# Patient Record
Sex: MALE | Race: WHITE | NOT HISPANIC OR LATINO | Employment: OTHER | URBAN - METROPOLITAN AREA
[De-identification: names, ages, dates, MRNs, and addresses within clinical notes are randomized per-mention and may not be internally consistent; named-entity substitution may affect disease eponyms.]

---

## 2017-01-10 ENCOUNTER — HOSPITAL ENCOUNTER (OUTPATIENT)
Dept: RADIOLOGY | Facility: MEDICAL CENTER | Age: 62
End: 2017-01-10
Attending: SURGERY
Payer: COMMERCIAL

## 2017-01-10 DIAGNOSIS — M79.606 PAIN OF LOWER EXTREMITY, UNSPECIFIED LATERALITY: ICD-10-CM

## 2017-01-10 DIAGNOSIS — M79.605 PAIN IN BOTH LOWER EXTREMITIES: ICD-10-CM

## 2017-01-10 DIAGNOSIS — M79.604 PAIN IN BOTH LOWER EXTREMITIES: ICD-10-CM

## 2017-01-10 DIAGNOSIS — R20.0 NUMBNESS: ICD-10-CM

## 2017-01-10 PROCEDURE — 93922 UPR/L XTREMITY ART 2 LEVELS: CPT | Mod: 26 | Performed by: SURGERY

## 2017-01-10 PROCEDURE — 93978 VASCULAR STUDY: CPT | Mod: 26 | Performed by: SURGERY

## 2017-01-10 PROCEDURE — 93978 VASCULAR STUDY: CPT

## 2017-01-10 PROCEDURE — 93880 EXTRACRANIAL BILAT STUDY: CPT

## 2017-01-10 PROCEDURE — 93922 UPR/L XTREMITY ART 2 LEVELS: CPT

## 2017-01-10 PROCEDURE — 93880 EXTRACRANIAL BILAT STUDY: CPT | Mod: 26 | Performed by: SURGERY

## 2017-01-17 ENCOUNTER — OFFICE VISIT (OUTPATIENT)
Dept: MEDICAL GROUP | Facility: PHYSICIAN GROUP | Age: 62
End: 2017-01-17
Payer: COMMERCIAL

## 2017-01-17 VITALS
TEMPERATURE: 98.4 F | HEIGHT: 70 IN | OXYGEN SATURATION: 95 % | SYSTOLIC BLOOD PRESSURE: 130 MMHG | DIASTOLIC BLOOD PRESSURE: 84 MMHG | WEIGHT: 148 LBS | BODY MASS INDEX: 21.19 KG/M2 | HEART RATE: 89 BPM

## 2017-01-17 DIAGNOSIS — I10 ESSENTIAL HYPERTENSION: ICD-10-CM

## 2017-01-17 DIAGNOSIS — F17.210 CIGARETTE NICOTINE DEPENDENCE WITHOUT COMPLICATION: ICD-10-CM

## 2017-01-17 DIAGNOSIS — I25.810 CORONARY ARTERY DISEASE INVOLVING AUTOLOGOUS VEIN CORONARY BYPASS GRAFT WITHOUT ANGINA PECTORIS: ICD-10-CM

## 2017-01-17 PROCEDURE — 99204 OFFICE O/P NEW MOD 45 MIN: CPT | Performed by: FAMILY MEDICINE

## 2017-01-17 RX ORDER — SIMVASTATIN 20 MG
20 TABLET ORAL EVERY EVENING
Qty: 90 TAB | Refills: 3 | Status: SHIPPED | OUTPATIENT
Start: 2017-01-17 | End: 2018-04-30 | Stop reason: SDUPTHER

## 2017-01-17 RX ORDER — LISINOPRIL 5 MG/1
5 TABLET ORAL DAILY
Qty: 30 TAB | Refills: 4 | Status: SHIPPED | OUTPATIENT
Start: 2017-01-17 | End: 2017-03-27 | Stop reason: SDUPTHER

## 2017-01-17 NOTE — ASSESSMENT & PLAN NOTE
Ongoing issue. Patient reports that he continues to smoke one pack per day. He reports that he has smoked as much as 2 packs per day in the past. He understands that this is a risk going forward. He states that he it wants to quit but at this point has not felt the motivation to actually quit.

## 2017-01-17 NOTE — ASSESSMENT & PLAN NOTE
Ongoing issue. Patient reports that he did have coronary artery bypass grafting in 2014. This is a two-vessel surgery. Patient reports that he has been following it was surgery but not with cardiology. He also reports that he has had stents placed in the past.    Of concern is the patient continues to smoke.

## 2017-01-17 NOTE — PROGRESS NOTES
Dale Dinero is a 61 y.o. male here for CAD; HTN; smoking  HPI:  Patient is here today to establish care; he recently has changed insurances and needed to find a new primary care provider. He is a 61-year-old male who presents with the following concerns:  CAD (coronary artery disease)  Ongoing issue. Patient reports that he did have coronary artery bypass grafting in 2014. This is a two-vessel surgery. Patient reports that he has been following it was surgery but not with cardiology. He also reports that he has had stents placed in the past.    Of concern is the patient continues to smoke.    Cigarette nicotine dependence without complication  Ongoing issue. Patient reports that he continues to smoke one pack per day. He reports that he has smoked as much as 2 packs per day in the past. He understands that this is a risk going forward. He states that he it wants to quit but at this point has not felt the motivation to actually quit.    Essential hypertension  Ongoing issue. Patient reports 100% compliance with medication; he is currently on lisinopril 5 mg daily. He denies any issues with dry Persistent cough, headache, dizziness, chest pain.    Current medicines (including changes today)  Current Outpatient Prescriptions   Medication Sig Dispense Refill   • lisinopril (PRINIVIL) 5 MG Tab Take 1 Tab by mouth every day. 30 Tab 4   • simvastatin (ZOCOR) 20 MG Tab Take 1 Tab by mouth every evening. 90 Tab 3   • aspirin  MG TBEC Take 1 Tab by mouth every day. 60 Tab    • cyclobenzaprine (FLEXERIL) 10 MG TABS Take 1 Tab by mouth 3 times a day as needed. 30 Tab 0   • hydrocodone-acetaminophen (NORCO) 5-325 MG TABS per tablet Take 1-2 Tabs by mouth every four hours as needed. 20 Tab 0   • albuterol (VENTOLIN OR PROVENTIL) 108 (90 BASE) MCG/ACT AERS inhalation aerosol Inhale 2 Puffs by mouth every 6 hours as needed for Shortness of Breath. 8.5 g 3     No current facility-administered medications for this visit.  "    He  has a past medical history of CAD (coronary artery disease); COPD (chronic obstructive pulmonary disease) (CMS-HCC); and Hep C w/o coma, chronic (CMS-HCC).  He  has past surgical history that includes meniscus repair (Left); multiple coronary artery bypass; and laminotomy.  Social History   Substance Use Topics   • Smoking status: Current Every Day Smoker -- 1.00 packs/day for 45 years     Types: Cigarettes   • Smokeless tobacco: Never Used   • Alcohol Use: Yes      Comment:  3 beers/daily     Social History     Social History Narrative     Family History   Problem Relation Age of Onset   • Other Mother      accident   • Cancer Father      prostate   • Diabetes Father    • Cancer Brother      lymphoma     Family Status   Relation Status Death Age   • Mother     • Father     • Sister Alive    • Brother Alive    • Sister Alive          ROS  12 point ROS completed and no pertinent positives identified  All other systems reviewed and are negative     Objective:     Blood pressure 130/84, pulse 89, temperature 36.9 °C (98.4 °F), height 1.778 m (5' 10\"), weight 67.132 kg (148 lb), SpO2 95 %. Body mass index is 21.24 kg/(m^2).  Physical Exam:    Constitutional: Alert, no distress.  Skin: Warm, dry, good turgor, no rashes in visible areas.  Eye: Equal, round and reactive, conjunctiva clear, lids normal.  ENMT: Lips without lesions, good dentition, oropharynx clear.  Neck: Trachea midline, no masses, no thyromegaly. No cervical or supraclavicular lymphadenopathy.  Respiratory: Unlabored respiratory effort, lungs clear to auscultation, no ronchi.mild expiratory wheeze to bilateral upper lobes  Cardiovascular: Normal S1, S2, no edema. Moderate systolic ejection murmur noted  Abdomen: Soft, non-tender, no masses, no hepatosplenomegaly.  Psych: Alert and oriented x3, normal affect and mood.  Neuro: CN 2-12 grossly intact      Assessment and Plan:   The following treatment plan was discussed     1. " Coronary artery disease involving autologous vein coronary bypass graft without angina pectoris  COMP METABOLIC PANEL    LIPID PROFILE    Stable. Patient will be started on simvastatin 20 mg as protection. Sent for labs in 3 months. Monitor   2. Essential hypertension  lisinopril (PRINIVIL) 5 MG Tab    Stable. Continue current medication; refills provided. Monitor   3. Cigarette nicotine dependence without complication      Uncontrolled. Encourage smoking cessation. Monitor        Records requested.  Followup: Return in about 3 months (around 4/17/2017) for lipids/medication.

## 2017-01-17 NOTE — ASSESSMENT & PLAN NOTE
Ongoing issue. Patient reports 100% compliance with medication; he is currently on lisinopril 5 mg daily. He denies any issues with dry Persistent cough, headache, dizziness, chest pain.

## 2017-03-27 DIAGNOSIS — I10 ESSENTIAL HYPERTENSION: ICD-10-CM

## 2017-03-27 RX ORDER — LISINOPRIL 5 MG/1
5 TABLET ORAL DAILY
Qty: 90 TAB | Refills: 1 | Status: SHIPPED | OUTPATIENT
Start: 2017-03-27 | End: 2017-06-23 | Stop reason: SDUPTHER

## 2017-03-27 NOTE — TELEPHONE ENCOUNTER
Was the patient seen in the last year in this department? Yes  1/17/17    Does patient have an active prescription for medications requested? No     Received Request Via: Pharmacy

## 2017-06-01 ENCOUNTER — OFFICE VISIT (OUTPATIENT)
Dept: URGENT CARE | Facility: PHYSICIAN GROUP | Age: 62
End: 2017-06-01
Payer: COMMERCIAL

## 2017-06-01 VITALS
HEIGHT: 70 IN | OXYGEN SATURATION: 96 % | SYSTOLIC BLOOD PRESSURE: 152 MMHG | BODY MASS INDEX: 21.19 KG/M2 | HEART RATE: 106 BPM | DIASTOLIC BLOOD PRESSURE: 90 MMHG | TEMPERATURE: 98.4 F | WEIGHT: 148 LBS

## 2017-06-01 DIAGNOSIS — J44.1 COPD EXACERBATION (HCC): ICD-10-CM

## 2017-06-01 PROCEDURE — 99214 OFFICE O/P EST MOD 30 MIN: CPT | Performed by: PHYSICIAN ASSISTANT

## 2017-06-01 RX ORDER — AZITHROMYCIN 250 MG/1
TABLET, FILM COATED ORAL
Qty: 6 TAB | Refills: 0 | Status: SHIPPED | OUTPATIENT
Start: 2017-06-01 | End: 2017-09-15

## 2017-06-01 RX ORDER — METHYLPREDNISOLONE 4 MG/1
TABLET ORAL
Qty: 1 KIT | Refills: 0 | Status: SHIPPED | OUTPATIENT
Start: 2017-06-01 | End: 2017-09-15

## 2017-06-01 RX ORDER — CODEINE PHOSPHATE AND GUAIFENESIN 10; 100 MG/5ML; MG/5ML
5 SOLUTION ORAL EVERY 4 HOURS PRN
Qty: 100 ML | Refills: 0 | Status: SHIPPED | OUTPATIENT
Start: 2017-06-01 | End: 2017-09-15

## 2017-06-01 RX ORDER — ALBUTEROL SULFATE 90 UG/1
2 AEROSOL, METERED RESPIRATORY (INHALATION) EVERY 6 HOURS PRN
Qty: 8.5 G | Refills: 0 | Status: SHIPPED | OUTPATIENT
Start: 2017-06-01 | End: 2017-09-15

## 2017-06-01 ASSESSMENT — ENCOUNTER SYMPTOMS
HEMOPTYSIS: 0
SPUTUM PRODUCTION: 1
WHEEZING: 1
SORE THROAT: 0
PALPITATIONS: 0
SHORTNESS OF BREATH: 1
COUGH: 1
CHILLS: 1
FEVER: 1

## 2017-06-01 ASSESSMENT — COPD QUESTIONNAIRES: COPD: 1

## 2017-06-01 NOTE — PROGRESS NOTES
Subjective:      Dale Dinero is a 62 y.o. male who presents with Cough            Cough  This is a new problem. The current episode started in the past 7 days. The problem has been gradually worsening. The cough is productive of sputum. Associated symptoms include chills, ear pain, a fever, shortness of breath and wheezing. Pertinent negatives include no chest pain, hemoptysis or sore throat. The symptoms are aggravated by lying down. He has tried OTC cough suppressant for the symptoms. The treatment provided no relief. His past medical history is significant for COPD.       Review of Systems   Constitutional: Positive for fever, chills and malaise/fatigue.   HENT: Positive for congestion and ear pain. Negative for sore throat.    Respiratory: Positive for cough, sputum production, shortness of breath and wheezing. Negative for hemoptysis.    Cardiovascular: Negative for chest pain and palpitations.     All other systems reviewed and are negative.  PMH:  has a past medical history of CAD (coronary artery disease); COPD (chronic obstructive pulmonary disease) (CMS-HCC); and Hep C w/o coma, chronic (CMS-HCC).  MEDS:   Current outpatient prescriptions:   •  azithromycin (ZITHROMAX) 250 MG Tab, Take 500 mg (two tablets) on day one and then take 250 mg (1 tablet) for 4 days., Disp: 6 Tab, Rfl: 0  •  guaifenesin-codeine (CHERATUSSIN AC) Solution oral solution, Take 5 mL by mouth every four hours as needed for Cough., Disp: 100 mL, Rfl: 0  •  MethylPREDNISolone (MEDROL DOSEPAK) 4 MG Tablet Therapy Pack, Take as directed on package, Disp: 1 Kit, Rfl: 0  •  albuterol 108 (90 BASE) MCG/ACT Aero Soln inhalation aerosol, Inhale 2 Puffs by mouth every 6 hours as needed for Shortness of Breath., Disp: 8.5 g, Rfl: 0  •  lisinopril (PRINIVIL) 5 MG Tab, Take 1 Tab by mouth every day., Disp: 90 Tab, Rfl: 1  •  simvastatin (ZOCOR) 20 MG Tab, Take 1 Tab by mouth every evening., Disp: 90 Tab, Rfl: 3  •  cyclobenzaprine (FLEXERIL)  "10 MG TABS, Take 1 Tab by mouth 3 times a day as needed., Disp: 30 Tab, Rfl: 0  •  hydrocodone-acetaminophen (NORCO) 5-325 MG TABS per tablet, Take 1-2 Tabs by mouth every four hours as needed., Disp: 20 Tab, Rfl: 0  •  aspirin  MG TBEC, Take 1 Tab by mouth every day., Disp: 60 Tab, Rfl:   ALLERGIES: No Known Allergies  SURGHX:   Past Surgical History   Procedure Laterality Date   • Meniscus repair Left    • Multiple coronary artery bypass     • Laminotomy       lumbar (rods)     SOCHX:  reports that he has been smoking Cigarettes.  He has a 45 pack-year smoking history. He has never used smokeless tobacco. He reports that he drinks alcohol. He reports that he does not use illicit drugs.  FH: Family history was reviewed, no pertinent findings to report  Medications, Allergies, and current problem list reviewed today in Epic       Objective:     /90 mmHg  Pulse 106  Temp(Src) 36.9 °C (98.4 °F)  Ht 1.778 m (5' 10\")  Wt 67.132 kg (148 lb)  BMI 21.24 kg/m2  SpO2 96%     Physical Exam   Constitutional: He is oriented to person, place, and time. He appears well-developed and well-nourished.   HENT:   Head: Normocephalic and atraumatic.   Right Ear: Hearing, tympanic membrane, external ear and ear canal normal.   Left Ear: Hearing, tympanic membrane, external ear and ear canal normal.   Nose: Nose normal.   Mouth/Throat: Uvula is midline, oropharynx is clear and moist and mucous membranes are normal.   Neck: Normal range of motion. Neck supple.   Cardiovascular: Normal rate, regular rhythm and normal heart sounds.  Exam reveals no gallop and no friction rub.    No murmur heard.  Pulmonary/Chest: Effort normal. No accessory muscle usage. No apnea, no tachypnea and no bradypnea. No respiratory distress. He has no decreased breath sounds. He has wheezes. He has no rhonchi. He has no rales. He exhibits no tenderness.   Neurological: He is alert and oriented to person, place, and time.   Skin: Skin is warm and " dry.   Psychiatric: He has a normal mood and affect. His behavior is normal. Judgment and thought content normal.   Vitals reviewed.              Assessment/Plan:     1. COPD exacerbation (CMS-[facility])    - azithromycin (ZITHROMAX) 250 MG Tab; Take 500 mg (two tablets) on day one and then take 250 mg (1 tablet) for 4 days.  Dispense: 6 Tab; Refill: 0  - guaifenesin-codeine (CHERATUSSIN AC) Solution oral solution; Take 5 mL by mouth every four hours as needed for Cough.  Dispense: 100 mL; Refill: 0  - MethylPREDNISolone (MEDROL DOSEPAK) 4 MG Tablet Therapy Pack; Take as directed on package  Dispense: 1 Kit; Refill: 0  - albuterol 108 (90 BASE) MCG/ACT Aero Soln inhalation aerosol; Inhale 2 Puffs by mouth every 6 hours as needed for Shortness of Breath.  Dispense: 8.5 g; Refill: 0    Differential diagnosis, natural history, supportive care, and indications for immediate follow-up discussed at length.   Follow-up with primary care provider within 4-5 days, emergency room precautions discussed.  Patient and/or family appears understanding of information.

## 2017-06-23 DIAGNOSIS — I10 ESSENTIAL HYPERTENSION: ICD-10-CM

## 2017-06-23 RX ORDER — LISINOPRIL 5 MG/1
5 TABLET ORAL DAILY
Qty: 90 TAB | Refills: 2 | Status: SHIPPED | OUTPATIENT
Start: 2017-06-23 | End: 2018-04-30 | Stop reason: SDUPTHER

## 2017-06-23 NOTE — TELEPHONE ENCOUNTER
Was the patient seen in the last year in this department? Yes  1/17/17    Does patient have an active prescription for medications requested? No     Received Request Via: Patient

## 2017-06-23 NOTE — TELEPHONE ENCOUNTER
Requested Prescriptions     Signed Prescriptions Disp Refills   • lisinopril (PRINIVIL) 5 MG Tab 90 Tab 2     Sig: Take 1 Tab by mouth every day.     Authorizing Provider: [facility]     Ordering User: TAMAR BENAVIDEZ A.P.R.N.

## 2017-07-27 ENCOUNTER — TELEPHONE (OUTPATIENT)
Dept: MEDICAL GROUP | Facility: PHYSICIAN GROUP | Age: 62
End: 2017-07-27

## 2017-07-27 NOTE — TELEPHONE ENCOUNTER
VOICEMAIL: 7/26/17 @ 4:09 pm  1. Caller Name: Dale Dinero                      Call Back Number: [phone] (home)     2. Message: Pt needs assistant in finding another doctor Pt stated he things he has another blood clot in his leg.    3. Patient approves office to leave a detailed voicemail/MyChart message: N\A

## 2017-07-27 NOTE — TELEPHONE ENCOUNTER
1. Caller Name: Dale Dinero                                         Call Back Number: [phone] (home)       Patient approves a detailed voicemail message: N\A    I called patient to see what was going on Pt stated he has an appointment with Doctor Jeannette Bills MD on 8/1/17. I advise him to keep appointment. I asked if he had any swelling or pain in leg Pt stated no. I advise him if he develops swelling and or pain to go to Urgent care or Main ER. Pt stated he understood.

## 2017-09-13 ENCOUNTER — APPOINTMENT (OUTPATIENT)
Dept: MEDICAL GROUP | Facility: PHYSICIAN GROUP | Age: 62
End: 2017-09-13
Payer: COMMERCIAL

## 2017-09-14 ENCOUNTER — TELEPHONE (OUTPATIENT)
Dept: MEDICAL GROUP | Facility: PHYSICIAN GROUP | Age: 62
End: 2017-09-14

## 2017-09-14 NOTE — TELEPHONE ENCOUNTER
ESTABLISHED PATIENT PRE-VISIT PLANNING     Note: Patient will not be contacted if there is no indication to call.     1.  Reviewed notes from the last few office visits within the medical group: Yes    2.  If any orders were placed at last visit or intended to be done for this visit (i.e. 6 mos follow-up), do we have Results/Consult Notes?        •  Labs - Labs ordered, completed and results are in chart.       •  Imaging - Imaging was not ordered at last office visit.       •  Referrals - No referrals were ordered at last office visit.    3. Is this appointment scheduled as a Hospital Follow-Up? No    4.  Immunizations were updated in [employer] using Web[employer]?: Yes       •  [employer] Recommendations: HEPATITIS A , HEPATITIS B and ZOSTAVAX (Shingles)    5.  Patient is due for the following Health Maintenance Topics:   Health Maintenance Due   Topic Date Due   • IMM DTaP/Tdap/Td Vaccine (1 - Tdap) 03/08/1976   • IMM PNEUMOCOCCAL 19-64 (ADULT) MEDIUM RISK SERIES (1 of 1 - PPSV23) 03/08/1976   • COLONOSCOPY  03/08/2007   • PFT SCREENING-FEV1 AND FEV/FVC RATIO / SPIROMETRY SHOULD BE PERFORMED ANNUALLY  05/28/2016   • IMM ZOSTER VACCINE  03/08/2017   • IMM INFLUENZA (1) 09/01/2017           6.  Patient was informed to arrive 15 min prior to their scheduled appointment and bring in their medication bottles.

## 2017-09-15 ENCOUNTER — OFFICE VISIT (OUTPATIENT)
Dept: MEDICAL GROUP | Facility: PHYSICIAN GROUP | Age: 62
End: 2017-09-15
Payer: COMMERCIAL

## 2017-09-15 VITALS
OXYGEN SATURATION: 94 % | DIASTOLIC BLOOD PRESSURE: 84 MMHG | HEIGHT: 70 IN | WEIGHT: 146 LBS | TEMPERATURE: 98.2 F | HEART RATE: 89 BPM | SYSTOLIC BLOOD PRESSURE: 136 MMHG | RESPIRATION RATE: 16 BRPM | BODY MASS INDEX: 20.9 KG/M2

## 2017-09-15 DIAGNOSIS — B18.2 CHRONIC HEPATITIS C WITHOUT HEPATIC COMA (HCC): ICD-10-CM

## 2017-09-15 DIAGNOSIS — I10 ESSENTIAL HYPERTENSION: ICD-10-CM

## 2017-09-15 DIAGNOSIS — F17.210 CIGARETTE NICOTINE DEPENDENCE WITHOUT COMPLICATION: ICD-10-CM

## 2017-09-15 DIAGNOSIS — I25.810 CORONARY ARTERY DISEASE INVOLVING AUTOLOGOUS VEIN CORONARY BYPASS GRAFT WITHOUT ANGINA PECTORIS: ICD-10-CM

## 2017-09-15 PROCEDURE — 99214 OFFICE O/P EST MOD 30 MIN: CPT | Performed by: FAMILY MEDICINE

## 2017-09-15 ASSESSMENT — PATIENT HEALTH QUESTIONNAIRE - PHQ9: CLINICAL INTERPRETATION OF PHQ2 SCORE: 0

## 2017-09-15 NOTE — ASSESSMENT & PLAN NOTE
Ongoing issue. Patient continues to smoke about a pack a day. When asked he reports that he is not motivated to quit at this time.

## 2017-09-15 NOTE — ASSESSMENT & PLAN NOTE
Ongoing issue. Patient reports 100% compliance with lisinopril 5 mg daily. Patient denies any issues with dry cough, headache, dizziness, chest pain. Chart review shows that his blood pressure and heart rate both are in a normal range for his age

## 2017-09-15 NOTE — ASSESSMENT & PLAN NOTE
Ongoing issue. Patient has a long-standing history of coronary artery disease. He is well-controlled with hypertension medicine and currently is on simvastatin for cholesterol management. Previous lab work shows that his lipid panel was within the normal range. Patient continues to have a risk factor of smoking.

## 2017-09-15 NOTE — ASSESSMENT & PLAN NOTE
Ongoing issue. Patient is reporting that he has a long-standing history of hepatitis C. He states in the past he was managed by hematologist and has received phlebotomy treatments. He would like to establish with a new hematologist for treatment.

## 2017-09-15 NOTE — PROGRESS NOTES
Subjective:   Dale Dinero is a 62 y.o. male here today for Hypertension, coronary artery disease, hepatitis C, smoking    Essential hypertension  Ongoing issue. Patient reports 100% compliance with lisinopril 5 mg daily. Patient denies any issues with dry cough, headache, dizziness, chest pain. Chart review shows that his blood pressure and heart rate both are in a normal range for his age    Chronic hepatitis C without hepatic coma (CMS-HCC)  Ongoing issue. Patient is reporting that he has a long-standing history of hepatitis C. He states in the past he was managed by hematologist and has received phlebotomy treatments. He would like to establish with a new hematologist for treatment.    CAD (coronary artery disease)  Ongoing issue. Patient has a long-standing history of coronary artery disease. He is well-controlled with hypertension medicine and currently is on simvastatin for cholesterol management. Previous lab work shows that his lipid panel was within the normal range. Patient continues to have a risk factor of smoking.    Cigarette nicotine dependence without complication  Ongoing issue. Patient continues to smoke about a pack a day. When asked he reports that he is not motivated to quit at this time.         Current medicines (including changes today)  Current Outpatient Prescriptions   Medication Sig Dispense Refill   • lisinopril (PRINIVIL) 5 MG Tab Take 1 Tab by mouth every day. 90 Tab 2   • simvastatin (ZOCOR) 20 MG Tab Take 1 Tab by mouth every evening. 90 Tab 3   • aspirin  MG TBEC Take 1 Tab by mouth every day. 60 Tab      No current facility-administered medications for this visit.      He  has a past medical history of CAD (coronary artery disease); COPD (chronic obstructive pulmonary disease) (CMS-HCC); and Hep C w/o coma, chronic (CMS-HCC).    ROS   No chest pain, no shortness of breath, no abdominal pain  +Skin lesions     Objective:     Blood pressure 136/84, pulse 89, temperature  "36.8 °C (98.2 °F), resp. rate 16, height 1.778 m (5' 10\"), weight 66.2 kg (146 lb), SpO2 94 %. Body mass index is 20.95 kg/m².   Physical Exam:  Alert, oriented in no acute distress.  Eye contact is good, speech goal directed, affect calm  HEENT: conjunctiva non-injected, sclera non-icteric.  Pinna normal. TM pearly gray.   Oral mucous membranes pink and moist with no lesions.  Neck No adenopathy or masses in the neck or supraclavicular regions.  Lungs: Mild expiratory wheeze appreciated bilateral upper lobes  CV: regular rate and rhythm. Moderate systolic ejection murmurs appreciated  Abdomen: soft, nontender, No CVAT  Ext: no edema, color normal, vascularity normal, temperature normal  Skin: Multiple open sores over both hands      Assessment and Plan:   The following treatment plan was discussed     1. Chronic hepatitis C without hepatic coma (CMS-HCC)  REFERRAL TO HEMATOLOGY ONCOLOGY Referral to? [facility]    Stable. Patient requesting referral to hematology for further treatment.   2. Essential hypertension  COMP METABOLIC PANEL    MICROALBUMIN CREAT RATIO URINE    LIPID PROFILE    VITAMIN D,25 HYDROXY    Stable. Continue current medications; sent for labs and monitor for results   3. Coronary artery disease involving autologous vein coronary bypass graft without angina pectoris      Stable. Continue current medications; sent for labs and monitor for results   4. Cigarette nicotine dependence without complication      Uncontrolled. Continue to encourage smoking cessation       Followup: Return in about 6 months (around 3/15/2018) for HTN/medication, Short.            "

## 2017-09-16 ENCOUNTER — HOSPITAL ENCOUNTER (OUTPATIENT)
Dept: LAB | Facility: MEDICAL CENTER | Age: 62
End: 2017-09-16
Attending: FAMILY MEDICINE
Payer: COMMERCIAL

## 2017-09-16 DIAGNOSIS — I10 ESSENTIAL HYPERTENSION: ICD-10-CM

## 2017-09-16 LAB
25(OH)D3 SERPL-MCNC: 25 NG/ML (ref 30–100)
ALBUMIN SERPL BCP-MCNC: 4.2 G/DL (ref 3.2–4.9)
ALBUMIN/GLOB SERPL: 1.3 G/DL
ALP SERPL-CCNC: 51 U/L (ref 30–99)
ALT SERPL-CCNC: 29 U/L (ref 2–50)
ANION GAP SERPL CALC-SCNC: 10 MMOL/L (ref 0–11.9)
AST SERPL-CCNC: 68 U/L (ref 12–45)
BILIRUB SERPL-MCNC: 0.9 MG/DL (ref 0.1–1.5)
BUN SERPL-MCNC: 8 MG/DL (ref 8–22)
CALCIUM SERPL-MCNC: 9.1 MG/DL (ref 8.5–10.5)
CHLORIDE SERPL-SCNC: 107 MMOL/L (ref 96–112)
CHOLEST SERPL-MCNC: 123 MG/DL (ref 100–199)
CO2 SERPL-SCNC: 25 MMOL/L (ref 20–33)
CREAT SERPL-MCNC: 0.56 MG/DL (ref 0.5–1.4)
CREAT UR-MCNC: 228 MG/DL
GFR SERPL CREATININE-BSD FRML MDRD: >60 ML/MIN/1.73 M 2
GLOBULIN SER CALC-MCNC: 3.3 G/DL (ref 1.9–3.5)
GLUCOSE SERPL-MCNC: 79 MG/DL (ref 65–99)
HDLC SERPL-MCNC: 69 MG/DL
LDLC SERPL CALC-MCNC: 43 MG/DL
MICROALBUMIN UR-MCNC: 19.8 MG/DL
MICROALBUMIN/CREAT UR: 87 MG/G (ref 0–30)
POTASSIUM SERPL-SCNC: 4.1 MMOL/L (ref 3.6–5.5)
PROT SERPL-MCNC: 7.5 G/DL (ref 6–8.2)
SODIUM SERPL-SCNC: 142 MMOL/L (ref 135–145)
TRIGL SERPL-MCNC: 53 MG/DL (ref 0–149)

## 2017-09-16 PROCEDURE — 36415 COLL VENOUS BLD VENIPUNCTURE: CPT

## 2017-09-16 PROCEDURE — 82306 VITAMIN D 25 HYDROXY: CPT

## 2017-09-16 PROCEDURE — 82043 UR ALBUMIN QUANTITATIVE: CPT

## 2017-09-16 PROCEDURE — 80053 COMPREHEN METABOLIC PANEL: CPT

## 2017-09-16 PROCEDURE — 80061 LIPID PANEL: CPT

## 2017-09-16 PROCEDURE — 82570 ASSAY OF URINE CREATININE: CPT

## 2017-09-19 ENCOUNTER — TELEPHONE (OUTPATIENT)
Dept: MEDICAL GROUP | Facility: PHYSICIAN GROUP | Age: 62
End: 2017-09-19

## 2017-09-19 DIAGNOSIS — B18.2 CHRONIC HEPATITIS C WITHOUT HEPATIC COMA (HCC): ICD-10-CM

## 2017-09-19 NOTE — TELEPHONE ENCOUNTER
Please let the patient know that the referral was declined. Please ask him who he has seen previously for phlebotomy and we would need to get those records for further evaluation.    Lisa Velásquez D.O.

## 2017-09-19 NOTE — TELEPHONE ENCOUNTER
I informed patient about lab work. He is still asking about getting a phleb done. It looks like the referral you placed to Hematology was canceled by the physcian who reviewed it.       Per Dr. Oconnell he did not feel this was an appropriate specialty for this referral.  Gave to Boaz to review and contact the office.

## 2017-09-19 NOTE — TELEPHONE ENCOUNTER
----- Message from Lisa Velásquez D.O. sent at 9/19/2017  2:32 PM PDT -----  Please advise pt that all labs are in a normal/acceptable range except: AST (liver enzyme), vitamin D.  For the elevated liver enzyme, this is something we can reevaluate in 6 months. At that time we will recheck this lab and determine if anything further needs to be done.  For the vitamin D level, recommend over-the-counter vitamin D 2000 international units twice daily.  If you have any questions, please let me know.    Lisa Velásquez D.O.

## 2017-09-21 ENCOUNTER — HOSPITAL ENCOUNTER (OUTPATIENT)
Dept: LAB | Facility: MEDICAL CENTER | Age: 62
End: 2017-09-21
Attending: FAMILY MEDICINE
Payer: COMMERCIAL

## 2017-09-21 DIAGNOSIS — B18.2 CHRONIC HEPATITIS C WITHOUT HEPATIC COMA (HCC): ICD-10-CM

## 2017-09-21 LAB
BASOPHILS # BLD AUTO: 0.6 % (ref 0–1.8)
BASOPHILS # BLD: 0.04 K/UL (ref 0–0.12)
EOSINOPHIL # BLD AUTO: 0.05 K/UL (ref 0–0.51)
EOSINOPHIL NFR BLD: 0.8 % (ref 0–6.9)
ERYTHROCYTE [DISTWIDTH] IN BLOOD BY AUTOMATED COUNT: 43.3 FL (ref 35.9–50)
FERRITIN SERPL-MCNC: 300.2 NG/ML (ref 22–322)
HCT VFR BLD AUTO: 47.3 % (ref 42–52)
HGB BLD-MCNC: 16 G/DL (ref 14–18)
IMM GRANULOCYTES # BLD AUTO: 0.02 K/UL (ref 0–0.11)
IMM GRANULOCYTES NFR BLD AUTO: 0.3 % (ref 0–0.9)
IRON SATN MFR SERPL: 20 % (ref 15–55)
IRON SERPL-MCNC: 75 UG/DL (ref 50–180)
LYMPHOCYTES # BLD AUTO: 2.35 K/UL (ref 1–4.8)
LYMPHOCYTES NFR BLD: 37.7 % (ref 22–41)
MCH RBC QN AUTO: 32 PG (ref 27–33)
MCHC RBC AUTO-ENTMCNC: 33.8 G/DL (ref 33.7–35.3)
MCV RBC AUTO: 94.6 FL (ref 81.4–97.8)
MONOCYTES # BLD AUTO: 0.58 K/UL (ref 0–0.85)
MONOCYTES NFR BLD AUTO: 9.3 % (ref 0–13.4)
NEUTROPHILS # BLD AUTO: 3.19 K/UL (ref 1.82–7.42)
NEUTROPHILS NFR BLD: 51.3 % (ref 44–72)
NRBC # BLD AUTO: 0 K/UL
NRBC BLD AUTO-RTO: 0 /100 WBC
PLATELET # BLD AUTO: 153 K/UL (ref 164–446)
PMV BLD AUTO: 11.1 FL (ref 9–12.9)
RBC # BLD AUTO: 5 M/UL (ref 4.7–6.1)
TIBC SERPL-MCNC: 375 UG/DL (ref 250–450)
WBC # BLD AUTO: 6.2 K/UL (ref 4.8–10.8)

## 2017-09-21 PROCEDURE — 82728 ASSAY OF FERRITIN: CPT

## 2017-09-21 PROCEDURE — 85025 COMPLETE CBC W/AUTO DIFF WBC: CPT

## 2017-09-21 PROCEDURE — 36415 COLL VENOUS BLD VENIPUNCTURE: CPT

## 2017-09-21 PROCEDURE — 83540 ASSAY OF IRON: CPT

## 2017-09-21 PROCEDURE — 83550 IRON BINDING TEST: CPT

## 2017-09-28 ENCOUNTER — TELEPHONE (OUTPATIENT)
Dept: MEDICAL GROUP | Facility: PHYSICIAN GROUP | Age: 62
End: 2017-09-28

## 2017-09-28 NOTE — TELEPHONE ENCOUNTER
It looks like the labs that the hematologist requested came back. [facility] doesn't have anything stating a patient got a phlebotomy 10+ years ago. Dale is extremely upset about this process. I told him we have ordered the labs and placed the referral. I am not sure what else we can do.      Per Dr. Sandra:     I talked to Dr. Velásquez & suggested she get CBC, Ferritin & old records & staff message me with results. I will help her with patient.

## 2017-10-03 ENCOUNTER — TELEPHONE (OUTPATIENT)
Dept: MEDICAL GROUP | Facility: PHYSICIAN GROUP | Age: 62
End: 2017-10-03

## 2017-10-03 DIAGNOSIS — L57.0 AK (ACTINIC KERATOSIS): ICD-10-CM

## 2017-10-03 NOTE — TELEPHONE ENCOUNTER
Tamir called asking if he can see the doctor that he originally saw years ago. Can we please send a referral?     [facility]    [address], [place], [address], [place] [id number] [phone]

## 2017-10-12 ENCOUNTER — APPOINTMENT (RX ONLY)
Dept: URBAN - METROPOLITAN AREA CLINIC 4 | Facility: CLINIC | Age: 62
Setting detail: DERMATOLOGY
End: 2017-10-12

## 2017-10-12 DIAGNOSIS — E80.1 PORPHYRIA CUTANEA TARDA: ICD-10-CM

## 2017-10-12 PROBLEM — Z85.820 PERSONAL HISTORY OF MALIGNANT MELANOMA OF SKIN: Status: ACTIVE | Noted: 2017-10-12

## 2017-10-12 PROBLEM — B17.10 ACUTE HEPATITIS C WITHOUT HEPATIC COMA: Status: ACTIVE | Noted: 2017-10-12

## 2017-10-12 PROCEDURE — ? COUNSELING

## 2017-10-12 PROCEDURE — 99201: CPT

## 2017-10-12 NOTE — HPI: EVALUATION OF SKIN LESION(S)
How Severe Are Your Spot(S)?: mild
Have Your Spot(S) Been Treated In The Past?: has been treated
Hpi Title: Evaluation of a Skin Lesion
Additional History: Patient with history of same symptoms, een and evaluated here 3  years ago, had full work up and diagnosed with PCT, had theraputic phlebotomy which seemed to improve symptoms. Patient is not under the care of DHA/GI specialist at this time, PCP is following him for Hep C.
When Was It Treated?: 10/14/2013
Family Member: Sister
Location: Upper back
Year Removed: 1900

## 2017-10-25 ENCOUNTER — RX ONLY (OUTPATIENT)
Age: 62
Setting detail: RX ONLY
End: 2017-10-25

## 2017-10-25 RX ORDER — HYDROXYCHLOROQUINE SULFATE 200 MG/1
TABLET, FILM COATED ORAL
Qty: 30 | Refills: 2 | Status: ERX | COMMUNITY
Start: 2017-10-25

## 2018-04-30 DIAGNOSIS — I10 ESSENTIAL HYPERTENSION: ICD-10-CM

## 2018-04-30 RX ORDER — LISINOPRIL 5 MG/1
5 TABLET ORAL DAILY
Qty: 90 TAB | Refills: 2 | Status: SHIPPED | OUTPATIENT
Start: 2018-04-30 | End: 2020-02-27 | Stop reason: SDUPTHER

## 2018-04-30 RX ORDER — SIMVASTATIN 20 MG
20 TABLET ORAL EVERY EVENING
Qty: 90 TAB | Refills: 2 | Status: SHIPPED | OUTPATIENT
Start: 2018-04-30 | End: 2020-03-18 | Stop reason: SDUPTHER

## 2018-04-30 NOTE — TELEPHONE ENCOUNTER
Was the patient seen in the last year in this department? Yes LOV 09/15/17  Does patient have an active prescription for medications requested? No     Received Request Via: Pharmacy

## 2018-08-15 ENCOUNTER — HOSPITAL ENCOUNTER (OUTPATIENT)
Dept: RADIOLOGY | Facility: MEDICAL CENTER | Age: 63
End: 2018-08-15
Attending: SURGERY
Payer: COMMERCIAL

## 2018-08-15 DIAGNOSIS — R20.0 TACTILE ANESTHESIA: ICD-10-CM

## 2018-08-15 DIAGNOSIS — M79.606 PAIN OF LOWER EXTREMITY, UNSPECIFIED LATERALITY: ICD-10-CM

## 2018-08-15 PROCEDURE — 93978 VASCULAR STUDY: CPT

## 2018-08-15 PROCEDURE — 93978 VASCULAR STUDY: CPT | Mod: 26 | Performed by: SURGERY

## 2018-08-15 PROCEDURE — 93922 UPR/L XTREMITY ART 2 LEVELS: CPT | Mod: 26 | Performed by: SURGERY

## 2018-08-15 PROCEDURE — 93922 UPR/L XTREMITY ART 2 LEVELS: CPT

## 2018-10-03 ENCOUNTER — OFFICE VISIT (OUTPATIENT)
Dept: MEDICAL GROUP | Facility: PHYSICIAN GROUP | Age: 63
End: 2018-10-03
Payer: COMMERCIAL

## 2018-10-03 VITALS
WEIGHT: 159 LBS | DIASTOLIC BLOOD PRESSURE: 86 MMHG | TEMPERATURE: 97.3 F | BODY MASS INDEX: 22.76 KG/M2 | HEART RATE: 90 BPM | OXYGEN SATURATION: 96 % | SYSTOLIC BLOOD PRESSURE: 138 MMHG | HEIGHT: 70 IN

## 2018-10-03 DIAGNOSIS — E78.5 DYSLIPIDEMIA: ICD-10-CM

## 2018-10-03 DIAGNOSIS — B07.8 OTHER VIRAL WARTS: ICD-10-CM

## 2018-10-03 DIAGNOSIS — I65.23 BILATERAL CAROTID ARTERY STENOSIS: ICD-10-CM

## 2018-10-03 DIAGNOSIS — I10 ESSENTIAL HYPERTENSION: ICD-10-CM

## 2018-10-03 DIAGNOSIS — L84 CALLUS OF FOOT: ICD-10-CM

## 2018-10-03 DIAGNOSIS — R53.83 OTHER FATIGUE: ICD-10-CM

## 2018-10-03 DIAGNOSIS — I25.810 CORONARY ARTERY DISEASE INVOLVING AUTOLOGOUS VEIN CORONARY BYPASS GRAFT WITHOUT ANGINA PECTORIS: ICD-10-CM

## 2018-10-03 PROCEDURE — 99214 OFFICE O/P EST MOD 30 MIN: CPT | Performed by: FAMILY MEDICINE

## 2018-10-03 NOTE — ASSESSMENT & PLAN NOTE
Ongoing issues; patient currently compliant with lisinopril 5 mg daily; current blood pressure is not within a recommended range.  Patient has been following cardiology for this issue.  Currently denies any headache, dizziness, chest pain.

## 2018-10-03 NOTE — ASSESSMENT & PLAN NOTE
Ongoing issues; patient reports that he is doing well; recent follow with cardiology showed no significant blockage to the coronary arteries at this time.  Currently denies any chest pain, syncope, shortness of breath.  Patient does continue to smoke.

## 2018-10-03 NOTE — ASSESSMENT & PLAN NOTE
This problem is new to me.  Patient reports that he has a wart on the palm of both hands.  He states it is been there for several months.  He is tried over-the-counter treatments but nothing has been beneficial.

## 2018-10-03 NOTE — ASSESSMENT & PLAN NOTE
This problem is new to me.  Patient is reporting that he is having fatigue during the day.  He reports that he sleeps well during the night and currently denies any issues with adduction or sleep maintenance.  He reports that the fatigue is to the point that he feels very tired and needs to take a nap during the day.  Otherwise evaluation of chart does not reveal any significant findings.

## 2018-10-03 NOTE — PROGRESS NOTES
Subjective:   Dale Dinero is a 63 y.o. male here today for coronary artery disease, hypertension, hyperlipidemia, callus on foot, warts on hand    CAD (coronary artery disease)  Ongoing issues; patient reports that he is doing well; recent follow with cardiology showed no significant blockage to the coronary arteries at this time.  Currently denies any chest pain, syncope, shortness of breath.  Patient does continue to smoke.    Essential hypertension  Ongoing issues; patient currently compliant with lisinopril 5 mg daily; current blood pressure is not within a recommended range.  Patient has been following cardiology for this issue.  Currently denies any headache, dizziness, chest pain.    Other fatigue  This problem is new to me.  Patient is reporting that he is having fatigue during the day.  He reports that he sleeps well during the night and currently denies any issues with adduction or sleep maintenance.  He reports that the fatigue is to the point that he feels very tired and needs to take a nap during the day.  Otherwise evaluation of chart does not reveal any significant findings.    Dyslipidemia  Ongoing issues; patient compliant with simvastatin 20 mg daily and currently denies any muscle cramps.  Previous lab work was within a recommended range.    Callus of foot  This problem is new to me.  Patient is reporting a callus on the medial side of the left great toe.  He states that it has gotten slightly larger.  He is curious as to what he needs to do to help get rid of it since it presses on issue and causes some pain.    Other viral warts  This problem is new to me.  Patient reports that he has a wart on the palm of both hands.  He states it is been there for several months.  He is tried over-the-counter treatments but nothing has been beneficial.         Current medicines (including changes today)  Current Outpatient Prescriptions   Medication Sig Dispense Refill   • lisinopril (PRINIVIL) 5 MG Tab  "Take 1 Tab by mouth every day. 90 Tab 2   • simvastatin (ZOCOR) 20 MG Tab Take 1 Tab by mouth every evening. 90 Tab 2   • aspirin  MG TBEC Take 1 Tab by mouth every day. 60 Tab      No current facility-administered medications for this visit.      He  has a past medical history of CAD (coronary artery disease); COPD (chronic obstructive pulmonary disease) (HCC); and Hep C w/o coma, chronic (HCC).    ROS   No chest pain, no shortness of breath, no abdominal pain  + Fatigue     Objective:     Blood pressure 138/86, pulse 90, temperature 36.3 °C (97.3 °F), height 1.778 m (5' 10\"), weight 72.1 kg (159 lb), SpO2 96 %. Body mass index is 22.81 kg/m².   Physical Exam:  Alert, oriented in no acute distress.  Eye contact is good, speech goal directed, affect calm  HEENT: conjunctiva non-injected, sclera non-icteric.  Pinna normal. Oral mucous membranes pink and moist with no lesions.  Neck No adenopathy or masses in the neck or supraclavicular regions.  Lungs: clear to auscultation bilaterally with good excursion.  CV: regular rate and rhythm.  Mild systolic ejection murmur  Abdomen: soft, nontender, No CVAT  Ext: no edema, color normal, vascularity normal, temperature normal        Assessment and Plan:   The following treatment plan was discussed   1. Other fatigue  CBC WITH DIFFERENTIAL    TSH    Unknown origin; labs ordered for evaluation; monitor for results   2. Essential hypertension  COMP METABOLIC PANEL    MICROALBUMIN CREAT RATIO URINE    Uncontrolled; recommend patient follow with cardiology for medication adjustment.   3. Coronary artery disease involving autologous vein coronary bypass graft without angina pectoris  US-CAROTID DOPPLER BILAT    Stable.  Continue follow with cardiology as directed   4. Dyslipidemia  LIPID PROFILE    Stable.  Continue current medications; labs ordered for evaluation; monitor for results   5. Callus of foot      Uncontrolled; patient will try conservative home therapy of warm " salt water soaks along with filing; monitor   6. Other viral warts      Uncontrolled; patient tolerated cryo procedure today; please see note above   7. Bilateral carotid artery stenosis      Carotid ultrasound ordered for further evaluation; due to fatigue, rule out any blood flow issue       Followup: Return in about 6 months (around 4/3/2019) for Medication/lab review, Short.

## 2018-10-03 NOTE — ASSESSMENT & PLAN NOTE
Ongoing issues; patient compliant with simvastatin 20 mg daily and currently denies any muscle cramps.  Previous lab work was within a recommended range.

## 2018-10-03 NOTE — ASSESSMENT & PLAN NOTE
This problem is new to me.  Patient is reporting a callus on the medial side of the left great toe.  He states that it has gotten slightly larger.  He is curious as to what he needs to do to help get rid of it since it presses on issue and causes some pain.

## 2018-10-04 ENCOUNTER — HOSPITAL ENCOUNTER (OUTPATIENT)
Dept: RADIOLOGY | Facility: MEDICAL CENTER | Age: 63
End: 2018-10-04
Attending: FAMILY MEDICINE
Payer: COMMERCIAL

## 2018-10-04 ENCOUNTER — HOSPITAL ENCOUNTER (OUTPATIENT)
Dept: LAB | Facility: MEDICAL CENTER | Age: 63
End: 2018-10-04
Attending: FAMILY MEDICINE
Payer: COMMERCIAL

## 2018-10-04 DIAGNOSIS — I25.810 CORONARY ARTERY DISEASE INVOLVING AUTOLOGOUS VEIN CORONARY BYPASS GRAFT WITHOUT ANGINA PECTORIS: ICD-10-CM

## 2018-10-04 DIAGNOSIS — E78.5 DYSLIPIDEMIA: ICD-10-CM

## 2018-10-04 DIAGNOSIS — R53.83 OTHER FATIGUE: ICD-10-CM

## 2018-10-04 DIAGNOSIS — I10 ESSENTIAL HYPERTENSION: ICD-10-CM

## 2018-10-04 LAB
ALBUMIN SERPL BCP-MCNC: 4.1 G/DL (ref 3.2–4.9)
ALBUMIN/GLOB SERPL: 1.3 G/DL
ALP SERPL-CCNC: 49 U/L (ref 30–99)
ALT SERPL-CCNC: 12 U/L (ref 2–50)
ANION GAP SERPL CALC-SCNC: 6 MMOL/L (ref 0–11.9)
AST SERPL-CCNC: 17 U/L (ref 12–45)
BASOPHILS # BLD AUTO: 0.6 % (ref 0–1.8)
BASOPHILS # BLD: 0.04 K/UL (ref 0–0.12)
BILIRUB SERPL-MCNC: 0.6 MG/DL (ref 0.1–1.5)
BUN SERPL-MCNC: 12 MG/DL (ref 8–22)
CALCIUM SERPL-MCNC: 9.7 MG/DL (ref 8.5–10.5)
CHLORIDE SERPL-SCNC: 109 MMOL/L (ref 96–112)
CHOLEST SERPL-MCNC: 109 MG/DL (ref 100–199)
CO2 SERPL-SCNC: 26 MMOL/L (ref 20–33)
CREAT SERPL-MCNC: 0.83 MG/DL (ref 0.5–1.4)
CREAT UR-MCNC: 238.3 MG/DL
EOSINOPHIL # BLD AUTO: 0.14 K/UL (ref 0–0.51)
EOSINOPHIL NFR BLD: 2.2 % (ref 0–6.9)
ERYTHROCYTE [DISTWIDTH] IN BLOOD BY AUTOMATED COUNT: 43.8 FL (ref 35.9–50)
GLOBULIN SER CALC-MCNC: 3.1 G/DL (ref 1.9–3.5)
GLUCOSE SERPL-MCNC: 82 MG/DL (ref 65–99)
HCT VFR BLD AUTO: 46.5 % (ref 42–52)
HDLC SERPL-MCNC: 34 MG/DL
HGB BLD-MCNC: 15.6 G/DL (ref 14–18)
IMM GRANULOCYTES # BLD AUTO: 0.02 K/UL (ref 0–0.11)
IMM GRANULOCYTES NFR BLD AUTO: 0.3 % (ref 0–0.9)
LDLC SERPL CALC-MCNC: 60 MG/DL
LYMPHOCYTES # BLD AUTO: 2.42 K/UL (ref 1–4.8)
LYMPHOCYTES NFR BLD: 37.8 % (ref 22–41)
MCH RBC QN AUTO: 30.8 PG (ref 27–33)
MCHC RBC AUTO-ENTMCNC: 33.5 G/DL (ref 33.7–35.3)
MCV RBC AUTO: 91.7 FL (ref 81.4–97.8)
MICROALBUMIN UR-MCNC: 2.3 MG/DL
MICROALBUMIN/CREAT UR: 10 MG/G (ref 0–30)
MONOCYTES # BLD AUTO: 0.7 K/UL (ref 0–0.85)
MONOCYTES NFR BLD AUTO: 10.9 % (ref 0–13.4)
NEUTROPHILS # BLD AUTO: 3.08 K/UL (ref 1.82–7.42)
NEUTROPHILS NFR BLD: 48.2 % (ref 44–72)
NRBC # BLD AUTO: 0 K/UL
NRBC BLD-RTO: 0 /100 WBC
PLATELET # BLD AUTO: 223 K/UL (ref 164–446)
PMV BLD AUTO: 10.4 FL (ref 9–12.9)
POTASSIUM SERPL-SCNC: 4.5 MMOL/L (ref 3.6–5.5)
PROT SERPL-MCNC: 7.2 G/DL (ref 6–8.2)
RBC # BLD AUTO: 5.07 M/UL (ref 4.7–6.1)
SODIUM SERPL-SCNC: 141 MMOL/L (ref 135–145)
TRIGL SERPL-MCNC: 77 MG/DL (ref 0–149)
TSH SERPL DL<=0.005 MIU/L-ACNC: 1.63 UIU/ML (ref 0.38–5.33)
WBC # BLD AUTO: 6.4 K/UL (ref 4.8–10.8)

## 2018-10-04 PROCEDURE — 80053 COMPREHEN METABOLIC PANEL: CPT

## 2018-10-04 PROCEDURE — 84443 ASSAY THYROID STIM HORMONE: CPT

## 2018-10-04 PROCEDURE — 85025 COMPLETE CBC W/AUTO DIFF WBC: CPT

## 2018-10-04 PROCEDURE — 93880 EXTRACRANIAL BILAT STUDY: CPT

## 2018-10-04 PROCEDURE — 82043 UR ALBUMIN QUANTITATIVE: CPT

## 2018-10-04 PROCEDURE — 36415 COLL VENOUS BLD VENIPUNCTURE: CPT

## 2018-10-04 PROCEDURE — 80061 LIPID PANEL: CPT

## 2018-10-04 PROCEDURE — 82570 ASSAY OF URINE CREATININE: CPT

## 2018-10-11 ENCOUNTER — TELEPHONE (OUTPATIENT)
Dept: MEDICAL GROUP | Facility: PHYSICIAN GROUP | Age: 63
End: 2018-10-11

## 2018-10-11 RX ORDER — SILDENAFIL 50 MG/1
50 TABLET, FILM COATED ORAL PRN
Qty: 10 TAB | Refills: 3 | Status: SHIPPED | OUTPATIENT
Start: 2018-10-11 | End: 2020-01-16 | Stop reason: SDUPTHER

## 2018-10-11 NOTE — TELEPHONE ENCOUNTER
I called to inform him of his US results     Dale said he mentioned his ED to you in his last appt. He said we did a US to check his blood flow    Since his blood flow if fine he wants to know what to do about the ED

## 2018-10-29 ENCOUNTER — IMMUNIZATION (OUTPATIENT)
Dept: SOCIAL WORK | Facility: CLINIC | Age: 63
End: 2018-10-29
Payer: COMMERCIAL

## 2018-10-29 DIAGNOSIS — Z23 NEED FOR VACCINATION: ICD-10-CM

## 2018-10-29 PROCEDURE — 90471 IMMUNIZATION ADMIN: CPT | Performed by: REGISTERED NURSE

## 2018-10-29 PROCEDURE — 90686 IIV4 VACC NO PRSV 0.5 ML IM: CPT | Performed by: REGISTERED NURSE

## 2019-01-17 RX ORDER — SILDENAFIL CITRATE 20 MG/1
50 TABLET ORAL PRN
Qty: 10 TAB | Refills: 0
Start: 2019-01-17

## 2019-02-26 ENCOUNTER — HOSPITAL ENCOUNTER (OUTPATIENT)
Dept: LAB | Facility: MEDICAL CENTER | Age: 64
End: 2019-02-26
Attending: INTERNAL MEDICINE
Payer: COMMERCIAL

## 2019-02-26 LAB
ALBUMIN SERPL BCP-MCNC: 4.6 G/DL (ref 3.2–4.9)
ALBUMIN/GLOB SERPL: 1.8 G/DL
ALP SERPL-CCNC: 68 U/L (ref 30–99)
ALT SERPL-CCNC: 6 U/L (ref 2–50)
ANION GAP SERPL CALC-SCNC: 8 MMOL/L (ref 0–11.9)
APTT PPP: 29.8 SEC (ref 24.7–36)
AST SERPL-CCNC: 13 U/L (ref 12–45)
BASOPHILS # BLD AUTO: 0.6 % (ref 0–1.8)
BASOPHILS # BLD: 0.05 K/UL (ref 0–0.12)
BILIRUB SERPL-MCNC: 0.6 MG/DL (ref 0.1–1.5)
BUN SERPL-MCNC: 9 MG/DL (ref 8–22)
CALCIUM SERPL-MCNC: 10 MG/DL (ref 8.5–10.5)
CHLORIDE SERPL-SCNC: 106 MMOL/L (ref 96–112)
CHOLEST SERPL-MCNC: 124 MG/DL (ref 100–199)
CO2 SERPL-SCNC: 28 MMOL/L (ref 20–33)
CREAT SERPL-MCNC: 0.79 MG/DL (ref 0.5–1.4)
EOSINOPHIL # BLD AUTO: 0.1 K/UL (ref 0–0.51)
EOSINOPHIL NFR BLD: 1.2 % (ref 0–6.9)
ERYTHROCYTE [DISTWIDTH] IN BLOOD BY AUTOMATED COUNT: 45.5 FL (ref 35.9–50)
FASTING STATUS PATIENT QL REPORTED: NORMAL
GLOBULIN SER CALC-MCNC: 2.6 G/DL (ref 1.9–3.5)
GLUCOSE SERPL-MCNC: 103 MG/DL (ref 65–99)
HCT VFR BLD AUTO: 48.5 % (ref 42–52)
HDLC SERPL-MCNC: 37 MG/DL
HGB BLD-MCNC: 16.1 G/DL (ref 14–18)
IMM GRANULOCYTES # BLD AUTO: 0.02 K/UL (ref 0–0.11)
IMM GRANULOCYTES NFR BLD AUTO: 0.2 % (ref 0–0.9)
INR PPP: 1.04 (ref 0.87–1.13)
LDLC SERPL CALC-MCNC: 76 MG/DL
LYMPHOCYTES # BLD AUTO: 2.94 K/UL (ref 1–4.8)
LYMPHOCYTES NFR BLD: 36.2 % (ref 22–41)
MCH RBC QN AUTO: 30.6 PG (ref 27–33)
MCHC RBC AUTO-ENTMCNC: 33.2 G/DL (ref 33.7–35.3)
MCV RBC AUTO: 92.2 FL (ref 81.4–97.8)
MONOCYTES # BLD AUTO: 0.72 K/UL (ref 0–0.85)
MONOCYTES NFR BLD AUTO: 8.9 % (ref 0–13.4)
NEUTROPHILS # BLD AUTO: 4.29 K/UL (ref 1.82–7.42)
NEUTROPHILS NFR BLD: 52.9 % (ref 44–72)
NRBC # BLD AUTO: 0 K/UL
NRBC BLD-RTO: 0 /100 WBC
PLATELET # BLD AUTO: 228 K/UL (ref 164–446)
PMV BLD AUTO: 10.2 FL (ref 9–12.9)
POTASSIUM SERPL-SCNC: 4.5 MMOL/L (ref 3.6–5.5)
PROT SERPL-MCNC: 7.2 G/DL (ref 6–8.2)
PROTHROMBIN TIME: 13.7 SEC (ref 12–14.6)
PSA SERPL-MCNC: 3.4 NG/ML (ref 0–4)
RBC # BLD AUTO: 5.26 M/UL (ref 4.7–6.1)
SODIUM SERPL-SCNC: 142 MMOL/L (ref 135–145)
TRIGL SERPL-MCNC: 55 MG/DL (ref 0–149)
TSH SERPL DL<=0.005 MIU/L-ACNC: 1.82 UIU/ML (ref 0.38–5.33)
WBC # BLD AUTO: 8.1 K/UL (ref 4.8–10.8)

## 2019-02-26 PROCEDURE — 84153 ASSAY OF PSA TOTAL: CPT

## 2019-02-26 PROCEDURE — 84443 ASSAY THYROID STIM HORMONE: CPT

## 2019-02-26 PROCEDURE — 85730 THROMBOPLASTIN TIME PARTIAL: CPT

## 2019-02-26 PROCEDURE — 80053 COMPREHEN METABOLIC PANEL: CPT

## 2019-02-26 PROCEDURE — 36415 COLL VENOUS BLD VENIPUNCTURE: CPT

## 2019-02-26 PROCEDURE — 85025 COMPLETE CBC W/AUTO DIFF WBC: CPT

## 2019-02-26 PROCEDURE — 85610 PROTHROMBIN TIME: CPT

## 2019-02-26 PROCEDURE — 80061 LIPID PANEL: CPT

## 2019-04-18 ENCOUNTER — HOSPITAL ENCOUNTER (OUTPATIENT)
Dept: LAB | Facility: MEDICAL CENTER | Age: 64
End: 2019-04-18
Attending: INTERNAL MEDICINE
Payer: COMMERCIAL

## 2019-04-18 LAB
ALBUMIN SERPL BCP-MCNC: 4.5 G/DL (ref 3.2–4.9)
ALBUMIN/GLOB SERPL: 1.5 G/DL
ALP SERPL-CCNC: 65 U/L (ref 30–99)
ALT SERPL-CCNC: 8 U/L (ref 2–50)
ANION GAP SERPL CALC-SCNC: 7 MMOL/L (ref 0–11.9)
AST SERPL-CCNC: 13 U/L (ref 12–45)
BASOPHILS # BLD AUTO: 0.6 % (ref 0–1.8)
BASOPHILS # BLD: 0.05 K/UL (ref 0–0.12)
BILIRUB SERPL-MCNC: 0.6 MG/DL (ref 0.1–1.5)
BUN SERPL-MCNC: 11 MG/DL (ref 8–22)
CALCIUM SERPL-MCNC: 9.4 MG/DL (ref 8.5–10.5)
CHLORIDE SERPL-SCNC: 106 MMOL/L (ref 96–112)
CO2 SERPL-SCNC: 26 MMOL/L (ref 20–33)
CREAT SERPL-MCNC: 0.7 MG/DL (ref 0.5–1.4)
EOSINOPHIL # BLD AUTO: 0.12 K/UL (ref 0–0.51)
EOSINOPHIL NFR BLD: 1.5 % (ref 0–6.9)
ERYTHROCYTE [DISTWIDTH] IN BLOOD BY AUTOMATED COUNT: 43.6 FL (ref 35.9–50)
FERRITIN SERPL-MCNC: 101 NG/ML (ref 22–322)
GLOBULIN SER CALC-MCNC: 3 G/DL (ref 1.9–3.5)
GLUCOSE SERPL-MCNC: 98 MG/DL (ref 65–99)
HBV CORE AB SERPL QL IA: NEGATIVE
HBV SURFACE AB SERPL IA-ACNC: <3.1 MIU/ML (ref 0–10)
HBV SURFACE AG SER QL: NEGATIVE
HCT VFR BLD AUTO: 50 % (ref 42–52)
HCV AB SER QL: REACTIVE
HGB BLD-MCNC: 16.5 G/DL (ref 14–18)
IMM GRANULOCYTES # BLD AUTO: 0.02 K/UL (ref 0–0.11)
IMM GRANULOCYTES NFR BLD AUTO: 0.3 % (ref 0–0.9)
INR PPP: 1.04 (ref 0.87–1.13)
IRON SATN MFR SERPL: 22 % (ref 15–55)
IRON SERPL-MCNC: 83 UG/DL (ref 50–180)
LYMPHOCYTES # BLD AUTO: 3.18 K/UL (ref 1–4.8)
LYMPHOCYTES NFR BLD: 40 % (ref 22–41)
MCH RBC QN AUTO: 29.8 PG (ref 27–33)
MCHC RBC AUTO-ENTMCNC: 33 G/DL (ref 33.7–35.3)
MCV RBC AUTO: 90.3 FL (ref 81.4–97.8)
MONOCYTES # BLD AUTO: 0.7 K/UL (ref 0–0.85)
MONOCYTES NFR BLD AUTO: 8.8 % (ref 0–13.4)
NEUTROPHILS # BLD AUTO: 3.88 K/UL (ref 1.82–7.42)
NEUTROPHILS NFR BLD: 48.8 % (ref 44–72)
NRBC # BLD AUTO: 0 K/UL
NRBC BLD-RTO: 0 /100 WBC
PLATELET # BLD AUTO: 254 K/UL (ref 164–446)
PLATELET # BLD AUTO: 262 K/UL (ref 164–446)
PMV BLD AUTO: 10 FL (ref 9–12.9)
POTASSIUM SERPL-SCNC: 4.3 MMOL/L (ref 3.6–5.5)
PROT SERPL-MCNC: 7.5 G/DL (ref 6–8.2)
PROTHROMBIN TIME: 13.7 SEC (ref 12–14.6)
RBC # BLD AUTO: 5.54 M/UL (ref 4.7–6.1)
SODIUM SERPL-SCNC: 139 MMOL/L (ref 135–145)
TIBC SERPL-MCNC: 381 UG/DL (ref 250–450)
TSH SERPL DL<=0.005 MIU/L-ACNC: 1.62 UIU/ML (ref 0.38–5.33)
WBC # BLD AUTO: 8 K/UL (ref 4.8–10.8)

## 2019-04-18 PROCEDURE — 87522 HEPATITIS C REVRS TRNSCRPJ: CPT

## 2019-04-18 PROCEDURE — 82104 ALPHA-1-ANTITRYPSIN PHENO: CPT

## 2019-04-18 PROCEDURE — 86708 HEPATITIS A ANTIBODY: CPT

## 2019-04-18 PROCEDURE — 85025 COMPLETE CBC W/AUTO DIFF WBC: CPT

## 2019-04-18 PROCEDURE — 82977 ASSAY OF GGT: CPT

## 2019-04-18 PROCEDURE — 84443 ASSAY THYROID STIM HORMONE: CPT

## 2019-04-18 PROCEDURE — 84450 TRANSFERASE (AST) (SGOT): CPT

## 2019-04-18 PROCEDURE — 83883 ASSAY NEPHELOMETRY NOT SPEC: CPT

## 2019-04-18 PROCEDURE — 86706 HEP B SURFACE ANTIBODY: CPT

## 2019-04-18 PROCEDURE — 86704 HEP B CORE ANTIBODY TOTAL: CPT

## 2019-04-18 PROCEDURE — 85049 AUTOMATED PLATELET COUNT: CPT

## 2019-04-18 PROCEDURE — 82390 ASSAY OF CERULOPLASMIN: CPT

## 2019-04-18 PROCEDURE — 36415 COLL VENOUS BLD VENIPUNCTURE: CPT

## 2019-04-18 PROCEDURE — 86803 HEPATITIS C AB TEST: CPT

## 2019-04-18 PROCEDURE — 83516 IMMUNOASSAY NONANTIBODY: CPT | Mod: 91

## 2019-04-18 PROCEDURE — 87340 HEPATITIS B SURFACE AG IA: CPT

## 2019-04-18 PROCEDURE — 83550 IRON BINDING TEST: CPT

## 2019-04-18 PROCEDURE — 84520 ASSAY OF UREA NITROGEN: CPT

## 2019-04-18 PROCEDURE — 82103 ALPHA-1-ANTITRYPSIN TOTAL: CPT

## 2019-04-18 PROCEDURE — 82728 ASSAY OF FERRITIN: CPT

## 2019-04-18 PROCEDURE — 85610 PROTHROMBIN TIME: CPT

## 2019-04-18 PROCEDURE — 84460 ALANINE AMINO (ALT) (SGPT): CPT

## 2019-04-18 PROCEDURE — 86038 ANTINUCLEAR ANTIBODIES: CPT

## 2019-04-18 PROCEDURE — 80053 COMPREHEN METABOLIC PANEL: CPT

## 2019-04-18 PROCEDURE — 83540 ASSAY OF IRON: CPT

## 2019-04-20 LAB
CERULOPLASMIN SERPL-MCNC: 32 MG/DL (ref 17–54)
HAV AB SER QL IA: NEGATIVE

## 2019-04-21 LAB
A1AT PHENOTYP SERPL-IMP: NORMAL
A1AT SERPL-MCNC: 175 MG/DL (ref 90–200)
MITOCHONDRIA M2 IGG SER-ACNC: 10.3 UNITS (ref 0–20)
NUCLEAR IGG SER QL IA: NORMAL
SMA IGG SER-ACNC: 8 UNITS (ref 0–19)

## 2019-04-22 LAB
A2 MACROGLOB SERPL-MCNC: 451 MG/DL (ref 131–293)
ALT SERPL-CCNC: 9 U/L (ref 5–50)
ANNOTATION COMMENT IMP: ABNORMAL
AST SERPL-CCNC: 18 U/L (ref 9–50)
BUN SERPL-SCNC: 10 MG/DL (ref 7–20)
CIRRHOMETER PT SCORE Q4850: 0.02
FIBROSIS STAGE SERPL QL: ABNORMAL
GGT SERPL-CCNC: 17 U/L (ref 7–51)
INFLAMETER META CLASS Q4853: ABNORMAL
INFLAMETER PT SCORE Q4852: 0.47
LIVER FIBR SCORE SERPL CALC.FIBROMETER: 0.6
PATHOLOGY STUDY: ABNORMAL
PROTHROM ACT/NOR PPP: 96 % (ref 90–120)

## 2019-04-23 LAB
HCV RNA SERPL NAA+PROBE-ACNC: ABNORMAL IU/ML
HCV RNA SERPL NAA+PROBE-LOG IU: 6.59 LOG IU/ML
HCV RNA SERPL QL NAA+PROBE: DETECTED

## 2019-05-13 ENCOUNTER — HOSPITAL ENCOUNTER (OUTPATIENT)
Dept: RADIOLOGY | Facility: MEDICAL CENTER | Age: 64
End: 2019-05-13
Attending: INTERNAL MEDICINE
Payer: COMMERCIAL

## 2019-05-13 DIAGNOSIS — Z80.0 FAMILY HISTORY OF MALIGNANT NEOPLASM OF GASTROINTESTINAL TRACT: ICD-10-CM

## 2019-05-13 DIAGNOSIS — B18.2 CHRONIC HEPATITIS C WITH HEPATIC COMA (HCC): ICD-10-CM

## 2019-05-13 PROCEDURE — 76700 US EXAM ABDOM COMPLETE: CPT

## 2019-09-11 ENCOUNTER — HOSPITAL ENCOUNTER (OUTPATIENT)
Dept: LAB | Facility: MEDICAL CENTER | Age: 64
End: 2019-09-11
Attending: INTERNAL MEDICINE
Payer: COMMERCIAL

## 2019-09-11 LAB
ALBUMIN SERPL BCP-MCNC: 4.5 G/DL (ref 3.2–4.9)
ALBUMIN/GLOB SERPL: 1.6 G/DL
ALP SERPL-CCNC: 68 U/L (ref 30–99)
ALT SERPL-CCNC: 6 U/L (ref 2–50)
ANION GAP SERPL CALC-SCNC: 12 MMOL/L (ref 0–11.9)
AST SERPL-CCNC: 15 U/L (ref 12–45)
BASOPHILS # BLD AUTO: 0.7 % (ref 0–1.8)
BASOPHILS # BLD: 0.06 K/UL (ref 0–0.12)
BILIRUB SERPL-MCNC: 0.8 MG/DL (ref 0.1–1.5)
BUN SERPL-MCNC: 18 MG/DL (ref 8–22)
CALCIUM SERPL-MCNC: 9.5 MG/DL (ref 8.5–10.5)
CHLORIDE SERPL-SCNC: 106 MMOL/L (ref 96–112)
CO2 SERPL-SCNC: 22 MMOL/L (ref 20–33)
CREAT SERPL-MCNC: 0.78 MG/DL (ref 0.5–1.4)
EOSINOPHIL # BLD AUTO: 0.23 K/UL (ref 0–0.51)
EOSINOPHIL NFR BLD: 2.7 % (ref 0–6.9)
ERYTHROCYTE [DISTWIDTH] IN BLOOD BY AUTOMATED COUNT: 43.2 FL (ref 35.9–50)
GLOBULIN SER CALC-MCNC: 2.9 G/DL (ref 1.9–3.5)
GLUCOSE SERPL-MCNC: 106 MG/DL (ref 65–99)
HCT VFR BLD AUTO: 50.9 % (ref 42–52)
HGB BLD-MCNC: 16.5 G/DL (ref 14–18)
IMM GRANULOCYTES # BLD AUTO: 0.03 K/UL (ref 0–0.11)
IMM GRANULOCYTES NFR BLD AUTO: 0.3 % (ref 0–0.9)
LYMPHOCYTES # BLD AUTO: 3.58 K/UL (ref 1–4.8)
LYMPHOCYTES NFR BLD: 41.7 % (ref 22–41)
MCH RBC QN AUTO: 28.9 PG (ref 27–33)
MCHC RBC AUTO-ENTMCNC: 32.4 G/DL (ref 33.7–35.3)
MCV RBC AUTO: 89.3 FL (ref 81.4–97.8)
MONOCYTES # BLD AUTO: 0.88 K/UL (ref 0–0.85)
MONOCYTES NFR BLD AUTO: 10.3 % (ref 0–13.4)
NEUTROPHILS # BLD AUTO: 3.8 K/UL (ref 1.82–7.42)
NEUTROPHILS NFR BLD: 44.3 % (ref 44–72)
NRBC # BLD AUTO: 0 K/UL
NRBC BLD-RTO: 0 /100 WBC
PLATELET # BLD AUTO: 218 K/UL (ref 164–446)
PMV BLD AUTO: 10.2 FL (ref 9–12.9)
POTASSIUM SERPL-SCNC: 4.1 MMOL/L (ref 3.6–5.5)
PROT SERPL-MCNC: 7.4 G/DL (ref 6–8.2)
RBC # BLD AUTO: 5.7 M/UL (ref 4.7–6.1)
SODIUM SERPL-SCNC: 140 MMOL/L (ref 135–145)
WBC # BLD AUTO: 8.6 K/UL (ref 4.8–10.8)

## 2019-09-11 PROCEDURE — 87522 HEPATITIS C REVRS TRNSCRPJ: CPT

## 2019-09-11 PROCEDURE — 80053 COMPREHEN METABOLIC PANEL: CPT

## 2019-09-11 PROCEDURE — 85025 COMPLETE CBC W/AUTO DIFF WBC: CPT

## 2019-09-11 PROCEDURE — 36415 COLL VENOUS BLD VENIPUNCTURE: CPT

## 2019-09-13 LAB
HCV RNA SERPL NAA+PROBE-ACNC: NOT DETECTED IU/ML
HCV RNA SERPL NAA+PROBE-LOG IU: NOT DETECTED LOG IU/ML
HCV RNA SERPL QL NAA+PROBE: NOT DETECTED

## 2019-10-08 ENCOUNTER — HOSPITAL ENCOUNTER (OUTPATIENT)
Dept: LAB | Facility: MEDICAL CENTER | Age: 64
End: 2019-10-08
Attending: INTERNAL MEDICINE
Payer: COMMERCIAL

## 2019-10-08 LAB
ALBUMIN SERPL BCP-MCNC: 4.2 G/DL (ref 3.2–4.9)
ALBUMIN/GLOB SERPL: 1.5 G/DL
ALP SERPL-CCNC: 67 U/L (ref 30–99)
ALT SERPL-CCNC: 6 U/L (ref 2–50)
ANION GAP SERPL CALC-SCNC: 10 MMOL/L (ref 0–11.9)
AST SERPL-CCNC: 13 U/L (ref 12–45)
BASOPHILS # BLD AUTO: 0.7 % (ref 0–1.8)
BASOPHILS # BLD: 0.06 K/UL (ref 0–0.12)
BILIRUB SERPL-MCNC: 0.7 MG/DL (ref 0.1–1.5)
BUN SERPL-MCNC: 13 MG/DL (ref 8–22)
CALCIUM SERPL-MCNC: 9 MG/DL (ref 8.5–10.5)
CHLORIDE SERPL-SCNC: 110 MMOL/L (ref 96–112)
CO2 SERPL-SCNC: 22 MMOL/L (ref 20–33)
CREAT SERPL-MCNC: 0.73 MG/DL (ref 0.5–1.4)
EOSINOPHIL # BLD AUTO: 0.18 K/UL (ref 0–0.51)
EOSINOPHIL NFR BLD: 2 % (ref 0–6.9)
ERYTHROCYTE [DISTWIDTH] IN BLOOD BY AUTOMATED COUNT: 43.4 FL (ref 35.9–50)
GLOBULIN SER CALC-MCNC: 2.8 G/DL (ref 1.9–3.5)
GLUCOSE SERPL-MCNC: 88 MG/DL (ref 65–99)
HCT VFR BLD AUTO: 48.4 % (ref 42–52)
HGB BLD-MCNC: 15.7 G/DL (ref 14–18)
IMM GRANULOCYTES # BLD AUTO: 0.03 K/UL (ref 0–0.11)
IMM GRANULOCYTES NFR BLD AUTO: 0.3 % (ref 0–0.9)
LYMPHOCYTES # BLD AUTO: 3.14 K/UL (ref 1–4.8)
LYMPHOCYTES NFR BLD: 35.4 % (ref 22–41)
MCH RBC QN AUTO: 29.3 PG (ref 27–33)
MCHC RBC AUTO-ENTMCNC: 32.4 G/DL (ref 33.7–35.3)
MCV RBC AUTO: 90.3 FL (ref 81.4–97.8)
MONOCYTES # BLD AUTO: 0.96 K/UL (ref 0–0.85)
MONOCYTES NFR BLD AUTO: 10.8 % (ref 0–13.4)
NEUTROPHILS # BLD AUTO: 4.51 K/UL (ref 1.82–7.42)
NEUTROPHILS NFR BLD: 50.8 % (ref 44–72)
NRBC # BLD AUTO: 0 K/UL
NRBC BLD-RTO: 0 /100 WBC
PLATELET # BLD AUTO: 233 K/UL (ref 164–446)
PMV BLD AUTO: 10.4 FL (ref 9–12.9)
POTASSIUM SERPL-SCNC: 3.9 MMOL/L (ref 3.6–5.5)
PROT SERPL-MCNC: 7 G/DL (ref 6–8.2)
RBC # BLD AUTO: 5.36 M/UL (ref 4.7–6.1)
SODIUM SERPL-SCNC: 142 MMOL/L (ref 135–145)
WBC # BLD AUTO: 8.9 K/UL (ref 4.8–10.8)

## 2019-10-08 PROCEDURE — 85025 COMPLETE CBC W/AUTO DIFF WBC: CPT

## 2019-10-08 PROCEDURE — 80053 COMPREHEN METABOLIC PANEL: CPT

## 2019-10-08 PROCEDURE — 87522 HEPATITIS C REVRS TRNSCRPJ: CPT

## 2019-10-08 PROCEDURE — 36415 COLL VENOUS BLD VENIPUNCTURE: CPT

## 2019-12-17 ENCOUNTER — OFFICE VISIT (OUTPATIENT)
Dept: MEDICAL GROUP | Facility: MEDICAL CENTER | Age: 64
End: 2019-12-17
Payer: COMMERCIAL

## 2019-12-17 VITALS
TEMPERATURE: 98.5 F | BODY MASS INDEX: 23.98 KG/M2 | RESPIRATION RATE: 16 BRPM | OXYGEN SATURATION: 94 % | HEIGHT: 71 IN | HEART RATE: 86 BPM | SYSTOLIC BLOOD PRESSURE: 146 MMHG | DIASTOLIC BLOOD PRESSURE: 82 MMHG | WEIGHT: 171.3 LBS

## 2019-12-17 DIAGNOSIS — J41.0 SMOKERS' COUGH (HCC): ICD-10-CM

## 2019-12-17 DIAGNOSIS — F17.200 TOBACCO DEPENDENCE: ICD-10-CM

## 2019-12-17 DIAGNOSIS — Z12.11 COLON CANCER SCREENING: ICD-10-CM

## 2019-12-17 DIAGNOSIS — I10 ESSENTIAL HYPERTENSION: ICD-10-CM

## 2019-12-17 DIAGNOSIS — Z23 NEED FOR VACCINATION: ICD-10-CM

## 2019-12-17 DIAGNOSIS — B18.2 CHRONIC HEPATITIS C WITHOUT HEPATIC COMA (HCC): ICD-10-CM

## 2019-12-17 DIAGNOSIS — E78.5 DYSLIPIDEMIA: ICD-10-CM

## 2019-12-17 DIAGNOSIS — I73.9 PVD (PERIPHERAL VASCULAR DISEASE) (HCC): ICD-10-CM

## 2019-12-17 PROCEDURE — 90732 PPSV23 VACC 2 YRS+ SUBQ/IM: CPT | Performed by: FAMILY MEDICINE

## 2019-12-17 PROCEDURE — 99204 OFFICE O/P NEW MOD 45 MIN: CPT | Mod: 25 | Performed by: FAMILY MEDICINE

## 2019-12-17 PROCEDURE — 90471 IMMUNIZATION ADMIN: CPT | Performed by: FAMILY MEDICINE

## 2019-12-17 PROCEDURE — 90472 IMMUNIZATION ADMIN EACH ADD: CPT | Performed by: FAMILY MEDICINE

## 2019-12-17 PROCEDURE — 90686 IIV4 VACC NO PRSV 0.5 ML IM: CPT | Performed by: FAMILY MEDICINE

## 2019-12-17 ASSESSMENT — PATIENT HEALTH QUESTIONNAIRE - PHQ9: CLINICAL INTERPRETATION OF PHQ2 SCORE: 0

## 2019-12-17 NOTE — PROGRESS NOTES
CC: New patient: Hypertension, hyperlipidemia, prior peripheral vascular disease, hepatitis C, chronic smoker    HPI:  Dale presents today to establish new PCP.    Patient has been active and independent with all ADLs.  Has the following chronic medical issues:.    Smokers' cough (HCC)/ Tobacco dependence  Patient is a chronic smoker.  He smokes about one to one and a half pack a day for more than 40 years.  Smoking cessation discussed patient wants to try it.  No history of COPD, however has been having intermittent cough that lately gets worse.  Denies any shortness of breath.  Has been having normal oxygen saturation.  Was never diagnosed before with COPD.     Essential hypertension  Has been adequately controlled on current medication. Denies headache, chest pain, and SOB.patient has been on lisinopril 5 mg daily.  No side effects    Dyslipidemia  He has been tolerating the statin. Denies muscle pain LFTs has been normal, patient has been on simvastatin 20 mg daily.  Has history of peripheral vascular disease.  No history of diabetes, CAD, or stroke.    PVD (peripheral vascular disease) (HCC)  Patient has history of aorta bifemoral bypass.  He is currently mostly stable. However has been having intermittent claudication but has not been affecting his quality of life.  Patient has been on aspirin and statin, has been following up with vascular surgeon in a regular basis.    Chronic hepatitis C without hepatic coma (HCC)  Patient underwent t treatment, last blood test showed hep C virus is undetected    Due for colonoscopy.  However for now he cannot afford it.  It is okay to do the fit test.  Due for pneumonia shot.    Patient Active Problem List    Diagnosis Date Noted   • Smokers' cough (HCC) 12/17/2019   • PVD (peripheral vascular disease) (HCC) 12/17/2019   • Other fatigue 10/03/2018   • Dyslipidemia 10/03/2018   • Callus of foot 10/03/2018   • Other viral warts 10/03/2018   • Chronic hepatitis C without  hepatic coma (HCC) 09/15/2017   • Essential hypertension 01/17/2017   • Tobacco dependence 01/17/2017   • COPD (chronic obstructive pulmonary disease) (HCC) 05/28/2015   • CAD (coronary artery disease) 05/28/2015   • Near syncope 05/28/2015   • Non compliance w medication regimen 05/28/2015       Current Outpatient Medications   Medication Sig Dispense Refill   • lisinopril (PRINIVIL) 5 MG Tab Take 1 Tab by mouth every day. 90 Tab 2   • simvastatin (ZOCOR) 20 MG Tab Take 1 Tab by mouth every evening. 90 Tab 2   • sildenafil citrate (VIAGRA) 50 MG tablet Take 1 Tab by mouth as needed for Erectile Dysfunction. 10 Tab 3   • aspirin  MG TBEC Take 1 Tab by mouth every day. 60 Tab      No current facility-administered medications for this visit.          Allergies as of 12/17/2019   • (No Known Allergies)        Social History     Socioeconomic History   • Marital status: Single     Spouse name: Not on file   • Number of children: Not on file   • Years of education: Not on file   • Highest education level: Not on file   Occupational History   • Not on file   Social Needs   • Financial resource strain: Not on file   • Food insecurity:     Worry: Not on file     Inability: Not on file   • Transportation needs:     Medical: Not on file     Non-medical: Not on file   Tobacco Use   • Smoking status: Current Every Day Smoker     Packs/day: 1.00     Years: 45.00     Pack years: 45.00     Types: Cigarettes   • Smokeless tobacco: Never Used   Substance and Sexual Activity   • Alcohol use: No     Comment: Patient stopped drinking 4 months ago   • Drug use: No   • Sexual activity: Never   Lifestyle   • Physical activity:     Days per week: Not on file     Minutes per session: Not on file   • Stress: Not on file   Relationships   • Social connections:     Talks on phone: Not on file     Gets together: Not on file     Attends Hoahaoism service: Not on file     Active member of club or organization: Not on file     Attends  "meetings of clubs or organizations: Not on file     Relationship status: Not on file   • Intimate partner violence:     Fear of current or ex partner: Not on file     Emotionally abused: Not on file     Physically abused: Not on file     Forced sexual activity: Not on file   Other Topics Concern   • Not on file   Social History Narrative   • Not on file       Family History   Problem Relation Age of Onset   • Other Mother         accident   • Cancer Father         prostate   • Diabetes Father    • Cancer Brother         lymphoma       Past Surgical History:   Procedure Laterality Date   • LAMINOTOMY      lumbar (rods)   • MENISCUS REPAIR Left    • MULTIPLE CORONARY ARTERY BYPASS         ROS:  Denies any Headache, Blurred Vision, Confusion Chest pain,  Shortness of breath,  Abdominal pain, Changes of bowel or bladder, Lower ext edema, Fevers, Nights sweats, Weight Changes, Focal weakness or numbness.  All other systems are negative.    /82 (BP Location: Right arm, Patient Position: Sitting, BP Cuff Size: Adult)   Pulse 86   Temp 36.9 °C (98.5 °F) (Temporal)   Resp 16   Ht 1.791 m (5' 10.5\")   Wt 77.7 kg (171 lb 4.8 oz)   SpO2 94%   BMI 24.23 kg/m²     Physical Exam:  Gen:         Alert and oriented, No apparent distress.  HEENT:   Perrla, TM clear,  Oralpharynx no erythema or exudates.  Neck:       No Jugular venous distension, Lymphadenopathy, Thyromegaly, Bruits.  Lungs:     Clear to auscultation bilaterally  CV:          Regular rate and rhythm. No murmurs, rubs or gallops.  Abd:         Soft non tender, non distended. Normal active bowel sounds. No                                        Hepatosplenomegaly, No pulsatile masses.  Ext:          No clubbing, cyanosis, edema.      Assessment and Plan.   64 y.o. male     1. Smokers' cough (HCC)/ Tobacco dependence  Smokes about one to one and a half pack a day for more than 40 years.  Smoking cessation discussed patient wants to try it.  Referred to " smoking cessation program.    - REFERRAL TO TOBACCO CESSATION PROGRAM    2. Essential hypertension  Controlled.  Continue on lisinopril 5 mg daily.    3. Dyslipidemia  He has been tolerating the statin. Denies muscle pain LFTs has been normal  Continue on simvastatin 20 mg daily.    4. PVD (peripheral vascular disease) (HCC)  Patient has history of aorta bifemoral bypass.  Currently stable.  However has been having intermittent claudication.  Continue aspirin, continue simvastatin.  Continue follow-up with vascular surgeon.    5. Chronic hepatitis C without hepatic coma (HCC)  Status post treatment, last blood test showed hep C virus is undetected      7. Colon cancer screening  Due for colonoscopy.  However for now he cannot afford it.  Will do the fit test.    - OCCULT BLOOD FECES IMMUNOASSAY; Future    8. Need for vaccination  Due for pneumonia shot (patient less than 65 blood he is a chronic smoker), and he was due for flu shot.    - Pneumococal Polysaccharide Vaccine 23-Valent =>3YO SQ/IM  - Influenza Vaccine Quad Injection (PF)

## 2019-12-21 ENCOUNTER — HOSPITAL ENCOUNTER (OUTPATIENT)
Facility: MEDICAL CENTER | Age: 64
End: 2019-12-21
Attending: FAMILY MEDICINE
Payer: COMMERCIAL

## 2019-12-21 PROCEDURE — 82274 ASSAY TEST FOR BLOOD FECAL: CPT

## 2019-12-29 DIAGNOSIS — Z12.11 COLON CANCER SCREENING: ICD-10-CM

## 2019-12-30 ENCOUNTER — PATIENT MESSAGE (OUTPATIENT)
Dept: MEDICAL GROUP | Facility: MEDICAL CENTER | Age: 64
End: 2019-12-30

## 2019-12-30 DIAGNOSIS — Z71.6 ENCOUNTER FOR SMOKING CESSATION COUNSELING: ICD-10-CM

## 2019-12-30 LAB — HEMOCCULT STL QL IA: NEGATIVE

## 2020-01-02 ENCOUNTER — PATIENT MESSAGE (OUTPATIENT)
Dept: MEDICAL GROUP | Facility: MEDICAL CENTER | Age: 65
End: 2020-01-02

## 2020-01-02 DIAGNOSIS — Z71.6 ENCOUNTER FOR SMOKING CESSATION COUNSELING: ICD-10-CM

## 2020-01-09 ENCOUNTER — HOSPITAL ENCOUNTER (OUTPATIENT)
Dept: LAB | Facility: MEDICAL CENTER | Age: 65
End: 2020-01-09
Attending: INTERNAL MEDICINE
Payer: COMMERCIAL

## 2020-01-09 LAB
ALBUMIN SERPL BCP-MCNC: 4.3 G/DL (ref 3.2–4.9)
ALBUMIN/GLOB SERPL: 1.7 G/DL
ALP SERPL-CCNC: 52 U/L (ref 30–99)
ALT SERPL-CCNC: 6 U/L (ref 2–50)
ANION GAP SERPL CALC-SCNC: 10 MMOL/L (ref 0–11.9)
AST SERPL-CCNC: 13 U/L (ref 12–45)
BASOPHILS # BLD AUTO: 0.8 % (ref 0–1.8)
BASOPHILS # BLD: 0.06 K/UL (ref 0–0.12)
BILIRUB SERPL-MCNC: 0.7 MG/DL (ref 0.1–1.5)
BUN SERPL-MCNC: 10 MG/DL (ref 8–22)
CALCIUM SERPL-MCNC: 9.1 MG/DL (ref 8.5–10.5)
CHLORIDE SERPL-SCNC: 107 MMOL/L (ref 96–112)
CO2 SERPL-SCNC: 24 MMOL/L (ref 20–33)
CREAT SERPL-MCNC: 0.73 MG/DL (ref 0.5–1.4)
EOSINOPHIL # BLD AUTO: 0.15 K/UL (ref 0–0.51)
EOSINOPHIL NFR BLD: 1.9 % (ref 0–6.9)
ERYTHROCYTE [DISTWIDTH] IN BLOOD BY AUTOMATED COUNT: 43.3 FL (ref 35.9–50)
GLOBULIN SER CALC-MCNC: 2.5 G/DL (ref 1.9–3.5)
GLUCOSE SERPL-MCNC: 81 MG/DL (ref 65–99)
HCT VFR BLD AUTO: 51.5 % (ref 42–52)
HGB BLD-MCNC: 17.4 G/DL (ref 14–18)
IMM GRANULOCYTES # BLD AUTO: 0.01 K/UL (ref 0–0.11)
IMM GRANULOCYTES NFR BLD AUTO: 0.1 % (ref 0–0.9)
LYMPHOCYTES # BLD AUTO: 3.04 K/UL (ref 1–4.8)
LYMPHOCYTES NFR BLD: 39 % (ref 22–41)
MCH RBC QN AUTO: 30.4 PG (ref 27–33)
MCHC RBC AUTO-ENTMCNC: 33.8 G/DL (ref 33.7–35.3)
MCV RBC AUTO: 89.9 FL (ref 81.4–97.8)
MONOCYTES # BLD AUTO: 0.77 K/UL (ref 0–0.85)
MONOCYTES NFR BLD AUTO: 9.9 % (ref 0–13.4)
NEUTROPHILS # BLD AUTO: 3.77 K/UL (ref 1.82–7.42)
NEUTROPHILS NFR BLD: 48.3 % (ref 44–72)
NRBC # BLD AUTO: 0 K/UL
NRBC BLD-RTO: 0 /100 WBC
PLATELET # BLD AUTO: 216 K/UL (ref 164–446)
PMV BLD AUTO: 10.2 FL (ref 9–12.9)
POTASSIUM SERPL-SCNC: 4 MMOL/L (ref 3.6–5.5)
PROT SERPL-MCNC: 6.8 G/DL (ref 6–8.2)
RBC # BLD AUTO: 5.73 M/UL (ref 4.7–6.1)
SODIUM SERPL-SCNC: 141 MMOL/L (ref 135–145)
WBC # BLD AUTO: 7.8 K/UL (ref 4.8–10.8)

## 2020-01-09 PROCEDURE — 87522 HEPATITIS C REVRS TRNSCRPJ: CPT

## 2020-01-09 PROCEDURE — 80053 COMPREHEN METABOLIC PANEL: CPT

## 2020-01-09 PROCEDURE — 85025 COMPLETE CBC W/AUTO DIFF WBC: CPT

## 2020-01-09 PROCEDURE — 36415 COLL VENOUS BLD VENIPUNCTURE: CPT

## 2020-01-16 ENCOUNTER — PATIENT MESSAGE (OUTPATIENT)
Dept: MEDICAL GROUP | Facility: MEDICAL CENTER | Age: 65
End: 2020-01-16

## 2020-01-16 DIAGNOSIS — N52.9 ERECTILE DYSFUNCTION, UNSPECIFIED ERECTILE DYSFUNCTION TYPE: ICD-10-CM

## 2020-01-16 RX ORDER — SILDENAFIL 50 MG/1
50 TABLET, FILM COATED ORAL PRN
Qty: 10 TAB | Refills: 3 | Status: SHIPPED | OUTPATIENT
Start: 2020-01-16 | End: 2020-01-21 | Stop reason: SDUPTHER

## 2020-01-17 NOTE — TELEPHONE ENCOUNTER
From: Dale Dinero  To: William Harman M.D.  Sent: 1/16/2020 3:36 PM PST  Subject: Prescription Question    Can you refill this at [facility]     ----- Message -----  From: William Harman M.D.  Sent: 1/2/20 5:45 PM  To: Dale Dinero  Subject: RE: Prescription Question     I can mail it to you but not email it.  I will send it by mail.      ----- Message -----   From: Dale Dinero   Sent: 1/2/2020 3:57 PM PST   To: William Harman M.D.  Subject: Prescription Question    Can you e mail me the prescription for chantex my ins company dosent cover i found a company that will sale me chantex for 50 bucks not 500 .i send them your prescription then they contact you.

## 2020-01-21 ENCOUNTER — PATIENT MESSAGE (OUTPATIENT)
Dept: MEDICAL GROUP | Facility: MEDICAL CENTER | Age: 65
End: 2020-01-21

## 2020-01-21 DIAGNOSIS — N52.9 ERECTILE DYSFUNCTION, UNSPECIFIED ERECTILE DYSFUNCTION TYPE: ICD-10-CM

## 2020-01-21 RX ORDER — SILDENAFIL 50 MG/1
50 TABLET, FILM COATED ORAL PRN
Qty: 10 TAB | Refills: 3 | Status: SHIPPED
Start: 2020-01-21 | End: 2020-01-21 | Stop reason: SDUPTHER

## 2020-01-21 RX ORDER — SILDENAFIL 50 MG/1
50 TABLET, FILM COATED ORAL PRN
Qty: 10 TAB | Refills: 3 | Status: SHIPPED | OUTPATIENT
Start: 2020-01-21 | End: 2020-03-23 | Stop reason: SDUPTHER

## 2020-01-21 NOTE — TELEPHONE ENCOUNTER
From: Dale Dinero  To: William Harman M.D.  Sent: 1/21/2020 3:33 PM PST  Subject: Prescription Question      Sent to [facility]?  ----- Message -----  From: William Harman M.D.  Sent: 1/21/20 3:31 PM  To: Dale Dinero  Subject: RE: Prescription Question    Medication was refilled on 1/16/2020. However I refilled it again today.      ----- Message -----   From: Dale Dinero   Sent: 1/21/2020 2:45 PM PST   To: William Harman M.D.  Subject: Prescription Question    Need refill on silfinidel

## 2020-01-22 NOTE — TELEPHONE ENCOUNTER
From: Dale Dinero  To: William Harman M.D.  Sent: 1/21/2020 4:13 PM PST  Subject: Prescription Question      Omg .getting upset now. You sent that script to [facility] ... please send to [facility]  ----- Message -----  From: William Harman M.D.  Sent: 1/21/20 3:43 PM  To: Dale Dinero  Subject: RE: Prescription Question    yes    ----- Message -----   From: Dale Dinero   Sent: 1/21/2020 3:33 PM PST   To: William Harman M.D.  Subject: Prescription Question      Sent to [facility]?  ----- Message -----  From: William Harman M.D.  Sent: 1/21/20 3:31 PM  To: Dale Dinero  Subject: RE: Prescription Question    Medication was refilled on 1/16/2020. However I refilled it again today.      ----- Message -----   From: Dale Dinero   Sent: 1/21/2020 2:45 PM PST   To: William Harman M.D.  Subject: Prescription Question    Need refill on silfinidel

## 2020-01-24 ENCOUNTER — PATIENT MESSAGE (OUTPATIENT)
Dept: MEDICAL GROUP | Facility: MEDICAL CENTER | Age: 65
End: 2020-01-24

## 2020-01-24 DIAGNOSIS — Z71.6 ENCOUNTER FOR SMOKING CESSATION COUNSELING: ICD-10-CM

## 2020-01-26 NOTE — TELEPHONE ENCOUNTER
From: Dale Dinero  To: William Harman M.D.  Sent: 1/24/2020 1:36 PM PST  Subject: Prescription Question      Thanks for last script.. now im on my.last week of starter box of chantex down to 5 smokes a day now .but afraid withdraws if i run out of chantex ? Do i need to come in for the rest of [employer] subscription ??  ----- Message -----  From: William Harman M.D.  Sent: 1/21/20 4:29 PM  To: Dale Dinero  Subject: RE: Prescription Question    Sent to [facility].      ----- Message -----   From: Dale Dinero   Sent: 1/21/2020 4:13 PM PST   To: William Harman M.D.  Subject: Prescription Question      Omg .getting upset now. You sent that script to [facility] ... please send to [facility]  ----- Message -----  From: William Harman M.D.  Sent: 1/21/20 3:43 PM  To: Dale Dinero  Subject: RE: Prescription Question    yes    ----- Message -----   From: Dale Dinero   Sent: 1/21/2020 3:33 PM PST   To: William Harman M.D.  Subject: Prescription Question      Sent to [facility]?  ----- Message -----  From: William Harman M.D.  Sent: 1/21/20 3:31 PM  To: Dale Dinero  Subject: RE: Prescription Question    Medication was refilled on 1/16/2020. However I refilled it again today.      ----- Message -----   From: Dale Dinero   Sent: 1/21/2020 2:45 PM PST   To: William Harman M.D.  Subject: Prescription Question    Need refill on silfinidel

## 2020-01-28 DIAGNOSIS — Z71.6 ENCOUNTER FOR SMOKING CESSATION COUNSELING: ICD-10-CM

## 2020-02-21 ENCOUNTER — PATIENT MESSAGE (OUTPATIENT)
Dept: MEDICAL GROUP | Facility: MEDICAL CENTER | Age: 65
End: 2020-02-21

## 2020-02-21 ENCOUNTER — OFFICE VISIT (OUTPATIENT)
Dept: MEDICAL GROUP | Facility: MEDICAL CENTER | Age: 65
End: 2020-02-21
Payer: COMMERCIAL

## 2020-02-21 VITALS
SYSTOLIC BLOOD PRESSURE: 138 MMHG | OXYGEN SATURATION: 91 % | HEIGHT: 70 IN | TEMPERATURE: 98.6 F | RESPIRATION RATE: 16 BRPM | DIASTOLIC BLOOD PRESSURE: 82 MMHG | BODY MASS INDEX: 24.58 KG/M2 | HEART RATE: 88 BPM

## 2020-02-21 DIAGNOSIS — J44.1 COPD WITH ACUTE EXACERBATION (HCC): ICD-10-CM

## 2020-02-21 PROCEDURE — 99214 OFFICE O/P EST MOD 30 MIN: CPT | Performed by: FAMILY MEDICINE

## 2020-02-21 RX ORDER — GUAIFENESIN AND DEXTROMETHORPHAN HYDROBROMIDE 100; 10 MG/5ML; MG/5ML
10 SOLUTION ORAL EVERY 6 HOURS PRN
Qty: 560 ML | Refills: 0 | Status: SHIPPED | OUTPATIENT
Start: 2020-02-21 | End: 2020-09-08

## 2020-02-21 RX ORDER — METHYLPREDNISOLONE 4 MG/1
TABLET ORAL
Qty: 21 TAB | Refills: 0 | Status: SHIPPED | OUTPATIENT
Start: 2020-02-21 | End: 2020-09-08

## 2020-02-21 RX ORDER — ALBUTEROL SULFATE 90 UG/1
2 AEROSOL, METERED RESPIRATORY (INHALATION) EVERY 4 HOURS PRN
Qty: 1 INHALER | Refills: 2 | Status: SHIPPED | OUTPATIENT
Start: 2020-02-21 | End: 2023-04-13

## 2020-02-21 RX ORDER — DOXYCYCLINE HYCLATE 100 MG
100 TABLET ORAL 2 TIMES DAILY
Qty: 14 TAB | Refills: 0 | Status: SHIPPED | OUTPATIENT
Start: 2020-02-21 | End: 2020-02-28

## 2020-02-21 RX ORDER — BENZONATATE 100 MG/1
100 CAPSULE ORAL 3 TIMES DAILY PRN
Qty: 21 CAP | Refills: 0 | Status: SHIPPED | OUTPATIENT
Start: 2020-02-21 | End: 2020-09-08

## 2020-02-21 ASSESSMENT — PATIENT HEALTH QUESTIONNAIRE - PHQ9: CLINICAL INTERPRETATION OF PHQ2 SCORE: 0

## 2020-02-21 NOTE — TELEPHONE ENCOUNTER
From: Dale Dinero  To: William Harman M.D.  Sent: 2/21/2020 11:29 AM PST  Subject: Prescription Question    Please call me s[facility]roe said you not call in [facility].and they have no anti biotic till.monday       ----- Message -----   From:William Harman M.D.   Sent:1/26/2020 2:40 PM PST   To:Dale Dinero   Subject:RE: Prescription Question     I sent a refill for the Chantix    ----- Message -----   From: Dale Dinero   Sent: 1/24/2020 1:36 PM PST   To: William Harman M.D.  Subject: Prescription Question      Thanks for last script.. now im on my.last week of starter box of Localbasetex down to 5 smokes a day now .but afraid withdraws if i run out of Localbasetex ? Do i need to come in for the rest of [employer] subscription ??  ----- Message -----  From: William Harman M.D.  Sent: 1/21/20 4:29 PM  To: Dale Dinero  Subject: RE: Prescription Question    Sent to [facility].      ----- Message -----   From: Dale Dinero   Sent: 1/21/2020 4:13 PM PST   To: William Harman M.D.  Subject: Prescription Question      Omg .getting upset now. You sent that script to [facility] ... please send to [facility]  ----- Message -----  From: William Harman M.D.  Sent: 1/21/20 3:43 PM  To: Dale Dinero  Subject: RE: Prescription Question    yes    ----- Message -----   From: Dale Dinero   Sent: 1/21/2020 3:33 PM PST   To: William Harman M.D.  Subject: Prescription Question      Sent to [facility]?  ----- Message -----  From: William Harman M.D.  Sent: 1/21/20 3:31 PM  To: Dale Dinero  Subject: RE: Prescription Question    Medication was refilled on 1/16/2020. However I refilled it again today.      ----- Message -----   From: Dale Dinero   Sent: 1/21/2020 2:45 PM PST   To: William Harman M.D.  Subject: Prescription Question    Need refill on silfinidel

## 2020-02-21 NOTE — PROGRESS NOTES
CC: Cough and shortness of breath    HPI:   Dale presents today with cough and shortness of breath for a week.  However in the past 2 days the condition gets worse patient has been having productive cough with thick yellow phlegm, he also reported fever and chills especially at night.  However his oxygen saturation has been more than 90% of the time.  Patient still smokes about half a pack a day for more than 40 years.  Smoking cessation discussed, he stated that he has been trying.    Patient Active Problem List    Diagnosis Date Noted   • Smokers' cough ([facility]) 12/17/2019   • PVD (peripheral vascular disease) ([facility]) 12/17/2019   • Other fatigue 10/03/2018   • Dyslipidemia 10/03/2018   • Callus of foot 10/03/2018   • Other viral warts 10/03/2018   • Chronic hepatitis C without hepatic coma ([facility]) 09/15/2017   • Essential hypertension 01/17/2017   • Tobacco dependence 01/17/2017   • COPD (chronic obstructive pulmonary disease) ([facility]) 05/28/2015   • CAD (coronary artery disease) 05/28/2015   • Near syncope 05/28/2015   • Non compliance w medication regimen 05/28/2015       Current Outpatient Medications   Medication Sig Dispense Refill   • doxycycline (VIBRAMYCIN) 100 MG Tab Take 1 Tab by mouth 2 times a day for 7 days. 14 Tab 0   • methylPREDNISolone (MEDROL DOSEPAK) 4 MG Tablet Therapy Pack As directed on the packaging label. 21 Tab 0   • albuterol 108 (90 Base) MCG/ACT Aero Soln inhalation aerosol Inhale 2 Puffs by mouth every four hours as needed for Shortness of Breath. 1 Inhaler 2   • Dextromethorphan-guaiFENesin (TUSSIN DM)  MG/5ML Syrup Take 10 mL by mouth every 6 hours as needed. 560 mL 0   • varenicline (CHANTIX PATRICK) 0.5 MG X 11 & 1 MG X 42 tablet USE AS DIRECTED (Patient not taking: Reported on 2/21/2020) 53 Tab 0   • sildenafil citrate (VIAGRA) 50 MG tablet Take 1 Tab by mouth as needed for Erectile Dysfunction. 10 Tab 3   • lisinopril (PRINIVIL) 5 MG Tab Take 1 Tab by mouth every day. 90 Tab 2   •  "simvastatin (ZOCOR) 20 MG Tab Take 1 Tab by mouth every evening. 90 Tab 2   • aspirin  MG TBEC Take 1 Tab by mouth every day. 60 Tab      No current facility-administered medications for this visit.          Allergies as of 02/21/2020   • (No Known Allergies)        ROS: Denies any chest pain, Shortness of breath, Changes bowel or bladder, Lower extremity edema.    Physical Exam:  /82 (BP Location: Right arm, Patient Position: Sitting, BP Cuff Size: Adult long)   Pulse 88   Temp 37 °C (98.6 °F) (Temporal)   Resp 16   Ht 1.778 m (5' 10\")   SpO2 91%   BMI 24.58 kg/m²   Gen.: Well-developed, well-nourished, no apparent distress,pleasant and cooperative with the examination  Skin:  Warm and dry with good turgor. No rashes or suspicious lesions in visible areas  HEENT:Sinuses nontender with palpation, TMs clear, nares patent with pink mucosa and clear rhinorrhea,no septal deviation ,polyps or lesions. lips without lesions, oropharynx clear.  Neck: Trachea midline,no masses or adenopathy. No JVD.  Cor: Regular rate and rhythm without murmur, gallop or rub.  Lungs: Respirations unlabored.Decreased breath sounds bilaterally.  Diffuse wheezes, and mild crackles  Extremities: No cyanosis, clubbing or edema.        Assessment and Plan.   64 y.o. male     1. COPD with acute exacerbation (HCC)+/-pneumonia  We will start patient on oral antibiotics and oral steroids.  Patient advised to take albuterol every 6 to 8 hours regularly for 2 days then every 6 hours as needed.  Cough suppressant medication was started.  Recommend supportive treatment as follows:  Drink plenty of fluids to keep yourself hydrated. Warm fluids like tea and hot soup are better.  Increase humidity in the house with a humidifier or place your head over a steaming pot.-  Use Tylenol or Motrin for aches, pains, or fever.  Get plenty of rest to allow your body time to heal.  Contact the office or return for appt. if you develop worsening " symptoms or do not improve in the next week.  Avoid airway irritants such as tobacco smoke or blowing dust.    - doxycycline (VIBRAMYCIN) 100 MG Tab; Take 1 Tab by mouth 2 times a day for 7 days.  Dispense: 14 Tab; Refill: 0  - methylPREDNISolone (MEDROL DOSEPAK) 4 MG Tablet Therapy Pack; As directed on the packaging label.  Dispense: 21 Tab; Refill: 0  - albuterol 108 (90 Base) MCG/ACT Aero Soln inhalation aerosol; Inhale 2 Puffs by mouth every four hours as needed for Shortness of Breath.  Dispense: 1 Inhaler; Refill: 2  - Dextromethorphan-guaiFENesin (TUSSIN DM)  MG/5ML Syrup; Take 10 mL by mouth every 6 hours as needed.  Dispense: 560 mL; Refill: 0

## 2020-02-27 ENCOUNTER — PATIENT MESSAGE (OUTPATIENT)
Dept: MEDICAL GROUP | Facility: MEDICAL CENTER | Age: 65
End: 2020-02-27

## 2020-02-27 DIAGNOSIS — I10 ESSENTIAL HYPERTENSION: ICD-10-CM

## 2020-02-27 RX ORDER — LISINOPRIL 5 MG/1
5 TABLET ORAL DAILY
Qty: 90 TAB | Refills: 2 | Status: SHIPPED | OUTPATIENT
Start: 2020-02-27 | End: 2020-10-29 | Stop reason: SDUPTHER

## 2020-02-27 NOTE — TELEPHONE ENCOUNTER
From: Dale Dinero  To: William Harman M.D.  Sent: 2/27/2020 10:31 AM PST  Subject: Prescription Question      Omg do i need to ask every month for lisinopril refill ??? Im out in few days       ----- Message -----   From:William Harman M.D.   Sent:2/21/2020 12:45 PM PST   To:Dale Dinero   Subject:RE: Prescription Question    I sent it again.      ----- Message -----   From:Dale Dinero   Sent:2/21/2020 11:29 AM PST   To:William Harman M.D.   Subject:Prescription Question    Please call me yuliet said you not call in [facility].and they have no anti biotic till.monday       ----- Message -----   From:William Harman M.D.   Sent:1/26/2020 2:40 PM PST   To:Dale Dinero   Subject:RE: Prescription Question     I sent a refill for the Chantix    ----- Message -----   From: Dale Dinero   Sent: 1/24/2020 1:36 PM PST   To: William Harman M.D.  Subject: Prescription Question      Thanks for last script.. now im on my.last week of starter box of chantex down to 5 smokes a day now .but afraid withdraws if i run out of chantex ? Do i need to come in for the rest of chantex subscription ??  ----- Message -----  From: William Harman M.D.  Sent: 1/21/20 4:29 PM  To: Dale Dinero  Subject: RE: Prescription Question    Sent to [facility].      ----- Message -----   From: Dale Dinero   Sent: 1/21/2020 4:13 PM PST   To: William Harman M.D.  Subject: Prescription Question      Omg .getting upset now. You sent that script to [facility] ... please send to [facility]  ----- Message -----  From: William Harman M.D.  Sent: 1/21/20 3:43 PM  To: Dale Dinero  Subject: RE: Prescription Question    yes    ----- Message -----   From: Dale Dinero   Sent: 1/21/2020 3:33 PM PST   To: William Harman M.D.  Subject: Prescription Question      Sent to [facility]?  ----- Message -----  From:  William Harman M.D.  Sent: 1/21/20 3:31 PM  To: Dale Dinero  Subject: RE: Prescription Question    Medication was refilled on 1/16/2020. However I refilled it again today.      ----- Message -----   From: Dale Dinero   Sent: 1/21/2020 2:45 PM PST   To: William Harman M.D.  Subject: Prescription Question    Need refill on silfinidel

## 2020-03-18 RX ORDER — SIMVASTATIN 20 MG
20 TABLET ORAL EVERY EVENING
Qty: 90 TAB | Refills: 2 | Status: SHIPPED | OUTPATIENT
Start: 2020-03-18 | End: 2021-02-25

## 2020-03-23 DIAGNOSIS — N52.9 ERECTILE DYSFUNCTION, UNSPECIFIED ERECTILE DYSFUNCTION TYPE: ICD-10-CM

## 2020-03-23 RX ORDER — SILDENAFIL 50 MG/1
50 TABLET, FILM COATED ORAL PRN
Qty: 10 TAB | Refills: 3 | Status: SHIPPED | OUTPATIENT
Start: 2020-03-23 | End: 2020-05-08 | Stop reason: SDUPTHER

## 2020-05-08 DIAGNOSIS — N52.9 ERECTILE DYSFUNCTION, UNSPECIFIED ERECTILE DYSFUNCTION TYPE: ICD-10-CM

## 2020-05-08 RX ORDER — SILDENAFIL 50 MG/1
50 TABLET, FILM COATED ORAL PRN
Qty: 10 TAB | Refills: 3 | Status: SHIPPED | OUTPATIENT
Start: 2020-05-08 | End: 2020-05-12 | Stop reason: SDUPTHER

## 2020-07-30 ENCOUNTER — OFFICE VISIT (OUTPATIENT)
Dept: MEDICAL GROUP | Facility: MEDICAL CENTER | Age: 65
End: 2020-07-30
Payer: COMMERCIAL

## 2020-07-30 VITALS
WEIGHT: 170 LBS | SYSTOLIC BLOOD PRESSURE: 124 MMHG | HEART RATE: 94 BPM | RESPIRATION RATE: 16 BRPM | DIASTOLIC BLOOD PRESSURE: 74 MMHG | BODY MASS INDEX: 24.34 KG/M2 | HEIGHT: 70 IN | OXYGEN SATURATION: 95 % | TEMPERATURE: 98.2 F

## 2020-07-30 DIAGNOSIS — G44.89 OTHER HEADACHE SYNDROME: ICD-10-CM

## 2020-07-30 PROCEDURE — 99214 OFFICE O/P EST MOD 30 MIN: CPT | Performed by: FAMILY MEDICINE

## 2020-07-30 RX ORDER — BUTALBITAL, ACETAMINOPHEN AND CAFFEINE 300; 40; 50 MG/1; MG/1; MG/1
1 CAPSULE ORAL EVERY 6 HOURS PRN
Qty: 20 CAP | Refills: 0 | Status: SHIPPED | OUTPATIENT
Start: 2020-07-30 | End: 2020-08-04

## 2020-07-30 ASSESSMENT — PAIN SCALES - GENERAL: PAINLEVEL: 10=SEVERE PAIN

## 2020-07-30 ASSESSMENT — FIBROSIS 4 INDEX: FIB4 SCORE: 1.55

## 2020-07-30 NOTE — PROGRESS NOTES
CC: Headache/intermittent sharp pain    HPI:   Dale presents today because of intermittent headache in the right side of his head.  Patient stated that he has always been having mild headache on the top of his head, but for the past 3 days he has been getting intermittent sharp shooting pain start on the back of the head and goes up to the middle of the right side of the head.  Not associated with nausea or vomiting, associated with visual disturbance, dizziness, or fainting.  The pain stays only for few second and goes away, but shows up multiple times during the day 10-12 times daily.  Patient stated that lately has been staying a lot under the sun in the swimming pool.      Patient Active Problem List    Diagnosis Date Noted   • Smokers' cough (HCC) 12/17/2019   • PVD (peripheral vascular disease) (HCC) 12/17/2019   • Other fatigue 10/03/2018   • Dyslipidemia 10/03/2018   • Callus of foot 10/03/2018   • Other viral warts 10/03/2018   • Chronic hepatitis C without hepatic coma (HCC) 09/15/2017   • Essential hypertension 01/17/2017   • Tobacco dependence 01/17/2017   • COPD (chronic obstructive pulmonary disease) (HCC) 05/28/2015   • CAD (coronary artery disease) 05/28/2015   • Near syncope 05/28/2015   • Non compliance w medication regimen 05/28/2015       Current Outpatient Medications   Medication Sig Dispense Refill   • acetaminophen/caffeine/butalbital 300-40-50 mg (FIORICET) -40 MG Cap capsule Take 1 Cap by mouth every 6 hours as needed for Headache for up to 5 days. 20 Cap 0   • sildenafil citrate (VIAGRA) 50 MG tablet Take 1 Tab by mouth as needed for Erectile Dysfunction. 10 Tab 3   • simvastatin (ZOCOR) 20 MG Tab Take 1 Tab by mouth every evening. 90 Tab 2   • lisinopril (PRINIVIL) 5 MG Tab Take 1 Tab by mouth every day. 90 Tab 2   • albuterol 108 (90 Base) MCG/ACT Aero Soln inhalation aerosol Inhale 2 Puffs by mouth every four hours as needed for Shortness of Breath. 1 Inhaler 2   • aspirin EC  "325 MG TBEC Take 1 Tab by mouth every day. 60 Tab    • methylPREDNISolone (MEDROL DOSEPAK) 4 MG Tablet Therapy Pack As directed on the packaging label. (Patient not taking: Reported on 7/30/2020) 21 Tab 0   • Dextromethorphan-guaiFENesin (TUSSIN DM)  MG/5ML Syrup Take 10 mL by mouth every 6 hours as needed. (Patient not taking: Reported on 7/30/2020) 560 mL 0   • benzonatate (TESSALON) 100 MG Cap Take 1 Cap by mouth 3 times a day as needed for Cough. (Patient not taking: Reported on 7/30/2020) 21 Cap 0   • varenicline (CHANTIX PATRICK) 0.5 MG X 11 & 1 MG X 42 tablet USE AS DIRECTED (Patient not taking: Reported on 2/21/2020) 53 Tab 0     No current facility-administered medications for this visit.          Allergies as of 07/30/2020   • (No Known Allergies)        ROS: Denies any chest pain, Shortness of breath, Changes bowel or bladder, Lower extremity edema.    Physical Exam:  /74 (BP Location: Left arm, Patient Position: Sitting)   Pulse 94   Temp 36.8 °C (98.2 °F) (Temporal)   Resp 16   Ht 1.778 m (5' 10\")   Wt 77.1 kg (170 lb)   SpO2 95%   BMI 24.39 kg/m²   Gen.: Well-developed, well-nourished, no apparent distress,pleasant and cooperative with the examination  Skin:  Warm and dry with good turgor. No rashes or suspicious lesions in visible areas  HEENT:EOMI, pupils are reactive to light and accommodation, sinuses nontender with palpation, TMs clear, nares patent with pink mucosa and clear rhinorrhea,no septal deviation ,polyps or lesions. lips without lesions, oropharynx clear.  Neck: Trachea midline,no masses or adenopathy.  Normal range of motion of the muscles of the head  Cor: Regular rate and rhythm without murmur, gallop or rub.  Lungs: Respirations unlabored.Clear to auscultation with equal breath sounds bilaterally. No wheezes, rhonchi.  Extremities: No cyanosis, clubbing or edema.      Assessment and Plan.   65 y.o. male     1. Other headache syndrome  Seems like neuropathic pain versus " vascular headache.  Will send patient for CT scan  .  Patient on Fioricet.  Patient advised to go to ER if the condition gets worse.    - CT-HEAD W/O; Future  - acetaminophen/caffeine/butalbital 300-40-50 mg (FIORICET) -40 MG Cap capsule; Take 1 Cap by mouth every 6 hours as needed for Headache for up to 5 days.  Dispense: 20 Cap; Refill: 0

## 2020-08-26 ENCOUNTER — HOSPITAL ENCOUNTER (OUTPATIENT)
Dept: LAB | Facility: MEDICAL CENTER | Age: 65
End: 2020-08-26
Attending: INTERNAL MEDICINE
Payer: COMMERCIAL

## 2020-08-26 LAB
ALBUMIN SERPL BCP-MCNC: 4.4 G/DL (ref 3.2–4.9)
ALBUMIN/GLOB SERPL: 1.6 G/DL
ALP SERPL-CCNC: 54 U/L (ref 30–99)
ALT SERPL-CCNC: 8 U/L (ref 2–50)
ANION GAP SERPL CALC-SCNC: 13 MMOL/L (ref 7–16)
AST SERPL-CCNC: 10 U/L (ref 12–45)
BASOPHILS # BLD AUTO: 0.6 % (ref 0–1.8)
BASOPHILS # BLD: 0.05 K/UL (ref 0–0.12)
BILIRUB SERPL-MCNC: 0.5 MG/DL (ref 0.1–1.5)
BUN SERPL-MCNC: 10 MG/DL (ref 8–22)
CALCIUM SERPL-MCNC: 9.4 MG/DL (ref 8.5–10.5)
CHLORIDE SERPL-SCNC: 103 MMOL/L (ref 96–112)
CO2 SERPL-SCNC: 25 MMOL/L (ref 20–33)
CREAT SERPL-MCNC: 0.59 MG/DL (ref 0.5–1.4)
EOSINOPHIL # BLD AUTO: 0.13 K/UL (ref 0–0.51)
EOSINOPHIL NFR BLD: 1.5 % (ref 0–6.9)
ERYTHROCYTE [DISTWIDTH] IN BLOOD BY AUTOMATED COUNT: 42.9 FL (ref 35.9–50)
GLOBULIN SER CALC-MCNC: 2.7 G/DL (ref 1.9–3.5)
GLUCOSE SERPL-MCNC: 80 MG/DL (ref 65–99)
HCT VFR BLD AUTO: 49.7 % (ref 42–52)
HGB BLD-MCNC: 17 G/DL (ref 14–18)
IMM GRANULOCYTES # BLD AUTO: 0.02 K/UL (ref 0–0.11)
IMM GRANULOCYTES NFR BLD AUTO: 0.2 % (ref 0–0.9)
LYMPHOCYTES # BLD AUTO: 3.45 K/UL (ref 1–4.8)
LYMPHOCYTES NFR BLD: 40.9 % (ref 22–41)
MCH RBC QN AUTO: 30.4 PG (ref 27–33)
MCHC RBC AUTO-ENTMCNC: 34.2 G/DL (ref 33.7–35.3)
MCV RBC AUTO: 88.9 FL (ref 81.4–97.8)
MONOCYTES # BLD AUTO: 0.87 K/UL (ref 0–0.85)
MONOCYTES NFR BLD AUTO: 10.3 % (ref 0–13.4)
NEUTROPHILS # BLD AUTO: 3.92 K/UL (ref 1.82–7.42)
NEUTROPHILS NFR BLD: 46.5 % (ref 44–72)
NRBC # BLD AUTO: 0 K/UL
NRBC BLD-RTO: 0 /100 WBC
PLATELET # BLD AUTO: 218 K/UL (ref 164–446)
PMV BLD AUTO: 9.9 FL (ref 9–12.9)
POTASSIUM SERPL-SCNC: 4.4 MMOL/L (ref 3.6–5.5)
PROT SERPL-MCNC: 7.1 G/DL (ref 6–8.2)
RBC # BLD AUTO: 5.59 M/UL (ref 4.7–6.1)
SODIUM SERPL-SCNC: 141 MMOL/L (ref 135–145)
WBC # BLD AUTO: 8.4 K/UL (ref 4.8–10.8)

## 2020-08-26 PROCEDURE — 80053 COMPREHEN METABOLIC PANEL: CPT

## 2020-08-26 PROCEDURE — 85025 COMPLETE CBC W/AUTO DIFF WBC: CPT

## 2020-08-26 PROCEDURE — 36415 COLL VENOUS BLD VENIPUNCTURE: CPT

## 2020-08-26 PROCEDURE — 87522 HEPATITIS C REVRS TRNSCRPJ: CPT

## 2020-09-08 ENCOUNTER — TELEMEDICINE (OUTPATIENT)
Dept: TELEHEALTH | Facility: TELEMEDICINE | Age: 65
End: 2020-09-08
Payer: COMMERCIAL

## 2020-09-08 VITALS — WEIGHT: 170 LBS | HEIGHT: 70 IN | BODY MASS INDEX: 24.34 KG/M2 | RESPIRATION RATE: 16 BRPM

## 2020-09-08 DIAGNOSIS — M62.830 BACK SPASM: ICD-10-CM

## 2020-09-08 DIAGNOSIS — S39.012A BACK STRAIN, INITIAL ENCOUNTER: ICD-10-CM

## 2020-09-08 PROCEDURE — 99214 OFFICE O/P EST MOD 30 MIN: CPT | Mod: 95,CR | Performed by: PHYSICIAN ASSISTANT

## 2020-09-08 RX ORDER — CYCLOBENZAPRINE HCL 5 MG
5-10 TABLET ORAL 3 TIMES DAILY PRN
Qty: 20 TAB | Refills: 0 | Status: SHIPPED | OUTPATIENT
Start: 2020-09-08 | End: 2023-04-13

## 2020-09-08 ASSESSMENT — ENCOUNTER SYMPTOMS
TINGLING: 0
NUMBNESS: 0
PERIANAL NUMBNESS: 0
LEG PAIN: 0
FALLS: 0
SPEECH CHANGE: 0
BOWEL INCONTINENCE: 0
BACK PAIN: 1
NECK PAIN: 0

## 2020-09-08 ASSESSMENT — FIBROSIS 4 INDEX: FIB4 SCORE: 1.02

## 2020-09-09 NOTE — PROGRESS NOTES
"Telemedicine: Established Patient   This evaluation was conducted via ZO[employer], using secure and encrypted videoconferencing technology.     Subjective:   CC:   Chief Complaint   Patient presents with   • Back Pain     \"pulled his back\"       Dale Dinero is a 65 y.o. male presenting for evaluation and management of:    Pt is a 64 y/o male via telemedicine with lower back pain after setting up his outdoor pool this morning. He denies any specific fall, trauma, or injury. He reports that the pain is more to the sides of his low back- denies any radiation down his legs, weakness, or paresthesias.   He reports prior hx of lumbar surgery in 2014- with a \"Cage\" at L4-L5.       Back Pain  This is a new problem. The current episode started today. The problem occurs constantly. The problem has been gradually worsening since onset. The pain is present in the lumbar spine. The quality of the pain is described as aching. The pain is moderate. The symptoms are aggravated by lying down, position and twisting. Pertinent negatives include no bladder incontinence, bowel incontinence, leg pain, numbness, perianal numbness or tingling. Treatments tried: \"full day ASA\" The treatment provided mild relief.         Review of Systems   Gastrointestinal: Negative for bowel incontinence.   Genitourinary: Negative for bladder incontinence.   Musculoskeletal: Positive for back pain. Negative for falls, joint pain and neck pain.   Neurological: Negative for tingling, speech change and numbness.   All other systems reviewed and are negative.        No Known Allergies    Current medicines (including changes today)  Current Outpatient Medications   Medication Sig Dispense Refill   • cyclobenzaprine (FLEXERIL) 5 MG tablet Take 1-2 Tabs by mouth 3 times a day as needed (Avoid while driving.). 20 Tab 0   • sildenafil citrate (VIAGRA) 50 MG tablet Take 1 Tab by mouth as needed for Erectile Dysfunction. 10 Tab 3   • simvastatin (ZOCOR) 20 MG Tab " "Take 1 Tab by mouth every evening. 90 Tab 2   • lisinopril (PRINIVIL) 5 MG Tab Take 1 Tab by mouth every day. 90 Tab 2   • albuterol 108 (90 Base) MCG/ACT Aero Soln inhalation aerosol Inhale 2 Puffs by mouth every four hours as needed for Shortness of Breath. 1 Inhaler 2     No current facility-administered medications for this visit.        Patient Active Problem List    Diagnosis Date Noted   • Smokers' cough (HCC) 12/17/2019   • PVD (peripheral vascular disease) (HCC) 12/17/2019   • Other fatigue 10/03/2018   • Dyslipidemia 10/03/2018   • Callus of foot 10/03/2018   • Other viral warts 10/03/2018   • Chronic hepatitis C without hepatic coma (HCC) 09/15/2017   • Essential hypertension 01/17/2017   • Tobacco dependence 01/17/2017   • COPD (chronic obstructive pulmonary disease) (HCC) 05/28/2015   • CAD (coronary artery disease) 05/28/2015   • Near syncope 05/28/2015   • Non compliance w medication regimen 05/28/2015       Family History   Problem Relation Age of Onset   • Other Mother         accident   • Cancer Father         prostate   • Diabetes Father    • Cancer Brother         lymphoma       He  has a past medical history of CAD (coronary artery disease), COPD (chronic obstructive pulmonary disease) (HCC), and Hep C w/o coma, chronic (HCC).  He  has a past surgical history that includes meniscus repair (Left); multiple coronary artery bypass; and laminotomy.       Objective:   Resp 16   Ht 1.778 m (5' 10\")   Wt 77.1 kg (170 lb)   BMI 24.39 kg/m²     Physical Exam    Constitutional: Alert, no distress, well-groomed.  Skin: No rashes in visible areas.  Eye: Round. Conjunctiva clear, lids normal. No icterus.   ENMT: Lips pink without lesions, good dentition, moist mucous membranes. Phonation normal.  Neck: No masses, no thyromegaly. Moves freely without pain.  CV: Pulse as reported by patient  Respiratory: Unlabored respiratory effort, no cough or audible wheeze  Musc.: Localizes pain to the paraspinous " lumbar region- limited ROM with flexion. Ambulation- slow without antalgia. Spasm- per patient.   Psych: Alert and oriented x3, normal affect and mood.     Assessment and Plan:   The following treatment plan was discussed:     1. Back strain, initial encounter    2. Back spasm    Other orders  - cyclobenzaprine (FLEXERIL) 5 MG tablet; Take 1-2 Tabs by mouth 3 times a day as needed (Avoid while driving.).  Dispense: 20 Tab; Refill: 0      Pt without specific midline back pain- mainly to the Paraspinous region- Will write for the above- continue with NSAIDs- prior renal fxn reviewed no evidence of CKD. Light stretching, and ice and heat rotation.   Patient and/or guardian given precautionary s/sx that mandate immediate follow up and evaluation in the ED. Advised of risks of not doing so.  Side effects of the above medications discussed.   If minimal improvement with conservative therapies- patient must have in patient visit.     DDX, Supportive care, and indications for immediate follow-up discussed with patient.    Instructed to return to clinic or nearest emergency department if we are not available for any change in condition, further concerns, or worsening of symptoms.    The patient and/or guardian demonstrated a good understanding and agreed with the treatment plan.    Please note that this dictation was created using voice recognition software. I have made every reasonable attempt to correct obvious errors, but I expect that there are errors of grammar and possibly content that I did not discover before finalizing the note.

## 2020-10-20 DIAGNOSIS — N52.9 ERECTILE DYSFUNCTION, UNSPECIFIED ERECTILE DYSFUNCTION TYPE: ICD-10-CM

## 2020-10-20 RX ORDER — SILDENAFIL 50 MG/1
50 TABLET, FILM COATED ORAL PRN
Qty: 10 TAB | Refills: 3 | Status: SHIPPED | OUTPATIENT
Start: 2020-10-20 | End: 2021-03-26 | Stop reason: SDUPTHER

## 2020-10-29 DIAGNOSIS — I10 ESSENTIAL HYPERTENSION: ICD-10-CM

## 2020-10-29 RX ORDER — LISINOPRIL 5 MG/1
5 TABLET ORAL DAILY
Qty: 90 TAB | Refills: 2 | Status: SHIPPED | OUTPATIENT
Start: 2020-10-29 | End: 2021-09-06 | Stop reason: SDUPTHER

## 2020-12-03 ENCOUNTER — PATIENT MESSAGE (OUTPATIENT)
Dept: MEDICAL GROUP | Facility: MEDICAL CENTER | Age: 65
End: 2020-12-03

## 2021-02-25 RX ORDER — SIMVASTATIN 20 MG
TABLET ORAL
Qty: 90 TABLET | Refills: 3 | Status: SHIPPED | OUTPATIENT
Start: 2021-02-25 | End: 2021-02-26 | Stop reason: SDUPTHER

## 2021-02-26 RX ORDER — SIMVASTATIN 20 MG
20 TABLET ORAL EVERY EVENING
Qty: 90 TABLET | Refills: 3 | Status: SHIPPED | OUTPATIENT
Start: 2021-02-26 | End: 2022-05-25

## 2021-03-15 DIAGNOSIS — Z23 NEED FOR VACCINATION: ICD-10-CM

## 2021-03-19 ENCOUNTER — IMMUNIZATION (OUTPATIENT)
Dept: FAMILY PLANNING/WOMEN'S HEALTH CLINIC | Facility: IMMUNIZATION CENTER | Age: 66
End: 2021-03-19
Attending: INTERNAL MEDICINE
Payer: COMMERCIAL

## 2021-03-19 DIAGNOSIS — Z23 ENCOUNTER FOR VACCINATION: Primary | ICD-10-CM

## 2021-03-19 DIAGNOSIS — Z23 NEED FOR VACCINATION: ICD-10-CM

## 2021-03-19 PROCEDURE — 0011A MODERNA SARS-COV-2 VACCINE: CPT

## 2021-03-19 PROCEDURE — 91301 MODERNA SARS-COV-2 VACCINE: CPT

## 2021-03-26 DIAGNOSIS — N52.9 ERECTILE DYSFUNCTION, UNSPECIFIED ERECTILE DYSFUNCTION TYPE: ICD-10-CM

## 2021-03-29 RX ORDER — SILDENAFIL 50 MG/1
50 TABLET, FILM COATED ORAL PRN
Qty: 10 TABLET | Refills: 3 | Status: SHIPPED | OUTPATIENT
Start: 2021-03-29 | End: 2021-06-06 | Stop reason: SDUPTHER

## 2021-03-29 NOTE — TELEPHONE ENCOUNTER
Was the patient seen in the last year in this department? No      Does patient have an active prescription for medications requested? No     Received Request Via: Pharmacy

## 2021-04-17 ENCOUNTER — IMMUNIZATION (OUTPATIENT)
Dept: FAMILY PLANNING/WOMEN'S HEALTH CLINIC | Facility: IMMUNIZATION CENTER | Age: 66
End: 2021-04-17
Attending: INTERNAL MEDICINE
Payer: COMMERCIAL

## 2021-04-17 DIAGNOSIS — Z23 ENCOUNTER FOR VACCINATION: Primary | ICD-10-CM

## 2021-04-17 PROCEDURE — 91301 MODERNA SARS-COV-2 VACCINE: CPT

## 2021-04-17 PROCEDURE — 0012A MODERNA SARS-COV-2 VACCINE: CPT

## 2021-06-06 DIAGNOSIS — N52.9 ERECTILE DYSFUNCTION, UNSPECIFIED ERECTILE DYSFUNCTION TYPE: ICD-10-CM

## 2021-06-07 RX ORDER — SILDENAFIL 50 MG/1
50 TABLET, FILM COATED ORAL PRN
Qty: 10 TABLET | Refills: 3 | Status: SHIPPED | OUTPATIENT
Start: 2021-06-07 | End: 2022-06-02

## 2021-07-26 ENCOUNTER — PATIENT MESSAGE (OUTPATIENT)
Dept: MEDICAL GROUP | Facility: MEDICAL CENTER | Age: 66
End: 2021-07-26

## 2021-07-26 DIAGNOSIS — J44.1 COPD WITH ACUTE EXACERBATION (HCC): ICD-10-CM

## 2021-07-26 RX ORDER — BENZONATATE 100 MG/1
100 CAPSULE ORAL 3 TIMES DAILY PRN
Qty: 21 CAPSULE | Refills: 0 | Status: SHIPPED | OUTPATIENT
Start: 2021-07-26 | End: 2021-08-12 | Stop reason: SDUPTHER

## 2021-07-26 NOTE — TELEPHONE ENCOUNTER
From: Dale Dinero  To: Physician William Harman  Sent: 7/26/2021 3:33 PM PDT  Subject: Prescription Question    [facility] or [facility]??      ----- Message -----   From:Physician William Harman   Sent:7/26/2021 3:31 PM PDT   To:Dale Dinero   Subject:RE: Prescription Question    Medication refilled, sent to the pharmacy      ----- Message -----    From:Dale Dinero   Sent:7/26/2021 3:25 PM PDT   To:Physician William Harman   Subject:Prescription Question    You gave me a cough pills a while back for my copd .this smoke is making me cough again bad ? Can you prescription me again?

## 2021-08-09 ENCOUNTER — HOSPITAL ENCOUNTER (OUTPATIENT)
Facility: MEDICAL CENTER | Age: 66
End: 2021-08-09
Attending: FAMILY MEDICINE
Payer: COMMERCIAL

## 2021-08-09 ENCOUNTER — OFFICE VISIT (OUTPATIENT)
Dept: MEDICAL GROUP | Facility: MEDICAL CENTER | Age: 66
End: 2021-08-09
Payer: COMMERCIAL

## 2021-08-09 VITALS
DIASTOLIC BLOOD PRESSURE: 70 MMHG | SYSTOLIC BLOOD PRESSURE: 120 MMHG | WEIGHT: 175 LBS | HEIGHT: 70 IN | HEART RATE: 89 BPM | BODY MASS INDEX: 25.05 KG/M2 | OXYGEN SATURATION: 96 % | RESPIRATION RATE: 16 BRPM | TEMPERATURE: 98.8 F

## 2021-08-09 DIAGNOSIS — I10 ESSENTIAL HYPERTENSION: ICD-10-CM

## 2021-08-09 DIAGNOSIS — E78.5 DYSLIPIDEMIA: ICD-10-CM

## 2021-08-09 DIAGNOSIS — J44.9 CHRONIC OBSTRUCTIVE PULMONARY DISEASE, UNSPECIFIED COPD TYPE (HCC): ICD-10-CM

## 2021-08-09 DIAGNOSIS — Z23 NEED FOR VACCINATION: ICD-10-CM

## 2021-08-09 DIAGNOSIS — Z72.0 TOBACCO ABUSE: ICD-10-CM

## 2021-08-09 DIAGNOSIS — I73.9 PVD (PERIPHERAL VASCULAR DISEASE) (HCC): ICD-10-CM

## 2021-08-09 DIAGNOSIS — Z12.11 COLON CANCER SCREENING: ICD-10-CM

## 2021-08-09 PROCEDURE — 82274 ASSAY TEST FOR BLOOD FECAL: CPT

## 2021-08-09 PROCEDURE — 99214 OFFICE O/P EST MOD 30 MIN: CPT | Performed by: FAMILY MEDICINE

## 2021-08-09 RX ORDER — ASPIRIN 81 MG/1
TABLET, CHEWABLE ORAL
COMMUNITY
Start: 2020-09-08 | End: 2022-12-20

## 2021-08-09 ASSESSMENT — FIBROSIS 4 INDEX: FIB4 SCORE: 1.04

## 2021-08-09 ASSESSMENT — PATIENT HEALTH QUESTIONNAIRE - PHQ9: CLINICAL INTERPRETATION OF PHQ2 SCORE: 0

## 2021-08-09 NOTE — PROGRESS NOTES
CC: Hypertension, COPD, tobacco abuse, dyslipidemia, peripheral vascular disease    HPI:   Dale presents today to discuss the following:    Essential hypertension  Has been adequately controlled on current medication. Denies headache, chest pain, and SOB.patient has been on lisinopril 5 mg daily.  No side effects     Chronic obstructive pulmonary disease, unspecified COPD type (HCC)/ Tobacco abuse  Patient  has been  Having a chronic cough , but denies any SOB. Has been been using the albuterol. Tessalon cough medicine has been helping is cough. He is due for PFT.  Smokes 1.5 pack a day for 50 yrs, not ready to quit    Dyslipidemia  He has been tolerating the statin. Denies muscle pain LFTs has been normal, patient has been on simvastatin 20 mg daily.  Has history of peripheral vascular disease.  No history of diabetes, CAD, or stroke.     PVD (peripheral vascular disease) (HCC)  Patient has history of aorta bifemoral bypass.  He is currently mostly stable. However has been having intermittent claudication but has not been affecting his quality of life.  Patient has been on aspirin and statin, has been following up with vascular surgeon in a regular basis.Still smokes, not ready to quit.    Patient is due for colon cancer screening, refused colonoscopy, okay with the FIT test.  Due for shingles vaccine, not available    Patient Active Problem List    Diagnosis Date Noted   • Smokers' cough (HCC) 12/17/2019   • PVD (peripheral vascular disease) (HCC) 12/17/2019   • Other fatigue 10/03/2018   • Dyslipidemia 10/03/2018   • Callus of foot 10/03/2018   • Other viral warts 10/03/2018   • Chronic hepatitis C without hepatic coma (HCC) 09/15/2017   • Essential hypertension 01/17/2017   • Tobacco dependence 01/17/2017   • COPD (chronic obstructive pulmonary disease) (HCC) 05/28/2015   • CAD (coronary artery disease) 05/28/2015   • Near syncope 05/28/2015   • Non compliance w medication regimen 05/28/2015       Current  "Outpatient Medications   Medication Sig Dispense Refill   • aspirin (ASA) 81 MG Chew Tab chewable tablet      • benzonatate (TESSALON) 100 MG Cap Take 1 capsule by mouth 3 times a day as needed for Cough. 21 capsule 0   • sildenafil citrate (VIAGRA) 50 MG tablet Take 1 tablet by mouth as needed for Erectile Dysfunction. 10 tablet 3   • simvastatin (ZOCOR) 20 MG Tab Take 1 tablet by mouth every evening. 90 tablet 3   • lisinopril (PRINIVIL) 5 MG Tab Take 1 Tab by mouth every day. 90 Tab 2   • cyclobenzaprine (FLEXERIL) 5 MG tablet Take 1-2 Tabs by mouth 3 times a day as needed (Avoid while driving.). 20 Tab 0   • albuterol 108 (90 Base) MCG/ACT Aero Soln inhalation aerosol Inhale 2 Puffs by mouth every four hours as needed for Shortness of Breath. 1 Inhaler 2     No current facility-administered medications for this visit.         Allergies as of 08/09/2021   • (No Known Allergies)        ROS: Denies any chest pain, Shortness of breath, Changes bowel or bladder, Lower extremity edema.    Physical Exam:  /70 (BP Location: Right arm, Patient Position: Sitting, BP Cuff Size: Adult)   Pulse 89   Temp 37.1 °C (98.8 °F) (Temporal)   Resp 16   Ht 1.778 m (5' 10\")   Wt 79.4 kg (175 lb)   SpO2 96%   BMI 25.11 kg/m²   Gen.: Well-developed, well-nourished, no apparent distress,pleasant and cooperative with the examination  Skin:  Warm and dry with good turgor. No rashes or suspicious lesions in visible areas  HEENT:Sinuses nontender with palpation, TMs clear, nares patent with pink mucosa and clear rhinorrhea,no septal deviation ,polyps or lesions. lips without lesions, oropharynx clear.  Neck: Trachea midline,no masses or adenopathy. No JVD.  Cor: Regular rate and rhythm without murmur, gallop or rub.  Lungs: Respirations unlabored.Clear to auscultation with equal breath sounds bilaterally. No wheezes, rhonchi.  Extremities: No cyanosis, clubbing or edema.      Assessment and Plan.   66 y.o. male     1. Essential " hypertension  Controlled.  Continue on lisinopril 5 mg daily    - CBC WITH DIFFERENTIAL; Future  - Comp Metabolic Panel; Future  - Lipid Profile; Future  - TSH; Future    2. Chronic obstructive pulmonary disease, unspecified COPD type (HCC)  Stable.  Continue on albuterol as needed    - PULMONARY FUNCTION TESTS -Test requested: Complete Pulmonary Function Test; Future    3. Dyslipidemia  He has been tolerating the statin. Denies muscle pain LFTs has been normal  Continue on simvastatin 20 mg daily    - Lipid Profile; Future  - TSH; Future    4. PVD (peripheral vascular disease) (HCC)  S/P aorta bifemoral bypass.Currently stable.  Discussed smoking cessation, and exercise as tolerated.    5. Colon cancer screening    - OCCULT BLOOD FECES IMMUNOASSAY (FIT); Future    6. Tobacco abuse   Smokes 1.5 pack a day for 50 yrs, not ready to quit

## 2021-08-10 ENCOUNTER — HOSPITAL ENCOUNTER (OUTPATIENT)
Dept: LAB | Facility: MEDICAL CENTER | Age: 66
End: 2021-08-10
Attending: FAMILY MEDICINE
Payer: COMMERCIAL

## 2021-08-10 DIAGNOSIS — E78.5 DYSLIPIDEMIA: ICD-10-CM

## 2021-08-10 DIAGNOSIS — Z12.11 COLON CANCER SCREENING: ICD-10-CM

## 2021-08-10 DIAGNOSIS — I10 ESSENTIAL HYPERTENSION: ICD-10-CM

## 2021-08-10 LAB
ALBUMIN SERPL BCP-MCNC: 4.5 G/DL (ref 3.2–4.9)
ALBUMIN/GLOB SERPL: 1.5 G/DL
ALP SERPL-CCNC: 59 U/L (ref 30–99)
ALT SERPL-CCNC: 7 U/L (ref 2–50)
ANION GAP SERPL CALC-SCNC: 14 MMOL/L (ref 7–16)
AST SERPL-CCNC: 10 U/L (ref 12–45)
BASOPHILS # BLD AUTO: 0.7 % (ref 0–1.8)
BASOPHILS # BLD: 0.07 K/UL (ref 0–0.12)
BILIRUB SERPL-MCNC: 0.4 MG/DL (ref 0.1–1.5)
BUN SERPL-MCNC: 12 MG/DL (ref 8–22)
CALCIUM SERPL-MCNC: 9.4 MG/DL (ref 8.5–10.5)
CHLORIDE SERPL-SCNC: 102 MMOL/L (ref 96–112)
CHOLEST SERPL-MCNC: 132 MG/DL (ref 100–199)
CO2 SERPL-SCNC: 24 MMOL/L (ref 20–33)
CREAT SERPL-MCNC: 0.99 MG/DL (ref 0.5–1.4)
EOSINOPHIL # BLD AUTO: 0.19 K/UL (ref 0–0.51)
EOSINOPHIL NFR BLD: 2 % (ref 0–6.9)
ERYTHROCYTE [DISTWIDTH] IN BLOOD BY AUTOMATED COUNT: 45.8 FL (ref 35.9–50)
FASTING STATUS PATIENT QL REPORTED: NORMAL
GLOBULIN SER CALC-MCNC: 3 G/DL (ref 1.9–3.5)
GLUCOSE SERPL-MCNC: 69 MG/DL (ref 65–99)
HCT VFR BLD AUTO: 52.3 % (ref 42–52)
HDLC SERPL-MCNC: 31 MG/DL
HGB BLD-MCNC: 17.3 G/DL (ref 14–18)
IMM GRANULOCYTES # BLD AUTO: 0.04 K/UL (ref 0–0.11)
IMM GRANULOCYTES NFR BLD AUTO: 0.4 % (ref 0–0.9)
LDLC SERPL CALC-MCNC: 79 MG/DL
LYMPHOCYTES # BLD AUTO: 3.91 K/UL (ref 1–4.8)
LYMPHOCYTES NFR BLD: 40.8 % (ref 22–41)
MCH RBC QN AUTO: 30.3 PG (ref 27–33)
MCHC RBC AUTO-ENTMCNC: 33.1 G/DL (ref 33.7–35.3)
MCV RBC AUTO: 91.6 FL (ref 81.4–97.8)
MONOCYTES # BLD AUTO: 0.95 K/UL (ref 0–0.85)
MONOCYTES NFR BLD AUTO: 9.9 % (ref 0–13.4)
NEUTROPHILS # BLD AUTO: 4.42 K/UL (ref 1.82–7.42)
NEUTROPHILS NFR BLD: 46.2 % (ref 44–72)
NRBC # BLD AUTO: 0 K/UL
NRBC BLD-RTO: 0 /100 WBC
PLATELET # BLD AUTO: 247 K/UL (ref 164–446)
PMV BLD AUTO: 10.2 FL (ref 9–12.9)
POTASSIUM SERPL-SCNC: 4.4 MMOL/L (ref 3.6–5.5)
PROT SERPL-MCNC: 7.5 G/DL (ref 6–8.2)
RBC # BLD AUTO: 5.71 M/UL (ref 4.7–6.1)
SODIUM SERPL-SCNC: 140 MMOL/L (ref 135–145)
TRIGL SERPL-MCNC: 111 MG/DL (ref 0–149)
TSH SERPL DL<=0.005 MIU/L-ACNC: 2.3 UIU/ML (ref 0.38–5.33)
WBC # BLD AUTO: 9.6 K/UL (ref 4.8–10.8)

## 2021-08-10 PROCEDURE — 84443 ASSAY THYROID STIM HORMONE: CPT

## 2021-08-10 PROCEDURE — 80061 LIPID PANEL: CPT

## 2021-08-10 PROCEDURE — 85025 COMPLETE CBC W/AUTO DIFF WBC: CPT

## 2021-08-10 PROCEDURE — 80053 COMPREHEN METABOLIC PANEL: CPT

## 2021-08-10 PROCEDURE — 36415 COLL VENOUS BLD VENIPUNCTURE: CPT

## 2021-08-12 RX ORDER — BENZONATATE 100 MG/1
100 CAPSULE ORAL 3 TIMES DAILY PRN
Qty: 21 CAPSULE | Refills: 0 | Status: SHIPPED | OUTPATIENT
Start: 2021-08-12 | End: 2021-08-19 | Stop reason: SDUPTHER

## 2021-08-13 LAB — IMM ASSAY OCC BLD FITOB: NEGATIVE

## 2021-08-17 ENCOUNTER — NON-PROVIDER VISIT (OUTPATIENT)
Dept: MEDICAL GROUP | Facility: MEDICAL CENTER | Age: 66
End: 2021-08-17
Payer: COMMERCIAL

## 2021-08-17 DIAGNOSIS — Z23 NEED FOR VACCINATION: ICD-10-CM

## 2021-08-17 PROCEDURE — 90750 HZV VACC RECOMBINANT IM: CPT | Performed by: FAMILY MEDICINE

## 2021-08-17 PROCEDURE — 90471 IMMUNIZATION ADMIN: CPT | Performed by: FAMILY MEDICINE

## 2021-08-17 NOTE — NON-PROVIDER
"MARGE Dinero is a 66 y.o. male here for a non-provider visit for:   SHINGRIX (Shingles)    Reason for immunization: continue or complete series started at the office  Immunization records indicate need for vaccine: Yes, confirmed with Epic  Minimum interval has been met for this vaccine: Yes  ABN completed: Not Indicated    VIS Dated  10/30/19 was given to patient: Yes  All IAC Questionnaire questions were answered \"No.\"    Patient tolerated injection and no adverse effects were observed or reported: Yes    Pt scheduled for next dose in series: Not Indicated    "

## 2021-08-18 ENCOUNTER — PATIENT MESSAGE (OUTPATIENT)
Dept: MEDICAL GROUP | Facility: MEDICAL CENTER | Age: 66
End: 2021-08-18

## 2021-08-18 DIAGNOSIS — J44.1 COPD WITH ACUTE EXACERBATION (HCC): ICD-10-CM

## 2021-08-19 ENCOUNTER — PATIENT MESSAGE (OUTPATIENT)
Dept: MEDICAL GROUP | Facility: MEDICAL CENTER | Age: 66
End: 2021-08-19

## 2021-08-19 RX ORDER — BENZONATATE 100 MG/1
100 CAPSULE ORAL 3 TIMES DAILY PRN
Qty: 21 CAPSULE | Refills: 0 | Status: SHIPPED | OUTPATIENT
Start: 2021-08-19 | End: 2023-04-13

## 2021-09-06 DIAGNOSIS — I10 ESSENTIAL HYPERTENSION: ICD-10-CM

## 2021-09-07 RX ORDER — LISINOPRIL 5 MG/1
5 TABLET ORAL DAILY
Qty: 90 TABLET | Refills: 3 | Status: SHIPPED | OUTPATIENT
Start: 2021-09-07 | End: 2022-05-24 | Stop reason: SDUPTHER

## 2021-09-07 NOTE — TELEPHONE ENCOUNTER
Received request via: Pharmacy    Was the patient seen in the last year in this department? Yes    Does the patient have an active prescription (recently filled or refills available) for medication(s) requested? No    
left normal/right normal

## 2021-09-13 ENCOUNTER — HOSPITAL ENCOUNTER (OUTPATIENT)
Dept: PULMONOLOGY | Facility: MEDICAL CENTER | Age: 66
End: 2021-09-13
Attending: FAMILY MEDICINE
Payer: COMMERCIAL

## 2021-09-13 DIAGNOSIS — J44.9 CHRONIC OBSTRUCTIVE PULMONARY DISEASE, UNSPECIFIED COPD TYPE (HCC): ICD-10-CM

## 2021-09-13 PROCEDURE — 94729 DIFFUSING CAPACITY: CPT

## 2021-09-13 PROCEDURE — 94060 EVALUATION OF WHEEZING: CPT

## 2021-09-13 PROCEDURE — 94726 PLETHYSMOGRAPHY LUNG VOLUMES: CPT

## 2021-09-13 RX ORDER — ALBUTEROL SULFATE 90 UG/1
2 AEROSOL, METERED RESPIRATORY (INHALATION)
Status: DISCONTINUED | OUTPATIENT
Start: 2021-09-13 | End: 2021-09-14 | Stop reason: HOSPADM

## 2021-09-13 ASSESSMENT — PULMONARY FUNCTION TESTS
FEV1/FVC_PERCENT_PREDICTED: 77
FEV1_PERCENT_CHANGE: 8
FEV1_PREDICTED: 3.44
FEV1/FVC_PERCENT_PREDICTED: 83
FEV1_PERCENT_CHANGE: 8
FVC: 4.06
FEV1_PERCENT_PREDICTED: 75
FEV1/FVC: 64.12
FEV1/FVC_PERCENT_PREDICTED: 83
FEV1: 2.4
FEV1/FVC_PERCENT_PREDICTED: 83
FEV1_PERCENT_PREDICTED: 69
FEV1/FVC_PERCENT_CHANGE: 100
FEV1/FVC: 64.29
FVC_PERCENT_PREDICTED: 90
FEV1_LLN: 2.87
FEV1_LLN: 2.87
FVC_PREDICTED: 4.49
FEV1/FVC: 64
FVC_LLN: 3.75
FEV1/FVC_PERCENT_LLN: 64.19
FEV1/FVC_PREDICTED: 76.87
FEV1/FVC: 64.10
FEV1: 2.61
FVC_LLN: 3.75
FVC: 3.74
FVC_PERCENT_PREDICTED: 83
FEV1/FVC_PERCENT_LLN: 64.19
FEV1/FVC_PERCENT_PREDICTED: 83
FEV1/FVC_PERCENT_CHANGE: 0

## 2021-09-17 NOTE — PROCEDURES
DATE OF SERVICE:  09/13/2021     SPIROMETRY:  FVC is 3.74 liters, 83% predicted.  FEV1 is 2.40 liters, 69%   predicted.  FEV1/FVC is 64.10.  FEF 25-75 is 1.04 liters per second, 37%   predicted.  MVV is 112 liters per minute, 82% predicted.     LUNG VOLUMES:  RV is 3.93 liters, 169% predicted.  TLC is 7.84 liters, 111%   predicted.  RV/TLC ratio is 50.15.     DIFFUSION CAPACITY:  DLCO is 24.6, 91% of predicted.  DL/VA is 3.35, 87%   predicted.     INTERPRETATION:  1.  There is a moderate obstructive ventilatory defect. There is no significant   bronchodilator change.  2.  Increased RV is consistent with air trapping.  3.  Diffusion capacity is normal.        ______________________________  MD EVE Emerson/YASH    DD:  09/17/2021 12:00  DT:  09/17/2021 12:27    Job#:  [id number]

## 2021-09-19 PROCEDURE — 94729 DIFFUSING CAPACITY: CPT | Mod: 26 | Performed by: STUDENT IN AN ORGANIZED HEALTH CARE EDUCATION/TRAINING PROGRAM

## 2021-09-19 PROCEDURE — 94060 EVALUATION OF WHEEZING: CPT | Mod: 26 | Performed by: STUDENT IN AN ORGANIZED HEALTH CARE EDUCATION/TRAINING PROGRAM

## 2021-09-19 PROCEDURE — 94726 PLETHYSMOGRAPHY LUNG VOLUMES: CPT | Mod: 26 | Performed by: STUDENT IN AN ORGANIZED HEALTH CARE EDUCATION/TRAINING PROGRAM

## 2022-05-24 DIAGNOSIS — I10 ESSENTIAL HYPERTENSION: ICD-10-CM

## 2022-05-25 RX ORDER — LISINOPRIL 5 MG/1
5 TABLET ORAL DAILY
Qty: 90 TABLET | Refills: 3 | Status: SHIPPED | OUTPATIENT
Start: 2022-05-25 | End: 2023-03-16 | Stop reason: SDUPTHER

## 2022-05-25 RX ORDER — SIMVASTATIN 20 MG
20 TABLET ORAL EVERY EVENING
Qty: 90 TABLET | Refills: 3 | Status: SHIPPED | OUTPATIENT
Start: 2022-05-25 | End: 2023-03-15 | Stop reason: SDUPTHER

## 2022-05-25 RX ORDER — SIMVASTATIN 20 MG
TABLET ORAL
Qty: 90 TABLET | Refills: 0 | Status: SHIPPED | OUTPATIENT
Start: 2022-05-25 | End: 2023-07-30 | Stop reason: SDUPTHER

## 2022-06-02 DIAGNOSIS — N52.9 ERECTILE DYSFUNCTION, UNSPECIFIED ERECTILE DYSFUNCTION TYPE: ICD-10-CM

## 2022-06-02 RX ORDER — SILDENAFIL 50 MG/1
50 TABLET, FILM COATED ORAL PRN
Qty: 10 TABLET | Refills: 2 | Status: SHIPPED | OUTPATIENT
Start: 2022-06-02 | End: 2023-04-13

## 2022-12-20 ENCOUNTER — OFFICE VISIT (OUTPATIENT)
Dept: MEDICAL GROUP | Facility: MEDICAL CENTER | Age: 67
End: 2022-12-20
Payer: MEDICARE

## 2022-12-20 VITALS
DIASTOLIC BLOOD PRESSURE: 80 MMHG | RESPIRATION RATE: 16 BRPM | OXYGEN SATURATION: 96 % | TEMPERATURE: 98.3 F | HEIGHT: 70 IN | BODY MASS INDEX: 24.62 KG/M2 | WEIGHT: 172 LBS | SYSTOLIC BLOOD PRESSURE: 120 MMHG | HEART RATE: 84 BPM

## 2022-12-20 DIAGNOSIS — R05.1 ACUTE COUGH: ICD-10-CM

## 2022-12-20 DIAGNOSIS — R09.81 NASAL CONGESTION: ICD-10-CM

## 2022-12-20 DIAGNOSIS — J44.1 COPD WITH ACUTE EXACERBATION (HCC): ICD-10-CM

## 2022-12-20 PROCEDURE — 99214 OFFICE O/P EST MOD 30 MIN: CPT | Performed by: FAMILY MEDICINE

## 2022-12-20 RX ORDER — AMOXICILLIN 500 MG/1
500 CAPSULE ORAL 4 TIMES DAILY
COMMUNITY
Start: 2022-10-31 | End: 2022-12-20

## 2022-12-20 RX ORDER — ATENOLOL 25 MG/1
TABLET ORAL
COMMUNITY
End: 2023-04-13

## 2022-12-20 RX ORDER — AMOXICILLIN AND CLAVULANATE POTASSIUM 875; 125 MG/1; MG/1
1 TABLET, FILM COATED ORAL 2 TIMES DAILY
Qty: 14 TABLET | Refills: 0 | Status: SHIPPED | OUTPATIENT
Start: 2022-12-20 | End: 2022-12-27

## 2022-12-20 RX ORDER — TADALAFIL 20 MG/1
TABLET ORAL
COMMUNITY
End: 2023-04-13

## 2022-12-20 RX ORDER — LEVOTHYROXINE SODIUM 25 UG/1
1 CAPSULE ORAL
COMMUNITY
End: 2023-04-13

## 2022-12-20 RX ORDER — LOVASTATIN 40 MG/1
TABLET ORAL
COMMUNITY
End: 2023-04-13

## 2022-12-20 RX ORDER — OXYCODONE AND ACETAMINOPHEN 10; 325 MG/1; MG/1
1 TABLET ORAL EVERY 6 HOURS PRN
COMMUNITY
Start: 2022-10-31 | End: 2023-04-13

## 2022-12-20 RX ORDER — METHYLPREDNISOLONE 4 MG/1
TABLET ORAL
Qty: 21 TABLET | Refills: 0 | Status: SHIPPED | OUTPATIENT
Start: 2022-12-20 | End: 2023-04-13

## 2022-12-20 RX ORDER — ENOXAPARIN SODIUM 100 MG/ML
INJECTION SUBCUTANEOUS
COMMUNITY
Start: 2022-09-22 | End: 2023-04-13

## 2022-12-20 RX ORDER — ZOLMITRIPTAN 2.5 MG/1
TABLET, FILM COATED ORAL
COMMUNITY
End: 2023-04-13

## 2022-12-20 RX ORDER — BENZONATATE 100 MG/1
100 CAPSULE ORAL 3 TIMES DAILY PRN
Qty: 30 CAPSULE | Refills: 0 | Status: SHIPPED | OUTPATIENT
Start: 2022-12-20 | End: 2022-12-29 | Stop reason: SDUPTHER

## 2022-12-20 ASSESSMENT — FIBROSIS 4 INDEX: FIB4 SCORE: 0.99

## 2022-12-20 ASSESSMENT — PATIENT HEALTH QUESTIONNAIRE - PHQ9: CLINICAL INTERPRETATION OF PHQ2 SCORE: 0

## 2022-12-20 NOTE — PROGRESS NOTES
Annual Health Assessment Questions:    1.  Are you currently engaging in any exercise or physical activity? No    2.  How would you describe your mood or emotional well-being today? good    3.  Have you had any falls in the last year? No    4.  Have you noticed any problems with your balance or had difficulty walking? No    5.  In the last six months have you experienced any leakage of urine? No    6. DPA/Advanced Directive: Patient does not have an Advance Directive.  A packet and workshop information was given on Advance Directives.      CC: Productive cough with phlegm/shortness of breath     HPI:   Dale presents today because he has been having productive nasal burning and congestion, cough with yellow phlegm.  He was tested for COVID yesterday and was negative.  Today in the clinic he was tested for COVID and influenza a and B, and he was negative.  Patient with history of COPD has been on albuterol as needed.  Patient stated that he caught his flulike symptoms from his wife, and since yesterday has been having those symptoms.  However he denies any fever, chills.  His oxygen saturation is normal in room air.      Patient Active Problem List    Diagnosis Date Noted    Smokers' cough (HCC) 12/17/2019    PVD (peripheral vascular disease) (HCC) 12/17/2019    Other fatigue 10/03/2018    Dyslipidemia 10/03/2018    Callus of foot 10/03/2018    Other viral warts 10/03/2018    Chronic hepatitis C without hepatic coma (HCC) 09/15/2017    Essential hypertension 01/17/2017    Tobacco dependence 01/17/2017    COPD (chronic obstructive pulmonary disease) (HCC) 05/28/2015    CAD (coronary artery disease) 05/28/2015    Near syncope 05/28/2015    Non compliance w medication regimen 05/28/2015       Current Outpatient Medications   Medication Sig Dispense Refill    apixaban (ELIQUIS) 5mg Tab Take  by mouth.      enoxaparin (LOVENOX) 80 MG/0.8ML Solution Prefilled Syringe inj inject 0.8 milliliter by subcutaneous route  every  12 hours.  Begin 2 days prior to surgery and single dose morning of surgery and evening after surgery; continue for 2 days after surgery simultaneously with Eliquis      methylPREDNISolone (MEDROL DOSEPAK) 4 MG Tablet Therapy Pack As directed on the packaging label. 21 Tablet 0    amoxicillin-clavulanate (AUGMENTIN) 875-125 MG Tab Take 1 Tablet by mouth 2 times a day for 7 days. 14 Tablet 0    benzonatate (TESSALON) 100 MG Cap Take 1 Capsule by mouth 3 times a day as needed for Cough. 30 Capsule 0    atenolol (TENORMIN) 25 MG Tab       Levothyroxine Sodium 25 MCG Cap Take 1 Capsule by mouth.      lovastatin (MEVACOR) 40 MG tablet Take  by mouth.      oxyCODONE-acetaminophen (PERCOCET-10)  MG Tab Take 1 Tablet by mouth every 6 hours as needed.  FOR PAIN      tadalafil (CIALIS) 20 MG tablet Take  by mouth.      zolmitriptan (ZOMIG) 2.5 MG Tab Take  by mouth.      sildenafil citrate (VIAGRA) 50 MG tablet TAKE 1 TABLET BY MOUTH AS NEEDED FOR ERECTILE DYSFUNCTION. 10 Tablet 2    simvastatin (ZOCOR) 20 MG Tab TAKE 1 TABLET BY MOUTH ONCE DAILY IN THE EVENING 90 Tablet 0    simvastatin (ZOCOR) 20 MG Tab Take 1 Tablet by mouth every evening. 90 Tablet 3    lisinopril (PRINIVIL) 5 MG Tab Take 1 Tablet by mouth every day. 90 Tablet 3    benzonatate (TESSALON) 100 MG Cap Take 1 Capsule by mouth 3 times a day as needed for Cough. 21 Capsule 0    cyclobenzaprine (FLEXERIL) 5 MG tablet Take 1-2 Tabs by mouth 3 times a day as needed (Avoid while driving.). 20 Tab 0    albuterol 108 (90 Base) MCG/ACT Aero Soln inhalation aerosol Inhale 2 Puffs by mouth every four hours as needed for Shortness of Breath. 1 Inhaler 2     No current facility-administered medications for this visit.         Allergies as of 12/20/2022    (No Known Allergies)        ROS: Denies any chest pain, Shortness of breath, Changes bowel or bladder, Lower extremity edema.    Physical Exam:  /80 (BP Location: Right arm, Patient Position: Sitting, BP Cuff  "Size: Adult)   Pulse 84   Temp 36.8 °C (98.3 °F) (Temporal)   Resp 16   Ht 1.778 m (5' 10\")   Wt 78 kg (172 lb)   SpO2 96%   BMI 24.68 kg/m²   Gen.: Well-developed, well-nourished, no apparent distress,pleasant and cooperative with the examination  Skin:  Warm and dry with good turgor. No rashes or suspicious lesions in visible areas  HEENT:Sinuses nontender with palpation, TMs clear, nares patent with pink mucosa and clear rhinorrhea,no septal deviation ,polyps or lesions. lips without lesions, oropharynx clear.  Neck: Trachea midline,no masses or adenopathy. No JVD.  Cor: Regular rate and rhythm without murmur, gallop or rub.  Lungs: Respirations unlabored.Clear to auscultation with equal breath sounds bilaterally. No wheezes, rhonchi.  Extremities: No cyanosis, clubbing or edema.      Assessment and Plan.   67 y.o. male     1. Acute cough  2. Nasal congestion  POCT influenza and COVID are negative.  However patient is symptomatic, probably has COPD exacerbation  - POCT SARS-COV Antigen ARASH (Symptomatic only)  - POCT Influenza A/B    3. COPD with acute exacerbation (HCC)  I will start patient on oral antibiotics and prednisone  Advised to take albuterol every 6 hours as needed  Recommend smoking cessation  Supportive care is reviewed with patient/caregiver - recommend to push PO fluids and electrolytes,  take full course of Rx, take with probiotics, observe for resolution  Return to clinic with lack of resolution or progression of symptoms.      - methylPREDNISolone (MEDROL DOSEPAK) 4 MG Tablet Therapy Pack; As directed on the packaging label.  Dispense: 21 Tablet; Refill: 0  - amoxicillin-clavulanate (AUGMENTIN) 875-125 MG Tab; Take 1 Tablet by mouth 2 times a day for 7 days.  Dispense: 14 Tablet; Refill: 0        "

## 2023-02-01 ENCOUNTER — PATIENT MESSAGE (OUTPATIENT)
Dept: HEALTH INFORMATION MANAGEMENT | Facility: OTHER | Age: 68
End: 2023-02-01

## 2023-02-01 ENCOUNTER — DOCUMENTATION (OUTPATIENT)
Dept: HEALTH INFORMATION MANAGEMENT | Facility: OTHER | Age: 68
End: 2023-02-01
Payer: MEDICARE

## 2023-03-15 RX ORDER — SIMVASTATIN 20 MG
20 TABLET ORAL EVERY EVENING
Qty: 100 TABLET | Refills: 2 | Status: SHIPPED | OUTPATIENT
Start: 2023-03-15 | End: 2023-04-13

## 2023-03-16 DIAGNOSIS — I10 ESSENTIAL HYPERTENSION: ICD-10-CM

## 2023-03-16 RX ORDER — LISINOPRIL 5 MG/1
5 TABLET ORAL DAILY
Qty: 100 TABLET | Refills: 3 | Status: SHIPPED | OUTPATIENT
Start: 2023-03-16 | End: 2023-09-29 | Stop reason: SDUPTHER

## 2023-04-13 ENCOUNTER — OFFICE VISIT (OUTPATIENT)
Dept: MEDICAL GROUP | Facility: MEDICAL CENTER | Age: 68
End: 2023-04-13
Payer: MEDICARE

## 2023-04-13 ENCOUNTER — APPOINTMENT (OUTPATIENT)
Dept: RADIOLOGY | Facility: MEDICAL CENTER | Age: 68
End: 2023-04-13
Attending: EMERGENCY MEDICINE
Payer: MEDICARE

## 2023-04-13 ENCOUNTER — HOSPITAL ENCOUNTER (EMERGENCY)
Facility: MEDICAL CENTER | Age: 68
End: 2023-04-13
Attending: EMERGENCY MEDICINE
Payer: MEDICARE

## 2023-04-13 VITALS
HEIGHT: 70 IN | WEIGHT: 169 LBS | TEMPERATURE: 98.4 F | DIASTOLIC BLOOD PRESSURE: 70 MMHG | HEART RATE: 81 BPM | RESPIRATION RATE: 16 BRPM | SYSTOLIC BLOOD PRESSURE: 120 MMHG | OXYGEN SATURATION: 96 % | BODY MASS INDEX: 24.2 KG/M2

## 2023-04-13 VITALS
SYSTOLIC BLOOD PRESSURE: 163 MMHG | HEIGHT: 70 IN | HEART RATE: 84 BPM | BODY MASS INDEX: 24.33 KG/M2 | RESPIRATION RATE: 16 BRPM | WEIGHT: 169.97 LBS | OXYGEN SATURATION: 94 % | DIASTOLIC BLOOD PRESSURE: 101 MMHG | TEMPERATURE: 98.1 F

## 2023-04-13 DIAGNOSIS — R29.810 FACIAL DROOP: ICD-10-CM

## 2023-04-13 DIAGNOSIS — G51.0 BELL'S PALSY: ICD-10-CM

## 2023-04-13 LAB — EKG IMPRESSION: NORMAL

## 2023-04-13 PROCEDURE — 99285 EMERGENCY DEPT VISIT HI MDM: CPT

## 2023-04-13 PROCEDURE — 99214 OFFICE O/P EST MOD 30 MIN: CPT | Performed by: FAMILY MEDICINE

## 2023-04-13 PROCEDURE — 700111 HCHG RX REV CODE 636 W/ 250 OVERRIDE (IP): Performed by: EMERGENCY MEDICINE

## 2023-04-13 PROCEDURE — 70551 MRI BRAIN STEM W/O DYE: CPT

## 2023-04-13 PROCEDURE — 93005 ELECTROCARDIOGRAM TRACING: CPT | Performed by: EMERGENCY MEDICINE

## 2023-04-13 RX ORDER — ATENOLOL 25 MG/1
TABLET ORAL
COMMUNITY
End: 2023-04-13

## 2023-04-13 RX ORDER — PREDNISONE 20 MG/1
60 TABLET ORAL DAILY
Qty: 21 TABLET | Refills: 0 | Status: SHIPPED | OUTPATIENT
Start: 2023-04-13 | End: 2023-04-20

## 2023-04-13 RX ORDER — ASPIRIN 81 MG/1
81 TABLET ORAL EVERY MORNING
COMMUNITY

## 2023-04-13 RX ADMIN — PREDNISONE 60 MG: 10 TABLET ORAL at 13:11

## 2023-04-13 ASSESSMENT — FIBROSIS 4 INDEX
FIB4 SCORE: 1.01
FIB4 SCORE: 1.01

## 2023-04-13 ASSESSMENT — PATIENT HEALTH QUESTIONNAIRE - PHQ9: CLINICAL INTERPRETATION OF PHQ2 SCORE: 0

## 2023-04-13 NOTE — ED TRIAGE NOTES
Chief Complaint   Patient presents with    Facial Droop     Right facial [place] Palsy since this am, saw PCP who sent  him here to confirm dx, hx of this in the past.

## 2023-04-13 NOTE — PROGRESS NOTES
CC: Facial droop    HPI:   Dale presents today because he has a facial droop.  Patient states he wakes up this morning and he found that he has a facial droop.  Denies any fall, no other focal neurological symptoms(denies any headache, dizziness, weak extremities, or sensation problem).  Patient had similar problem 30 years ago and was diagnosed with facial palsy.    Patient Active Problem List    Diagnosis Date Noted    Smokers' cough ([facility]) 12/17/2019    PVD (peripheral vascular disease) ([facility]) 12/17/2019    Other fatigue 10/03/2018    Dyslipidemia 10/03/2018    Callus of foot 10/03/2018    Other viral warts 10/03/2018    Chronic hepatitis C without hepatic coma ([facility]) 09/15/2017    Essential hypertension 01/17/2017    Tobacco dependence 01/17/2017    COPD (chronic obstructive pulmonary disease) ([facility]) 05/28/2015    CAD (coronary artery disease) 05/28/2015    Near syncope 05/28/2015    Non compliance w medication regimen 05/28/2015       Current Outpatient Medications   Medication Sig Dispense Refill    aspirin 81 MG EC tablet Take 1 Tablet by mouth every day.      atenolol (TENORMIN) 25 MG Tab Take  by mouth.      lisinopril (PRINIVIL) 5 MG Tab Take 1 Tablet by mouth every day. 100 Tablet 3    simvastatin (ZOCOR) 20 MG Tab Take 1 Tablet by mouth every evening. 100 Tablet 2    benzonatate (TESSALON) 100 MG Cap Take 1 Capsule by mouth 3 times a day as needed for Cough. 30 Capsule 0    atenolol (TENORMIN) 25 MG Tab       tadalafil (CIALIS) 20 MG tablet Take  by mouth.      zolmitriptan (ZOMIG) 2.5 MG Tab Take  by mouth.      methylPREDNISolone (MEDROL DOSEPAK) 4 MG Tablet Therapy Pack As directed on the packaging label. 21 Tablet 0    sildenafil citrate (VIAGRA) 50 MG tablet TAKE 1 TABLET BY MOUTH AS NEEDED FOR ERECTILE DYSFUNCTION. 10 Tablet 2    simvastatin (ZOCOR) 20 MG Tab TAKE 1 TABLET BY MOUTH ONCE DAILY IN THE EVENING 90 Tablet 0    benzonatate (TESSALON) 100 MG Cap Take 1 Capsule by mouth 3 times a day as  "needed for Cough. 21 Capsule 0    cyclobenzaprine (FLEXERIL) 5 MG tablet Take 1-2 Tabs by mouth 3 times a day as needed (Avoid while driving.). 20 Tab 0    albuterol 108 (90 Base) MCG/ACT Aero Soln inhalation aerosol Inhale 2 Puffs by mouth every four hours as needed for Shortness of Breath. 1 Inhaler 2     No current facility-administered medications for this visit.         Allergies as of 04/13/2023    (No Known Allergies)        ROS: Denies any chest pain, Shortness of breath, Changes bowel or bladder, Lower extremity edema.    Physical Exam:  /70 (BP Location: Right arm, Patient Position: Sitting, BP Cuff Size: Adult)   Pulse 81   Temp 36.9 °C (98.4 °F) (Temporal)   Resp 16   Ht 1.778 m (5' 10\")   Wt 76.7 kg (169 lb)   SpO2 96%   BMI 24.25 kg/m²   Gen.: Well-developed, well-nourished, no apparent distress,pleasant and cooperative with the examination  Skin:  Warm and dry with good turgor. No rashes or suspicious lesions in visible areas    Neck: Trachea midline,no masses or adenopathy. No JVD.  Cor: Regular rate and rhythm without murmur, gallop or rub.  Lungs: Respirations unlabored.Clear to auscultation with equal breath sounds bilaterally. No wheezes, rhonchi.  Extremities: No cyanosis, clubbing or edema.  Neurological exam: Reflexes and tone, normal strength upper and lower EXTR.      Assessment and Plan.   68 y.o. male     1. Facial droop  Probably Bell's palsy .  However embolic stroke cannot be excluded.  Imaging is recommended(MRI of the brain).  Recommended to be evaluated in the ER, patient agreed.          "

## 2023-04-13 NOTE — ED NOTES
Pharmacy Medication Reconciliation      ~Medication reconciliation updated and complete per patient at bedside   ~Allergies have been verified   ~No oral ABX within the last 30 days  ~Patient home pharmacy :  [facility]

## 2023-04-13 NOTE — DISCHARGE SUMMARY
"  ED Observation Discharge Summary    Patient:Lucia Dinero  Patient : 1955  Patient MRN: [id number]  Patient PCP: William Harman M.D.    Admit Date: 2023  Discharge Date and Time: 23 4:57 PM  Discharge Diagnosis:   1. Bell's palsy       Discharge Attending: Jonny Angelo M.D.  Discharge Service: ED Observation    ED Course  Lucia is a 68 y.o. male who was evaluated at [facility] for right facial droop.  Patient's MRI was reassuring and that there was no new acute stroke but it did show chronic microvascular ischemic changes.  They also noted that actual evaluation for Bell's palsy requires a contrast study which was not performed today.  I did let the patient know the results at bedside including the abnormalities.  These can be safely dealt with as an outpatient and I do not feel he requires any further inpatient evaluation or neurology consultation.  Patient was discharged per Dr. Vasquez's instructions.    Discharge Exam:  BP (!) 163/101   Pulse 84   Temp 36.7 °C (98.1 °F) (Temporal)   Resp 16   Ht 1.778 m (5' 10\")   Wt 77.1 kg (169 lb 15.6 oz)   SpO2 94%   BMI 24.39 kg/m² .    Constitutional: Awake and alert. Nontoxic  HENT:  Grossly normal  Eyes: Grossly normal  Neck: Normal range of motion  Cardiovascular: Normal heart rate   Thorax & Lungs: No respiratory distress  Abdomen: Nontender  Skin:  No pathologic rash.   Extremities: Well perfused  Psychiatric: Affect normal    Labs  Results for orders placed or performed during the hospital encounter of 23   EKG   Result Value Ref Range    Report       [facility] Emergency Dept.    Test Date:  2023  Pt Name:    LUCIA DINERO                 Department: [facility]  MRN:        [id number]                      Room:       -ROOM 13  Gender:     Male                         Technician: [id number]  :        1955                   Requested By:CAS VASQUEZ  Order #:    [id number]           "          Reading MD:    Measurements  Intervals                                Axis  Rate:       75                           P:          37  DE:         175                          QRS:        -73  QRSD:       93                           T:          36  QT:         398  QTc:        445    Interpretive Statements  Sinus rhythm  Inferior infarct, old  Probable anteroseptal infarct, old  Compared to ECG 05/28/2015 13:48:50  Left anterior fascicular block no longer present  Myocardial infarct finding still present         Radiology  MR-BRAIN-W/O   Final Result      1.  Minimal supratentorial white matter disease most consistent with microvascular ischemic change versus demyelination or gliosis. Common findings for the patient's age of doubtful clinical significance.   2.  No evidence of acute infarction, hemorrhage, or mass lesion.   3.  Concerning the history of facial droop, this routine noncontrast MRI brain protocol would not evaluate for possible Bell's palsy. IAC protocol without and with contrast would be indicated for evaluation of Bell's palsy.          Medications:   New Prescriptions    ARTIFICIAL TEARS (EYE LUBRICANT) OINTMENT OPHTH OINTMENT    Apply 1 Application. to both eyes every evening.    PREDNISONE (DELTASONE) 20 MG TAB    Take 3 Tablets by mouth every day for 7 days.       My final assessment includes bedside evaluation and discussion with patient and his significant other  Upon Reevaluation, the patient's condition has: Improved; and will be discharged.    Patient discharged from ED Observation status at 5 PM (Time) April 13, 2023 (Date).     Total time spent on this ED Observation discharge encounter is < 30 Minutes    Electronically signed by: Jonny Angelo M.D., 4/13/2023 4:58 PM

## 2023-04-13 NOTE — ED PROVIDER NOTES
ED Provider Note    CHIEF COMPLAINT  Chief Complaint   Patient presents with    Facial Droop     Right facial [place] Palsy since this am, saw PCP who sent  him here to confirm dx, hx of this in the past.       EXTERNAL RECORDS REVIEWED  4/13/2023 patient was seen by his primary care physician today and sent to the emergency department for the right facial droop history of hypertension, peripheral vascular disease, coronary artery disease    HPI/ROS  LIMITATION TO HISTORY     OUTSIDE HISTORIAN(S):      Dale Dinero is a 68 y.o. male who presents to the ED with complaints of right-sided facial droop.  Patient states that he woke up this morning and he noticed that he had a different type of sensation to the right side of his face and that it was drooping.  Patient has had Bell's palsy in the past he states it feels the same so he went to see his primary care physician today who then promptly sent him to the emergency department for evaluation for possible CVA.  Upon arrival here the patient denies any other symptoms denies any visual acuity changes or states that he cannot close his right eye all the way.  Denies any headaches or any other symptoms presents for evaluation.    PAST MEDICAL HISTORY   has a past medical history of CAD (coronary artery disease), COPD (chronic obstructive pulmonary disease) (HCC), and Hep C w/o coma, chronic (HCC).    SURGICAL HISTORY   has a past surgical history that includes meniscus repair (Left); multiple coronary artery bypass; and laminotomy.    FAMILY HISTORY  Family History   Problem Relation Age of Onset    Other Mother         accident    Cancer Father         prostate    Diabetes Father     Cancer Brother         lymphoma       SOCIAL HISTORY  Social History     Tobacco Use    Smoking status: Every Day     Packs/day: 1.00     Years: 45.00     Pack years: 45.00     Types: Cigarettes    Smokeless tobacco: Never   Substance and Sexual Activity    Alcohol use: No     Comment:  "Patient stopped drinking 4 months ago    Drug use: No    Sexual activity: Never       CURRENT MEDICATIONS  Home Medications       Reviewed by Kathleen Forrest R.N. (Registered Nurse) on 04/13/23 at 1223  Med List Status: Not Addressed     Medication Last Dose Status   albuterol 108 (90 Base) MCG/ACT Aero Soln inhalation aerosol  Active   aspirin 81 MG EC tablet  Active   atenolol (TENORMIN) 25 MG Tab  Active   atenolol (TENORMIN) 25 MG Tab  Active   benzonatate (TESSALON) 100 MG Cap  Active   benzonatate (TESSALON) 100 MG Cap  Active   cyclobenzaprine (FLEXERIL) 5 MG tablet  Active   lisinopril (PRINIVIL) 5 MG Tab  Active   methylPREDNISolone (MEDROL DOSEPAK) 4 MG Tablet Therapy Pack  Active   sildenafil citrate (VIAGRA) 50 MG tablet  Active   simvastatin (ZOCOR) 20 MG Tab  Active   simvastatin (ZOCOR) 20 MG Tab  Active   tadalafil (CIALIS) 20 MG tablet  Active   zolmitriptan (ZOMIG) 2.5 MG Tab  Active                    ALLERGIES  No Known Allergies    PHYSICAL EXAM  VITAL SIGNS: BP (!) 163/101   Pulse 84   Temp 36.7 °C (98.1 °F) (Temporal)   Resp 16   Ht 1.778 m (5' 10\")   Wt 77.1 kg (169 lb 15.6 oz)   SpO2 94%   BMI 24.39 kg/m²    Constitutional: Well-developed no acute distress   HENT: Normocephalic, Atraumatic, Bilateral external ears normal.  Eyes:  conjunctiva are normal.   Neck: Supple.  Nontender midline  Cardiovascular: Regular rate and rhythm without murmurs gallops or rubs.   Thorax & Lungs: No respiratory distress. Breathing comfortably. Lungs are clear to auscultation bilaterally, there are no wheezes no rales. Chest wall is nontender.  Abdomen: Soft, nontender nondistended.  Skin: Warm, Dry, No erythema,   Back: No tenderness, No CVA tenderness.  Musculoskeletal: No clubbing cyanosis or edema good range of motion   Neurologic: Alert & oriented x 3, normal sensation moving all extremities appears normal radial nerve VII on the right side is affected he does have some drooping to the right side of " his face as well as the upper forehead.  He is unable to completely close his right eye.  The rest of the cranial nerves II through XII are grossly intact.  Musculoskeletal has good range of motion and normal strength throughout.  The patient has normal cerebellar functions to finger-nose touch.  Psychiatric: Affect normal, Judgment normal, Mood normal.       DIAGNOSTIC STUDIES / PROCEDURES      RADIOLOGY  MRI currently pending      COURSE & MEDICAL DECISION MAKING    ED Observation Status? Yes; I am placing the patient in to an observation status due to a diagnostic uncertainty as well as therapeutic intensity. Patient placed in observation status at 1:05 PM, 4/13/2023.     Observation plan is as follows: MRI was ordered because a primary care physician wanted to ensure that there was no issues.  I did discuss this with the patient and he agrees to an MRI.  Patient be placed on observation for neurologic changes.  I will sign the patient out to my partner for further disposition however I do feel that this is Bell's palsy        INITIAL ASSESSMENT, COURSE AND PLAN  Care Narrative: Presents emerged department for evaluation.  Clinically I do feel that this is an isolated cranial nerve VII Bell's palsy however he does have significant risk factors for stroke to include prior smoker coronary artery disease history of vascular disease.  At this point after discussion with the patient we will do an MRI just to ensure that there is no signs of cerebral injury.  MRI will be accomplished in a couple of hours I will sign the patient out to my partner for further disposition and care.  I did start the patient with 60 mg of prednisone and most likely will continue outpatient treatment for Bell's palsy.        ADDITIONAL PROBLEM LIST  1.  History of hypertension  2.  History of vascular disease  DISPOSITION AND DISCUSSIONS      FINAL DIAGNOSIS  1. Bell's palsy           Electronically signed by: Dov Medina M.D.,  4/13/2023 1:05 PM

## 2023-05-01 ENCOUNTER — OFFICE VISIT (OUTPATIENT)
Dept: URGENT CARE | Facility: PHYSICIAN GROUP | Age: 68
End: 2023-05-01
Payer: MEDICARE

## 2023-05-01 ENCOUNTER — TELEPHONE (OUTPATIENT)
Dept: MEDICAL GROUP | Facility: MEDICAL CENTER | Age: 68
End: 2023-05-01

## 2023-05-01 VITALS
OXYGEN SATURATION: 95 % | RESPIRATION RATE: 18 BRPM | TEMPERATURE: 97.4 F | WEIGHT: 170 LBS | BODY MASS INDEX: 23.8 KG/M2 | HEART RATE: 80 BPM | SYSTOLIC BLOOD PRESSURE: 160 MMHG | DIASTOLIC BLOOD PRESSURE: 78 MMHG | HEIGHT: 71 IN

## 2023-05-01 DIAGNOSIS — R10.31 GROIN PAIN, RIGHT: Primary | ICD-10-CM

## 2023-05-01 DIAGNOSIS — I10 ESSENTIAL HYPERTENSION: ICD-10-CM

## 2023-05-01 DIAGNOSIS — F17.200 TOBACCO DEPENDENCE: ICD-10-CM

## 2023-05-01 DIAGNOSIS — I10 ELEVATED BLOOD PRESSURE READING IN OFFICE WITH DIAGNOSIS OF HYPERTENSION: ICD-10-CM

## 2023-05-01 DIAGNOSIS — I73.9 PVD (PERIPHERAL VASCULAR DISEASE) (HCC): ICD-10-CM

## 2023-05-01 DIAGNOSIS — I25.810 CORONARY ARTERY DISEASE INVOLVING AUTOLOGOUS VEIN CORONARY BYPASS GRAFT WITHOUT ANGINA PECTORIS: ICD-10-CM

## 2023-05-01 PROCEDURE — 99215 OFFICE O/P EST HI 40 MIN: CPT | Performed by: NURSE PRACTITIONER

## 2023-05-01 ASSESSMENT — FIBROSIS 4 INDEX: FIB4 SCORE: 1.01

## 2023-05-01 NOTE — PROGRESS NOTES
Dale Dinero is a 68 y.o. male who presents for Groin Pain (X 1 wk doing squats felt pain, X 2 days ago pain in lower abdomen. Pt. States he had a double aortic surgery in 2024.)      HPI  This is a new problem. Dale Dinero is a 68 y.o. patient who presents to urgent care with c/o: Right groin pain that is getting worse over the past week.  It started after he was doing squatting exercises.  He has not had 2 days of a pulsating swelling in his right groin.  Right groin is tender to palpation.  Painful to walk.  Hx of bifemoral bypass  with intermittent claudication.   Treatments tried: None.  He did try to call his vascular surgeon who told him that he would need a referral to be seen again and that they could get him an appointment in a few months.  Denies fever, chills, numbness or tingling in lower extremity.  No other aggravating or alleviating factors.       ROS See HPI    Allergies:     No Known Allergies    PMSFS Hx:  Past Medical History:   Diagnosis Date    CAD (coronary artery disease)     COPD (chronic obstructive pulmonary disease) (HCC)     Hep C w/o coma, chronic (HCC)      Past Surgical History:   Procedure Laterality Date    LAMINOTOMY      lumbar (rods)    MENISCUS REPAIR Left     MULTIPLE CORONARY ARTERY BYPASS       Family History   Problem Relation Age of Onset    Other Mother         accident    Cancer Father         prostate    Diabetes Father     Cancer Brother         lymphoma     Social History     Tobacco Use    Smoking status: Every Day     Packs/day: 1.00     Years: 45.00     Pack years: 45.00     Types: Cigarettes    Smokeless tobacco: Never   Substance Use Topics    Alcohol use: No     Comment: Patient stopped drinking 4 months ago       Problems:   Patient Active Problem List   Diagnosis    COPD (chronic obstructive pulmonary disease) (HCC)    CAD (coronary artery disease)    Near syncope    Non compliance w medication regimen    Essential hypertension    Tobacco dependence     "Chronic hepatitis C without hepatic coma (HCC)    Other fatigue    Dyslipidemia    Callus of foot    Other viral warts    Smokers' cough (HCC)    PVD (peripheral vascular disease) (HCC)       Medications:   Current Outpatient Medications on File Prior to Visit   Medication Sig Dispense Refill    aspirin 81 MG EC tablet Take 81 mg by mouth every morning.      lisinopril (PRINIVIL) 5 MG Tab Take 1 Tablet by mouth every day. 100 Tablet 3    simvastatin (ZOCOR) 20 MG Tab TAKE 1 TABLET BY MOUTH ONCE DAILY IN THE EVENING 90 Tablet 0    artificial tears (EYE LUBRICANT) Ointment ophth ointment Apply 1 Application. to both eyes every evening. (Patient not taking: Reported on 5/1/2023) 3.5 g 1     No current facility-administered medications on file prior to visit.          Objective:     BP (!) 160/78 (BP Location: Left arm, Patient Position: Sitting, BP Cuff Size: Adult)   Pulse 80   Temp 36.3 °C (97.4 °F) (Temporal)   Resp 18   Ht 1.791 m (5' 10.5\")   Wt 77.1 kg (170 lb)   SpO2 95%   BMI 24.05 kg/m²     Physical Exam  Vitals and nursing note reviewed.   Cardiovascular:      Rate and Rhythm: Normal rate.      Pulses: Normal pulses.   Pulmonary:      Effort: Pulmonary effort is normal.   Abdominal:       Skin:     General: Skin is warm.      Capillary Refill: Capillary refill takes less than 2 seconds.   Neurological:      General: No focal deficit present.      Mental Status: He is alert and oriented to person, place, and time.      Sensory: Sensation is intact.      Motor: Motor function is intact.      Coordination: Coordination is intact.      Gait: Gait abnormal (Favors right leg).   Psychiatric:         Mood and Affect: Mood normal.         Behavior: Behavior normal. Behavior is cooperative.         Thought Content: Thought content normal.         Assessment /Associated Orders:      1. Groin pain, right        2. PVD (peripheral vascular disease) (HCC)        3. Elevated blood pressure reading in office with " diagnosis of hypertension        4. Tobacco dependence        5. Coronary artery disease involving autologous vein coronary bypass graft without angina pectoris        6. Essential hypertension              Medical Decision Making:    Pt's clinical presentation and exam today indicate a need for higher level of care with further evaluation and/or diagnostics.   Transfer to higher level of care in [facility] ER.  Pt is to be transported via POV.   I have reiterated to patient that although an Urgent Care to ER transfer was made this will not necessarily expedite the ER process      Please note that this dictation was created using voice recognition software. I have worked with consultants from the vendor as well as technical experts from [facility] to optimize the interface. I have made every reasonable attempt to correct obvious errors, but I expect that there are errors of grammar and possibly content that I did not discover before finalizing the note.  This note was electronically signed by provider

## 2023-05-01 NOTE — TELEPHONE ENCOUNTER
Patient had imaging at  [facility] and has an aneurysm. Patients wife is requesting a referral for a surgeon.

## 2023-05-02 ENCOUNTER — OFFICE VISIT (OUTPATIENT)
Dept: VASCULAR SURGERY | Facility: MEDICAL CENTER | Age: 68
End: 2023-05-02
Payer: MEDICARE

## 2023-05-02 ENCOUNTER — TELEPHONE (OUTPATIENT)
Dept: VASCULAR SURGERY | Facility: MEDICAL CENTER | Age: 68
End: 2023-05-02

## 2023-05-02 VITALS
HEART RATE: 86 BPM | SYSTOLIC BLOOD PRESSURE: 130 MMHG | WEIGHT: 168 LBS | BODY MASS INDEX: 23.52 KG/M2 | DIASTOLIC BLOOD PRESSURE: 70 MMHG | TEMPERATURE: 98.2 F | OXYGEN SATURATION: 95 % | HEIGHT: 71 IN

## 2023-05-02 DIAGNOSIS — Z72.0 TOBACCO USE: ICD-10-CM

## 2023-05-02 DIAGNOSIS — E78.5 DYSLIPIDEMIA: ICD-10-CM

## 2023-05-02 DIAGNOSIS — I10 PRIMARY HYPERTENSION: ICD-10-CM

## 2023-05-02 DIAGNOSIS — I72.4 PSEUDOANEURYSM OF FEMORAL ARTERY (HCC): ICD-10-CM

## 2023-05-02 PROCEDURE — 99204 OFFICE O/P NEW MOD 45 MIN: CPT | Performed by: SURGERY

## 2023-05-02 ASSESSMENT — FIBROSIS 4 INDEX: FIB4 SCORE: 1.01

## 2023-05-02 NOTE — TELEPHONE ENCOUNTER
I need to see the echocardiogram or the CT scan result that was done to see what kind of aneurysm he has.  Please either call [facility] or call the patient to get the result to see if the patient needs to be seen by vascular medicine or vascular surgery.  Vascular medicine if the aneurysm is not big, and vascular surgery if the aneurysm is really big.

## 2023-05-02 NOTE — PROGRESS NOTES
VASCULAR SURGERY SERVICE  CONSULT NOTE      Date: 5/2/2023    Referring Provider: William Harman MD    Consulting Physician: Negar Machado MD - [facility]     -------------------------------------------------------------------------------------------------    Reason for consultation:  Pulsatile mass in right groin.    HPI:  This is a 68 y.o. male who underwent aortobifemoral bypass on September 22, 2014.  Patient was last seen in January 2017 and was asked to follow-up with me in 6 months; however, he never came back.  Patient noticed a pulsatile mass in his right groin recently.  Ultrasound of performed at [facility] showed a pseudoaneurysm.  Patient is referred to see me.  Unfortunately, he still smokes.    Past Medical History:   Diagnosis Date    CAD (coronary artery disease)     COPD (chronic obstructive pulmonary disease) (HCC)     Hep C w/o coma, chronic (HCC)        Past Surgical History:   Procedure Laterality Date    LAMINOTOMY      lumbar (rods)    MENISCUS REPAIR Left     MULTIPLE CORONARY ARTERY BYPASS         Current Outpatient Medications   Medication Sig Dispense Refill    aspirin 81 MG EC tablet Take 81 mg by mouth every morning.      lisinopril (PRINIVIL) 5 MG Tab Take 1 Tablet by mouth every day. 100 Tablet 3    simvastatin (ZOCOR) 20 MG Tab TAKE 1 TABLET BY MOUTH ONCE DAILY IN THE EVENING 90 Tablet 0     No current facility-administered medications for this visit.       Social History     Socioeconomic History    Marital status:      Spouse name: Not on file    Number of children: Not on file    Years of education: Not on file    Highest education level: Not on file   Occupational History    Not on file   Tobacco Use    Smoking status: Every Day     Packs/day: 1.00     Years: 45.00     Pack years: 45.00     Types: Cigarettes    Smokeless tobacco: Never   Vaping Use    Vaping Use: Never used   Substance and Sexual Activity    Alcohol use: No     Comment:  "Patient stopped drinking 4 months ago    Drug use: No    Sexual activity: Never   Other Topics Concern    Not on file   Social History Narrative    Not on file     Social Determinants of Health     Financial Resource Strain: Not on file   Food Insecurity: Not on file   Transportation Needs: Not on file   Physical Activity: Not on file   Stress: Not on file   Social Connections: Not on file   Intimate Partner Violence: Not on file   Housing Stability: Not on file       Family History   Problem Relation Age of Onset    Other Mother         accident    Cancer Father         prostate    Diabetes Father     Cancer Brother         lymphoma       Allergies:  Patient has no known allergies.    Review of Systems:    Constitutional: Negative for fever, chills, weight loss,   HENT:   Negative for hearing loss or tinnitus    Eyes:    Negative for blurred vision, double vision, or loss of vision  Respiratory:  Negative for cough, hemoptysis, or wheezing    Cardiac:  Negative for chest pain or palpitations or orthopnea  Vascular:  Right calf claudication.   Gastrointestinal: Negative for nausea, vomiting, or abdominal pain     Negative for hematochezia or melena   Genitourinary: Negative for dysuria, frequency, or hematuria   Musculoskeletal: Negative for myalgias, back pain, or joint pain.  Discomfort in right groin.  Skin:   Negative for itching or rash  Neurological:  Negative for dizziness, headaches, or tremors     Negative for speech disturbance     Negative for extremity weakness or paresthesias  Endo/Heme:  Negative for easy bruising or bleeding  Psychiatric:  Negative for depression, suicidal ideas, or hallucinations    Physical Exam:  /70 (BP Location: Left arm, Patient Position: Sitting, BP Cuff Size: Adult)   Pulse 86   Temp 36.8 °C (98.2 °F) (Temporal)   Ht 1.791 m (5' 10.5\")   Wt 76.2 kg (168 lb)   SpO2 95%     Constitutional: Alert, oriented, no acute distress  HEENT:  Normocephalic and atraumatic, " EOMI  Neck:   Supple, no JVD  Cardiovascular: Regular rate and rhythm  Pulmonary:  Good air entry bilaterally  Abdominal:  Soft, non-tender, non-distended     Well-healed midline scar.  No hernia.  Musculoskeletal: No edema, no tenderness  Neurological:  CN II-XII grossly intact, no focal deficits  Skin:   Skin is warm and dry. No rash noted.  Psychiatric:  Normal mood and affect.  Lower extremities:    4 cm pulsatile mass in right groin without thinning of overlying skin, erythema, or drainage.  Well-healed scars in bilateral groins..  Palpable left dorsalis pedis pulses with multiphasic flow.  Monophasic to biphasic Doppler flow signals are obtained over the right posterior tibial artery.      Radiology:  Ultrasound results were reviewed.    Assessment:  -4 cm right femoral artery pseudoaneurysm.  -History of aortobifemoral bypass.  -Tobacco use.  -COPD.  -History of hepatitis C.  -Hypertension.  -Dyslipidemia.    Plan:  I had a long discussion with patient and his wife.  Study results were reviewed with therm.  I recommended the patient undergoes open repair of right femoral artery pseudoaneurysm.  Risks and benefits were discussed.  Risks include, but not limited to, bleeding, infection, seroma formation, seroma leak which if happened would require repair to prevent graft infection, heart attack, and perioperative anesthetic complications.  The alternative of not having the repair done was also discussed.  With this option, there is a risk of rupture and death.  Patient and his wife indicated understanding and would like to proceed with repair.  All questions were answered.  I plan to perform the repair tomorrow, pending operating room availability.  Patient knows to be taken to the emergency room if he developed enlargement of the mass in the right groin or any bleeding in the meantime.  I counseled patient to stop smoking to prevent progression of disease as well as to help with wound healing.      [facility]  MD Jeannette  [facility] Vascular Surgery   Voal[place] preferred or call my office [phone]  __________________________________________________________________  Patient:Dale Dinero   MRN:[id number]   CSN:[id number]

## 2023-05-02 NOTE — TELEPHONE ENCOUNTER
I called patient and scheduled procedure with Dr. Machado for 5/03 @ 1:30 pm. [facility]

## 2023-05-03 ENCOUNTER — APPOINTMENT (OUTPATIENT)
Dept: RADIOLOGY | Facility: MEDICAL CENTER | Age: 68
DRG: 271 | End: 2023-05-03
Attending: SURGERY
Payer: MEDICARE

## 2023-05-03 ENCOUNTER — ANESTHESIA (OUTPATIENT)
Dept: SURGERY | Facility: MEDICAL CENTER | Age: 68
DRG: 271 | End: 2023-05-03
Payer: MEDICARE

## 2023-05-03 ENCOUNTER — ANESTHESIA EVENT (OUTPATIENT)
Dept: SURGERY | Facility: MEDICAL CENTER | Age: 68
DRG: 271 | End: 2023-05-03
Payer: MEDICARE

## 2023-05-03 ENCOUNTER — HOSPITAL ENCOUNTER (INPATIENT)
Facility: MEDICAL CENTER | Age: 68
LOS: 1 days | DRG: 271 | End: 2023-05-04
Attending: SURGERY | Admitting: SURGERY
Payer: MEDICARE

## 2023-05-03 DIAGNOSIS — G89.18 POST-OP PAIN: ICD-10-CM

## 2023-05-03 LAB
ABO + RH BLD: NORMAL
ABO GROUP BLD: NORMAL
ANION GAP SERPL CALC-SCNC: 13 MMOL/L (ref 7–16)
BLD GP AB SCN SERPL QL: NORMAL
BUN SERPL-MCNC: 15 MG/DL (ref 8–22)
CALCIUM SERPL-MCNC: 9.3 MG/DL (ref 8.5–10.5)
CHLORIDE SERPL-SCNC: 103 MMOL/L (ref 96–112)
CO2 SERPL-SCNC: 21 MMOL/L (ref 20–33)
CREAT SERPL-MCNC: 0.85 MG/DL (ref 0.5–1.4)
ERYTHROCYTE [DISTWIDTH] IN BLOOD BY AUTOMATED COUNT: 42.7 FL (ref 35.9–50)
GFR SERPLBLD CREATININE-BSD FMLA CKD-EPI: 96 ML/MIN/1.73 M 2
GLUCOSE SERPL-MCNC: 83 MG/DL (ref 65–99)
HCT VFR BLD AUTO: 50.8 % (ref 42–52)
HGB BLD-MCNC: 16.6 G/DL (ref 14–18)
MCH RBC QN AUTO: 28.8 PG (ref 27–33)
MCHC RBC AUTO-ENTMCNC: 32.7 G/DL (ref 33.7–35.3)
MCV RBC AUTO: 88.2 FL (ref 81.4–97.8)
PLATELET # BLD AUTO: 267 K/UL (ref 164–446)
PMV BLD AUTO: 9.4 FL (ref 9–12.9)
POTASSIUM SERPL-SCNC: 4.2 MMOL/L (ref 3.6–5.5)
RBC # BLD AUTO: 5.76 M/UL (ref 4.7–6.1)
RH BLD: NORMAL
SODIUM SERPL-SCNC: 137 MMOL/L (ref 135–145)
WBC # BLD AUTO: 8.8 K/UL (ref 4.8–10.8)

## 2023-05-03 PROCEDURE — 86900 BLOOD TYPING SEROLOGIC ABO: CPT

## 2023-05-03 PROCEDURE — 700101 HCHG RX REV CODE 250: Performed by: SURGERY

## 2023-05-03 PROCEDURE — 160035 HCHG PACU - 1ST 60 MINS PHASE I: Performed by: SURGERY

## 2023-05-03 PROCEDURE — 35141 REPAIR DEFECT OF ARTERY: CPT | Performed by: SURGERY

## 2023-05-03 PROCEDURE — 35141 REPAIR DEFECT OF ARTERY: CPT | Mod: AS,59 | Performed by: SPECIALIST

## 2023-05-03 PROCEDURE — 700101 HCHG RX REV CODE 250: Performed by: ANESTHESIOLOGY

## 2023-05-03 PROCEDURE — 700105 HCHG RX REV CODE 258: Performed by: SURGERY

## 2023-05-03 PROCEDURE — 04CK0ZZ EXTIRPATION OF MATTER FROM RIGHT FEMORAL ARTERY, OPEN APPROACH: ICD-10-PCS | Performed by: SURGERY

## 2023-05-03 PROCEDURE — 160041 HCHG SURGERY MINUTES - EA ADDL 1 MIN LEVEL 4: Performed by: SURGERY

## 2023-05-03 PROCEDURE — 85027 COMPLETE CBC AUTOMATED: CPT

## 2023-05-03 PROCEDURE — 160048 HCHG OR STATISTICAL LEVEL 1-5: Performed by: SURGERY

## 2023-05-03 PROCEDURE — 700105 HCHG RX REV CODE 258: Performed by: ANESTHESIOLOGY

## 2023-05-03 PROCEDURE — 700111 HCHG RX REV CODE 636 W/ 250 OVERRIDE (IP): Performed by: ANESTHESIOLOGY

## 2023-05-03 PROCEDURE — 86901 BLOOD TYPING SEROLOGIC RH(D): CPT

## 2023-05-03 PROCEDURE — 700102 HCHG RX REV CODE 250 W/ 637 OVERRIDE(OP)

## 2023-05-03 PROCEDURE — A9270 NON-COVERED ITEM OR SERVICE: HCPCS | Performed by: SURGERY

## 2023-05-03 PROCEDURE — 36415 COLL VENOUS BLD VENIPUNCTURE: CPT

## 2023-05-03 PROCEDURE — 01270 ANES PX ARTERIES UPR LEG NOS: CPT | Performed by: ANESTHESIOLOGY

## 2023-05-03 PROCEDURE — 700102 HCHG RX REV CODE 250 W/ 637 OVERRIDE(OP): Performed by: SURGERY

## 2023-05-03 PROCEDURE — 770001 HCHG ROOM/CARE - MED/SURG/GYN PRIV*

## 2023-05-03 PROCEDURE — C1751 CATH, INF, PER/CENT/MIDLINE: HCPCS | Performed by: SURGERY

## 2023-05-03 PROCEDURE — 75710 ARTERY X-RAYS ARM/LEG: CPT | Mod: 26,59 | Performed by: SURGERY

## 2023-05-03 PROCEDURE — A9270 NON-COVERED ITEM OR SERVICE: HCPCS

## 2023-05-03 PROCEDURE — 160029 HCHG SURGERY MINUTES - 1ST 30 MINS LEVEL 4: Performed by: SURGERY

## 2023-05-03 PROCEDURE — 75710 ARTERY X-RAYS ARM/LEG: CPT | Mod: AS,26,59 | Performed by: SPECIALIST

## 2023-05-03 PROCEDURE — 160002 HCHG RECOVERY MINUTES (STAT): Performed by: SURGERY

## 2023-05-03 PROCEDURE — 700117 HCHG RX CONTRAST REV CODE 255: Performed by: SURGERY

## 2023-05-03 PROCEDURE — 04100JH BYPASS ABDOMINAL AORTA TO RIGHT FEMORAL ARTERY WITH SYNTHETIC SUBSTITUTE, OPEN APPROACH: ICD-10-PCS | Performed by: SURGERY

## 2023-05-03 PROCEDURE — C1757 CATH, THROMBECTOMY/EMBOLECT: HCPCS | Performed by: SURGERY

## 2023-05-03 PROCEDURE — 34101 REMOVAL OF ARTERY CLOT: CPT | Mod: AS | Performed by: SPECIALIST

## 2023-05-03 PROCEDURE — 160036 HCHG PACU - EA ADDL 30 MINS PHASE I: Performed by: SURGERY

## 2023-05-03 PROCEDURE — 36140 INTRO NDL ICATH UPR/LXTR ART: CPT | Mod: 59 | Performed by: SURGERY

## 2023-05-03 PROCEDURE — 86850 RBC ANTIBODY SCREEN: CPT

## 2023-05-03 PROCEDURE — C1768 GRAFT, VASCULAR: HCPCS | Performed by: SURGERY

## 2023-05-03 PROCEDURE — 80048 BASIC METABOLIC PNL TOTAL CA: CPT

## 2023-05-03 PROCEDURE — 34203 REMOVAL OF LEG ARTERY CLOT: CPT | Mod: 59 | Performed by: SURGERY

## 2023-05-03 PROCEDURE — C1725 CATH, TRANSLUMIN NON-LASER: HCPCS | Performed by: SURGERY

## 2023-05-03 PROCEDURE — 72170 X-RAY EXAM OF PELVIS: CPT

## 2023-05-03 PROCEDURE — 700111 HCHG RX REV CODE 636 W/ 250 OVERRIDE (IP): Performed by: SURGERY

## 2023-05-03 PROCEDURE — 160009 HCHG ANES TIME/MIN: Performed by: SURGERY

## 2023-05-03 DEVICE — GRAFT HEMASHIELD MDV 8MM 15CM: Type: IMPLANTABLE DEVICE | Site: GROIN | Status: FUNCTIONAL

## 2023-05-03 RX ORDER — ACETAMINOPHEN 325 MG/1
650 TABLET ORAL EVERY 6 HOURS PRN
Status: DISCONTINUED | OUTPATIENT
Start: 2023-05-03 | End: 2023-05-04 | Stop reason: HOSPADM

## 2023-05-03 RX ORDER — HALOPERIDOL 5 MG/ML
1 INJECTION INTRAMUSCULAR
Status: DISCONTINUED | OUTPATIENT
Start: 2023-05-03 | End: 2023-05-03 | Stop reason: HOSPADM

## 2023-05-03 RX ORDER — DIPHENHYDRAMINE HYDROCHLORIDE 50 MG/ML
12.5 INJECTION INTRAMUSCULAR; INTRAVENOUS
Status: DISCONTINUED | OUTPATIENT
Start: 2023-05-03 | End: 2023-05-03 | Stop reason: HOSPADM

## 2023-05-03 RX ORDER — ASPIRIN 81 MG/1
81 TABLET, CHEWABLE ORAL ONCE
Status: COMPLETED | OUTPATIENT
Start: 2023-05-03 | End: 2023-05-03

## 2023-05-03 RX ORDER — SODIUM CHLORIDE, SODIUM LACTATE, POTASSIUM CHLORIDE, CALCIUM CHLORIDE 600; 310; 30; 20 MG/100ML; MG/100ML; MG/100ML; MG/100ML
INJECTION, SOLUTION INTRAVENOUS CONTINUOUS
Status: ACTIVE | OUTPATIENT
Start: 2023-05-03 | End: 2023-05-03

## 2023-05-03 RX ORDER — ASPIRIN 81 MG/1
81 TABLET, CHEWABLE ORAL DAILY
Status: DISCONTINUED | OUTPATIENT
Start: 2023-05-04 | End: 2023-05-03

## 2023-05-03 RX ORDER — HYDROMORPHONE HYDROCHLORIDE 1 MG/ML
0.5 INJECTION, SOLUTION INTRAMUSCULAR; INTRAVENOUS; SUBCUTANEOUS
Status: DISCONTINUED | OUTPATIENT
Start: 2023-05-03 | End: 2023-05-03 | Stop reason: HOSPADM

## 2023-05-03 RX ORDER — HEPARIN SODIUM,PORCINE 1000/ML
VIAL (ML) INJECTION PRN
Status: DISCONTINUED | OUTPATIENT
Start: 2023-05-03 | End: 2023-05-03 | Stop reason: SURG

## 2023-05-03 RX ORDER — ONDANSETRON 2 MG/ML
INJECTION INTRAMUSCULAR; INTRAVENOUS PRN
Status: DISCONTINUED | OUTPATIENT
Start: 2023-05-03 | End: 2023-05-03 | Stop reason: SURG

## 2023-05-03 RX ORDER — CEFAZOLIN SODIUM 1 G/3ML
INJECTION, POWDER, FOR SOLUTION INTRAMUSCULAR; INTRAVENOUS PRN
Status: DISCONTINUED | OUTPATIENT
Start: 2023-05-03 | End: 2023-05-03 | Stop reason: SURG

## 2023-05-03 RX ORDER — HYDRALAZINE HYDROCHLORIDE 20 MG/ML
10 INJECTION INTRAMUSCULAR; INTRAVENOUS EVERY 4 HOURS PRN
Status: DISCONTINUED | OUTPATIENT
Start: 2023-05-03 | End: 2023-05-04 | Stop reason: HOSPADM

## 2023-05-03 RX ORDER — LABETALOL HYDROCHLORIDE 5 MG/ML
INJECTION, SOLUTION INTRAVENOUS PRN
Status: DISCONTINUED | OUTPATIENT
Start: 2023-05-03 | End: 2023-05-03 | Stop reason: SURG

## 2023-05-03 RX ORDER — MIDAZOLAM HYDROCHLORIDE 1 MG/ML
1 INJECTION INTRAMUSCULAR; INTRAVENOUS
Status: DISCONTINUED | OUTPATIENT
Start: 2023-05-03 | End: 2023-05-03 | Stop reason: HOSPADM

## 2023-05-03 RX ORDER — ONDANSETRON 2 MG/ML
4 INJECTION INTRAMUSCULAR; INTRAVENOUS EVERY 4 HOURS PRN
Status: DISCONTINUED | OUTPATIENT
Start: 2023-05-03 | End: 2023-05-04 | Stop reason: HOSPADM

## 2023-05-03 RX ORDER — LISINOPRIL 5 MG/1
5 TABLET ORAL DAILY
Status: DISCONTINUED | OUTPATIENT
Start: 2023-05-04 | End: 2023-05-04 | Stop reason: HOSPADM

## 2023-05-03 RX ORDER — SODIUM CHLORIDE, SODIUM LACTATE, POTASSIUM CHLORIDE, CALCIUM CHLORIDE 600; 310; 30; 20 MG/100ML; MG/100ML; MG/100ML; MG/100ML
INJECTION, SOLUTION INTRAVENOUS CONTINUOUS
Status: ACTIVE | OUTPATIENT
Start: 2023-05-03 | End: 2023-05-04

## 2023-05-03 RX ORDER — MIDAZOLAM HYDROCHLORIDE 1 MG/ML
.5-2 INJECTION INTRAMUSCULAR; INTRAVENOUS PRN
Status: CANCELLED | OUTPATIENT
Start: 2023-05-03 | End: 2023-05-03

## 2023-05-03 RX ORDER — ENOXAPARIN SODIUM 100 MG/ML
40 INJECTION SUBCUTANEOUS DAILY
Status: DISCONTINUED | OUTPATIENT
Start: 2023-05-04 | End: 2023-05-04 | Stop reason: HOSPADM

## 2023-05-03 RX ORDER — DEXAMETHASONE SODIUM PHOSPHATE 4 MG/ML
INJECTION, SOLUTION INTRA-ARTICULAR; INTRALESIONAL; INTRAMUSCULAR; INTRAVENOUS; SOFT TISSUE PRN
Status: DISCONTINUED | OUTPATIENT
Start: 2023-05-03 | End: 2023-05-03 | Stop reason: SURG

## 2023-05-03 RX ORDER — HYDROMORPHONE HYDROCHLORIDE 2 MG/ML
INJECTION, SOLUTION INTRAMUSCULAR; INTRAVENOUS; SUBCUTANEOUS PRN
Status: DISCONTINUED | OUTPATIENT
Start: 2023-05-03 | End: 2023-05-03 | Stop reason: SURG

## 2023-05-03 RX ORDER — MEPERIDINE HYDROCHLORIDE 25 MG/ML
12.5 INJECTION INTRAMUSCULAR; INTRAVENOUS; SUBCUTANEOUS
Status: DISCONTINUED | OUTPATIENT
Start: 2023-05-03 | End: 2023-05-03 | Stop reason: HOSPADM

## 2023-05-03 RX ORDER — PROTAMINE SULFATE 10 MG/ML
INJECTION, SOLUTION INTRAVENOUS PRN
Status: DISCONTINUED | OUTPATIENT
Start: 2023-05-03 | End: 2023-05-03 | Stop reason: SURG

## 2023-05-03 RX ORDER — ASPIRIN 81 MG/1
TABLET, CHEWABLE ORAL
Status: COMPLETED
Start: 2023-05-03 | End: 2023-05-03

## 2023-05-03 RX ORDER — HYDROMORPHONE HYDROCHLORIDE 1 MG/ML
0.2 INJECTION, SOLUTION INTRAMUSCULAR; INTRAVENOUS; SUBCUTANEOUS
Status: DISCONTINUED | OUTPATIENT
Start: 2023-05-03 | End: 2023-05-03 | Stop reason: HOSPADM

## 2023-05-03 RX ORDER — HYDROMORPHONE HYDROCHLORIDE 1 MG/ML
0.4 INJECTION, SOLUTION INTRAMUSCULAR; INTRAVENOUS; SUBCUTANEOUS
Status: DISCONTINUED | OUTPATIENT
Start: 2023-05-03 | End: 2023-05-03 | Stop reason: HOSPADM

## 2023-05-03 RX ORDER — HYDRALAZINE HYDROCHLORIDE 20 MG/ML
5 INJECTION INTRAMUSCULAR; INTRAVENOUS
Status: DISCONTINUED | OUTPATIENT
Start: 2023-05-03 | End: 2023-05-03 | Stop reason: HOSPADM

## 2023-05-03 RX ORDER — ONDANSETRON 2 MG/ML
4 INJECTION INTRAMUSCULAR; INTRAVENOUS
Status: DISCONTINUED | OUTPATIENT
Start: 2023-05-03 | End: 2023-05-03 | Stop reason: HOSPADM

## 2023-05-03 RX ORDER — BUPIVACAINE HYDROCHLORIDE AND EPINEPHRINE 5; 5 MG/ML; UG/ML
INJECTION, SOLUTION EPIDURAL; INTRACAUDAL; PERINEURAL
Status: DISCONTINUED | OUTPATIENT
Start: 2023-05-03 | End: 2023-05-03 | Stop reason: HOSPADM

## 2023-05-03 RX ORDER — HYDROCODONE BITARTRATE AND ACETAMINOPHEN 5; 325 MG/1; MG/1
1-2 TABLET ORAL EVERY 6 HOURS PRN
Status: DISCONTINUED | OUTPATIENT
Start: 2023-05-03 | End: 2023-05-04 | Stop reason: HOSPADM

## 2023-05-03 RX ORDER — DOCUSATE SODIUM 100 MG/1
100 CAPSULE, LIQUID FILLED ORAL 2 TIMES DAILY
Status: DISCONTINUED | OUTPATIENT
Start: 2023-05-03 | End: 2023-05-04 | Stop reason: HOSPADM

## 2023-05-03 RX ORDER — LABETALOL HYDROCHLORIDE 5 MG/ML
5 INJECTION, SOLUTION INTRAVENOUS
Status: DISCONTINUED | OUTPATIENT
Start: 2023-05-03 | End: 2023-05-03 | Stop reason: HOSPADM

## 2023-05-03 RX ORDER — MORPHINE SULFATE 4 MG/ML
1-3 INJECTION INTRAVENOUS
Status: DISCONTINUED | OUTPATIENT
Start: 2023-05-03 | End: 2023-05-04 | Stop reason: HOSPADM

## 2023-05-03 RX ORDER — LIDOCAINE HYDROCHLORIDE 20 MG/ML
INJECTION, SOLUTION EPIDURAL; INFILTRATION; INTRACAUDAL; PERINEURAL PRN
Status: DISCONTINUED | OUTPATIENT
Start: 2023-05-03 | End: 2023-05-03 | Stop reason: SURG

## 2023-05-03 RX ORDER — LABETALOL HYDROCHLORIDE 5 MG/ML
10 INJECTION, SOLUTION INTRAVENOUS EVERY 4 HOURS PRN
Status: DISCONTINUED | OUTPATIENT
Start: 2023-05-03 | End: 2023-05-04 | Stop reason: HOSPADM

## 2023-05-03 RX ORDER — SIMVASTATIN 10 MG
20 TABLET ORAL EVERY EVENING
Status: DISCONTINUED | OUTPATIENT
Start: 2023-05-03 | End: 2023-05-04 | Stop reason: HOSPADM

## 2023-05-03 RX ORDER — SODIUM CHLORIDE 9 MG/ML
500 INJECTION, SOLUTION INTRAVENOUS
Status: CANCELLED | OUTPATIENT
Start: 2023-05-03 | End: 2023-05-03

## 2023-05-03 RX ORDER — ONDANSETRON 2 MG/ML
4 INJECTION INTRAMUSCULAR; INTRAVENOUS PRN
Status: CANCELLED | OUTPATIENT
Start: 2023-05-03 | End: 2023-05-03

## 2023-05-03 RX ORDER — SODIUM CHLORIDE, SODIUM LACTATE, POTASSIUM CHLORIDE, CALCIUM CHLORIDE 600; 310; 30; 20 MG/100ML; MG/100ML; MG/100ML; MG/100ML
INJECTION, SOLUTION INTRAVENOUS CONTINUOUS
Status: DISCONTINUED | OUTPATIENT
Start: 2023-05-03 | End: 2023-05-03 | Stop reason: HOSPADM

## 2023-05-03 RX ORDER — EPHEDRINE SULFATE 50 MG/ML
5 INJECTION, SOLUTION INTRAVENOUS
Status: DISCONTINUED | OUTPATIENT
Start: 2023-05-03 | End: 2023-05-03 | Stop reason: HOSPADM

## 2023-05-03 RX ORDER — SODIUM CHLORIDE, SODIUM LACTATE, POTASSIUM CHLORIDE, CALCIUM CHLORIDE 600; 310; 30; 20 MG/100ML; MG/100ML; MG/100ML; MG/100ML
INJECTION, SOLUTION INTRAVENOUS
Status: DISCONTINUED | OUTPATIENT
Start: 2023-05-03 | End: 2023-05-03 | Stop reason: SURG

## 2023-05-03 RX ADMIN — PROTAMINE SULFATE 30 MG: 10 INJECTION, SOLUTION INTRAVENOUS at 16:51

## 2023-05-03 RX ADMIN — HYDROMORPHONE HYDROCHLORIDE 0.5 MG: 2 INJECTION INTRAMUSCULAR; INTRAVENOUS; SUBCUTANEOUS at 16:58

## 2023-05-03 RX ADMIN — FENTANYL CITRATE 100 MCG: 50 INJECTION, SOLUTION INTRAMUSCULAR; INTRAVENOUS at 14:39

## 2023-05-03 RX ADMIN — ROCURONIUM BROMIDE 20 MG: 10 INJECTION, SOLUTION INTRAVENOUS at 15:39

## 2023-05-03 RX ADMIN — HYDROMORPHONE HYDROCHLORIDE 0.5 MG: 2 INJECTION INTRAMUSCULAR; INTRAVENOUS; SUBCUTANEOUS at 15:11

## 2023-05-03 RX ADMIN — HYDROMORPHONE HYDROCHLORIDE 0.4 MG: 1 INJECTION, SOLUTION INTRAMUSCULAR; INTRAVENOUS; SUBCUTANEOUS at 18:40

## 2023-05-03 RX ADMIN — HYDRALAZINE HYDROCHLORIDE 5 MG: 20 INJECTION INTRAMUSCULAR; INTRAVENOUS at 19:13

## 2023-05-03 RX ADMIN — HEPARIN SODIUM 2000 UNITS: 1000 INJECTION, SOLUTION INTRAVENOUS; SUBCUTANEOUS at 15:29

## 2023-05-03 RX ADMIN — HYDROMORPHONE HYDROCHLORIDE 0.5 MG: 2 INJECTION INTRAMUSCULAR; INTRAVENOUS; SUBCUTANEOUS at 15:45

## 2023-05-03 RX ADMIN — HYDRALAZINE HYDROCHLORIDE 5 MG: 20 INJECTION INTRAMUSCULAR; INTRAVENOUS at 19:03

## 2023-05-03 RX ADMIN — SODIUM CHLORIDE, POTASSIUM CHLORIDE, SODIUM LACTATE AND CALCIUM CHLORIDE: 600; 310; 30; 20 INJECTION, SOLUTION INTRAVENOUS at 14:34

## 2023-05-03 RX ADMIN — ASPIRIN 81 MG 81 MG: 81 TABLET ORAL at 20:08

## 2023-05-03 RX ADMIN — ASPIRIN 81 MG: 81 TABLET, CHEWABLE ORAL at 20:08

## 2023-05-03 RX ADMIN — HYDROCODONE BITARTRATE AND ACETAMINOPHEN 2 TABLET: 5; 325 TABLET ORAL at 21:33

## 2023-05-03 RX ADMIN — DEXAMETHASONE SODIUM PHOSPHATE 4 MG: 4 INJECTION, SOLUTION INTRA-ARTICULAR; INTRALESIONAL; INTRAMUSCULAR; INTRAVENOUS; SOFT TISSUE at 14:42

## 2023-05-03 RX ADMIN — FENTANYL CITRATE 50 MCG: 50 INJECTION, SOLUTION INTRAMUSCULAR; INTRAVENOUS at 18:15

## 2023-05-03 RX ADMIN — HEPARIN SODIUM 5000 UNITS: 1000 INJECTION, SOLUTION INTRAVENOUS; SUBCUTANEOUS at 15:19

## 2023-05-03 RX ADMIN — LABETALOL HYDROCHLORIDE 5 MG: 5 INJECTION, SOLUTION INTRAVENOUS at 17:31

## 2023-05-03 RX ADMIN — ROCURONIUM BROMIDE 80 MG: 10 INJECTION, SOLUTION INTRAVENOUS at 14:39

## 2023-05-03 RX ADMIN — LABETALOL HYDROCHLORIDE 5 MG: 5 INJECTION, SOLUTION INTRAVENOUS at 17:58

## 2023-05-03 RX ADMIN — SUGAMMADEX 200 MG: 100 INJECTION, SOLUTION INTRAVENOUS at 17:47

## 2023-05-03 RX ADMIN — CEFAZOLIN 2 G: 330 INJECTION, POWDER, FOR SOLUTION INTRAMUSCULAR; INTRAVENOUS at 14:55

## 2023-05-03 RX ADMIN — CEFAZOLIN 2 G: 2 INJECTION, POWDER, FOR SOLUTION INTRAMUSCULAR; INTRAVENOUS at 21:37

## 2023-05-03 RX ADMIN — HYDROMORPHONE HYDROCHLORIDE 0.4 MG: 1 INJECTION, SOLUTION INTRAMUSCULAR; INTRAVENOUS; SUBCUTANEOUS at 18:32

## 2023-05-03 RX ADMIN — SIMVASTATIN 20 MG: 10 TABLET, FILM COATED ORAL at 21:33

## 2023-05-03 RX ADMIN — HYDRALAZINE HYDROCHLORIDE 5 MG: 20 INJECTION INTRAMUSCULAR; INTRAVENOUS at 19:30

## 2023-05-03 RX ADMIN — SODIUM CHLORIDE, POTASSIUM CHLORIDE, SODIUM LACTATE AND CALCIUM CHLORIDE: 600; 310; 30; 20 INJECTION, SOLUTION INTRAVENOUS at 16:52

## 2023-05-03 RX ADMIN — HYDRALAZINE HYDROCHLORIDE 5 MG: 20 INJECTION INTRAMUSCULAR; INTRAVENOUS at 19:08

## 2023-05-03 RX ADMIN — SODIUM CHLORIDE, POTASSIUM CHLORIDE, SODIUM LACTATE AND CALCIUM CHLORIDE: 600; 310; 30; 20 INJECTION, SOLUTION INTRAVENOUS at 12:55

## 2023-05-03 RX ADMIN — HYDROMORPHONE HYDROCHLORIDE 0.2 MG: 1 INJECTION, SOLUTION INTRAMUSCULAR; INTRAVENOUS; SUBCUTANEOUS at 18:45

## 2023-05-03 RX ADMIN — ONDANSETRON 4 MG: 2 INJECTION INTRAMUSCULAR; INTRAVENOUS at 17:18

## 2023-05-03 RX ADMIN — PROPOFOL 150 MG: 10 INJECTION, EMULSION INTRAVENOUS at 14:39

## 2023-05-03 RX ADMIN — LABETALOL HYDROCHLORIDE 5 MG: 5 INJECTION, SOLUTION INTRAVENOUS at 19:42

## 2023-05-03 RX ADMIN — FENTANYL CITRATE 50 MCG: 50 INJECTION, SOLUTION INTRAMUSCULAR; INTRAVENOUS at 18:09

## 2023-05-03 RX ADMIN — LABETALOL HYDROCHLORIDE 5 MG: 5 INJECTION, SOLUTION INTRAVENOUS at 17:41

## 2023-05-03 RX ADMIN — LIDOCAINE HYDROCHLORIDE 80 MG: 20 INJECTION, SOLUTION EPIDURAL; INFILTRATION; INTRACAUDAL at 14:39

## 2023-05-03 RX ADMIN — LABETALOL HYDROCHLORIDE 10 MG: 5 INJECTION, SOLUTION INTRAVENOUS at 15:14

## 2023-05-03 ASSESSMENT — PAIN DESCRIPTION - PAIN TYPE
TYPE: SURGICAL PAIN
TYPE: ACUTE PAIN;SURGICAL PAIN
TYPE: SURGICAL PAIN
TYPE: SURGICAL PAIN

## 2023-05-03 ASSESSMENT — FIBROSIS 4 INDEX: FIB4 SCORE: 1.01

## 2023-05-03 ASSESSMENT — COPD QUESTIONNAIRES
HAVE YOU SMOKED AT LEAST 100 CIGARETTES IN YOUR ENTIRE LIFE: NO/DON'T KNOW
DO YOU EVER COUGH UP ANY MUCUS OR PHLEGM?: NO/ONLY WITH OCCASIONAL COLDS OR INFECTIONS
COPD SCREENING SCORE: 3
DURING THE PAST 4 WEEKS HOW MUCH DID YOU FEEL SHORT OF BREATH: SOME OF THE TIME

## 2023-05-03 ASSESSMENT — PAIN SCALES - GENERAL: PAIN_LEVEL: 6

## 2023-05-03 NOTE — ANESTHESIA PREPROCEDURE EVALUATION
Case: [id number] Date/Time: 05/03/23 1403    Procedure: OPEN REPAIR OF RIGHT GROIN PSEUDOANEURYSM (Right: Groin)    Anesthesia type: General    Pre-op diagnosis: RIGHT FEMORAL PSEUDOANEURYSM 4-CM    Location: [facility] OR 03 / SURGERY [facility]    Surgeons: Negar Machado M.D.          Relevant Problems   PULMONARY   (positive) COPD (chronic obstructive pulmonary disease) (HCC)      CARDIAC   (positive) CAD (coronary artery disease)   (positive) Essential hypertension   (positive) Pseudoaneurysm of femoral artery (HCC)         (positive) Chronic hepatitis C without hepatic coma (HCC)     Hx aortobifemoral bypass in 2014, presents now with R femoral pseudoaneurysm.  HTN  CAD  1PPD smoker  COPD  Hx Hep C, treated per patient  Denies CP or SOB with activity  NPO >8h  Denies problems with anesthesia      Physical Exam    Airway   Mallampati: II  TM distance: >3 FB  Neck ROM: full       Cardiovascular - normal exam  Rhythm: regular  Rate: normal  (-) murmur     Dental - normal exam           Pulmonary - normal exam  Breath sounds clear to auscultation     Abdominal    Neurological - normal exam                 Anesthesia Plan    ASA 3   ASA physical status 3 criteria: COPD - poorly controlled, other (comment) and CAD/stents (> 3 months)    Plan - general       Airway plan will be ETT          Induction: intravenous    Postoperative Plan: Postoperative administration of opioids is intended.    Pertinent diagnostic labs and testing reviewed    Informed Consent:    Anesthetic plan and risks discussed with patient.    Use of blood products discussed with: patient whom consented to blood products.

## 2023-05-03 NOTE — ANESTHESIA PROCEDURE NOTES
Arterial Line  Performed by: Colton Pereira M.D.  Authorized by: Colton Pereira M.D.     Localization: surface landmarks    Patient Location:  OR  Indication: continuous blood pressure monitoring        Catheter Size:  20 G  Seldinger Technique?: Yes    Laterality:  Left  Site:  Radial artery  Line Secured:  Antimicrobial disc, tape and transparent dressing  Events: patient tolerated procedure well with no complications

## 2023-05-03 NOTE — TELEPHONE ENCOUNTER
Patient was seen recently by vascular surgeon for peripheral vascular disease.  I recommend to get the imaging that showed she had an aneurysm.  Or make a follow-up appointment to order imaging checking for the aneurysm.

## 2023-05-03 NOTE — ANESTHESIA PROCEDURE NOTES
Airway    Date/Time: 5/3/2023 2:42 PM  Performed by: Colton Pereira M.D.  Authorized by: Colton Pereira M.D.     Location:  OR  Urgency:  Elective  Indications for Airway Management:  Anesthesia      Spontaneous Ventilation: absent    Sedation Level:  Deep  Preoxygenated: Yes    Patient Position:  Sniffing  Final Airway Type:  Endotracheal airway  Final Endotracheal Airway:  ETT  Cuffed: Yes    Technique Used for Successful ETT Placement:  Direct laryngoscopy    Insertion Site:  Oral  Blade Type:  Indy  Laryngoscope Blade/Videolaryngoscope Blade Size:  3  ETT Size (mm):  7.5  Measured from:  Teeth  ETT to Teeth (cm):  22  Placement Verified by: auscultation and capnometry    Cormack-Lehane Classification:  Grade IIa - partial view of glottis  Number of Attempts at Approach:  1

## 2023-05-03 NOTE — OR NURSING
Assumed care for patient in pre-op. Checklist complete. Consents signed and on chart and verbalized understanding of the procedure and plan of care. PIV started. Belongings placed in am locker. Call light within reach.

## 2023-05-04 VITALS
SYSTOLIC BLOOD PRESSURE: 136 MMHG | HEART RATE: 68 BPM | BODY MASS INDEX: 23.33 KG/M2 | RESPIRATION RATE: 18 BRPM | WEIGHT: 166.67 LBS | TEMPERATURE: 97.2 F | OXYGEN SATURATION: 92 % | HEIGHT: 71 IN | DIASTOLIC BLOOD PRESSURE: 67 MMHG

## 2023-05-04 LAB
ANION GAP SERPL CALC-SCNC: 11 MMOL/L (ref 7–16)
BUN SERPL-MCNC: 13 MG/DL (ref 8–22)
CALCIUM SERPL-MCNC: 8.6 MG/DL (ref 8.5–10.5)
CHLORIDE SERPL-SCNC: 103 MMOL/L (ref 96–112)
CO2 SERPL-SCNC: 22 MMOL/L (ref 20–33)
CREAT SERPL-MCNC: 0.71 MG/DL (ref 0.5–1.4)
ERYTHROCYTE [DISTWIDTH] IN BLOOD BY AUTOMATED COUNT: 43.7 FL (ref 35.9–50)
GFR SERPLBLD CREATININE-BSD FMLA CKD-EPI: 101 ML/MIN/1.73 M 2
GLUCOSE SERPL-MCNC: 147 MG/DL (ref 65–99)
HCT VFR BLD AUTO: 45.7 % (ref 42–52)
HGB BLD-MCNC: 15.3 G/DL (ref 14–18)
MCH RBC QN AUTO: 29.8 PG (ref 27–33)
MCHC RBC AUTO-ENTMCNC: 33.5 G/DL (ref 33.7–35.3)
MCV RBC AUTO: 88.9 FL (ref 81.4–97.8)
PLATELET # BLD AUTO: 253 K/UL (ref 164–446)
PMV BLD AUTO: 9.3 FL (ref 9–12.9)
POTASSIUM SERPL-SCNC: 3.9 MMOL/L (ref 3.6–5.5)
RBC # BLD AUTO: 5.14 M/UL (ref 4.7–6.1)
SODIUM SERPL-SCNC: 136 MMOL/L (ref 135–145)
WBC # BLD AUTO: 14.1 K/UL (ref 4.8–10.8)

## 2023-05-04 PROCEDURE — 700102 HCHG RX REV CODE 250 W/ 637 OVERRIDE(OP): Performed by: SURGERY

## 2023-05-04 PROCEDURE — 36415 COLL VENOUS BLD VENIPUNCTURE: CPT

## 2023-05-04 PROCEDURE — 700105 HCHG RX REV CODE 258: Performed by: SURGERY

## 2023-05-04 PROCEDURE — 85027 COMPLETE CBC AUTOMATED: CPT

## 2023-05-04 PROCEDURE — 80048 BASIC METABOLIC PNL TOTAL CA: CPT

## 2023-05-04 PROCEDURE — 700111 HCHG RX REV CODE 636 W/ 250 OVERRIDE (IP): Performed by: SURGERY

## 2023-05-04 PROCEDURE — A9270 NON-COVERED ITEM OR SERVICE: HCPCS | Performed by: SURGERY

## 2023-05-04 RX ORDER — HYDROCODONE BITARTRATE AND ACETAMINOPHEN 5; 325 MG/1; MG/1
1-2 TABLET ORAL EVERY 4 HOURS PRN
Qty: 10 TABLET | Refills: 0 | Status: SHIPPED | OUTPATIENT
Start: 2023-05-04 | End: 2023-05-05 | Stop reason: SDUPTHER

## 2023-05-04 RX ADMIN — ENOXAPARIN SODIUM 40 MG: 40 INJECTION SUBCUTANEOUS at 18:00

## 2023-05-04 RX ADMIN — NICOTINE 7 MG: 7 PATCH TRANSDERMAL at 05:26

## 2023-05-04 RX ADMIN — HYDROCODONE BITARTRATE AND ACETAMINOPHEN 2 TABLET: 5; 325 TABLET ORAL at 18:01

## 2023-05-04 RX ADMIN — CEFAZOLIN 2 G: 2 INJECTION, POWDER, FOR SOLUTION INTRAMUSCULAR; INTRAVENOUS at 15:00

## 2023-05-04 RX ADMIN — ASPIRIN 81 MG: 81 TABLET, COATED ORAL at 05:26

## 2023-05-04 RX ADMIN — HYDROCODONE BITARTRATE AND ACETAMINOPHEN 2 TABLET: 5; 325 TABLET ORAL at 11:59

## 2023-05-04 RX ADMIN — CEFAZOLIN 2 G: 2 INJECTION, POWDER, FOR SOLUTION INTRAMUSCULAR; INTRAVENOUS at 05:29

## 2023-05-04 RX ADMIN — LISINOPRIL 5 MG: 5 TABLET ORAL at 05:26

## 2023-05-04 RX ADMIN — HYDROCODONE BITARTRATE AND ACETAMINOPHEN 2 TABLET: 5; 325 TABLET ORAL at 05:32

## 2023-05-04 ASSESSMENT — PATIENT HEALTH QUESTIONNAIRE - PHQ9
2. FEELING DOWN, DEPRESSED, IRRITABLE, OR HOPELESS: NOT AT ALL
1. LITTLE INTEREST OR PLEASURE IN DOING THINGS: NOT AT ALL
SUM OF ALL RESPONSES TO PHQ9 QUESTIONS 1 AND 2: 0

## 2023-05-04 ASSESSMENT — LIFESTYLE VARIABLES
DOES PATIENT WANT TO STOP DRINKING: NO
ON A TYPICAL DAY WHEN YOU DRINK ALCOHOL HOW MANY DRINKS DO YOU HAVE: 0
EVER HAD A DRINK FIRST THING IN THE MORNING TO STEADY YOUR NERVES TO GET RID OF A HANGOVER: NO
AVERAGE NUMBER OF DAYS PER WEEK YOU HAVE A DRINK CONTAINING ALCOHOL: 0
TOTAL SCORE: 0
EVER FELT BAD OR GUILTY ABOUT YOUR DRINKING: NO
HAVE PEOPLE ANNOYED YOU BY CRITICIZING YOUR DRINKING: NO
HOW MANY TIMES IN THE PAST YEAR HAVE YOU HAD 5 OR MORE DRINKS IN A DAY: 0
TOTAL SCORE: 0
HAVE YOU EVER FELT YOU SHOULD CUT DOWN ON YOUR DRINKING: NO
TOTAL SCORE: 0
ALCOHOL_USE: NO
CONSUMPTION TOTAL: NEGATIVE

## 2023-05-04 ASSESSMENT — PAIN DESCRIPTION - PAIN TYPE
TYPE: ACUTE PAIN;SURGICAL PAIN
TYPE: ACUTE PAIN
TYPE: ACUTE PAIN;SURGICAL PAIN
TYPE: ACUTE PAIN;SURGICAL PAIN

## 2023-05-04 NOTE — ANESTHESIA POSTPROCEDURE EVALUATION
Patient: Dale Dinero    Procedure Summary     Date: 05/03/23 Room / Location: [facility] OR 03 / SURGERY [facility]    Anesthesia Start: 1434 Anesthesia Stop: 1758    Procedures:       OPEN REPAIR OF RIGHT GROIN PSEUDOANEURYSM (Right: Groin)      AORTOGRAM, RIGHT LEG (Right: Leg Upper)      THROMBECTOMY, RIGHT LEG (Right: Leg Upper) Diagnosis: (RIGHT FEMORAL PSEUDOANEURYSM)    Surgeons: Negar Machado M.D. Responsible Provider: Xiao Morgan M.D.    Anesthesia Type: general ASA Status: 3          Final Anesthesia Type: general  Last vitals  BP   189/75   Temp   (!) 35.7 °C (96.2 °F)    Pulse   65   Resp   12    SpO2   96 %      Anesthesia Post Evaluation    Patient location during evaluation: PACU  Patient participation: complete - patient participated  Level of consciousness: awake  Pain score: 6    Airway patency: patent  Anesthetic complications: no  Cardiovascular status: hypertensive  Respiratory status: acceptable  Hydration status: acceptable    PONV: none          There were no known notable events for this encounter.     Nurse Pain Score: 0 (NPRS)

## 2023-05-04 NOTE — PROGRESS NOTES
4 Eyes Skin Assessment Completed by EVERT Farley and EVERT Jung     Head WDL  Ears WDL  Nose WDL  Mouth WDL  Neck WDL  Breast/Chest WDL   Shoulder Blades WDL   Spine  WDL  (R) Arm/Elbow/Hand WDL  (L) Arm/Elbow/Hand WDL  Abdomen RLQ incision, dressing CD&I  Groin: R prevena  Scrotum/Coccyx/Buttocks: tinsley in place  (R) Leg WDL  (L) Leg WDL  (R) Heel/Foot/Toe WDL  (L) Heel/Foot/Toe WDL              Devices In Places SCDs, , oxymask        Interventions In Place: n/a        Pictures Uploaded Into Epic n/a  Wound Consult Placed n/a  RN Wound Prevention Protocol Ordered n/a

## 2023-05-04 NOTE — PROGRESS NOTES
Report received from PACU RN, assumed care upoin arrival.  Assessment complete.  A&O x 4. Patient calls appropriately.  Patient bedrest at this time  Patient has 7/10 pain. Pain managed with prescribed medications.  Denies N&V. Tolerating cardiac diet.  Surgical R groin prevena. RLQ incision.  + void via tinsley catheter, - flatus, - BM.  Patient denies SOB. On 4L nasal cannula  SCD's on.  Patient calm, pleasant, and cooperative.  Review plan with of care with patient. Call light and personal belongings within reach. Hourly rounding in place. All needs met at this time.

## 2023-05-04 NOTE — OR NURSING
Patient arrived from OR via gurney at 1753 with siderails up x 2, brake locked upon arrival. Patient maintaining own airway, responding to voice. Received report from Dr. Morgan and OR RN, assumed are of patient. Right groin with Previna in place, CDI. Right lower abdomen with gauze and Tegaderm, dressing CDI. Labetolol administered by anesthesia bedside for hypertension. Patient awake at 1800, oriented x 4 at 1805. Reports 8/10 pain, denies nausea. Fentanyl administered per MAR. Dilaudid administered at 1830 for continued report of pain. Wife updated at 1837. Hydralazine administered at 1903 per Dr. Machado's order to keep SBP less than 150. Labetalol administered for continued hypertension at 1942. Patient tolerating sips of water at 1945. Vital signs stable, patient resting comfortably at 2015. Report to Martine TEJADA RN. Patient discharged to room with belongings at 2028.

## 2023-05-04 NOTE — OP REPORT
VASCULAR SURGERY OPERATIVE REPORT       DATE OF SERVICE:  05/03/2023     SURGEON:  Negar Machado MD     ANESTHESIOLOGISTS:  Otto Murrieta MD and Xiao Morgan MD     TYPE OF ANESTHESIA:  General anesthesia.     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     PROCEDURES:  1.  Open repair of right common femoral artery pseudoaneurysm with 8 mm Dacron   interposition graft from right aortobifemoral bypass graft limb to distal   right common femoral artery.  2.  Right leg arterial thrombectomy.  3.  Right leg angiogram.     INDICATIONS FOR PROCEDURE:  This is a pleasant 68-year-old male who many years   ago underwent aortobifemoral bypass.  The patient was lost the followup and   recently presented with a 4 cm pseudoaneurysm in the right common femoral   artery area.  Discussion was made with the patient and his wife.  They would   like to undergo open repair of the pseudoaneurysm, fully understanding all   risks.     DESCRIPTION OF PROCEDURE:  Informed consent was obtained.  The patient was   taken to the operating room and was placed in the supine position.  General   anesthesia was induced.  A Moran catheter was placed under sterile condition.    The patient was given Ancef intravenously.     Next, his lower abdomen, both thighs and perineum were sterilely prepped and   draped in the normal fashion after Moran catheter was placed under sterile   condition.  A timeout procedure was done.  A small curvilinear incision was   made in the right lower abdomen.  The incision was extended through   subcutaneous tissue and fascia using the electrocautery.  Retroperitoneal   dissection was performed.  The right aortobifemoral bypass graft limb was   identified, carefully dissected.     The patient was given heparin 5000 units intravenously.  He was subsequently   given  another 2000 units of heparin.  After the heparin was allowed to   circulate systemically for 3 minutes, the right aortobifemoral bypass graft   limb was clamped with vascular clamps.  An incision was made in the right   groin, centering over the pseudoaneurysms.  The incision was extended through   subcutaneous tissue using the electrocautery.  The profunda femoral artery and   superficial femoral artery were identified, carefully dissected free from   surrounding tissue.  Vascular control of these arteries was obtained with vessel   loops.  The pseudoaneurysm was opened.  There was disruption of the graft to   artery anastomosis due to what appeared to be degeneration of the arterial   tissue.  There was no evidence of infection seen.  The proximal common femoral   artery as well as iliac artery were chronically occluded.  The graft was   resected from the common femoral artery and transected circumferentially.  An   8 mm Dacron graft was brought into the operative field and anastomosed   end-to-end to the transected end of the right aortobifemoral bypass graft limb   using running 5-0 Prolene suture.  After this was done, the vascular clamps   in the retroperitoneal area was removed.  Excellent inflow was seen.  The   graft was clamped below the anastomosis.     Next, an arteriotomy was made on the common femoral artery, extended into the   superficial femoral artery.  There were semi-organized clots seen.    Thrombectomy of the right leg was performed by passing a #4 Roberta   embolectomy catheter through the superficial femoral artery onto the popliteal   artery.  Thrombectomy catheter was inflated and retracted.  Large amount of   semi-organized clots were removed.  The catheter was passed 2 more times and   no further clot was removed.  Good backbleeding was seen.     Next, the common femoral artery was transected obliquely.  The 8 mm Dacron   graft was cut to appropriate length, beveled, and anastomosed  end-to-end to   the distal common femoral artery and proximal superficial femoral artery using running 5-0 Prolene suture.  Prior to   completing the anastomosis, backbleeding and flushing were obtained.  The   anastomosis was completed.  Flow was first restored into the profunda and then   into the superficial femoral artery.     Next, the interposition graft was cannulated with an angiocatheter.  A right   leg angiogram was obtained using diluted contrast.  There was prompt flow into   the superficial femoral artery and popliteal artery.  There appeared to be   3-vessel runoffs seen with the posterior tibial artery being dominant runoff   vessel.  The angiocatheter was removed.  The puncture site was repaired with   a 5-0 Prolene suture.     The patient was given protamine 30 mg intravenously.  The wounds were   Irrigated and were found to have good hemostasis.  Surgicel powder was placed in   the retroperitoneal area.  The wounds were anesthetized with 30ml of 0.25% Marcaine solution.  The fascia in the abdominal wound was closed with a running #1 looped PDS   suture.  The skin edges in the abdominal incision were reapproximated with a   skin stapler.     The right groin wound was closed in layer with interrupted and running 3-0 Vicryl   sutures subcutaneously as well as subcuticularly with 4-0 Monocryl suture.    The wounds were cleaned and a small Prevena dressing was placed over the right   groin wound.  A sterile dressing was also applied over the right lower   abdominal wound.     ESTIMATED BLOOD LOSS:  150 mL     INTRAVENOUS FLUIDS:  1.8 liters.     COUNTS:  Sponge and instrument count was correct x2.     The patient was then awakened, extubated, taken to recovery area in stable   condition with palpable right posterior tibial pulses with multiphasic flow.        ______________________________  MD SLY Smith/KATARZYNA    DD:  05/03/2023 17:49  DT:  05/03/2023 18:30    Job#:  [id number]

## 2023-05-04 NOTE — PROGRESS NOTES
Vascular surgery    Patient awake and alert  Reporting incisional pain  Right foot well-perfused  Incisions clean dry and intact  Olivia in place    Status post repair of right femoral pseudoaneurysm    Increase activity level  Ambulate    Disposition will be dependent upon patient's pain and ambulatory status    May be home this afternoon versus tomorrow morning    Ashvin Rothman MD

## 2023-05-04 NOTE — ANESTHESIA TIME REPORT
Anesthesia Start and Stop Event Times     Date Time Event    5/3/2023 1420 Ready for Procedure     1434 Anesthesia Start     1758 Anesthesia Stop        Responsible Staff  05/03/23    Name Role Begin End    Colton Pereira M.D. Anesth 1434 1611    Xiao Morgan M.D. Anesth 1611 1758        Overtime Reason:  overtime    Comments: Roberto overtime 15:00-16:11

## 2023-05-04 NOTE — OR SURGEON
VASCULAR SURGERY IMMEDIATE POST OP NOTE       PreOp Diagnosis: Right femoral pseudoaneurysm.      PostOp Diagnosis: Right femoral pseudoaneurysm and right leg arterial thrombosis.      Procedure(s):  1) Open repair of right common femoral artery pseudoaneurysm - Wound Class: Clean  2) Right leg arterial thrombectomy - Wound Class: Clean.  3) Right leg angiogram - Wound Class: Clean    Surgeon(s):  Negar Machado M.D.    Anesthesiologist/Type of Anesthesia:  Anesthesiologist: Colton Pereira M.D.; Xiao Morgan M.D./General    Surgical Staff:  Assistant: Milena Mata P.A.-C.  Circulator: Thomas Franz R.N.  Relief Circulator: Yolanda Kulkarni R.N.  Relief Scrub: Noreen Mason  Scrub Person: Leonardo Munoz  Radiology Technologist: Sagrario Isaac    Specimens removed if any:  None.  * No specimens in log *    Estimated Blood Loss: 150 mL.    IV fluids: 1.8 L.    Contrast used: 24 mL Visipaque.    Findings: Three-vessel runoff with posterior tibial artery being dominant runoff vessel.  Palpable right posterior tibial pulses post procedure.    Complications: None.    Dictated, #[id number].        5/3/2023 5:39 PM Negar Machado M.D.

## 2023-05-04 NOTE — DISCHARGE PLANNING
1030-  Received Choice Form @: 1005  Agency/Facility Name: [facility]  Needs Order to Send Out

## 2023-05-04 NOTE — CARE PLAN
The patient is Stable - Low risk of patient condition declining or worsening    Shift Goals  Clinical Goals: Pulse checks, pain control  Patient Goals: Pain control    Progress made toward(s) clinical / shift goals:  pulse checks completed, perfusion noted. Pain medication administered PRN, patient able to sleep after meds    Patient is not progressing towards the following goals:

## 2023-05-04 NOTE — DISCHARGE PLANNING
HTH/SCP TCN chart review completed. Collaborated with EZEKIEL Gonzalez prior to meeting with the pt. The most current review of medical record, knowledge of pt's PLOF and social support, LACE+ score of 57, no 6 clicks available at this time.     Pt seen at bedside. Introduced TCN program. Provided education regarding post acute levels of care. Discussed HTH/SCP plan benefits (Meds to Beds, medical uber and [facility] transitional care). Pt verbalizes understanding. Pt declines need for [facility] services and reports planning and prefers outpatient follow ups.    Pt reports no functional concerns with dc to home. Would note per review and pt report, he has not ambulated since surgical intervention. Despite this, pt reports no concerns with functional status/mobility for dc home or need for AD at this time. TCN will monitor for possible dc planning needs. Pt does report that if MD orders HH, he is agreeable, though would prefer outpatient follow ups as well in this regard.    Given aforementioned, no additional provider requests at this time. Choice proactively obtained for HH, faxed to [facility] and EZEKIEL aware.      Currently dc planning considerations are for dc to home with outpatient follow ups (though again, pt agreeable to HH services IF ordered/indicated by MD). TCN will continue to follow and collaborate with discharge planning team as additional post acute needs arise. Thank you.     Completed today:  Choice obtained: HH (see above; anticipate outpatient at this time unless MD orders HH)   [facility] referral not sent; pt declines; prefers and planning outpatient follow ups

## 2023-05-05 DIAGNOSIS — G89.18 POST-OP PAIN: ICD-10-CM

## 2023-05-05 RX ORDER — HYDROCODONE BITARTRATE AND ACETAMINOPHEN 5; 325 MG/1; MG/1
1-2 TABLET ORAL EVERY 4 HOURS PRN
Qty: 30 TABLET | Refills: 0 | Status: SHIPPED | OUTPATIENT
Start: 2023-05-05 | End: 2023-05-10

## 2023-05-05 NOTE — PROGRESS NOTES
Discharging Patient home per physician order. Demonstrated understanding of discharge instructions, follow up appointments, home medications, prescriptions, and nursing care instructions for follow up regarding prevena wound vac  Ambulating without assistance, voiding without difficulty, pain well controlled, tolerating oral medications, oxygen saturation greater than 90% , tolerating diet. Educational handouts given and discussed.  Verbalized understanding of discharge instructions and educational handouts.  Stated several reasons why to return to ed or seek medical attention. All questions answered.  Belongings and dressing supplies with patient at time of discharge.

## 2023-05-08 ENCOUNTER — TELEPHONE (OUTPATIENT)
Dept: VASCULAR SURGERY | Facility: MEDICAL CENTER | Age: 68
End: 2023-05-08
Payer: MEDICARE

## 2023-05-08 NOTE — TELEPHONE ENCOUNTER
Patient called to get a refill on the HYDROcodone-acetaminophen (NORCO) 5-325 MG Tab per tablet. He states that he was given only 10 pills when discharged. He states that he had surgery with Dr. Machado. Let patient know that I will go ahead and send a message to Dr. Machado to get this done. Sent a message to Dr. Machado and he let me know that he is not in town at the moment and asked if I would be able to ask Dr. Gaviria. Went ahead and sent the message to Dr. Gaviria. He states that he will get it done.

## 2023-05-09 ENCOUNTER — TELEPHONE (OUTPATIENT)
Dept: VASCULAR SURGERY | Facility: MEDICAL CENTER | Age: 68
End: 2023-05-09
Payer: MEDICARE

## 2023-05-09 NOTE — TELEPHONE ENCOUNTER
Patient called today requesting another refill on his pain medicine because he is out already. Tameka Rothman was in the office at the time so I spoke with her in regards to a refill. We are not refilling the pain medication and he will need to come to his visit on Thursday to see Dr. Machado.

## 2023-05-11 ENCOUNTER — OFFICE VISIT (OUTPATIENT)
Dept: VASCULAR SURGERY | Facility: MEDICAL CENTER | Age: 68
End: 2023-05-11
Payer: MEDICARE

## 2023-05-11 VITALS
BODY MASS INDEX: 24.15 KG/M2 | HEART RATE: 85 BPM | DIASTOLIC BLOOD PRESSURE: 74 MMHG | TEMPERATURE: 98.1 F | OXYGEN SATURATION: 95 % | SYSTOLIC BLOOD PRESSURE: 138 MMHG | WEIGHT: 168.7 LBS | HEIGHT: 70 IN

## 2023-05-11 DIAGNOSIS — I72.9 PSEUDOANEURYSM (HCC): ICD-10-CM

## 2023-05-11 PROCEDURE — 99024 POSTOP FOLLOW-UP VISIT: CPT | Performed by: SURGERY

## 2023-05-11 ASSESSMENT — FIBROSIS 4 INDEX: FIB4 SCORE: 0.99

## 2023-05-11 NOTE — PROGRESS NOTES
"                 VASCULAR SURGERY                    Postop Note  _________________________________    5/11/2023    Mr. Dinero comes to the clinic today with his wife for follow-up, status post open repair of right common femoral artery pseudoaneurysm, right leg arterial thrombectomy, and right leg angiogram on May 3, 2023.  He has no complaint except for right ankle swelling and incisional discomfort.  He was prescribed a total of 40 tablets of pain pills.    Vitals  /74 (BP Location: Left arm, Patient Position: Sitting, BP Cuff Size: Adult)   Pulse 85   Temp 36.7 °C (98.1 °F) (Temporal)   Ht 1.778 m (5' 10\")   Wt 76.5 kg (168 lb 11.2 oz)   SpO2 95%   BMI 24.21 kg/m²     Exam  General: Pleasant male in apparent distress.    Abdomen: Soft, nondistended.  Right lower abdominal incision clean, dry, intact with mild incisional tenderness.  Right lower extremity: Incision clean, dry, intact without erythema, drainage, or hematoma formation.  Palpable right posterior tibial pulses with multiphasic flow.  1+ edema of right ankle and foot.  Compartments are soft.    Assessment: Status post open repair of right common femoral artery pseudoaneurysm, right leg arterial thrombectomy, and right leg angiogram.    Plan:  Doing well.  I removed the Prevena dressing in the office and asked and gave patient and his wife supplies to paint the incision with Betadine and cover with dry gauze once a day.  I asked patient to take Tylenol or Ibuprofen as needed for the discomfort.  I also asked him to keep his right leg elevated above heart level when he is not on his feet to help with the swelling.  Patient and his wife indicated understanding.  All questions were answered.  Patient was given an appointment to see me next Thursday to have staples removed.  Patient and his wife know to call me for any problems at any time.      Negar Machado MD  [facility]  [phone]  [address] [place] [address], [facility] " [phone]

## 2023-05-16 DIAGNOSIS — L03.90 CELLULITIS, UNSPECIFIED CELLULITIS SITE: ICD-10-CM

## 2023-05-16 RX ORDER — AMOXICILLIN AND CLAVULANATE POTASSIUM 875; 125 MG/1; MG/1
1 TABLET, FILM COATED ORAL 2 TIMES DAILY
Qty: 28 TABLET | Refills: 0 | Status: ON HOLD | OUTPATIENT
Start: 2023-05-16 | End: 2023-05-31

## 2023-05-18 ENCOUNTER — APPOINTMENT (OUTPATIENT)
Dept: ADMISSIONS | Facility: MEDICAL CENTER | Age: 68
DRG: 857 | End: 2023-05-18
Attending: SURGERY
Payer: MEDICARE

## 2023-05-18 ENCOUNTER — TELEPHONE (OUTPATIENT)
Dept: VASCULAR SURGERY | Facility: MEDICAL CENTER | Age: 68
End: 2023-05-18

## 2023-05-18 ENCOUNTER — OFFICE VISIT (OUTPATIENT)
Dept: VASCULAR SURGERY | Facility: MEDICAL CENTER | Age: 68
End: 2023-05-18
Payer: MEDICARE

## 2023-05-18 VITALS
HEART RATE: 80 BPM | TEMPERATURE: 98.8 F | DIASTOLIC BLOOD PRESSURE: 74 MMHG | BODY MASS INDEX: 23.51 KG/M2 | HEIGHT: 71 IN | OXYGEN SATURATION: 96 % | WEIGHT: 167.9 LBS | SYSTOLIC BLOOD PRESSURE: 130 MMHG

## 2023-05-18 DIAGNOSIS — I72.4 PSEUDOANEURYSM OF RIGHT FEMORAL ARTERY (HCC): ICD-10-CM

## 2023-05-18 PROCEDURE — 3078F DIAST BP <80 MM HG: CPT | Performed by: SURGERY

## 2023-05-18 PROCEDURE — 99024 POSTOP FOLLOW-UP VISIT: CPT | Performed by: SURGERY

## 2023-05-18 PROCEDURE — 3075F SYST BP GE 130 - 139MM HG: CPT | Performed by: SURGERY

## 2023-05-18 RX ORDER — HYDROCODONE BITARTRATE AND ACETAMINOPHEN 5; 325 MG/1; MG/1
1 TABLET ORAL EVERY 4 HOURS PRN
COMMUNITY
End: 2023-06-05

## 2023-05-18 ASSESSMENT — FIBROSIS 4 INDEX: FIB4 SCORE: 0.99

## 2023-05-18 NOTE — TELEPHONE ENCOUNTER
I called patient to schedule him for procedure with Dr. Machado for 5/19 @ 1:00 pm. Patient is to check in @ 11:00 am in the [facility] ground floor.     Patient instructed that he will need a  and nothing to eat or drink 8 hours prior.

## 2023-05-18 NOTE — PROGRESS NOTES
"  VASCULAR SURGERY SERVICE  OFFICE FOLLOW UP      Date: 5/18/2023    Referring Provider: William Harman MD    Consulting Physician: Negar Machado MD - [facility]     -------------------------------------------------------------------------------------------------    Chief complaint:  \"Here for follow-up\".    HPI:  Mr. Dinero comes to the clinic today with his wife for follow-up, status post open repair of a right femoral pseudoaneurysm, right leg thrombectomy, and angiogram on May 3, 2023.  He complains of some redness around the incision.  He was prescribed Augmentin by me 2 days ago.  He denies fever, chills, nausea, or vomiting.  Unfortunately, he continues to smoke.    Past Medical History:   Diagnosis Date    Anesthesia     Awareness under anesthesia     CAD (coronary artery disease)     Cancer (HCC)     skin    COPD (chronic obstructive pulmonary disease) (HCC)     Emphysema of lung (HCC)     Heart burn     Hep C w/o coma, chronic (HCC)     Hepatitis C     treated    High cholesterol     Hypertension     states controlled on meds    Indigestion     Renal disorder     stage 1 kidney disease    Snoring        Past Surgical History:   Procedure Laterality Date    MT REPR ANEURYSM/GRFT INS,COMMON FEMORAL Right 5/3/2023    Procedure: OPEN REPAIR OF RIGHT GROIN PSEUDOANEURYSM;  Surgeon: Negar Machado M.D.;  Location: SURGERY [facility];  Service: General    AORTOGRAM Right 5/3/2023    Procedure: AORTOGRAM, RIGHT LEG;  Surgeon: Negar Machado M.D.;  Location: SURGERY [facility];  Service: General    THROMBECTOMY Right 5/3/2023    Procedure: THROMBECTOMY, RIGHT LEG;  Surgeon: Negar Machado M.D.;  Location: SURGERY [facility];  Service: General    LAMINOTOMY      lumbar (rods)    MENISCUS REPAIR Left     MULTIPLE CORONARY ARTERY BYPASS         Current Outpatient Medications   Medication Sig Dispense Refill    amoxicillin-clavulanate (AUGMENTIN) 875-125 MG Tab Take 1 Tablet by mouth 2 times a day for " 14 days. 28 Tablet 0    aspirin 81 MG EC tablet Take 81 mg by mouth every morning.      lisinopril (PRINIVIL) 5 MG Tab Take 1 Tablet by mouth every day. 100 Tablet 3    simvastatin (ZOCOR) 20 MG Tab TAKE 1 TABLET BY MOUTH ONCE DAILY IN THE EVENING 90 Tablet 0     No current facility-administered medications for this visit.       Social History     Socioeconomic History    Marital status:      Spouse name: Not on file    Number of children: Not on file    Years of education: Not on file    Highest education level: Not on file   Occupational History    Not on file   Tobacco Use    Smoking status: Every Day     Packs/day: 1.00     Years: 52.00     Pack years: 52.00     Types: Cigarettes    Smokeless tobacco: Never   Vaping Use    Vaping Use: Never used   Substance and Sexual Activity    Alcohol use: No     Comment: Patient stopped drinking 5/28/2018    Drug use: No    Sexual activity: Never   Other Topics Concern    Not on file   Social History Narrative    Not on file     Social Determinants of Health     Financial Resource Strain: Not on file   Food Insecurity: Not on file   Transportation Needs: Not on file   Physical Activity: Not on file   Stress: Not on file   Social Connections: Not on file   Intimate Partner Violence: Not on file   Housing Stability: Not on file       Family History   Problem Relation Age of Onset    Other Mother         accident    Cancer Father         prostate    Diabetes Father     Cancer Brother         lymphoma       Allergies:  Patient has no known allergies.    Review of Systems:    Constitutional: Negative for fever, chills, weight loss,   HENT:   Negative for hearing loss or tinnitus    Eyes:    Negative for blurred vision, double vision, or loss of vision  Respiratory:  Negative for cough, hemoptysis, or wheezing    Cardiac:  Negative for chest pain or palpitations or orthopnea  Vascular:  Negative for claudication or rest pain   Gastrointestinal: Negative for nausea,  "vomiting, or abdominal pain     Negative for hematochezia or melena   Genitourinary: Negative for dysuria, frequency, or hematuria   Musculoskeletal: Negative for myalgias, back pain, or joint pain  Skin:   Negative for itching or rash  Neurological:  Negative for dizziness, headaches, or tremors     Negative for speech disturbance     Negative for extremity weakness or paresthesias  Endo/Heme:  Negative for easy bruising or bleeding  Psychiatric:  Negative for depression, suicidal ideas, or hallucinations    Physical Exam:  /74 (BP Location: Left arm, Patient Position: Sitting, BP Cuff Size: Adult)   Pulse 80   Temp 37.1 °C (98.8 °F) (Temporal)   Ht 1.791 m (5' 10.5\")   Wt 76.2 kg (167 lb 14.4 oz)   SpO2 96%     Constitutional: Alert, oriented, no acute distress  HEENT:  Normocephalic and atraumatic, EOMI  Neck:   Supple, no JVD  Cardiovascular: Regular rate and rhythm  Pulmonary:  Good air entry bilaterally  Abdominal:  Soft, non-tender, non-distended     Aortic impulse not widened  Musculoskeletal: No edema, no tenderness  Neurological:  CN II-XII grossly intact, no focal deficits  Skin:   Skin is warm and dry. No rash noted.  Psychiatric:  Normal mood and affect.  Right lower extremity: Partial superficial dehiscence of the groin incision with fibrinous exudate and mild to moderate surrounding erythema.  Palpable right posterior tibial pulses with multiphasic flow.      Assessment:  -Superficial right groin wound dehiscence and cellulitis, status post open repair of right femoral pseudoaneurysm, right leg thrombectomy and angiogram.  -Tobacco use.  -COPD.  -Coronary artery disease.  -Dyslipidemia.  -Hypertension.    Plan:  I had a long discussion with patient and his wife.  I recommended that patient undergo operative debridement of the right groin wound to get the wound to heal and hopefully prevent graft infection.  Risks of procedure were discussed which include, but not limited to, bleeding, " infection, nonhealing of wound, and perioperative anesthetic complications.  Patient and his wife indicated understanding and would like to proceed.  All questions were answered.    I strongly counseled patient to stop smoking to help with wound healing.      Negar Machado MD  [facility]   [employer] preferred or call my office [phone]  __________________________________________________________________  Patient:Dale Dinero   MRN:[id number]   CSN:[id number]

## 2023-05-19 ENCOUNTER — ANESTHESIA EVENT (OUTPATIENT)
Dept: SURGERY | Facility: MEDICAL CENTER | Age: 68
DRG: 857 | End: 2023-05-19
Payer: MEDICARE

## 2023-05-19 ENCOUNTER — HOSPITAL ENCOUNTER (INPATIENT)
Facility: MEDICAL CENTER | Age: 68
LOS: 12 days | DRG: 857 | End: 2023-05-31
Attending: SURGERY | Admitting: SURGERY
Payer: MEDICARE

## 2023-05-19 ENCOUNTER — ANESTHESIA (OUTPATIENT)
Dept: SURGERY | Facility: MEDICAL CENTER | Age: 68
DRG: 857 | End: 2023-05-19
Payer: MEDICARE

## 2023-05-19 DIAGNOSIS — L08.9 SOFT TISSUE INFECTION: ICD-10-CM

## 2023-05-19 DIAGNOSIS — T81.30XA WOUND DEHISCENCE: ICD-10-CM

## 2023-05-19 LAB
GRAM STN SPEC: NORMAL
SIGNIFICANT IND 70042: NORMAL
SITE SITE: NORMAL
SOURCE SOURCE: NORMAL

## 2023-05-19 PROCEDURE — 01250 ANES ALL PX NRV MUSC UPR LEG: CPT | Performed by: ANESTHESIOLOGY

## 2023-05-19 PROCEDURE — 700105 HCHG RX REV CODE 258: Performed by: SURGERY

## 2023-05-19 PROCEDURE — 700111 HCHG RX REV CODE 636 W/ 250 OVERRIDE (IP): Performed by: SURGERY

## 2023-05-19 PROCEDURE — 0JB80ZZ EXCISION OF ABDOMEN SUBCUTANEOUS TISSUE AND FASCIA, OPEN APPROACH: ICD-10-PCS | Performed by: SURGERY

## 2023-05-19 PROCEDURE — 87077 CULTURE AEROBIC IDENTIFY: CPT

## 2023-05-19 PROCEDURE — 700111 HCHG RX REV CODE 636 W/ 250 OVERRIDE (IP): Performed by: ANESTHESIOLOGY

## 2023-05-19 PROCEDURE — 87075 CULTR BACTERIA EXCEPT BLOOD: CPT

## 2023-05-19 PROCEDURE — 160002 HCHG RECOVERY MINUTES (STAT): Performed by: SURGERY

## 2023-05-19 PROCEDURE — A9270 NON-COVERED ITEM OR SERVICE: HCPCS | Performed by: SURGERY

## 2023-05-19 PROCEDURE — 160009 HCHG ANES TIME/MIN: Performed by: SURGERY

## 2023-05-19 PROCEDURE — 160035 HCHG PACU - 1ST 60 MINS PHASE I: Performed by: SURGERY

## 2023-05-19 PROCEDURE — 87205 SMEAR GRAM STAIN: CPT

## 2023-05-19 PROCEDURE — 700101 HCHG RX REV CODE 250: Performed by: ANESTHESIOLOGY

## 2023-05-19 PROCEDURE — 160048 HCHG OR STATISTICAL LEVEL 1-5: Performed by: SURGERY

## 2023-05-19 PROCEDURE — 11043 DBRDMT MUSC&/FSCA 1ST 20/<: CPT | Performed by: SURGERY

## 2023-05-19 PROCEDURE — 87186 SC STD MICRODIL/AGAR DIL: CPT

## 2023-05-19 PROCEDURE — 770001 HCHG ROOM/CARE - MED/SURG/GYN PRIV*

## 2023-05-19 PROCEDURE — A9270 NON-COVERED ITEM OR SERVICE: HCPCS | Performed by: ANESTHESIOLOGY

## 2023-05-19 PROCEDURE — 87076 CULTURE ANAEROBE IDENT EACH: CPT

## 2023-05-19 PROCEDURE — 160027 HCHG SURGERY MINUTES - 1ST 30 MINS LEVEL 2: Performed by: SURGERY

## 2023-05-19 PROCEDURE — 160038 HCHG SURGERY MINUTES - EA ADDL 1 MIN LEVEL 2: Performed by: SURGERY

## 2023-05-19 PROCEDURE — 97605 NEG PRS WND THER DME<=50SQCM: CPT | Performed by: SURGERY

## 2023-05-19 PROCEDURE — 700102 HCHG RX REV CODE 250 W/ 637 OVERRIDE(OP): Performed by: SURGERY

## 2023-05-19 PROCEDURE — 87070 CULTURE OTHR SPECIMN AEROBIC: CPT

## 2023-05-19 PROCEDURE — 700102 HCHG RX REV CODE 250 W/ 637 OVERRIDE(OP): Performed by: ANESTHESIOLOGY

## 2023-05-19 RX ORDER — LABETALOL HYDROCHLORIDE 5 MG/ML
10 INJECTION, SOLUTION INTRAVENOUS EVERY 4 HOURS PRN
Status: DISCONTINUED | OUTPATIENT
Start: 2023-05-19 | End: 2023-05-31 | Stop reason: HOSPADM

## 2023-05-19 RX ORDER — LABETALOL HYDROCHLORIDE 5 MG/ML
INJECTION, SOLUTION INTRAVENOUS PRN
Status: DISCONTINUED | OUTPATIENT
Start: 2023-05-19 | End: 2023-05-19 | Stop reason: SURG

## 2023-05-19 RX ORDER — LISINOPRIL 5 MG/1
5 TABLET ORAL DAILY
Status: DISCONTINUED | OUTPATIENT
Start: 2023-05-19 | End: 2023-05-24

## 2023-05-19 RX ORDER — SODIUM CHLORIDE, SODIUM LACTATE, POTASSIUM CHLORIDE, CALCIUM CHLORIDE 600; 310; 30; 20 MG/100ML; MG/100ML; MG/100ML; MG/100ML
INJECTION, SOLUTION INTRAVENOUS CONTINUOUS
Status: ACTIVE | OUTPATIENT
Start: 2023-05-19 | End: 2023-05-19

## 2023-05-19 RX ORDER — OXYCODONE HCL 5 MG/5 ML
5 SOLUTION, ORAL ORAL
Status: COMPLETED | OUTPATIENT
Start: 2023-05-19 | End: 2023-05-19

## 2023-05-19 RX ORDER — OXYCODONE HCL 5 MG/5 ML
10 SOLUTION, ORAL ORAL
Status: COMPLETED | OUTPATIENT
Start: 2023-05-19 | End: 2023-05-19

## 2023-05-19 RX ORDER — HYDRALAZINE HYDROCHLORIDE 20 MG/ML
5 INJECTION INTRAMUSCULAR; INTRAVENOUS
Status: DISCONTINUED | OUTPATIENT
Start: 2023-05-19 | End: 2023-05-19 | Stop reason: HOSPADM

## 2023-05-19 RX ORDER — ACETAMINOPHEN 500 MG
1000 TABLET ORAL EVERY 6 HOURS PRN
Status: DISCONTINUED | OUTPATIENT
Start: 2023-05-24 | End: 2023-05-31 | Stop reason: HOSPADM

## 2023-05-19 RX ORDER — HYDROMORPHONE HYDROCHLORIDE 1 MG/ML
0.4 INJECTION, SOLUTION INTRAMUSCULAR; INTRAVENOUS; SUBCUTANEOUS
Status: DISCONTINUED | OUTPATIENT
Start: 2023-05-19 | End: 2023-05-19 | Stop reason: HOSPADM

## 2023-05-19 RX ORDER — CEFAZOLIN SODIUM 1 G/3ML
INJECTION, POWDER, FOR SOLUTION INTRAMUSCULAR; INTRAVENOUS PRN
Status: DISCONTINUED | OUTPATIENT
Start: 2023-05-19 | End: 2023-05-19 | Stop reason: SURG

## 2023-05-19 RX ORDER — HYDROMORPHONE HYDROCHLORIDE 1 MG/ML
0.2 INJECTION, SOLUTION INTRAMUSCULAR; INTRAVENOUS; SUBCUTANEOUS
Status: DISCONTINUED | OUTPATIENT
Start: 2023-05-19 | End: 2023-05-19 | Stop reason: HOSPADM

## 2023-05-19 RX ORDER — ENOXAPARIN SODIUM 100 MG/ML
40 INJECTION SUBCUTANEOUS DAILY
Status: DISCONTINUED | OUTPATIENT
Start: 2023-05-20 | End: 2023-05-31 | Stop reason: HOSPADM

## 2023-05-19 RX ORDER — OXYCODONE HYDROCHLORIDE 5 MG/1
5 TABLET ORAL EVERY 4 HOURS PRN
Status: DISCONTINUED | OUTPATIENT
Start: 2023-05-19 | End: 2023-05-28

## 2023-05-19 RX ORDER — ASPIRIN 81 MG/1
81 TABLET ORAL EVERY MORNING
Status: DISCONTINUED | OUTPATIENT
Start: 2023-05-19 | End: 2023-05-31 | Stop reason: HOSPADM

## 2023-05-19 RX ORDER — ONDANSETRON 2 MG/ML
4 INJECTION INTRAMUSCULAR; INTRAVENOUS
Status: DISCONTINUED | OUTPATIENT
Start: 2023-05-19 | End: 2023-05-19 | Stop reason: HOSPADM

## 2023-05-19 RX ORDER — LIDOCAINE HYDROCHLORIDE 40 MG/ML
SOLUTION TOPICAL PRN
Status: DISCONTINUED | OUTPATIENT
Start: 2023-05-19 | End: 2023-05-19 | Stop reason: SURG

## 2023-05-19 RX ORDER — HYDROMORPHONE HYDROCHLORIDE 1 MG/ML
0.1 INJECTION, SOLUTION INTRAMUSCULAR; INTRAVENOUS; SUBCUTANEOUS
Status: DISCONTINUED | OUTPATIENT
Start: 2023-05-19 | End: 2023-05-19 | Stop reason: HOSPADM

## 2023-05-19 RX ORDER — MORPHINE SULFATE 4 MG/ML
1-2 INJECTION INTRAVENOUS
Status: DISCONTINUED | OUTPATIENT
Start: 2023-05-19 | End: 2023-05-28

## 2023-05-19 RX ORDER — HYDRALAZINE HYDROCHLORIDE 20 MG/ML
10 INJECTION INTRAMUSCULAR; INTRAVENOUS EVERY 4 HOURS PRN
Status: DISCONTINUED | OUTPATIENT
Start: 2023-05-19 | End: 2023-05-24

## 2023-05-19 RX ORDER — SODIUM CHLORIDE, SODIUM LACTATE, POTASSIUM CHLORIDE, CALCIUM CHLORIDE 600; 310; 30; 20 MG/100ML; MG/100ML; MG/100ML; MG/100ML
INJECTION, SOLUTION INTRAVENOUS CONTINUOUS
Status: DISCONTINUED | OUTPATIENT
Start: 2023-05-19 | End: 2023-05-19 | Stop reason: HOSPADM

## 2023-05-19 RX ORDER — SIMVASTATIN 20 MG
20 TABLET ORAL EVERY EVENING
Status: DISCONTINUED | OUTPATIENT
Start: 2023-05-19 | End: 2023-05-31 | Stop reason: HOSPADM

## 2023-05-19 RX ORDER — DEXAMETHASONE SODIUM PHOSPHATE 4 MG/ML
INJECTION, SOLUTION INTRA-ARTICULAR; INTRALESIONAL; INTRAMUSCULAR; INTRAVENOUS; SOFT TISSUE PRN
Status: DISCONTINUED | OUTPATIENT
Start: 2023-05-19 | End: 2023-05-19 | Stop reason: SURG

## 2023-05-19 RX ORDER — MEPERIDINE HYDROCHLORIDE 25 MG/ML
6.25 INJECTION INTRAMUSCULAR; INTRAVENOUS; SUBCUTANEOUS
Status: DISCONTINUED | OUTPATIENT
Start: 2023-05-19 | End: 2023-05-19 | Stop reason: HOSPADM

## 2023-05-19 RX ORDER — ONDANSETRON 2 MG/ML
INJECTION INTRAMUSCULAR; INTRAVENOUS PRN
Status: DISCONTINUED | OUTPATIENT
Start: 2023-05-19 | End: 2023-05-19 | Stop reason: SURG

## 2023-05-19 RX ORDER — DOCUSATE SODIUM 100 MG/1
100 CAPSULE, LIQUID FILLED ORAL 2 TIMES DAILY
Status: DISCONTINUED | OUTPATIENT
Start: 2023-05-19 | End: 2023-05-31 | Stop reason: HOSPADM

## 2023-05-19 RX ORDER — LIDOCAINE HYDROCHLORIDE 20 MG/ML
INJECTION, SOLUTION EPIDURAL; INFILTRATION; INTRACAUDAL; PERINEURAL PRN
Status: DISCONTINUED | OUTPATIENT
Start: 2023-05-19 | End: 2023-05-19 | Stop reason: SURG

## 2023-05-19 RX ORDER — LABETALOL HYDROCHLORIDE 5 MG/ML
5 INJECTION, SOLUTION INTRAVENOUS
Status: DISCONTINUED | OUTPATIENT
Start: 2023-05-19 | End: 2023-05-19 | Stop reason: HOSPADM

## 2023-05-19 RX ORDER — ACETAMINOPHEN 500 MG
1000 TABLET ORAL EVERY 6 HOURS
Status: DISPENSED | OUTPATIENT
Start: 2023-05-19 | End: 2023-05-24

## 2023-05-19 RX ORDER — ONDANSETRON 2 MG/ML
4 INJECTION INTRAMUSCULAR; INTRAVENOUS EVERY 4 HOURS PRN
Status: DISCONTINUED | OUTPATIENT
Start: 2023-05-19 | End: 2023-05-31 | Stop reason: HOSPADM

## 2023-05-19 RX ORDER — HALOPERIDOL 5 MG/ML
1 INJECTION INTRAMUSCULAR
Status: DISCONTINUED | OUTPATIENT
Start: 2023-05-19 | End: 2023-05-19 | Stop reason: HOSPADM

## 2023-05-19 RX ORDER — MAGNESIUM SULFATE HEPTAHYDRATE 40 MG/ML
INJECTION, SOLUTION INTRAVENOUS PRN
Status: DISCONTINUED | OUTPATIENT
Start: 2023-05-19 | End: 2023-05-19 | Stop reason: SURG

## 2023-05-19 RX ADMIN — SUGAMMADEX 200 MG: 100 INJECTION, SOLUTION INTRAVENOUS at 14:43

## 2023-05-19 RX ADMIN — MAGNESIUM SULFATE HEPTAHYDRATE 2 G: 4 INJECTION, SOLUTION INTRAVENOUS at 14:33

## 2023-05-19 RX ADMIN — OXYCODONE 5 MG: 5 TABLET ORAL at 23:17

## 2023-05-19 RX ADMIN — HYDROMORPHONE HYDROCHLORIDE 0.4 MG: 1 INJECTION, SOLUTION INTRAMUSCULAR; INTRAVENOUS; SUBCUTANEOUS at 15:29

## 2023-05-19 RX ADMIN — HYDROMORPHONE HYDROCHLORIDE 0.4 MG: 1 INJECTION, SOLUTION INTRAMUSCULAR; INTRAVENOUS; SUBCUTANEOUS at 15:15

## 2023-05-19 RX ADMIN — AMPICILLIN SODIUM AND SULBACTAM SODIUM 3 G: 2; 1 INJECTION, POWDER, FOR SOLUTION INTRAMUSCULAR; INTRAVENOUS at 18:45

## 2023-05-19 RX ADMIN — HYDROMORPHONE HYDROCHLORIDE 0.2 MG: 1 INJECTION, SOLUTION INTRAMUSCULAR; INTRAVENOUS; SUBCUTANEOUS at 15:18

## 2023-05-19 RX ADMIN — LIDOCAINE HYDROCHLORIDE 60 MG: 20 INJECTION, SOLUTION EPIDURAL; INFILTRATION; INTRACAUDAL at 14:05

## 2023-05-19 RX ADMIN — HYDROMORPHONE HYDROCHLORIDE 0.4 MG: 1 INJECTION, SOLUTION INTRAMUSCULAR; INTRAVENOUS; SUBCUTANEOUS at 15:06

## 2023-05-19 RX ADMIN — FENTANYL CITRATE 50 MCG: 50 INJECTION, SOLUTION INTRAMUSCULAR; INTRAVENOUS at 14:45

## 2023-05-19 RX ADMIN — LISINOPRIL 5 MG: 5 TABLET ORAL at 18:33

## 2023-05-19 RX ADMIN — FENTANYL CITRATE 50 MCG: 50 INJECTION, SOLUTION INTRAMUSCULAR; INTRAVENOUS at 14:01

## 2023-05-19 RX ADMIN — PROPOFOL 50 MG: 10 INJECTION, EMULSION INTRAVENOUS at 14:08

## 2023-05-19 RX ADMIN — ACETAMINOPHEN 1000 MG: 500 TABLET, FILM COATED ORAL at 18:33

## 2023-05-19 RX ADMIN — LABETALOL HYDROCHLORIDE 5 MG: 5 INJECTION, SOLUTION INTRAVENOUS at 14:37

## 2023-05-19 RX ADMIN — PROPOFOL 150 MG: 10 INJECTION, EMULSION INTRAVENOUS at 14:05

## 2023-05-19 RX ADMIN — ONDANSETRON 4 MG: 2 INJECTION INTRAMUSCULAR; INTRAVENOUS at 14:40

## 2023-05-19 RX ADMIN — OXYCODONE 5 MG: 5 TABLET ORAL at 18:33

## 2023-05-19 RX ADMIN — DEXAMETHASONE SODIUM PHOSPHATE 4 MG: 4 INJECTION INTRA-ARTICULAR; INTRALESIONAL; INTRAMUSCULAR; INTRAVENOUS; SOFT TISSUE at 14:13

## 2023-05-19 RX ADMIN — CEFAZOLIN 2 G: 1 INJECTION, POWDER, FOR SOLUTION INTRAMUSCULAR; INTRAVENOUS at 14:05

## 2023-05-19 RX ADMIN — SODIUM CHLORIDE, POTASSIUM CHLORIDE, SODIUM LACTATE AND CALCIUM CHLORIDE: 600; 310; 30; 20 INJECTION, SOLUTION INTRAVENOUS at 11:48

## 2023-05-19 RX ADMIN — MIDAZOLAM 2 MG: 1 INJECTION, SOLUTION INTRAMUSCULAR; INTRAVENOUS at 14:04

## 2023-05-19 RX ADMIN — LIDOCAINE HYDROCHLORIDE 4 ML: 40 SOLUTION TOPICAL at 14:07

## 2023-05-19 RX ADMIN — OXYCODONE HYDROCHLORIDE 10 MG: 5 SOLUTION ORAL at 15:06

## 2023-05-19 RX ADMIN — SIMVASTATIN 20 MG: 20 TABLET, FILM COATED ORAL at 21:21

## 2023-05-19 RX ADMIN — ROCURONIUM BROMIDE 50 MG: 10 INJECTION, SOLUTION INTRAVENOUS at 14:05

## 2023-05-19 RX ADMIN — SODIUM CHLORIDE, POTASSIUM CHLORIDE, SODIUM LACTATE AND CALCIUM CHLORIDE: 600; 310; 30; 20 INJECTION, SOLUTION INTRAVENOUS at 18:39

## 2023-05-19 RX ADMIN — HYDROMORPHONE HYDROCHLORIDE 0.4 MG: 1 INJECTION, SOLUTION INTRAMUSCULAR; INTRAVENOUS; SUBCUTANEOUS at 16:00

## 2023-05-19 ASSESSMENT — COGNITIVE AND FUNCTIONAL STATUS - GENERAL
DAILY ACTIVITIY SCORE: 24
SUGGESTED CMS G CODE MODIFIER DAILY ACTIVITY: CH
MOBILITY SCORE: 24
SUGGESTED CMS G CODE MODIFIER MOBILITY: CH

## 2023-05-19 ASSESSMENT — PAIN DESCRIPTION - PAIN TYPE
TYPE: SURGICAL PAIN

## 2023-05-19 ASSESSMENT — PATIENT HEALTH QUESTIONNAIRE - PHQ9
1. LITTLE INTEREST OR PLEASURE IN DOING THINGS: NOT AT ALL
SUM OF ALL RESPONSES TO PHQ9 QUESTIONS 1 AND 2: 0
2. FEELING DOWN, DEPRESSED, IRRITABLE, OR HOPELESS: NOT AT ALL

## 2023-05-19 ASSESSMENT — FIBROSIS 4 INDEX: FIB4 SCORE: 0.99

## 2023-05-19 NOTE — OR NURSING
1711 Patient recovery complete in PACU, surgical incision to right groin with wound vac in place, gauze and tegaderm dressing above right groin; clean/dry/intact throughout PACU stay, multiple doses of pain medication required to reach a tolerable pain level, patient denies nausea and is tolerating oral intake, telephone updates to spouse, all belongings with patient and accounted for, report called to EVERT Carver, patient transferred to room, safety ensured, care transferred.

## 2023-05-19 NOTE — ANESTHESIA PREPROCEDURE EVALUATION
Case: [id number] Date/Time: 05/03/23 1403    Procedure: OPEN REPAIR OF RIGHT GROIN PSEUDOANEURYSM (Right: Groin)    Anesthesia type: General    Pre-op diagnosis: RIGHT FEMORAL PSEUDOANEURYSM 4-CM    Location: [facility] OR 03 / SURGERY [facility]    Surgeons: Negar Machado M.D.          Relevant Problems   PULMONARY   (positive) COPD (chronic obstructive pulmonary disease) (HCC)      CARDIAC   (positive) CAD (coronary artery disease)   (positive) Essential hypertension   (positive) Pseudoaneurysm of femoral artery (HCC)         (positive) Chronic hepatitis C without hepatic coma (HCC)     Hx aortobifemoral bypass in 2014, with R femoral pseudoaneurysm repair 5/3/23 here for I&D.    HTN  CAD  1PPD smoker  COPD  Hx Hep C, treated per patient  Denies CP or SOB with activity    NPO >8h  Denies problems with anesthesia  Physical Exam    Airway   Mallampati: II  TM distance: >3 FB  Neck ROM: full       Cardiovascular - normal exam  Rhythm: regular  Rate: normal  (-) murmur     Dental - normal exam        Facial Hair   Pulmonary - normal exam  Breath sounds clear to auscultation     Abdominal    Neurological - normal exam               Anesthesia Plan    ASA 3   ASA physical status 3 criteria: COPD - poorly controlled and CAD/stents (> 3 months)    Plan - general       Airway plan will be ETT          Induction: intravenous    Postoperative Plan: Postoperative administration of opioids is intended.    Pertinent diagnostic labs and testing reviewed    Informed Consent:    Anesthetic plan and risks discussed with patient.    Use of blood products discussed with: patient whom consented to blood products.

## 2023-05-19 NOTE — ANESTHESIA TIME REPORT
Anesthesia Start and Stop Event Times     Date Time Event    5/19/2023 1331 Ready for Procedure     1401 Anesthesia Start     1451 Anesthesia Stop        Responsible Staff  05/19/23    Name Role Begin End    Andree Davila M.D. Anesth 1401 1451        Overtime Reason:  no overtime (within assigned shift)    Comments:

## 2023-05-19 NOTE — ANESTHESIA PROCEDURE NOTES
Airway    Date/Time: 5/19/2023 2:07 PM    Performed by: Andree Davila M.D.  Authorized by: Andree Davila M.D.    Location:  OR  Urgency:  Elective  Difficult Airway: No    Indications for Airway Management:  Anesthesia      Spontaneous Ventilation: absent    Sedation Level:  Deep  Preoxygenated: Yes    Patient Position:  Sniffing  Mask Difficulty Assessment:  2 - vent by mask + OA or adjuvant +/- NMBA  Final Airway Type:  Endotracheal airway  Final Endotracheal Airway:  ETT  Cuffed: Yes    Technique Used for Successful ETT Placement:  Direct laryngoscopy  Devices/Methods Used in Placement:  Intubating stylet    Insertion Site:  Oral  Blade Type:  Indy  Laryngoscope Blade/Videolaryngoscope Blade Size:  3  ETT Size (mm):  7.5  Measured from:  Teeth  ETT to Teeth (cm):  23  Placement Verified by: auscultation and capnometry    Cormack-Lehane Classification:  Grade IIa - partial view of glottis  Number of Attempts at Approach:  1

## 2023-05-19 NOTE — OR NURSING
Assume care for patient in pre-op. NPO status and allergies verified. Surgical procedure verified by patient.  All questions answered. Belongings locked-up. Call light within reach.    L4 metal implant as verbalized by the pt

## 2023-05-19 NOTE — OR SURGEON
VASCULAR SURGERY IMMEDIATE POST OP NOTE       PreOp Diagnosis: Right groin wound dehiscence and cellulitis.      PostOp Diagnosis: Same.      Procedure(s):  1) Excisional debridement of right groin wound (skin and subcutaneous tissues), 7 x 3 cm - Wound Class: Dirty or Infected.  2) Right groin wound VAC application, 7 x 3 cm.    Surgeon(s):  Negar Machado M.D.    Anesthesiologist/Type of Anesthesia:  Anesthesiologist: Andree Davila M.D./General    Surgical Staff:  Circulator: Thomas Franz R.N.  Relief Circulator: Yolanda Kulkarni R.N.  Scrub Person: Agnes Ho    Specimens removed if any:  ID Type Source Tests Collected by Time Destination   1 : Right groin Other Other AEROBIC/ANAEROBIC CULTURE (SURGERY) Negar Machado M.D. 5/19/2023  2:37 PM        Estimated Blood Loss: 10 mL.    IV fluids: 500 mL.    Findings: Necrosis of subcutaneous fat.  Wound is about 1.5 cm deep.  Graft is not exposed.    Complications: None.    Dictated, #[id number].        5/19/2023 3:09 PM Negar Machado M.D.

## 2023-05-19 NOTE — ANESTHESIA POSTPROCEDURE EVALUATION
Patient: Dale Dinero    Procedure Summary     Date: 05/19/23 Room / Location: [place] [address] / SURGERY [facility]    Anesthesia Start:  Anesthesia Stop:     Procedure: RIGHT GROIN WOUND DEBRIDEMENT (Right: Groin) Diagnosis: (pseudoaneurysm of right femoral artery)    Surgeons: Negar Machado M.D. Responsible Provider:     Anesthesia Type: general ASA Status: 3          Final Anesthesia Type: general  Last vitals  BP   Blood Pressure : (!) 163/82    Temp   36.1 °C (97 °F)    Pulse   66   Resp   20    SpO2   90 %      Anesthesia Post Evaluation    Patient location during evaluation: PACU  Patient participation: complete - patient participated  Level of consciousness: sleepy but conscious    Airway patency: patent  Anesthetic complications: no  Cardiovascular status: hemodynamically stable  Respiratory status: acceptable  Hydration status: euvolemic    PONV: none          There were no known notable events for this encounter.     Nurse Pain Score: 8 (NPRS)

## 2023-05-20 LAB
ANION GAP SERPL CALC-SCNC: 12 MMOL/L (ref 7–16)
BUN SERPL-MCNC: 15 MG/DL (ref 8–22)
CALCIUM SERPL-MCNC: 8.6 MG/DL (ref 8.5–10.5)
CHLORIDE SERPL-SCNC: 101 MMOL/L (ref 96–112)
CO2 SERPL-SCNC: 25 MMOL/L (ref 20–33)
CREAT SERPL-MCNC: 0.68 MG/DL (ref 0.5–1.4)
ERYTHROCYTE [DISTWIDTH] IN BLOOD BY AUTOMATED COUNT: 42.3 FL (ref 35.9–50)
GFR SERPLBLD CREATININE-BSD FMLA CKD-EPI: 102 ML/MIN/1.73 M 2
GLUCOSE SERPL-MCNC: 133 MG/DL (ref 65–99)
HCT VFR BLD AUTO: 44.2 % (ref 42–52)
HGB BLD-MCNC: 14.8 G/DL (ref 14–18)
MCH RBC QN AUTO: 29.7 PG (ref 27–33)
MCHC RBC AUTO-ENTMCNC: 33.5 G/DL (ref 33.7–35.3)
MCV RBC AUTO: 88.8 FL (ref 81.4–97.8)
PLATELET # BLD AUTO: 332 K/UL (ref 164–446)
PMV BLD AUTO: 8.8 FL (ref 9–12.9)
POTASSIUM SERPL-SCNC: 4.6 MMOL/L (ref 3.6–5.5)
RBC # BLD AUTO: 4.98 M/UL (ref 4.7–6.1)
SCCMEC + MECA PNL NOSE NAA+PROBE: NEGATIVE
SODIUM SERPL-SCNC: 138 MMOL/L (ref 135–145)
WBC # BLD AUTO: 7.8 K/UL (ref 4.8–10.8)

## 2023-05-20 PROCEDURE — 87641 MR-STAPH DNA AMP PROBE: CPT

## 2023-05-20 PROCEDURE — 700105 HCHG RX REV CODE 258: Performed by: SURGERY

## 2023-05-20 PROCEDURE — A9270 NON-COVERED ITEM OR SERVICE: HCPCS | Performed by: SURGERY

## 2023-05-20 PROCEDURE — 700111 HCHG RX REV CODE 636 W/ 250 OVERRIDE (IP): Performed by: SURGERY

## 2023-05-20 PROCEDURE — 770001 HCHG ROOM/CARE - MED/SURG/GYN PRIV*

## 2023-05-20 PROCEDURE — 36415 COLL VENOUS BLD VENIPUNCTURE: CPT

## 2023-05-20 PROCEDURE — 80048 BASIC METABOLIC PNL TOTAL CA: CPT

## 2023-05-20 PROCEDURE — 85027 COMPLETE CBC AUTOMATED: CPT

## 2023-05-20 PROCEDURE — 700102 HCHG RX REV CODE 250 W/ 637 OVERRIDE(OP): Performed by: SURGERY

## 2023-05-20 RX ORDER — LINEZOLID 600 MG/1
600 TABLET, FILM COATED ORAL EVERY 12 HOURS
Status: DISCONTINUED | OUTPATIENT
Start: 2023-05-20 | End: 2023-05-24

## 2023-05-20 RX ADMIN — DOCUSATE SODIUM 100 MG: 100 CAPSULE, LIQUID FILLED ORAL at 05:23

## 2023-05-20 RX ADMIN — MORPHINE SULFATE 2 MG: 4 INJECTION, SOLUTION INTRAMUSCULAR; INTRAVENOUS at 21:54

## 2023-05-20 RX ADMIN — LISINOPRIL 5 MG: 5 TABLET ORAL at 05:23

## 2023-05-20 RX ADMIN — SIMVASTATIN 20 MG: 20 TABLET, FILM COATED ORAL at 18:14

## 2023-05-20 RX ADMIN — ACETAMINOPHEN 1000 MG: 500 TABLET, FILM COATED ORAL at 11:20

## 2023-05-20 RX ADMIN — AMPICILLIN SODIUM AND SULBACTAM SODIUM 3 G: 2; 1 INJECTION, POWDER, FOR SOLUTION INTRAMUSCULAR; INTRAVENOUS at 18:13

## 2023-05-20 RX ADMIN — ACETAMINOPHEN 1000 MG: 500 TABLET, FILM COATED ORAL at 18:14

## 2023-05-20 RX ADMIN — LINEZOLID 600 MG: 600 TABLET, FILM COATED ORAL at 11:20

## 2023-05-20 RX ADMIN — AMPICILLIN SODIUM AND SULBACTAM SODIUM 3 G: 2; 1 INJECTION, POWDER, FOR SOLUTION INTRAMUSCULAR; INTRAVENOUS at 00:16

## 2023-05-20 RX ADMIN — ASPIRIN 81 MG: 81 TABLET, COATED ORAL at 05:23

## 2023-05-20 RX ADMIN — ACETAMINOPHEN 1000 MG: 500 TABLET, FILM COATED ORAL at 05:23

## 2023-05-20 RX ADMIN — MORPHINE SULFATE 2 MG: 4 INJECTION, SOLUTION INTRAMUSCULAR; INTRAVENOUS at 18:10

## 2023-05-20 RX ADMIN — OXYCODONE 5 MG: 5 TABLET ORAL at 10:06

## 2023-05-20 RX ADMIN — AMPICILLIN SODIUM AND SULBACTAM SODIUM 3 G: 2; 1 INJECTION, POWDER, FOR SOLUTION INTRAMUSCULAR; INTRAVENOUS at 11:23

## 2023-05-20 RX ADMIN — NICOTINE 7 MG: 7 PATCH, EXTENDED RELEASE TRANSDERMAL at 05:23

## 2023-05-20 RX ADMIN — OXYCODONE 5 MG: 5 TABLET ORAL at 05:25

## 2023-05-20 RX ADMIN — LINEZOLID 600 MG: 600 TABLET, FILM COATED ORAL at 18:19

## 2023-05-20 RX ADMIN — DOCUSATE SODIUM 100 MG: 100 CAPSULE, LIQUID FILLED ORAL at 18:14

## 2023-05-20 RX ADMIN — ENOXAPARIN SODIUM 40 MG: 100 INJECTION SUBCUTANEOUS at 18:00

## 2023-05-20 RX ADMIN — OXYCODONE 5 MG: 5 TABLET ORAL at 16:09

## 2023-05-20 RX ADMIN — AMPICILLIN SODIUM AND SULBACTAM SODIUM 3 G: 2; 1 INJECTION, POWDER, FOR SOLUTION INTRAMUSCULAR; INTRAVENOUS at 05:24

## 2023-05-20 RX ADMIN — MORPHINE SULFATE 2 MG: 4 INJECTION, SOLUTION INTRAMUSCULAR; INTRAVENOUS at 03:45

## 2023-05-20 ASSESSMENT — PAIN DESCRIPTION - PAIN TYPE
TYPE: ACUTE PAIN
TYPE: ACUTE PAIN
TYPE: SURGICAL PAIN
TYPE: SURGICAL PAIN
TYPE: ACUTE PAIN
TYPE: SURGICAL PAIN

## 2023-05-20 ASSESSMENT — LIFESTYLE VARIABLES
HOW MANY TIMES IN THE PAST YEAR HAVE YOU HAD 5 OR MORE DRINKS IN A DAY: 0
CONSUMPTION TOTAL: NEGATIVE
TOTAL SCORE: 0
AVERAGE NUMBER OF DAYS PER WEEK YOU HAVE A DRINK CONTAINING ALCOHOL: 0
HAVE PEOPLE ANNOYED YOU BY CRITICIZING YOUR DRINKING: NO
TOTAL SCORE: 0
ALCOHOL_USE: NO
ON A TYPICAL DAY WHEN YOU DRINK ALCOHOL HOW MANY DRINKS DO YOU HAVE: 0
TOTAL SCORE: 0
HAVE YOU EVER FELT YOU SHOULD CUT DOWN ON YOUR DRINKING: NO
DOES PATIENT WANT TO STOP DRINKING: NO
EVER HAD A DRINK FIRST THING IN THE MORNING TO STEADY YOUR NERVES TO GET RID OF A HANGOVER: NO
EVER FELT BAD OR GUILTY ABOUT YOUR DRINKING: NO

## 2023-05-20 NOTE — CARE PLAN
The patient is Stable - Low risk of patient condition declining or worsening         Progress made toward(s) clinical / shift goals:       Patient is not progressing towards the following goals:

## 2023-05-20 NOTE — RESPIRATORY CARE
"COPD EDUCATION by COPD CLINICAL EDUCATOR  5/20/2023 at 1:29 PM by Yee Soliman, RRT     Patient interviewed by COPD education team. Patient refused COPD program at this time. An Action Plan was completed in the EMR to reflect current Respiratory Medication use.                 Smoking Cessation Intervention and education was decline    COPD SCREEN  COPD Clinical Specialists ONLY  COPD Education Initiated: Yes--Short Intervention  Is this a COPD exacerbation patient?: No  DME Company: denies prior  Physician Name: BIJAL Harman MD  Pulmonologist Name: refused information  $ Demo/Eval of SVN's, MDI's and Aerosols:  (refused)    PFT Results  (OP) Pulmonary Function Testing: Yes (last in EMR: 9/13/2021 FEV1-69, FEV1/FVC Ratio 64)    Meds to Beds  Would the patient like to opt in for Bedside Medication Delivery at Discharge?: Yes, interested     MY COPD ACTION PLAN     It is recommended that patients and physicians /healthcare providers complete this action plan together. This plan should be discussed at each physician visit and updated as needed.    The green, yellow and red zones show groups of symptoms of COPD. This list of symptoms is not comprehensive, and you may experience other symptoms. In the \"Actions\" column, your healthcare provider has recommended actions for you to take based on your symptoms.    Patient Name: Dale Dinero   YOB: 1955   Last Updated on:     Green Zone:  I am doing well today Actions     Usual activitiy and exercise level   Take daily medications     Usual amounts of cough and phlegm/mucus   Use oxygen as prescribed     Sleep well at night   Continue regular exercise/diet plan     Appetite is good   At all times avoid cigarette smoke, inhaled irritants     Daily Medications (these medications are taken every day):                Yellow Zone:  I am having a bad day or a COPD flare Actions     More breathless than usual   Continue daily medications     I have less energy " "for my daily activities   Use quick relief inhaler as ordered     Increased or thicker phlegm/mucus   Use oxygen as prescribed     Using quick relief inhaler/nebulizer more often   Get plenty of rest     Swelling of ankles more than usual   Use pursed lip breathing     More coughing than usual   At all times avoid cigarette smoke, inhaled irritants     I feel like I have a \"chest cold\"     Poor sleep and my symptoms woke me up     My appetite is not good     My medicine is not helping      Call provider immediately if symptoms don’t improve     Continue daily medications, add rescue medications:   Albuterol 2 Puffs Every 4 hours PRN       Medications to be used during a flare up, (as Discussed with Provider):           Additional Information:  Please use as directed by your Doctor    Red Zone:  I need urgent medical care Actions     Severe shortness of breath even at rest   Call 911 or seek medical care immediately     Not able to do any activity because of breathing      Fever or shaking chills      Feeling confused or very drowsy       Chest pains      Coughing up blood                  "

## 2023-05-20 NOTE — OP REPORT
VASCULAR SURGERY OPERATIVE REPORT    DATE OF SERVICE:  05/19/2023     SURGEON:  Negar Machado MD     ANESTHESIOLOGIST:  Andree Davila MD     TYPE OF ANESTHESIA:  General anesthesia.     PREOPERATIVE DIAGNOSIS:  Right groin wound dehiscence and cellulitis.     POSTOPERATIVE DIAGNOSIS:  Right groin wound dehiscence and cellulitis.     PROCEDURES:  1.  Excisional debridement of right groin wound (skin and subcutaneous   tissue), 7x3 cm.  2.  Right groin wound VAC application, 7x3 cm.     INDICATIONS FOR PROCEDURE:  This is a pleasant 68-year-old male with multiple   medical problems including tobacco abuse who previously underwent open repair   of a right femoral artery pseudoaneurysm as well as right leg arterial   thrombectomy.  He did well, but developed superficial wound dehiscence as well   as surrounding cellulitis.  Discussion was made with the patient and his   wife.  Patient was advised to be taken to the operating room for excisional   debridement of the wounds and washout.  They would like to proceed, fully   understanding all risks.     DESCRIPTION OF PROCEDURE:  Informed consent was obtained.  The patient was   taken to the operating room and was placed in the supine position.  Sequential   compression devices were applied.  The patient was given Ancef intravenously.    General anesthesia was induced.     Next, his right lower abdomen, right groin and right thigh were sterilely   prepped and draped in the normal fashion.  A timeout procedure was done.  The   devitalized skin and subcutaneous tissues in the right groin were   completely excised using the Metzenbaum scissor.  The wound was approximately   7x3 cm in size.  The depth of the wound was about 1.5 cm.  The graft was not   exposed as it was located much deeper.  The wound was copiously irrigated with   sterile saline solution using a Pulsavac device.  The wound was also swabbed   for culture and sensitivity.  After the irrigation was  done, the wound was   inspected and no further infected/devitalized tissue was seen.  The wound was cleaned and black sponge wound VAC was placed onto the wound and   connected to the wound VAC.     Next, the staple in the right lower abdominal incision was removed.  There was   a small seroma seen medially.  There was also dehiscence of the medial wound.    This part of the wound was copiously irrigated and closed with 3-0 nylon   vertical mattresses.  The wound was cleaned and sterile dressing was   applied.     ESTIMATED BLOOD LOSS:  10 mL.     INTRAVENOUS FLUIDS:  500 mL.     COUNTS:  Sponge and instrument count was correct x2.     The patient was then awakened, extubated, taken to recovery area in stable   condition.        ______________________________  Negar Machado MD    TQN/DEREK/JOSE    DD:  05/19/2023 15:16  DT:  05/19/2023 18:20    Job#:  [id number]

## 2023-05-20 NOTE — PROGRESS NOTES
4 Eyes Skin Assessment Completed by EVERT croft and EVERT tinoco.    Head WDL  Ears WDL  Nose WDL  Mouth WDL  Neck WDL  Breast/Chest WDL  Shoulder Blades WDL  Spine WDL  (R) Arm/Elbow/Hand WDL  (L) Arm/Elbow/Hand WDL  Abdomen Incision - right lower  Groin Incision right with wound vac  Scrotum/Coccyx/Buttocks WDL  (R) Leg WDL  (L) Leg WDL  (R) Heel/Foot/Toe WDL  (L) Heel/Foot/Toe WDL          Devices In Places SCD's      Interventions In Place N/A    Possible Skin Injury No    Pictures Uploaded Into Epic N/A  Wound Consult Placed N/A  RN Wound Prevention Protocol Ordered No      Patient turns and repositions independently in bed. Up with supervision - steady gait.

## 2023-05-20 NOTE — DISCHARGE PLANNING
HTH/SCP TCN chart review completed. Collaborated with EZEKIEL Raygoza prior to meeting with the pt. The most current review of medical record, knowledge of pt's PLOF and social support, LACE+ score of 63, 6 clicks scores of 24 mobility were considered.      Pt seen at bedside. Introduced TCN program. Provided education regarding post acute levels of care. Discussed HTH/SCP plan benefits (Meds to Beds, medical uber and [facility] transitional care). Pt verbalizes understanding.     Pt recalls this TCN from prior admit. He reports he has been told he will likely need HH services for wound management post dc by medical team. He reports no functional concerns with dc to home and has remained ambulatory with no AD post surgical intervention as well. He reports no concerns with transportation at time of medical clearance as well.     Given aforementioned, no additional provider requests at this time. Choice proactively obtained for HH, faxed to DPA and CM aware of current dc considerations. TCN will continue to follow and collaborate with discharge planning team as additional post acute needs arise. Thank you.     Completed today:  Choice obtained: HH for possible wound care needs; see above  [facility] referral sent 5/20

## 2023-05-20 NOTE — PROGRESS NOTES
AA&Ox4. Denies CP/SOB.  Reporting 7/10 pain. Medicated per MAR.   Educated patient regarding pharmacologic and non pharmacologic modalities for pain management.  Skin per flowsheet.  Tolerating cardiac diet. Denies N/V.  + void. Last BM PTA.  Pt ambulates x1 assist.  All needs met at this time. Call light within reach. Pt calls appropriately. Bed low and locked, non skid socks in place. Hourly rounding in place.

## 2023-05-20 NOTE — PROGRESS NOTES
Bedside report received.  Assessment complete.  A&O x 4. Patient calls appropriately. CMS checks in place  Patient ambulates with standby assist. Bed alarm off.   Patient has 7/10 pain. Pain managed with prescribed medications.  Denies N&V. Tolerating cardiac diet.  Surgical: WV to R groin, RLQ dressing CDI  + void, + flatus, - BM.  Patient denies SOB.  SCD's refused.  Review plan with of care with patient. Call light and personal belongings within reach. Hourly rounding in place. All needs met at this time.

## 2023-05-20 NOTE — CARE PLAN
Problem: Pain - Standard  Goal: Alleviation of pain or a reduction in pain to the patient’s comfort goal  Outcome: Progressing     Problem: Knowledge Deficit - Standard  Goal: Patient and family/care givers will demonstrate understanding of plan of care, disease process/condition, diagnostic tests and medications  Outcome: Progressing   The patient is Stable - Low risk of patient condition declining or worsening    Shift Goals  Clinical Goals: iv abx, pain control  Patient Goals: pain control, rest  Family Goals: phu    Progress made toward(s) clinical / shift goals:  patient a+o x 4, 6/10 pain - oxycodone admin with good effect - patient sleeping on reassessment, dressing to Rlq - c/d/I, wound vac patent and dressing c/d/I, iv abx a/o, no needs at this time, [facility]    Patient is not progressing towards the following goals: n/a    Fall precautions/hourly rounding maintained, call light within reach and functioning, all items within reach.  Patient encouraged to call for assistance, poc reviewed with patient, ?'s/concerns answered.

## 2023-05-20 NOTE — WOUND TEAM
Received wound consult to begin R groin regular Vac changes per Dr. Machado.  Patient underwent surgery on 5/19.    Wound team to begin Vac changes per protocol.

## 2023-05-20 NOTE — PROGRESS NOTES
VASCULAR SURGERY PROGRESS NOTE       Awake, no complaints.  AF, VSS.    General: Pleasant male in none apparent distress.  Abdomen: Soft, non-distended.  Right lower abdominal incision clean, dry, intact with mild incisional tenderness.  Right lower extremity: Wound VAC in place with some focal erythema laterally.  Palpable right posterior tibial pulses with multiphasic flow.    Labs: Reviewed.      Assessment: Status post excisional debridement of right groin wound and wound VAC placement.    Plan:  Improving.  There is still some erythema lateral to the incision.  Will add Zyvox as patient has prosthetic graft underneath the incision although the graft was not exposed at the time of surgery.  Wound care consult for wound VAC change.    Discussed with patient.  All questions were answered.

## 2023-05-21 ENCOUNTER — HOME HEALTH ADMISSION (OUTPATIENT)
Dept: HOME HEALTH SERVICES | Facility: HOME HEALTHCARE | Age: 68
End: 2023-05-21
Payer: MEDICARE

## 2023-05-21 PROCEDURE — 306591 TRAY SUTURE REMOVAL DISP: Performed by: SURGERY

## 2023-05-21 PROCEDURE — 700111 HCHG RX REV CODE 636 W/ 250 OVERRIDE (IP): Performed by: SURGERY

## 2023-05-21 PROCEDURE — 302098 PASTE RING (FLAT): Performed by: SURGERY

## 2023-05-21 PROCEDURE — 97602 WOUND(S) CARE NON-SELECTIVE: CPT

## 2023-05-21 PROCEDURE — 700102 HCHG RX REV CODE 250 W/ 637 OVERRIDE(OP): Performed by: SURGERY

## 2023-05-21 PROCEDURE — 770001 HCHG ROOM/CARE - MED/SURG/GYN PRIV*

## 2023-05-21 PROCEDURE — 700105 HCHG RX REV CODE 258: Performed by: SURGERY

## 2023-05-21 PROCEDURE — A9270 NON-COVERED ITEM OR SERVICE: HCPCS | Performed by: SURGERY

## 2023-05-21 PROCEDURE — 97605 NEG PRS WND THER DME<=50SQCM: CPT

## 2023-05-21 RX ORDER — LIDOCAINE HYDROCHLORIDE 20 MG/ML
1 JELLY TOPICAL
Status: DISCONTINUED | OUTPATIENT
Start: 2023-05-21 | End: 2023-05-31 | Stop reason: HOSPADM

## 2023-05-21 RX ORDER — LIDOCAINE HYDROCHLORIDE 40 MG/ML
SOLUTION TOPICAL
Status: DISCONTINUED | OUTPATIENT
Start: 2023-05-21 | End: 2023-05-31 | Stop reason: HOSPADM

## 2023-05-21 RX ORDER — LIDOCAINE HYDROCHLORIDE 20 MG/ML
20 INJECTION, SOLUTION INFILTRATION; PERINEURAL
Status: DISCONTINUED | OUTPATIENT
Start: 2023-05-21 | End: 2023-05-31 | Stop reason: HOSPADM

## 2023-05-21 RX ADMIN — ACETAMINOPHEN 1000 MG: 500 TABLET, FILM COATED ORAL at 18:00

## 2023-05-21 RX ADMIN — MORPHINE SULFATE 2 MG: 4 INJECTION, SOLUTION INTRAMUSCULAR; INTRAVENOUS at 21:00

## 2023-05-21 RX ADMIN — ACETAMINOPHEN 1000 MG: 500 TABLET, FILM COATED ORAL at 11:18

## 2023-05-21 RX ADMIN — MORPHINE SULFATE 2 MG: 4 INJECTION, SOLUTION INTRAMUSCULAR; INTRAVENOUS at 00:55

## 2023-05-21 RX ADMIN — ACETAMINOPHEN 1000 MG: 500 TABLET, FILM COATED ORAL at 05:28

## 2023-05-21 RX ADMIN — AMPICILLIN SODIUM AND SULBACTAM SODIUM 3 G: 2; 1 INJECTION, POWDER, FOR SOLUTION INTRAMUSCULAR; INTRAVENOUS at 00:56

## 2023-05-21 RX ADMIN — DOCUSATE SODIUM 100 MG: 100 CAPSULE, LIQUID FILLED ORAL at 18:01

## 2023-05-21 RX ADMIN — AMPICILLIN SODIUM AND SULBACTAM SODIUM 3 G: 2; 1 INJECTION, POWDER, FOR SOLUTION INTRAMUSCULAR; INTRAVENOUS at 23:05

## 2023-05-21 RX ADMIN — LISINOPRIL 5 MG: 5 TABLET ORAL at 05:29

## 2023-05-21 RX ADMIN — LINEZOLID 600 MG: 600 TABLET, FILM COATED ORAL at 18:01

## 2023-05-21 RX ADMIN — OXYCODONE 5 MG: 5 TABLET ORAL at 11:26

## 2023-05-21 RX ADMIN — MORPHINE SULFATE 2 MG: 4 INJECTION, SOLUTION INTRAMUSCULAR; INTRAVENOUS at 12:44

## 2023-05-21 RX ADMIN — AMPICILLIN SODIUM AND SULBACTAM SODIUM 3 G: 2; 1 INJECTION, POWDER, FOR SOLUTION INTRAMUSCULAR; INTRAVENOUS at 11:22

## 2023-05-21 RX ADMIN — ASPIRIN 81 MG: 81 TABLET, COATED ORAL at 05:28

## 2023-05-21 RX ADMIN — MORPHINE SULFATE 2 MG: 4 INJECTION, SOLUTION INTRAMUSCULAR; INTRAVENOUS at 05:28

## 2023-05-21 RX ADMIN — ACETAMINOPHEN 1000 MG: 500 TABLET, FILM COATED ORAL at 00:54

## 2023-05-21 RX ADMIN — MORPHINE SULFATE 2 MG: 4 INJECTION, SOLUTION INTRAMUSCULAR; INTRAVENOUS at 09:44

## 2023-05-21 RX ADMIN — NICOTINE 7 MG: 7 PATCH, EXTENDED RELEASE TRANSDERMAL at 05:28

## 2023-05-21 RX ADMIN — LINEZOLID 600 MG: 600 TABLET, FILM COATED ORAL at 05:28

## 2023-05-21 RX ADMIN — SIMVASTATIN 20 MG: 20 TABLET, FILM COATED ORAL at 18:01

## 2023-05-21 RX ADMIN — OXYCODONE 5 MG: 5 TABLET ORAL at 23:04

## 2023-05-21 RX ADMIN — AMPICILLIN SODIUM AND SULBACTAM SODIUM 3 G: 2; 1 INJECTION, POWDER, FOR SOLUTION INTRAMUSCULAR; INTRAVENOUS at 05:31

## 2023-05-21 RX ADMIN — DOCUSATE SODIUM 100 MG: 100 CAPSULE, LIQUID FILLED ORAL at 05:28

## 2023-05-21 RX ADMIN — MORPHINE SULFATE 2 MG: 4 INJECTION, SOLUTION INTRAMUSCULAR; INTRAVENOUS at 17:57

## 2023-05-21 RX ADMIN — AMPICILLIN SODIUM AND SULBACTAM SODIUM 3 G: 2; 1 INJECTION, POWDER, FOR SOLUTION INTRAMUSCULAR; INTRAVENOUS at 18:02

## 2023-05-21 ASSESSMENT — PAIN DESCRIPTION - PAIN TYPE
TYPE: ACUTE PAIN

## 2023-05-21 NOTE — DISCHARGE PLANNING
ATTN: Case Management  RE: Referral for Home Health    As of 5/21/2023, we have accepted the Home Health referral for the patient listed above.    A [facility] clinician will be out to see the patient within 48 hours. If you have any questions or concerns regarding the patient's transition to Home Health, please do not hesitate to contact us at [phone].      We look forward to collaborating with you,  [facility] Team

## 2023-05-21 NOTE — WOUND TEAM
[facility]  Inpatient Services  Initial Wound and Skin Care Evaluation    Admission Date: 5/19/2023     Last order of IP CONSULT TO WOUND CARE was found on 5/21/2023 from Hospital Encounter on 5/18/2023     HPI, PMH, SH: Reviewed    Past Surgical History:   Procedure Laterality Date    DEBRIDEMENT Right 5/19/2023    Procedure: RIGHT GROIN WOUND DEBRIDEMENT;  Surgeon: Negar Machado M.D.;  Location: SURGERY [facility];  Service: General    MN REPR ANEURYSM/GRFT INS,COMMON FEMORAL Right 5/3/2023    Procedure: OPEN REPAIR OF RIGHT GROIN PSEUDOANEURYSM;  Surgeon: Negar Machado M.D.;  Location: SURGERY [facility];  Service: General    AORTOGRAM Right 5/3/2023    Procedure: AORTOGRAM, RIGHT LEG;  Surgeon: Negar Machado M.D.;  Location: SURGERY [facility];  Service: General    THROMBECTOMY Right 5/3/2023    Procedure: THROMBECTOMY, RIGHT LEG;  Surgeon: Negar Machado M.D.;  Location: SURGERY [facility];  Service: General    LAMINOTOMY      lumbar (rods)    MENISCUS REPAIR Left     MULTIPLE CORONARY ARTERY BYPASS       Social History     Tobacco Use    Smoking status: Every Day     Packs/day: 1.00     Years: 52.00     Pack years: 52.00     Types: Cigarettes    Smokeless tobacco: Never   Vaping Use    Vaping Use: Never used   Substance Use Topics    Alcohol use: No     Comment: Patient stopped drinking 5/28/2018     No chief complaint on file.    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 Pt underwent open repair of a right femoral artery pseudoaneurysm as well as a right leg arterial thrombectomy on May 3, 2023. Pt did well and was discharged home the next day. Unfortunately pt had developed a superficial wound dehiscence as  well as surround cellulitis. Pt was therefore admitted and taken to OR for washout with wound vac application. Wound team was subsequently consulted to manage wound vac.    WOUND ASSESSMENT/LDA     Negative Pressure Wound Therapy 05/19/23 Thigh Proximal (Active)   Vacuum Serial Number [id number] 05/21/23 1400   NPWT Pump Mode / Pressure Setting Ulta;Continuous;125 mmHg    Dressing Type Medium;Black Foam (Regular)    Number of Foam Pieces Used 3    Canister Changed No    Output (mL) 0 mL    NEXT Dressing Change/Treatment Date 05/23/23      Wound 05/19/23 Full Thickness Wound Groin Right (Active)   Wound Image   05/21/23 1400   Site Assessment Red;Yellow    Periwound Assessment Clean;Dry;Intact    Margins Attached edges;Defined edges    Closure Secondary intention    Drainage Amount Small    Drainage Description Serosanguineous    Treatments Cleansed;Site care;Offloading    Wound Cleansing Approved Wound Cleanser    Periwound Protectant Skin Protectant Wipes to Periwound;Paste Ring;Drape    Dressing Cleansing/Solutions Not Applicable    Dressing Options Wound Vac;Mepilex Heel    Dressing Changed Changed    Dressing Status Clean;Dry;Intact    Dressing Change/Treatment Frequency Tuesday, Thursday, Saturday, and As Needed    NEXT Dressing Change/Treatment Date 05/23/23    NEXT Weekly Photo (Inpatient Only) 05/28/23    Non-staged Wound Description Full thickness    Wound Length (cm) 9.5 cm    Wound Width (cm) 4.5 cm    Wound Depth (cm) 1.5 cm    Wound Surface Area (cm^2) 42.75 cm^2    Wound Volume (cm^3) 64.125 cm^3    Shape Oval    Wound Odor None    Exposed Structures JEREMY    WOUND NURSE ONLY - Time Spent with Patient (mins) 60      Vascular:    NEGRITA:   No results found.    Lab Values:    Lab Results   Component Value Date/Time    WBC 7.8 05/20/2023 12:05 AM    RBC 4.98 05/20/2023 12:05 AM    HEMOGLOBIN 14.8 05/20/2023 12:05 AM    HEMATOCRIT 44.2 05/20/2023 12:05 AM    HBA1C 5.3 05/28/2015 03:23 PM      Culture Results  show:  No results found for this or any previous visit (from the past 720 hour(s)).    Pain Level/Medicated:  Pre-medicated with PO Oxy 1hr prior and IV Morphine at start of dressing change. 2% Lidocaine applied and allowed to dwell over the wound for 10 minutes.         INTERVENTIONS BY WOUND TEAM:  Chart and images reviewed. Discussed with bedside RN. All areas of concern (based on picture review, LDA review and discussion with bedside RN) have been thoroughly assessed. Documentation of areas based on significant findings. This RN in to assess patient. Performed standard wound care which includes appropriate positioning, dressing removal and non-selective debridement. Pictures and measurements obtained weekly if/when required.  Preparation for Dressing removal: Dressing soaked with Lidocaine and NS  Non-selectively Debrided with:  Wound cleanser and gauze.  Sharp debridement: NA, clipped of hair  Katiana wound: Cleansed with Wound cleanser and gauze, Prepped with no sting, one paste ring to medial side of wound. Drape to the lateral aspect.   Primary Dressing: One piece of cut to fit full thickness black foam into wound. Small partial thickness bridge to the right lateral thigh as a bridge for trac pad. All foam secured with drape.  Secondary (Outer) Dressing: 3rd and final piece of half thickness circular black foam applied as a button for trac pad. Trac pad applied and suction resumed.    Advanced Wound Care Discharge Planning  Number of Clinicians necessary to complete wound care: 1  Is patient requiring IV pain medications for dressing changes: yes  Length of time for dressing change 30 min. (This does not include chart review, pre-medication time, set up, clean up or time spent charting.)    Interdisciplinary consultation: Patient, Bedside RN, Cintia JOYNER (Wound RN)    EVALUATION / RATIONALE FOR TREATMENT:  Most Recent Date:  05/21/23: Pt with open wound to the right groin, Picture did not save in Epic will get  picture at next visit. MD requested no veraflo be applied. Applied regular NPWT to assist with granulation tissue development. Pt likely to DC later this week. Will plan to change again on Tuesday. Will need a wound care appointment by Friday.      Goals: Steady decrease in wound area and depth weekly.    WOUND TEAM PLAN OF CARE ([X] for frequency of wound follow up,):   Nursing to follow dressing orders written for wound care. Contact wound team if area fails to progress, deteriorates or with any questions/concerns if something comes up before next scheduled follow up (See below as to whether wound is following and frequency of wound follow up)  Dressing changes by wound team:                   Follow up 3 times weekly:                NPWT change 3 times weekly:   X, Sunday to Tuesday then likely Friday to get on a MWF schedule  Follow up 1-2 times weekly:      Follow up Bi-Monthly:           Follow up Monthly (High Risk):                        Follow up as needed:     Other (explain):     NURSING PLAN OF CARE ORDERS (X):  Dressing changes: See Dressing Care orders: X  Skin care: See Skin Care orders:   RN Prevention Protocol:   Rectal tube care: See Rectal Tube Care orders:   Other orders:    RSKIN:   CURRENTLY IN PLACE (X), APPLIED THIS VISIT (A), ORDERED (O):   Q shift Palmer:  X  Q shift pressure point assessments:  X    Surface/Positioning   Standard Mattress/Trauma Bed          Low Airloss        X, turned on this visit  ICU Low Airloss   Bariatric IRIS     Waffle cushion        Waffle Overlay          Reposition q 2 hours      TAPs Turning system     Z Abdias Pillow     Offloading/Redistribution JEREMY  Sacral Offloading Dressing (Silicone dressing)     Heel Offloading Dressing (Silicone dressing)         Heel float boots (Prevalon boot)             Float Heels off Bed with Pillows           Respiratory Room Air  Silicone O2 tubing         Gray Foam Ear protectors     Cannula fixation Device (Tender )           High flow offloading Clip    Elastic head band offloading device      Anchorfast                                                         Trach with Optifoam split foam             Containment/Moisture Prevention Continent    Rectal tube or BMS    Purwick/Condom Cath        Moran Catheter    Barrier wipes           Barrier paste       Antifungal tx      Interdry        Mobilization JEREMY      Up to chair        Ambulate      PT/OT      Nutrition       Dietician        Diabetes Education      PO   X  TF     TPN     NPO   # days     Other        Anticipated discharge plans:   LTACH:        SNF/Rehab:                  Home Health Care:       X vs,    Outpatient Wound Center:          X  Self/Family Care:        Other:                  Vac Discharge Needs:   Vac Discharge plan is purely a recommendation from wound team and not a requirement for discharge unless otherwise stated by physician.  Not Applicable Pt not on a wound vac:       Regular Vac while inpatient, alternative dressing at DC:        Regular Vac in use and continued at DC:          X  Reg. Vac w/ Skin Sub/Biologic in use. Will need to be changed 2x wkly:      Veraflo Vac while inpatient, ok to transition to Regular Vac on Discharge (Bedside RN to Clamp small instillation tubing at time of DC):           Veraflo Vac while inpatient, would benefit from remaining on Veraflo Vac upon discharge:

## 2023-05-21 NOTE — CARE PLAN
The patient is Stable - Low risk of patient condition declining or worsening    Shift Goals  Clinical Goals: pain control, monitor WV  Patient Goals: pain control, rest  Family Goals: JEREMY    Progress made toward(s) clinical / shift goals:      Patient's pain will be well controlled with PRN pain medication and ambulation.    Problem: Pain - Standard  Goal: Alleviation of pain or a reduction in pain to the patient’s comfort goal  Outcome: Progressing

## 2023-05-21 NOTE — CARE PLAN
The patient is Stable - Low risk of patient condition declining or worsening    Shift Goals  Clinical Goals: Pain control, safety, stable VS  Patient Goals: Pain control, sleep  Family Goals: JEREMY    Progress made toward(s) clinical / shift goals:      Problem: Pain - Standard  Goal: Alleviation of pain or a reduction in pain to the patient’s comfort goal  Description: Target End Date:  Prior to discharge or change in level of care    Document on Vitals flowsheet    1.  Document pain using the appropriate pain scale per order or unit policy  2.  Educate and implement non-pharmacologic comfort measures (i.e. relaxation, distraction, massage, cold/heat therapy, etc.)  3.  Pain management medications as ordered  4.  Reassess pain after pain med administration per policy  5.  If opiods administered assess patient's response to pain medication is appropriate per POSS sedation scale  6.  Follow pain management plan developed in collaboration with patient and interdisciplinary team (including palliative care or pain specialists if applicable)  Outcome: Progressing     Problem: Knowledge Deficit - Standard  Goal: Patient and family/care givers will demonstrate understanding of plan of care, disease process/condition, diagnostic tests and medications  Description: Target End Date:  1-3 days or as soon as patient condition allows    Document in Patient Education    1.  Patient and family/caregiver oriented to unit, equipment, visitation policy and means for communicating concern  2.  Complete/review Learning Assessment  3.  Assess knowledge level of disease process/condition, treatment plan, diagnostic tests and medications  4.  Explain disease process/condition, treatment plan, diagnostic tests and medications  Outcome: Progressing

## 2023-05-21 NOTE — FACE TO FACE
Face to Face Supporting Documentation - Home Health    The encounter with this patient was in whole or in part the primary reason for home health admission.    Date of encounter:   Patient:                    MRN:                       YOB: 2021  Dale Dinero  [id number]  1955     Home health to see patient for:  Wound Care    Skilled need for:  Wound vac change    Skilled nursing interventions to include:  Wound Care    Homebound status evidenced by:  Needs the assistance of another person in order to leave the home. Leaving home requires a considerable and taxing effort. There is a normal inability to leave the home.    Community Physician to provide follow up care: William Harman M.D.     Optional Interventions? No      I certify the face to face encounter for this home health care referral meets the CMS requirements and the encounter/clinical assessment with the patient was, in whole, or in part, for the medical condition(s) listed above, which is the primary reason for home health care. Based on my clinical findings: the service(s) are medically necessary, support the need for home health care, and the homebound criteria are met.  I certify that this patient has had a face to face encounter by myself.  Negar Machado M.D. - NPI: [id number]

## 2023-05-21 NOTE — PROGRESS NOTES
Bedside report received.  Assessment complete.  A&O x 4. Patient calls appropriately.  Patient ambulates with standby assist. Bed alarm off.   Patient has 7/10 pain. Pain managed with prescribed medications.  Denies N&V. Tolerating cardiac diet.  Surgical: WV to R groin, RLQ dressing CDI  + void, + flatus, - BM.  Patient denies SOB.  SCD's refused.  Review plan with of care with patient. Call light and personal belongings within reach. Hourly rounding in place. All needs met at this time.

## 2023-05-21 NOTE — PROGRESS NOTES
Patient aox4, no visible signs of distress. VSS. Tolerating medications without any signs or symptoms of adverse reactions. Patient reports elevated pain, medicated per MAR with good relief. On room air, no complaints of SOB. Tolerating diet without any n/v. Bed locked and in low position.

## 2023-05-21 NOTE — DISCHARGE PLANNING
Received choice form @: Taken from [employer]  Agency/Facility name: [facility]  Sent referral per choice form @: [id number]

## 2023-05-21 NOTE — DISCHARGE PLANNING
Care Transition Team Assessment      RN EZEKIEL met with pt at bedside to complete assessment. Pt A&Ox4 and able to verify the information on the face sheet.  Per Pt he lives with his wife in [facility]. Prior to this hospitalization pt was independent at home with ADLs and most IADLs. Pt does not own any DME's as claimed.     Information Source  Orientation Level: Oriented X4  Information Given By: Patient    Readmission Evaluation  Is this a readmission?: No    Elopement Risk  Legal Hold: No  Ambulatory or Self Mobile in Wheelchair: Yes  Disoriented: No  Psychiatric Symptoms: None  History of Wandering: No  Elopement this Admit: No  Vocalizing Wanting to Leave: No  Displays Behaviors, Body Language Wanting to Leave: No-Not at Risk for Elopement  Elopement Risk: Not at Risk for Elopement    Interdisciplinary Discharge Planning  Lives with - Patient's Self Care Capacity: Spouse  Patient or legal guardian wants to designate a caregiver: No  Support Systems: Family Member(s)  Housing / Facility: [address]  Durable Medical Equipment: Not Applicable    Discharge Preparedness  What is your plan after discharge?: Home health care  What are your discharge supports?: Spouse  Prior Functional Level: Ambulatory, Independent with Activities of Daily Living  Difficulity with ADLs: None  Difficulity with IADLs: None    Functional Assesment  Prior Functional Level: Ambulatory, Independent with Activities of Daily Living    Finances  Financial Barriers to Discharge: No  Prescription Coverage: Yes    Vision / Hearing Impairment  Right Eye Vision: Impaired, Wears Glasses  Left Eye Vision: Impaired, Wears Glasses    Advance Directive  Advance Directive?: None    Domestic Abuse  Have you ever been the victim of abuse or violence?: No  Physical Abuse or Sexual Abuse: No  Verbal Abuse or Emotional Abuse: No  Possible Abuse/Neglect Reported to:: Not Applicable    Psychological Assessment  History of Psychiatric Problems: No    Discharge  Risks or Barriers  Discharge risks or barriers?: Other (comment) (pending medical clearance, pending acceptance with [facility])    Anticipated Discharge Information  Discharge Disposition: D/T to home under HHA care in anticipation of covered skilled care (06)

## 2023-05-21 NOTE — DISCHARGE PLANNING
Case Management Discharge Planning    Admission Date: 5/19/2023  GMLOS: 3  ALOS: 2    6-Clicks ADL Score: 24  6-Clicks Mobility Score: 24      Anticipated Discharge Dispo: Discharge Disposition: D/T to home under care of home health w/planned hosp IP readmit (86)    DME Needed: Yes    DME Ordered: Yes    Action(s) Taken: Updated Provider/Nurse on Discharge Plan    This RN CM requested Ariana ZHAO to send referral to [facility] per pt's choice. Per Dr. Machado, Pt will need a regular wound Vac upon discharge.     Pt agreed to send referral to [facility] For a wound Vac. Wound order form signed by Dr. Machado. Order form faxed to Claudia of [facility], left VM to Claudia to inform her of the referral.     Pt has been accepted by [facility].    Pt is on Unasyn until May 26, 2023 per MAR.     Escalations Completed: None    Medically Clear: No    Next Steps: Will fax to [facility] once wound vac order form is signed.     Barriers to Discharge: Medical Clearance  Pending Acceptance with [facility] for a wound vac and delivery to bedside.     Is the patient up for discharge tomorrow: No

## 2023-05-21 NOTE — WOUND TEAM
Assisted Armando RAUSCH (Wound RN), RN with wound care, non-selective debridement performed using wound cleanser/NS and gauze. Please see Armando RAUSCH (Wound RN) wound note for further wound care details.

## 2023-05-21 NOTE — PROGRESS NOTES
VASCULAR SURGERY PROGRESS NOTE       Awake, no complaints.  AF, VSS.     General: Pleasant male in none apparent distress.  Abdomen: Soft, non-distended.  Right lower abdominal incision clean, dry, intact with mild incisional tenderness.  Right lower extremity: Wound VAC in place.  Decreased focal erythema laterally.  Palpable right posterior tibial pulses with multiphasic flow.     Labs: Reviewed.       Assessment: Status post excisional debridement of right groin wound and wound VAC placement.     Plan:  Improving.  Continue Unasyn and Zyvox.  Wound care consult reordered for wound VAC change.  NO Veraflo.    Home on home wound vac and suppressive antibiotic once wound is granulating well and erythema resolved.     Discussed with patient.  All questions were answered.    Dr. Rothman will cover tomorrow through Thursday.

## 2023-05-22 PROCEDURE — 700111 HCHG RX REV CODE 636 W/ 250 OVERRIDE (IP): Performed by: SURGERY

## 2023-05-22 PROCEDURE — 770001 HCHG ROOM/CARE - MED/SURG/GYN PRIV*

## 2023-05-22 PROCEDURE — A9270 NON-COVERED ITEM OR SERVICE: HCPCS | Performed by: SURGERY

## 2023-05-22 PROCEDURE — 700101 HCHG RX REV CODE 250: Performed by: SURGERY

## 2023-05-22 PROCEDURE — 700102 HCHG RX REV CODE 250 W/ 637 OVERRIDE(OP): Performed by: SURGERY

## 2023-05-22 PROCEDURE — 700105 HCHG RX REV CODE 258: Performed by: SURGERY

## 2023-05-22 RX ADMIN — MORPHINE SULFATE 2 MG: 4 INJECTION, SOLUTION INTRAMUSCULAR; INTRAVENOUS at 19:56

## 2023-05-22 RX ADMIN — LABETALOL HYDROCHLORIDE 10 MG: 5 INJECTION, SOLUTION INTRAVENOUS at 08:27

## 2023-05-22 RX ADMIN — SIMVASTATIN 20 MG: 20 TABLET, FILM COATED ORAL at 17:27

## 2023-05-22 RX ADMIN — DOCUSATE SODIUM 100 MG: 100 CAPSULE, LIQUID FILLED ORAL at 04:33

## 2023-05-22 RX ADMIN — OXYCODONE 5 MG: 5 TABLET ORAL at 09:38

## 2023-05-22 RX ADMIN — ACETAMINOPHEN 1000 MG: 500 TABLET, FILM COATED ORAL at 17:27

## 2023-05-22 RX ADMIN — ACETAMINOPHEN 1000 MG: 500 TABLET, FILM COATED ORAL at 12:09

## 2023-05-22 RX ADMIN — DOCUSATE SODIUM 100 MG: 100 CAPSULE, LIQUID FILLED ORAL at 17:26

## 2023-05-22 RX ADMIN — OXYCODONE 5 MG: 5 TABLET ORAL at 05:41

## 2023-05-22 RX ADMIN — ASPIRIN 81 MG: 81 TABLET, COATED ORAL at 04:33

## 2023-05-22 RX ADMIN — ACETAMINOPHEN 1000 MG: 500 TABLET, FILM COATED ORAL at 23:15

## 2023-05-22 RX ADMIN — AMPICILLIN SODIUM AND SULBACTAM SODIUM 3 G: 2; 1 INJECTION, POWDER, FOR SOLUTION INTRAMUSCULAR; INTRAVENOUS at 23:18

## 2023-05-22 RX ADMIN — AMPICILLIN SODIUM AND SULBACTAM SODIUM 3 G: 2; 1 INJECTION, POWDER, FOR SOLUTION INTRAMUSCULAR; INTRAVENOUS at 17:32

## 2023-05-22 RX ADMIN — ENOXAPARIN SODIUM 40 MG: 100 INJECTION SUBCUTANEOUS at 17:28

## 2023-05-22 RX ADMIN — MORPHINE SULFATE 2 MG: 4 INJECTION, SOLUTION INTRAMUSCULAR; INTRAVENOUS at 12:05

## 2023-05-22 RX ADMIN — NICOTINE 7 MG: 7 PATCH, EXTENDED RELEASE TRANSDERMAL at 04:33

## 2023-05-22 RX ADMIN — AMPICILLIN SODIUM AND SULBACTAM SODIUM 3 G: 2; 1 INJECTION, POWDER, FOR SOLUTION INTRAMUSCULAR; INTRAVENOUS at 04:37

## 2023-05-22 RX ADMIN — LISINOPRIL 5 MG: 5 TABLET ORAL at 04:33

## 2023-05-22 RX ADMIN — LINEZOLID 600 MG: 600 TABLET, FILM COATED ORAL at 04:33

## 2023-05-22 RX ADMIN — ACETAMINOPHEN 1000 MG: 500 TABLET, FILM COATED ORAL at 04:33

## 2023-05-22 RX ADMIN — AMPICILLIN SODIUM AND SULBACTAM SODIUM 3 G: 2; 1 INJECTION, POWDER, FOR SOLUTION INTRAMUSCULAR; INTRAVENOUS at 12:09

## 2023-05-22 RX ADMIN — HYDRALAZINE HYDROCHLORIDE 10 MG: 20 INJECTION INTRAMUSCULAR; INTRAVENOUS at 05:48

## 2023-05-22 RX ADMIN — OXYCODONE 5 MG: 5 TABLET ORAL at 18:24

## 2023-05-22 RX ADMIN — OXYCODONE 5 MG: 5 TABLET ORAL at 23:15

## 2023-05-22 RX ADMIN — MORPHINE SULFATE 2 MG: 4 INJECTION, SOLUTION INTRAMUSCULAR; INTRAVENOUS at 04:32

## 2023-05-22 RX ADMIN — OXYCODONE 5 MG: 5 TABLET ORAL at 14:32

## 2023-05-22 RX ADMIN — MORPHINE SULFATE 2 MG: 4 INJECTION, SOLUTION INTRAMUSCULAR; INTRAVENOUS at 07:42

## 2023-05-22 RX ADMIN — LINEZOLID 600 MG: 600 TABLET, FILM COATED ORAL at 17:26

## 2023-05-22 RX ADMIN — MORPHINE SULFATE 2 MG: 4 INJECTION, SOLUTION INTRAMUSCULAR; INTRAVENOUS at 16:10

## 2023-05-22 ASSESSMENT — PAIN DESCRIPTION - PAIN TYPE
TYPE: ACUTE PAIN

## 2023-05-22 NOTE — DISCHARGE PLANNING
Case Management Discharge Planning    Admission Date: 5/19/2023  GMLOS: 3  ALOS: 3    6-Clicks ADL Score: 24  6-Clicks Mobility Score: 24      Anticipated Discharge Dispo: Discharge Disposition: D/T to home under care of home health w/planned hosp IP readmit (86)    DME Needed: Yes    DME Ordered: Yes    Action(s) Taken: Updated Provider/Nurse on Discharge Plan    Pt has been accepted by [facility]. The  [facility] wound vac order form was faxed to [facility] .     Pt is on IV Unasyn with end date 5/26/23 per MAR      Per Claudia of [facility] , she received the [facility] wound vac order.     Escalations Completed: None    Medically Clear: No    Next Steps:   This RN CM to continue to assist Pt with discharge as needed    Barriers to Discharge:   Medical clearance  Pending acceptance with [facility] for a wound vac and delivery at bedside.     Is the patient up for discharge tomorrow: No

## 2023-05-22 NOTE — CARE PLAN
The patient is Stable - Low risk of patient condition declining or worsening    Shift Goals  Clinical Goals: pain control, wound vac  Patient Goals: sleep  Family Goals: JEREMY    Progress made toward(s) clinical / shift goals:        Problem: Pain - Standard  Goal: Alleviation of pain or a reduction in pain to the patient’s comfort goal  Outcome: Progressing     Problem: Knowledge Deficit - Standard  Goal: Patient and family/care givers will demonstrate understanding of plan of care, disease process/condition, diagnostic tests and medications  Outcome: Progressing

## 2023-05-22 NOTE — CARE PLAN
The patient is Stable - Low risk of patient condition declining or worsening    Shift Goals  Clinical Goals: pain control, wound vac  Patient Goals: sleep  Family Goals: JEREMY    Progress made toward(s) clinical / shift goals:    Problem: Pain - Standard  Goal: Alleviation of pain or a reduction in pain to the patient’s comfort goal  Description: Target End Date:  Prior to discharge or change in level of care    Document on Vitals flowsheet    1.  Document pain using the appropriate pain scale per order or unit policy  2.  Educate and implement non-pharmacologic comfort measures (i.e. relaxation, distraction, massage, cold/heat therapy, etc.)  3.  Pain management medications as ordered  4.  Reassess pain after pain med administration per policy  5.  If opiods administered assess patient's response to pain medication is appropriate per POSS sedation scale  6.  Follow pain management plan developed in collaboration with patient and interdisciplinary team (including palliative care or pain specialists if applicable)  Outcome: Progressing     Problem: Knowledge Deficit - Standard  Goal: Patient and family/care givers will demonstrate understanding of plan of care, disease process/condition, diagnostic tests and medications  Description: Target End Date:  1-3 days or as soon as patient condition allows    Document in Patient Education    1.  Patient and family/caregiver oriented to unit, equipment, visitation policy and means for communicating concern  2.  Complete/review Learning Assessment  3.  Assess knowledge level of disease process/condition, treatment plan, diagnostic tests and medications  4.  Explain disease process/condition, treatment plan, diagnostic tests and medications  Outcome: Progressing       Patient is not progressing towards the following goals:

## 2023-05-22 NOTE — PROGRESS NOTES
Bedside report received  AAOx4  RA  Medicated per MAR for pain  Ambulates with standby assist  Tolerating diet  Denies n/v  Surgical wound/wound vac to Right groin. On and working  +flatus  +void in urinal    POC reviewed, education provided  Safety maintained

## 2023-05-22 NOTE — PROGRESS NOTES
Report received from RN, assumed care at 0700  Pt is A0X4, and responds appropriately   Pt declines any SOB, chest pain, new onset of numbness/ tingiling  Pt rates pain at 8/10, on a scale of 1-10, pt medicated per MAR  Pt is voiding adequatly and without hesitancy  Pt has + flatus, + bowel sounds, + BM on 5/22/2020  Pt ambulates with a SBA assist   Pt is tolerating a regular cardiac diet, pt denies any nausea/vomiting  Pt has right eva incision, wound vac in place, no leaks noted at this time, vac is intact   Pt has abdominal incision, approximated, open to air    Pt medicated per for for SBP >150, will continue to monitor         Plan of care discussed, all questions answered. Explained importance of calling before getting OOB and pt verbalizes understanding. Explained importance of oral care. Call light is within reach, treaded slipper socks on, bed in lowest/ locked position, hourly rounding in place, all needs met at this time

## 2023-05-23 LAB
BACTERIA WND AEROBE CULT: ABNORMAL
BACTERIA WND AEROBE CULT: ABNORMAL
GRAM STN SPEC: ABNORMAL
SIGNIFICANT IND 70042: ABNORMAL
SITE SITE: ABNORMAL
SOURCE SOURCE: ABNORMAL

## 2023-05-23 PROCEDURE — 700105 HCHG RX REV CODE 258: Performed by: SURGERY

## 2023-05-23 PROCEDURE — 700101 HCHG RX REV CODE 250: Performed by: SURGERY

## 2023-05-23 PROCEDURE — 700102 HCHG RX REV CODE 250 W/ 637 OVERRIDE(OP): Performed by: SURGERY

## 2023-05-23 PROCEDURE — 97602 WOUND(S) CARE NON-SELECTIVE: CPT

## 2023-05-23 PROCEDURE — 97605 NEG PRS WND THER DME<=50SQCM: CPT

## 2023-05-23 PROCEDURE — A9270 NON-COVERED ITEM OR SERVICE: HCPCS | Performed by: SURGERY

## 2023-05-23 PROCEDURE — 700111 HCHG RX REV CODE 636 W/ 250 OVERRIDE (IP): Performed by: SURGERY

## 2023-05-23 PROCEDURE — 770001 HCHG ROOM/CARE - MED/SURG/GYN PRIV*

## 2023-05-23 PROCEDURE — 302098 PASTE RING (FLAT): Performed by: SURGERY

## 2023-05-23 RX ADMIN — DOCUSATE SODIUM 100 MG: 100 CAPSULE, LIQUID FILLED ORAL at 17:03

## 2023-05-23 RX ADMIN — ACETAMINOPHEN 1000 MG: 500 TABLET, FILM COATED ORAL at 04:07

## 2023-05-23 RX ADMIN — OXYCODONE 5 MG: 5 TABLET ORAL at 12:35

## 2023-05-23 RX ADMIN — ASPIRIN 81 MG: 81 TABLET, COATED ORAL at 04:08

## 2023-05-23 RX ADMIN — ENOXAPARIN SODIUM 40 MG: 100 INJECTION SUBCUTANEOUS at 17:02

## 2023-05-23 RX ADMIN — OXYCODONE 5 MG: 5 TABLET ORAL at 08:42

## 2023-05-23 RX ADMIN — LINEZOLID 600 MG: 600 TABLET, FILM COATED ORAL at 04:06

## 2023-05-23 RX ADMIN — LINEZOLID 600 MG: 600 TABLET, FILM COATED ORAL at 17:02

## 2023-05-23 RX ADMIN — ACETAMINOPHEN 1000 MG: 500 TABLET, FILM COATED ORAL at 17:02

## 2023-05-23 RX ADMIN — ACETAMINOPHEN 1000 MG: 500 TABLET, FILM COATED ORAL at 12:35

## 2023-05-23 RX ADMIN — AMPICILLIN SODIUM AND SULBACTAM SODIUM 3 G: 2; 1 INJECTION, POWDER, FOR SOLUTION INTRAMUSCULAR; INTRAVENOUS at 12:38

## 2023-05-23 RX ADMIN — OXYCODONE 5 MG: 5 TABLET ORAL at 21:01

## 2023-05-23 RX ADMIN — AMPICILLIN SODIUM AND SULBACTAM SODIUM 3 G: 2; 1 INJECTION, POWDER, FOR SOLUTION INTRAMUSCULAR; INTRAVENOUS at 04:11

## 2023-05-23 RX ADMIN — LISINOPRIL 5 MG: 5 TABLET ORAL at 04:06

## 2023-05-23 RX ADMIN — SIMVASTATIN 20 MG: 20 TABLET, FILM COATED ORAL at 17:09

## 2023-05-23 RX ADMIN — OXYCODONE 5 MG: 5 TABLET ORAL at 03:54

## 2023-05-23 RX ADMIN — NICOTINE 7 MG: 7 PATCH, EXTENDED RELEASE TRANSDERMAL at 04:08

## 2023-05-23 RX ADMIN — LIDOCAINE HYDROCHLORIDE 20 ML: 20 INJECTION, SOLUTION INFILTRATION; PERINEURAL at 12:46

## 2023-05-23 RX ADMIN — OXYCODONE 5 MG: 5 TABLET ORAL at 17:03

## 2023-05-23 RX ADMIN — MORPHINE SULFATE 1 MG: 4 INJECTION, SOLUTION INTRAMUSCULAR; INTRAVENOUS at 13:30

## 2023-05-23 ASSESSMENT — PAIN DESCRIPTION - PAIN TYPE
TYPE: ACUTE PAIN
TYPE: ACUTE PAIN;SURGICAL PAIN

## 2023-05-23 NOTE — PROGRESS NOTES
Bedside report received.  Assessment complete.  A&O x 4. Patient calls appropriately.  Patient ambulates with standby assist.  Patient has 8/10 pain. Pain managed with prescribed medications.  Denies N&V. Tolerating cardiac diet.  R groin with wound vac, no leaks, CDI. Transverse incision to abdomen, closed with sutures.  + void, + flatus, + BM 5/22 per pt.  Patient denies SOB.  SCD's on.  Review plan with of care with patient. Call light and personal belongings within reach. Hourly rounding in place. All needs met at this time.

## 2023-05-23 NOTE — DISCHARGE PLANNING
[facility] TCN chart review completed. Collaborated with Martha FALCON. Current discharge considerations are home with home health for wound care, as well as [facility]. Per EZEKIEL, wound vac will be delivered today.     TCN will continue to follow and collaborate with discharge planning team as additional post acute needs arise. Thank you.    Completed:  Choice obtained:  for possible wound care needs; see above  [facility] referral sent 5/20

## 2023-05-23 NOTE — DISCHARGE PLANNING
[facility] TCN chart review completed. Collaborated with Martha FALCON. Current discharge considerations are home with home health for wound care, as well as [facility]. Per EZEKIEL, wound vac has been delivered and the change is scheduled to occur today. [facility] has accepted and per Dr. Rothman, patient may discharge tomorrow if they tolerate the wound vac change.     TCN will continue to follow and collaborate with discharge planning team as additional post acute needs arise. Thank you.     Completed:  Choice obtained:  for possible wound care needs, [facility] accepted  [facility] referral sent 5/20

## 2023-05-23 NOTE — PROGRESS NOTES
Vascular surgery    Postop day 3 status post excisional debridement of right groin wound and application of wound VAC.  Patient awake and alert  Wound VAC in place  Leg well perfused    Wound VAC scheduled to be changed today  Based on how patient tolerates VAC change and appearance of wound will consider discharge tomorrow    Ashvin Rothman MD

## 2023-05-23 NOTE — CARE PLAN
The patient is Stable - Low risk of patient condition declining or worsening    Shift Goals  Clinical Goals: pain control  Patient Goals: pain control, rest    Progress made toward(s) clinical / shift goals:  Pain being controlled with PRN pain medication per MAR and rest.    Patient is not progressing towards the following goals:

## 2023-05-23 NOTE — CARE PLAN
The patient is Stable - Low risk of patient condition declining or worsening    Shift Goals  Clinical Goals: Pain Mgt  Patient Goals: Pain Mgt  Family Goals: JEREMY    Progress made toward(s) clinical / shift goals:        Problem: Pain - Standard  Goal: Alleviation of pain or a reduction in pain to the patient’s comfort goal  Outcome: Progressing     Problem: Knowledge Deficit - Standard  Goal: Patient and family/care givers will demonstrate understanding of plan of care, disease process/condition, diagnostic tests and medications  Outcome: Progressing

## 2023-05-23 NOTE — WOUND TEAM
[facility]  Inpatient Services  Initial Wound and Skin Care Evaluation    Admission Date: 5/19/2023     Last order of IP CONSULT TO WOUND CARE was found on 5/21/2023 from Hospital Encounter on 5/18/2023     HPI, PMH, SH: Reviewed    Past Surgical History:   Procedure Laterality Date    DEBRIDEMENT Right 5/19/2023    Procedure: RIGHT GROIN WOUND DEBRIDEMENT;  Surgeon: Negar Machado M.D.;  Location: SURGERY [facility];  Service: General    MT REPR ANEURYSM/GRFT INS,COMMON FEMORAL Right 5/3/2023    Procedure: OPEN REPAIR OF RIGHT GROIN PSEUDOANEURYSM;  Surgeon: Negar Machado M.D.;  Location: SURGERY [facility];  Service: General    AORTOGRAM Right 5/3/2023    Procedure: AORTOGRAM, RIGHT LEG;  Surgeon: Negar Machado M.D.;  Location: SURGERY [facility];  Service: General    THROMBECTOMY Right 5/3/2023    Procedure: THROMBECTOMY, RIGHT LEG;  Surgeon: Negar Machado M.D.;  Location: SURGERY [facility];  Service: General    LAMINOTOMY      lumbar (rods)    MENISCUS REPAIR Left     MULTIPLE CORONARY ARTERY BYPASS       Social History     Tobacco Use    Smoking status: Every Day     Packs/day: 1.00     Years: 52.00     Pack years: 52.00     Types: Cigarettes    Smokeless tobacco: Never   Vaping Use    Vaping Use: Never used   Substance Use Topics    Alcohol use: No     Comment: Patient stopped drinking 5/28/2018     No chief complaint on file.    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 Pt underwent open repair of a right femoral artery pseudoaneurysm as well as a right leg arterial thrombectomy on May 3, 2023. Pt did well and was discharged home the next day. Unfortunately pt had developed a superficial wound dehiscence as  well as surround cellulitis. Pt was therefore admitted and taken to OR for washout with wound vac application. Wound team was subsequently consulted to manage wound vac.    WOUND ASSESSMENT/LDA    Negative Pressure Wound Therapy 05/19/23 Thigh Proximal (Active)   Vacuum Serial Number [id number] 05/21/23 1400   NPWT Pump Mode / Pressure Setting Ulta;Continuous    Dressing Type Medium;Black Foam (Regular)    Number of Foam Pieces Used 4    Canister Changed No    Output (mL) 0 mL    NEXT Dressing Change/Treatment Date 05/25/23      Wound 05/19/23 Full Thickness Wound Groin Right (Active)   Wound Image      05/23/23 1500   Site Assessment Red;Yellow;Slough    Periwound Assessment Clean;Dry;Intact    Margins Attached edges;Defined edges    Closure Secondary intention    Drainage Amount Scant    Drainage Description Serosanguineous    Treatments Cleansed;Site care;Topical Lidocaine;CSWD - Conservative Sharp Wound Debridement    Wound Cleansing Approved Wound Cleanser    Periwound Protectant Skin Protectant Wipes to Periwound;Paste Ring;Drape    Dressing Cleansing/Solutions Not Applicable    Dressing Options Wound Vac;Mepilex Heel    Dressing Changed Changed    Dressing Status Clean;Dry;Intact    Dressing Change/Treatment Frequency Monday, Wednesday, Friday, and As Needed    NEXT Dressing Change/Treatment Date 05/26/23    NEXT Weekly Photo (Inpatient Only) 05/30/23    Non-staged Wound Description Full thickness    Wound Length (cm) 9.6 cm    Wound Width (cm) 3.4 cm    Wound Depth (cm) 3 cm    Wound Surface Area (cm^2) 32.64 cm^2    Wound Volume (cm^3) 97.92 cm^3    Wound Healing % -53    Tunneling (cm) 4.2 cm    Tunneling Clock Position of Wound 9    Shape Oval    Wound Odor None    Exposed Structures Adipose;JEREMY    WOUND NURSE ONLY - Time Spent with Patient (mins) 60      Vascular:    NEGRITA:   No results found.    Lab Values:    Lab Results   Component Value Date/Time    WBC 7.8 05/20/2023 12:05 AM    RBC 4.98 05/20/2023  12:05 AM    HEMOGLOBIN 14.8 05/20/2023 12:05 AM    HEMATOCRIT 44.2 05/20/2023 12:05 AM    HBA1C 5.3 05/28/2015 03:23 PM      Culture Results show:  Recent Results (from the past 720 hour(s))   CULTURE WOUND W/ GRAM STAIN    Collection Time: 05/19/23  2:37 PM    Specimen: Wound   Result Value Ref Range    Significant Indicator POS (POS)     Source WND     Site Right Groin     Culture Result (A)      Growth noted after further incubation, see below for  organism identification.      Gram Stain Result Few WBCs.  No organisms seen.       Culture Result Staphylococcus epidermidis  Rare growth   (A)        Susceptibility    Staphylococcus epidermidis - TAPAN     Azithromycin <=2 Sensitive mcg/mL     Clindamycin <=0.25 Sensitive mcg/mL     Cefazolin <=8 Resistant mcg/mL     Cefepime 8 Resistant mcg/mL     Daptomycin <=0.5 Sensitive mcg/mL     Erythromycin <=0.25 Sensitive mcg/mL     Ampicillin/sulbactam <=8/4 Resistant mcg/mL     Vancomycin 1 Sensitive mcg/mL     Oxacillin >2 Resistant mcg/mL     Trimeth/Sulfa <=0.5/9.5 Sensitive mcg/mL     Tetracycline <=4 Sensitive mcg/mL       Pain Level/Medicated:  Pre-medicated with PO Oxy 30min prior and IV Morphine at end of dressing change. 2% Lidocaine applied and allowed to dwell over the wound for 25 minutes.         INTERVENTIONS BY WOUND TEAM:  Chart and images reviewed. Discussed with bedside RN. All areas of concern (based on picture review, LDA review and discussion with bedside RN) have been thoroughly assessed. Documentation of areas based on significant findings. This RN in to assess patient. Performed standard wound care which includes appropriate positioning, dressing removal and non-selective debridement. Pictures and measurements obtained weekly if/when required.  Preparation for Dressing removal: Dressing soaked with Lidocaine and NS  Non-selectively Debrided with:  Wound cleanser and gauze.  Sharp debridement: NA, clipped of hair  Katiana wound: Cleansed with Wound  cleanser and gauze, Prepped with no sting, one paste ring to medial side of wound. Drape to the lateral aspect.   Primary Dressing: One piece of spiraled half thickness black foam packed into undermining at 0900. One piece of cut to fit full thickness black foam into wound. Small partial thickness bridge to the right lateral thigh as a bridge for trac pad. All foam secured with drape.  Secondary (Outer) Dressinth and final piece of half thickness circular black foam applied as a button for trac pad. Trac pad applied and suction resumed.    Advanced Wound Care Discharge Planning  Number of Clinicians necessary to complete wound care: 1  Is patient requiring IV pain medications for dressing changes: yes  Length of time for dressing change 30 min. (This does not include chart review, pre-medication time, set up, clean up or time spent charting.)    Interdisciplinary consultation: Patient, Bedside RN, Cintia JOYNER (Wound RN)    EVALUATION / RATIONALE FOR TREATMENT:  Most Recent Date:  23: Slough debrided with scissors and forceps. Continued with regular vac per MD order. Pt has small area of tunneling with slough present. Foam applied into tunnel in attempt to breakup slough. Continued with POC.    23: Pt with open wound to the right groin, Picture did not save in Epic will get picture at next visit. MD requested no veraflo be applied. Applied regular NPWT to assist with granulation tissue development. Pt likely to DC later this week. Will plan to change again on Tuesday. Will need a wound care appointment by Friday.      Goals: Steady decrease in wound area and depth weekly.    WOUND TEAM PLAN OF CARE ([X] for frequency of wound follow up,):   Nursing to follow dressing orders written for wound care. Contact wound team if area fails to progress, deteriorates or with any questions/concerns if something comes up before next scheduled follow up (See below as to whether wound is following and frequency of wound  follow up)  Dressing changes by wound team:                   Follow up 3 times weekly:                NPWT change 3 times weekly:   X, Sunday to Tuesday then likely Friday to get on a MWF schedule  Follow up 1-2 times weekly:      Follow up Bi-Monthly:           Follow up Monthly (High Risk):                        Follow up as needed:     Other (explain):     NURSING PLAN OF CARE ORDERS (X):  Dressing changes: See Dressing Care orders: X  Skin care: See Skin Care orders:   RN Prevention Protocol:   Rectal tube care: See Rectal Tube Care orders:   Other orders:    RSKIN:   CURRENTLY IN PLACE (X), APPLIED THIS VISIT (A), ORDERED (O):   Q shift Palmer:  X  Q shift pressure point assessments:  X    Surface/Positioning   Standard Mattress/Trauma Bed          Low Airloss        X, turned on this visit  ICU Low Airloss   Bariatric IRIS     Waffle cushion        Waffle Overlay          Reposition q 2 hours      TAPs Turning system     Z Abdias Pillow     Offloading/Redistribution JEREMY  Sacral Offloading Dressing (Silicone dressing)     Heel Offloading Dressing (Silicone dressing)         Heel float boots (Prevalon boot)             Float Heels off Bed with Pillows           Respiratory Room Air  Silicone O2 tubing         Gray Foam Ear protectors     Cannula fixation Device (Tender )          High flow offloading Clip    Elastic head band offloading device      Anchorfast                                                         Trach with Optifoam split foam             Containment/Moisture Prevention Continent    Rectal tube or BMS    Purwick/Condom Cath        Moran Catheter    Barrier wipes           Barrier paste       Antifungal tx      Interdry        Mobilization JEREMY      Up to chair        Ambulate      PT/OT      Nutrition       Dietician        Diabetes Education      PO   X  TF     TPN     NPO   # days     Other        Anticipated discharge plans:   LTACH:        SNF/Rehab:                  Home Health Care:        X vs,    Outpatient Wound Center:          X  Self/Family Care:        Other:                  Vac Discharge Needs:   Vac Discharge plan is purely a recommendation from wound team and not a requirement for discharge unless otherwise stated by physician.  Not Applicable Pt not on a wound vac:       Regular Vac while inpatient, alternative dressing at DC:        Regular Vac in use and continued at DC:          X  Reg. Vac w/ Skin Sub/Biologic in use. Will need to be changed 2x wkly:      Veraflo Vac while inpatient, ok to transition to Regular Vac on Discharge (Bedside RN to Clamp small instillation tubing at time of DC):           Veraflo Vac while inpatient, would benefit from remaining on Veraflo Vac upon discharge:

## 2023-05-23 NOTE — PROGRESS NOTES
Bedside report received, assessment completed    A&O x  4, pt calls appropriately  Mobility: Up with SBA, FWW/ HH  Fall Risk Assessment: low, bed alarm n/a, door notifications yes  Pain Assessment / Reassessment completed, medication provided per MAR  Diet: Cardiac  LDA:   IV Access: 20G LFA, CDI/ flushed/ SL  Vac: R-Groin vac    GI/: urinal/ bathroom void, + flatus, 5/22 BM  DVT Prophylaxis: Lovenox, SCD on while in bed   Palmer Score: 22, Interventions per flow sheet  Procedures:    - 5/19 R Groin Debridement   D/C Plan:    - pt planning on going to [place], will need to set up wound care     Reviewed plan of care with patient, bed in lowest position and locked, pt resting comfortably now, call light within reach, all needs met at this time. Interventions will be executed per plan of care

## 2023-05-24 LAB
BACTERIA SPEC ANAEROBE CULT: ABNORMAL
BACTERIA SPEC ANAEROBE CULT: ABNORMAL
SIGNIFICANT IND 70042: ABNORMAL
SITE SITE: ABNORMAL
SOURCE SOURCE: ABNORMAL

## 2023-05-24 PROCEDURE — 700105 HCHG RX REV CODE 258: Performed by: INTERNAL MEDICINE

## 2023-05-24 PROCEDURE — A9270 NON-COVERED ITEM OR SERVICE: HCPCS | Performed by: SURGERY

## 2023-05-24 PROCEDURE — 700102 HCHG RX REV CODE 250 W/ 637 OVERRIDE(OP): Performed by: SURGERY

## 2023-05-24 PROCEDURE — A9270 NON-COVERED ITEM OR SERVICE: HCPCS | Performed by: INTERNAL MEDICINE

## 2023-05-24 PROCEDURE — 99223 1ST HOSP IP/OBS HIGH 75: CPT | Performed by: INTERNAL MEDICINE

## 2023-05-24 PROCEDURE — 700102 HCHG RX REV CODE 250 W/ 637 OVERRIDE(OP): Performed by: INTERNAL MEDICINE

## 2023-05-24 PROCEDURE — 700111 HCHG RX REV CODE 636 W/ 250 OVERRIDE (IP): Performed by: INTERNAL MEDICINE

## 2023-05-24 PROCEDURE — 700111 HCHG RX REV CODE 636 W/ 250 OVERRIDE (IP): Performed by: SURGERY

## 2023-05-24 PROCEDURE — 770001 HCHG ROOM/CARE - MED/SURG/GYN PRIV*

## 2023-05-24 RX ORDER — HYDRALAZINE HYDROCHLORIDE 20 MG/ML
20 INJECTION INTRAMUSCULAR; INTRAVENOUS EVERY 4 HOURS PRN
Status: DISCONTINUED | OUTPATIENT
Start: 2023-05-24 | End: 2023-05-31 | Stop reason: HOSPADM

## 2023-05-24 RX ORDER — LISINOPRIL 10 MG/1
10 TABLET ORAL DAILY
Status: DISCONTINUED | OUTPATIENT
Start: 2023-05-25 | End: 2023-05-31 | Stop reason: HOSPADM

## 2023-05-24 RX ORDER — METRONIDAZOLE 500 MG/1
500 TABLET ORAL EVERY 12 HOURS
Status: DISCONTINUED | OUTPATIENT
Start: 2023-05-24 | End: 2023-05-30

## 2023-05-24 RX ORDER — AMLODIPINE BESYLATE 10 MG/1
5 TABLET ORAL
Status: DISCONTINUED | OUTPATIENT
Start: 2023-05-24 | End: 2023-05-31 | Stop reason: HOSPADM

## 2023-05-24 RX ADMIN — VANCOMYCIN HYDROCHLORIDE 2000 MG: 500 INJECTION, POWDER, LYOPHILIZED, FOR SOLUTION INTRAVENOUS at 11:50

## 2023-05-24 RX ADMIN — OXYCODONE 5 MG: 5 TABLET ORAL at 19:04

## 2023-05-24 RX ADMIN — OXYCODONE 5 MG: 5 TABLET ORAL at 23:00

## 2023-05-24 RX ADMIN — SIMVASTATIN 20 MG: 20 TABLET, FILM COATED ORAL at 16:20

## 2023-05-24 RX ADMIN — LISINOPRIL 5 MG: 5 TABLET ORAL at 04:16

## 2023-05-24 RX ADMIN — OXYCODONE 5 MG: 5 TABLET ORAL at 05:31

## 2023-05-24 RX ADMIN — NICOTINE 7 MG: 7 PATCH, EXTENDED RELEASE TRANSDERMAL at 04:16

## 2023-05-24 RX ADMIN — OXYCODONE 5 MG: 5 TABLET ORAL at 14:23

## 2023-05-24 RX ADMIN — ENOXAPARIN SODIUM 40 MG: 100 INJECTION SUBCUTANEOUS at 16:20

## 2023-05-24 RX ADMIN — HYDRALAZINE HYDROCHLORIDE 10 MG: 20 INJECTION INTRAMUSCULAR; INTRAVENOUS at 07:59

## 2023-05-24 RX ADMIN — OXYCODONE 5 MG: 5 TABLET ORAL at 01:41

## 2023-05-24 RX ADMIN — LINEZOLID 600 MG: 600 TABLET, FILM COATED ORAL at 04:16

## 2023-05-24 RX ADMIN — METRONIDAZOLE 500 MG: 500 TABLET ORAL at 11:50

## 2023-05-24 RX ADMIN — ACETAMINOPHEN 1000 MG: 500 TABLET, FILM COATED ORAL at 04:16

## 2023-05-24 RX ADMIN — AMLODIPINE BESYLATE 5 MG: 10 TABLET ORAL at 19:58

## 2023-05-24 RX ADMIN — METRONIDAZOLE 500 MG: 500 TABLET ORAL at 23:00

## 2023-05-24 RX ADMIN — VANCOMYCIN HYDROCHLORIDE 750 MG: 5 INJECTION, POWDER, LYOPHILIZED, FOR SOLUTION INTRAVENOUS at 19:59

## 2023-05-24 RX ADMIN — ASPIRIN 81 MG: 81 TABLET, COATED ORAL at 04:16

## 2023-05-24 RX ADMIN — ACETAMINOPHEN 1000 MG: 500 TABLET, FILM COATED ORAL at 11:50

## 2023-05-24 ASSESSMENT — PAIN DESCRIPTION - PAIN TYPE
TYPE: ACUTE PAIN;SURGICAL PAIN
TYPE: ACUTE PAIN
TYPE: ACUTE PAIN;SURGICAL PAIN

## 2023-05-24 NOTE — CONSULTS
[facility] INFECTIOUS DISEASES INPATIENT CONSULT NOTE     Date of Service: 5/24/2023    Consult Requested By: Negar Machado M.D.    Reason for Consultation: Suspected graft infection    Chief Complaint: Groin wound    History of Present Illness:     Dale Dinero is a 68 y.o. male admitted 5/19/2023.  Extensive review of documentation from multiple specialties performed in generating this HPI.  Patient has history of peripheral arterial disease, tobacco abuse, chronic pain with narcotic use, has a remote history of aortobifemoral bypass more than a decade ago.  Patient was lost to follow-up but returned to vascular surgery recently and was noted to have a 4 cm pseudoaneurysm in the right common femoral artery area.  He was taken to the OR on 5/3, underwent open repair with a Dacron graft from the right aortobifemoral bypass graft limb to the distal right common femoral artery, also underwent right leg arterial thrombectomy.  At the time, general degeneration was noted but no obvious evidence of infection.  No cultures were obtained.    Patient was initially doing well but developed superficial wound dehiscence and surrounding cellulitis and was taken back to the OR on 5/19.  I&D was performed, superficial fluid collection noted, macroscopically described as a seroma, no extension down to the graft noted to the naked eye and the graft was not exposed during this procedure.  Cultures were obtained and this is growing methicillin-resistant staph epi and Finegoldia magna.  Vascular surgery considering aggressive antibiotic use given concern for seeding of underlying vascular graft.  ID consulted for recommendations.    Review of Systems:  All other systems reviewed and are negative expect as noted in HPI    Past Medical History:   Diagnosis Date    Anesthesia     Awareness under anesthesia     CAD (coronary artery disease)     Cancer (HCC)     skin    COPD (chronic obstructive pulmonary disease) (HCC)     Emphysema  of lung (HCC)     Heart burn     Hep C w/o coma, chronic (HCC)     Hepatitis C     treated    High cholesterol     Hypertension     states controlled on meds    Indigestion     Renal disorder     stage 1 kidney disease    Snoring        Past Surgical History:   Procedure Laterality Date    DEBRIDEMENT Right 5/19/2023    Procedure: RIGHT GROIN WOUND DEBRIDEMENT;  Surgeon: Negar Machado M.D.;  Location: SURGERY [facility];  Service: General    MN REPR ANEURYSM/GRFT INS,COMMON FEMORAL Right 5/3/2023    Procedure: OPEN REPAIR OF RIGHT GROIN PSEUDOANEURYSM;  Surgeon: Negar Machado M.D.;  Location: SURGERY [facility];  Service: General    AORTOGRAM Right 5/3/2023    Procedure: AORTOGRAM, RIGHT LEG;  Surgeon: Negar Machado M.D.;  Location: SURGERY [facility];  Service: General    THROMBECTOMY Right 5/3/2023    Procedure: THROMBECTOMY, RIGHT LEG;  Surgeon: Negar Machado M.D.;  Location: SURGERY [facility];  Service: General    LAMINOTOMY      lumbar (rods)    MENISCUS REPAIR Left     MULTIPLE CORONARY ARTERY BYPASS         Family History   Problem Relation Age of Onset    Other Mother         accident    Cancer Father         Colan    Diabetes Father     Cancer Brother         lymphoma       Social History     Socioeconomic History    Marital status:      Spouse name: Not on file    Number of children: Not on file    Years of education: Not on file    Highest education level: Not on file   Occupational History    Not on file   Tobacco Use    Smoking status: Every Day     Packs/day: 1.00     Years: 52.00     Pack years: 52.00     Types: Cigarettes    Smokeless tobacco: Never   Vaping Use    Vaping Use: Never used   Substance and Sexual Activity    Alcohol use: No     Comment: Patient stopped drinking 5/28/2018    Drug use: No    Sexual activity: Never   Other Topics Concern    Not on file   Social History Narrative    Not on file     Social Determinants of Health     Financial Resource Strain: Not on file    Food Insecurity: Not on file   Transportation Needs: Not on file   Physical Activity: Not on file   Stress: Not on file   Social Connections: Not on file   Intimate Partner Violence: Not on file   Housing Stability: Not on file       No Known Allergies    Medications:    Current Facility-Administered Medications:     lidocaine jelly (Uro-Jet) 2 % 1 Application., 1 Application., Topical, QDAY PRN, Negar Machado M.D.    lidocaine (XYLOCAINE) 2 % injection 20 mL, 20 mL, Topical, QDAY PRN, 20 mL at 05/23/23 1246 **OR** lidocaine (XYLOCAINE) 4 % topical solution, , Topical, QDAY PRN, Negar Machado M.D.    linezolid (ZYVOX) tablet 600 mg, 600 mg, Oral, Q12HRS, Negar Machado M.D., 600 mg at 05/24/23 0416    aspirin EC tablet 81 mg, 81 mg, Oral, QAM, Negar Machado M.D., 81 mg at 05/24/23 0416    lisinopril (PRINIVIL) tablet 5 mg, 5 mg, Oral, DAILY, Negar Machado M.D., 5 mg at 05/24/23 0416    simvastatin (ZOCOR) tablet 20 mg, 20 mg, Oral, Q EVENING, Negar Machado M.D., 20 mg at 05/23/23 1709    Pharmacy Consult Request ...Pain Management Review 1 Each, 1 Each, Other, PHARMACY TO DOSE, Negar Machado M.D.    ondansetron (ZOFRAN) syringe/vial injection 4 mg, 4 mg, Intravenous, Q4HRS PRN, Negar Machado M.D.    enoxaparin (Lovenox) inj 40 mg, 40 mg, Subcutaneous, DAILY AT 1800, Negar Machado M.D., 40 mg at 05/23/23 1702    acetaminophen (TYLENOL) tablet 1,000 mg, 1,000 mg, Oral, Q6HRS, 1,000 mg at 05/24/23 0416 **FOLLOWED BY** acetaminophen (TYLENOL) tablet 1,000 mg, 1,000 mg, Oral, Q6HRS PRN, Negar Machado M.D.    labetalol (NORMODYNE/TRANDATE) injection 10 mg, 10 mg, Intravenous, Q4HRS PRN, Negar Machado M.D., 10 mg at 05/22/23 0827    hydrALAZINE (APRESOLINE) injection 10 mg, 10 mg, Intravenous, Q4HRS PRN, Negar Machado M.D., 10 mg at 05/24/23 0759    magnesium hydroxide (MILK OF MAGNESIA) suspension 30 mL, 30 mL, Oral, QDAY PRN, Negar Machado M.D.    docusate sodium (COLACE) capsule 100 mg, 100  "mg, Oral, BID, Negar Machado M.D., 100 mg at 23 1703    oxyCODONE immediate-release (ROXICODONE) tablet 5 mg, 5 mg, Oral, Q4HRS PRN, Negar Machado M.D., 5 mg at 23 0531    morphine 4 MG/ML injection 1-2 mg, 1-2 mg, Intravenous, Q2HRS PRN, Negar Machado M.D., 1 mg at 23 1330    nicotine (NICODERM) 7 MG/24HR 7 mg, 7 mg, Transdermal, Q24HR, Negar Machado M.D., 7 mg at 23 0416    Physical Exam:   Vital Signs: BP (!) 164/82   Pulse 70   Temp 36.3 °C (97.3 °F) (Temporal)   Resp 17   Ht 1.791 m (5' 10.5\")   Wt 75 kg (165 lb 5.5 oz)   SpO2 94%   BMI 23.39 kg/m²   Temp  Av.6 °C (97.8 °F)  Min: 36.1 °C (97 °F)  Max: 37.4 °C (99.3 °F)  Vital signs reviewed  Constitutional: Patient is oriented to person, place, and time. Appears well-developed and well-nourished. No distress  Head: Atraumatic, normocephalic  Eyes: Conjunctivae normal  Mouth/Throat: Lips without lesions  Cardiovascular: Normal rate. No pedal edema.  Pulmonary/Chest: No respiratory distress. Unlabored respiratory effort  Abdominal: Soft, non tender.  Right groin with wound VAC in place  Musculoskeletal: No joint tenderness, swelling, erythema, or restriction of motion noted.  Skin: Skin is warm and dry. No rashes or embolic phenomena noted on exposed skin  Psychiatric: Normal mood and affect. Behavior is normal.     LABS:  No results for input(s): WBC, HGB in the last 72 hours.    Invalid input(s): PLATELET, ABSOLUTENEUT         No results for input(s): SODIUM, POTASSIUM, CHLORIDE, CO2, CREATININE, EGFR in the last 72 hours.    Invalid input(s): UREANITROGEN, GULCOSE     No results for input(s): ALBUMIN in the last 72 hours.    Invalid input(s): AST, ALT, ALKPHOS, BILITOT, TOTALBILIRUB, BILIRUBINTOT, BILIRUBINDIR, BILIRUBININD, ALKALINEPHOS     MICRO:  No results found for: BLOODCULTU, BLDCULT, BCHOLD     Latest pertinent labs were reviewed    IMAGING STUDIES:  Pelvis x-ray with no retained hardware    Hospital " Course/Assessment:   Dale Dinero is a 68 y.o. male patient with history of peripheral arterial disease and tobacco abuse, chronic pain with narcotic use, remote history of aortobifemoral bypass more than a decade ago, lost to follow-up, returned to vascular surgery recently and was noted to have a 4 cm pseudoaneurysm in the right common femoral artery area.  He was taken to the OR on 5/3, underwent open repair with a Dacron graft from the right aortobifemoral bypass graft limb to the distal right common femoral artery, also underwent right leg arterial thrombectomy.  At the time, general degeneration was noted but no obvious evidence of infection.  Patient developed superficial wound dehiscence and surrounding cellulitis, return to the OR on 5/19, I&D was performed, superficial fluid collection noted, macroscopically described as a seroma, no extension down to the graft noted to the naked eye and the graft was not exposed during this procedure.  Cultures were obtained and this is growing methicillin-resistant Staph epi and Finegoldia magna.      Agree with vascular surgery recommendations for aggressive antibiotic therapy given relatively easy access for the organisms to travel down and seed the underlying vascular graft.    Pertinent Diagnoses:  Surgical site infection  Polymicrobial infection  Vascular graft pseudoaneurysm, status postrepair   Peripheral arterial disease  Tobacco abuse  Chronic pain with narcotic use    Plan:   -Start IV vancomycin and p.o. Flagyl for now.  Patient will remain in the hospital over the next few days for wound follow-up.  On discharge, anticipate 6 weeks of oral antibiotics with Bactrim and Augmentin, followed by close clinical follow-up off of antibiotic  -Monitor levels and renal function closely    Disposition: Anticipate no ID specific disposition needs  Need for PICC line: Likely no    Plan was discussed with the primary team, Dr. Rothman    ID will follow. Please feel  free to call with questions.    Navi Ha M.D.    Please note that this dictation was created using voice recognition software. I have worked with technical experts from [facility] to optimize the interface.  I have made every reasonable attempt to correct obvious errors, but there may be errors of grammar and possibly content that I did not discover before finalizing the note.

## 2023-05-24 NOTE — DISCHARGE PLANNING
"[facility] chart review completed. Collaborated with EZEKIEL Thomas. Noted MD Rothman note from this AM @8:44, \"We will pursue aggressive IV antibiotic therapy and wound VAC therapy at this time.  Further recommendations will be based on clinical progress. We will consult infectious disease for aggressive antibiotic therapy\". Current discharge considerations are dc to home with [facility] services and possible home IV ABX (dependent on final ID recs*). Patient seen at bedside. Obtained Home IV ABX choice and gave to CM. TCN will continue to follow and collaborate with discharge planning team as additional post acute needs arise. Thank you.    Completed:  Choice obtained:  for possible wound care needs, [facility] has accepted; IV ABX*  [facility] [place] sent 5/20     *noted at time of writing this note per ID MD @9:22 this AM \"Start IV vancomycin and p.o. Flagyl for now.  Patient will remain in the hospital over the next few days for wound follow-up.  On discharge, anticipate 6 weeks of oral antibiotics with Bactrim and Augmentin, followed by close clinical follow-up off of antibiotic\"   "

## 2023-05-24 NOTE — PROGRESS NOTES
Bedside report received, assessment completed    A&O x  4, pt calls appropriately  Mobility: Up with SBA, FWW/ HH  Fall Risk Assessment: low, bed alarm n/a, door notifications yes  Pain Assessment / Reassessment completed, medication provided per MAR  Diet: Cardiac  LDA:   IV Access: 20G LFA, CDI/ flushed/ SL  Vac: R-Groin vac    GI/: urinal/ bathroom void, + flatus, 5/23 BM  DVT Prophylaxis: Lovenox, SCD on while in bed   Palmer Score: 22, Interventions per flow sheet  Procedures:    - 5/19 R Groin Debridement   D/C Plan:    - Per Dr. Rothman, pt will need 1 more IP vac change on Friday 5/26   - IV Abx needed until Friday 5/26    Reviewed plan of care with patient, bed in lowest position and locked, pt resting comfortably now, call light within reach, all needs met at this time. Interventions will be executed per plan of care

## 2023-05-24 NOTE — CARE PLAN
The patient is Stable - Low risk of patient condition declining or worsening    Shift Goals  Clinical Goals: Pain Mgt/ Discharge  Patient Goals: Discharge Home  Family Goals: JEREMY    Progress made toward(s) clinical / shift goals:       Problem: Pain - Standard  Goal: Alleviation of pain or a reduction in pain to the patient’s comfort goal  Outcome: Progressing     Problem: Knowledge Deficit - Standard  Goal: Patient and family/care givers will demonstrate understanding of plan of care, disease process/condition, diagnostic tests and medications  Outcome: Progressing

## 2023-05-24 NOTE — CARE PLAN
The patient is Stable - Low risk of patient condition declining or worsening    Shift Goals  Clinical Goals: pain control, rest  Patient Goals: pain control, dc    Progress made toward(s) clinical / shift goals:  Pain being controlled with PRN oxy 5mg q4 hours per MAR and rest. Plan for pt to dc with home health. Home wound vac at bedside.    Patient is not progressing towards the following goals:

## 2023-05-24 NOTE — PROGRESS NOTES
Vascular surgery    Patient awake and alert  Reports that the patient had significant pain during VAC change yesterday.  Patient does have a low pain tolerance and has some narcotic resistance due to chronic narcotic use  Microbiology returns sensitivities on organisms cultured from the right groin.  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  According to Dr. Machado the graft was not exposed at the groin washout.  There is definitely some concern that the graft may be infected.  We will pursue aggressive IV antibiotic therapy and wound VAC therapy at this time.  Further recommendations will be based on clinical progress.  We will consult infectious disease for aggressive antibiotic therapy  Staphylococcus epidermidis    Ampicillin/sulbactam <=8/4 mcg/mL Resistant     Azithromycin <=2 mcg/mL Sensitive     Cefazolin <=8 mcg/mL Resistant     Cefepime 8 mcg/mL Resistant     Clindamycin <=0.25 mcg/mL Sensitive     Daptomycin <=0.5 mcg/mL Sensitive     Erythromycin <=0.25 mcg/mL Sensitive     Oxacillin >2 mcg/mL Resistant     Tetracycline <=4 mcg/mL Sensitive     Trimeth/Sulfa <=0.5/9.5 m... Sensitive     Vancomycin 1 mcg/mL Sensitive    Finegoldia magna

## 2023-05-24 NOTE — DISCHARGE PLANNING
"Case Management Discharge Planning    Admission Date: 5/19/2023  GMLOS: 4.7  ALOS: 5    6-Clicks ADL Score: 24  6-Clicks Mobility Score: 24      Anticipated Discharge Dispo: Discharge Disposition: D/T to home under care of home health w/planned hosp IP readmit (86)      DME Needed: No    Action(s) Taken: Updated Provider/Nurse on Discharge Plan    Pt has been accepted by [facility] and Pt has a wound vac at bedside thru [facility].    Pt was discussed in IDT rounds today.     Pt opted for [facility] in case he needs home infusion for IV antibiotics which is saved in media tab.   Choice was obtained by Dov Dinero [facility].    Pt is pending recommendations  from Infectious Disease for final antibiotic orders per Dr Rothman's notes.     Pt in on IV Vancomycin with end date  5/26 and IV Metronidazole with end date 6/7 per MAR.     Per Dr Ha of Infectious Disease \" Start IV vancomycin and p.o. Flagyl for now.  Patient will remain in the hospital over the next few days for wound follow-up.  On discharge, anticipate 6 weeks of oral antibiotics with Bactrim and Augmentin, followed by close clinical follow-up off of antibiotic\"        Escalations Completed: None    Medically Clear: No    Next Steps:   This RN CM to continue to assist Pt with discharge as needed    Barriers to Discharge:   Medical clearance    Is the patient up for discharge tomorrow: No       "

## 2023-05-24 NOTE — PROGRESS NOTES
Bedside report received.  Assessment complete.  A&O x 4. Patient calls appropriately.  Patient ambulates with standby assist.  Patient has 7/10 pain. Pain managed with prescribed medications.  Denies N&V. Tolerating cardiac diet.  R groin with wound vac, no leaks, CDI. Scabbed transverse incision to abdomen.  + void, + flatus, + BM 5/23 per pt.  Patient denies SOB.  SCD's on.  Review plan with of care with patient. Call light and personal belongings within reach. Hourly rounding in place. All needs met at this time.

## 2023-05-24 NOTE — DISCHARGE PLANNING
1043-  Received Choice Form @: 1025  Agency/Facility Name: [facility]   Needs Order to Send Out

## 2023-05-24 NOTE — PROGRESS NOTES
Pharmacy Vancomycin Kinetics Note for 5/24/2023     68 y.o. male on Vancomycin day # 1     Vancomycin Indication (AUC Dosing): Skin/skin structure infection    Provider specified end date: 06/07/23    Active Antibiotics (From admission, onward)      Ordered     Ordering Provider       Wed May 24, 2023  1:32 PM    05/24/23 1332  vancomycin (VANCOCIN) 750 mg in  mL IVPB  (vancomycin (VANCOCIN) IV (LD + Maintenance))  EVERY 8 HOURS         Navi Ha M.D.       Wed May 24, 2023 10:12 AM    05/24/23 1012  vancomycin (VANCOCIN) 2,000 mg in  mL IVPB  (vancomycin (VANCOCIN) IV (LD + Maintenance))  ONCE         Navi Ha M.D.       Wed May 24, 2023 10:07 AM    05/24/23 1007  MD Alert...Vancomycin per Pharmacy  PHARMACY TO DOSE        Question:  Indication(s) for vancomycin?  Answer:  Skin and soft tissue infection    Navi Ha M.D.    05/24/23 1007  metroNIDAZOLE (FLAGYL) tablet 500 mg  EVERY 12 HOURS         Navi Ha M.D.            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 Pt was taken to OR on 5/19 for I+D. ID now consulting and Zyvox has been changed to Vanco and Flagyl.    Allergies:     Patient has no known allergies.     Pertinent cultures to date:     Results       Procedure Component Value Units Date/Time    CULTURE WOUND W/ GRAM STAIN [[id number]]  (Abnormal)  (Susceptibility) Collected: 05/19/23 1437    Order Status: Completed Specimen: Wound Updated: 05/23/23 8457     Significant Indicator POS     Source WND     Site Right Groin     Culture Result Growth noted after further incubation, see below for  organism identification.       Gram Stain Result Few WBCs.  No organisms seen.       Culture Result Staphylococcus epidermidis  Rare growth      Narrative:      Surgery -  "swabs received    Anaerobic Culture [[id number]]  (Abnormal) Collected: 23 1437    Order Status: Completed Specimen: Wound Updated: 23 1737     Significant Indicator POS     Source WND     Site Right Groin     Culture Result -      Finegoldia magna  Light growth      Narrative:      Surgery - swabs received    MRSA By PCR (Amp) [[id number]] Collected: 23 1257    Order Status: Completed Specimen: Respirate from Nares Updated: 23 1458     MRSA by PCR Negative    Narrative:      Collected By: [id number] JACINTO VELEZ    GRAM STAIN [[id number]] Collected: 23 1437    Order Status: Completed Specimen: Wound Updated: 23 1919     Significant Indicator .     Source WND     Site Right Groin     Gram Stain Result Few WBCs.  No organisms seen.      Narrative:      Surgery - swabs received            Labs:     Estimated Creatinine Clearance: 112.1 mL/min (by C-G formula based on SCr of 0.68 mg/dL).  No results for input(s): WBC, NEUTSPOLYS, BANDSSTABS in the last 72 hours.  No results for input(s): BUN, CREATININE, ALBUMIN in the last 72 hours.    Intake/Output Summary (Last 24 hours) at 2023 1332  Last data filed at 2023 1055  Gross per 24 hour   Intake 120 ml   Output 1600 ml   Net -1480 ml      BP (!) 169/86   Pulse 80   Temp 37 °C (98.6 °F) (Temporal)   Resp 17   Ht 1.791 m (5' 10.5\")   Wt 75 kg (165 lb 5.5 oz)   SpO2 94%  Temp (24hrs), Av.7 °C (98.1 °F), Min:36.3 °C (97.3 °F), Max:37 °C (98.6 °F)      List concerns for Vancomycin clearance:     Age;BUN/Scr ratio greater than 20:1    Pharmacokinetics:     AUC kinetics:   Ke (hr ^-1): 0.0949 hr^-1  Half life: 7.3 hr  Clearance: 4.626  Estimated TDD: 2313  Estimated Dose: 896  Estimated interval: 9.3    A/P:     -  Vancomycin dose: 750 mg iv q 8 hrs    -  Next vancomycin level(s): once pt is at steady state    -  Predicted vancomycin AUC from initial AUC test calculator: 486 mg·hr/L    -  Comments: Pt does not have many concerns " for vanco accumulation. Will check vanco peak and trough levels if pt is still here in the hospital. Predicted AUC with the above dosing is 486, AUC goal range is 400-600. ID is following.    Abbie VillafanaD

## 2023-05-25 DIAGNOSIS — L08.9 SOFT TISSUE INFECTION: Primary | ICD-10-CM

## 2023-05-25 DIAGNOSIS — T81.30XA WOUND DEHISCENCE: ICD-10-CM

## 2023-05-25 PROCEDURE — 700102 HCHG RX REV CODE 250 W/ 637 OVERRIDE(OP): Performed by: SURGERY

## 2023-05-25 PROCEDURE — A9270 NON-COVERED ITEM OR SERVICE: HCPCS | Performed by: SURGERY

## 2023-05-25 PROCEDURE — A9270 NON-COVERED ITEM OR SERVICE: HCPCS | Performed by: INTERNAL MEDICINE

## 2023-05-25 PROCEDURE — 770001 HCHG ROOM/CARE - MED/SURG/GYN PRIV*

## 2023-05-25 PROCEDURE — 700111 HCHG RX REV CODE 636 W/ 250 OVERRIDE (IP): Performed by: SURGERY

## 2023-05-25 PROCEDURE — 700105 HCHG RX REV CODE 258: Performed by: INTERNAL MEDICINE

## 2023-05-25 PROCEDURE — 700111 HCHG RX REV CODE 636 W/ 250 OVERRIDE (IP): Performed by: INTERNAL MEDICINE

## 2023-05-25 PROCEDURE — 700102 HCHG RX REV CODE 250 W/ 637 OVERRIDE(OP): Performed by: INTERNAL MEDICINE

## 2023-05-25 PROCEDURE — 99233 SBSQ HOSP IP/OBS HIGH 50: CPT | Performed by: INTERNAL MEDICINE

## 2023-05-25 RX ADMIN — NICOTINE 7 MG: 7 PATCH, EXTENDED RELEASE TRANSDERMAL at 04:25

## 2023-05-25 RX ADMIN — ENOXAPARIN SODIUM 40 MG: 100 INJECTION SUBCUTANEOUS at 16:38

## 2023-05-25 RX ADMIN — SIMVASTATIN 20 MG: 20 TABLET, FILM COATED ORAL at 16:38

## 2023-05-25 RX ADMIN — AMLODIPINE BESYLATE 5 MG: 10 TABLET ORAL at 04:24

## 2023-05-25 RX ADMIN — OXYCODONE 5 MG: 5 TABLET ORAL at 16:37

## 2023-05-25 RX ADMIN — VANCOMYCIN HYDROCHLORIDE 750 MG: 5 INJECTION, POWDER, LYOPHILIZED, FOR SOLUTION INTRAVENOUS at 03:58

## 2023-05-25 RX ADMIN — ASPIRIN 81 MG: 81 TABLET, COATED ORAL at 04:24

## 2023-05-25 RX ADMIN — OXYCODONE 5 MG: 5 TABLET ORAL at 20:31

## 2023-05-25 RX ADMIN — OXYCODONE 5 MG: 5 TABLET ORAL at 12:14

## 2023-05-25 RX ADMIN — LISINOPRIL 10 MG: 10 TABLET ORAL at 08:19

## 2023-05-25 RX ADMIN — OXYCODONE 5 MG: 5 TABLET ORAL at 08:20

## 2023-05-25 RX ADMIN — METRONIDAZOLE 500 MG: 500 TABLET ORAL at 08:20

## 2023-05-25 RX ADMIN — VANCOMYCIN HYDROCHLORIDE 750 MG: 5 INJECTION, POWDER, LYOPHILIZED, FOR SOLUTION INTRAVENOUS at 20:35

## 2023-05-25 RX ADMIN — VANCOMYCIN HYDROCHLORIDE 750 MG: 5 INJECTION, POWDER, LYOPHILIZED, FOR SOLUTION INTRAVENOUS at 12:10

## 2023-05-25 RX ADMIN — OXYCODONE 5 MG: 5 TABLET ORAL at 03:57

## 2023-05-25 RX ADMIN — METRONIDAZOLE 500 MG: 500 TABLET ORAL at 16:37

## 2023-05-25 ASSESSMENT — PAIN DESCRIPTION - PAIN TYPE
TYPE: ACUTE PAIN

## 2023-05-25 ASSESSMENT — ENCOUNTER SYMPTOMS
MYALGIAS: 1
FEVER: 0
DIARRHEA: 0

## 2023-05-25 NOTE — PROGRESS NOTES
Received report from previous shift RN. Assumed care of patient at 1900.  Assessment complete.  Patient A&O x 4. Patient calls appropriately.  Patient ambulates with no assist.   Patient has 3-5/10 pain. Pain managed with prescribed medications.  Denies N&V. Tolerating cardiac diet.  Vancomycin running per MAR.   Wound vac to R groin site, dressing CDI.  + void, + flatus, + 5/23 BM.  Patient on RA, denies SOB.     Reviewed plan of care with patient. Call light and personal belongings within reach. Hourly rounding in place. All needs met at this time.

## 2023-05-25 NOTE — PROGRESS NOTES
Infectious Disease Progress Note    Author: Navi Ha M.D. Date & Time of service: 2023  9:11 AM    Chief Complaint:  Follow-up for groin wound infection    Interval History:   patient remains afebrile, no CBC this morning, tolerating vancomycin and Flagyl, creatinine 0.68, cultures reviewed as below    Labs Reviewed and Medications Reviewed.    Review of Systems:  Review of Systems   Constitutional:  Negative for fever.   Gastrointestinal:  Negative for diarrhea.   Musculoskeletal:  Positive for myalgias.   Skin:  Negative for itching and rash.       Hemodynamics:  Temp (24hrs), Av.9 °C (98.5 °F), Min:36.6 °C (97.8 °F), Max:37.2 °C (99 °F)  Temperature: 36.6 °C (97.8 °F)  Pulse  Av.9  Min: 55  Max: 99   Blood Pressure : 117/80       Physical Exam:  Physical Exam  Vitals and nursing note reviewed.   Constitutional:       General: He is not in acute distress.     Appearance: He is not ill-appearing.   HENT:      Mouth/Throat:      Pharynx: No oropharyngeal exudate.   Eyes:      Conjunctiva/sclera: Conjunctivae normal.   Cardiovascular:      Rate and Rhythm: Normal rate.   Pulmonary:      Effort: Pulmonary effort is normal. No respiratory distress.      Breath sounds: No stridor.   Abdominal:      General: There is no distension.      Tenderness: There is no abdominal tenderness.   Musculoskeletal:         General: No swelling or tenderness.      Comments: Right groin with wound VAC in place.  Pictures from 2 days ago reviewed, no significant surrounding erythema or active discharge,  noted some slough wound bed   Skin:     Findings: No erythema.   Neurological:      Mental Status: He is alert.         Meds:    Current Facility-Administered Medications:     MD Alert...Vancomycin per Pharmacy    metroNIDAZOLE    vancomycin    lisinopril    hydrALAZINE    amLODIPine    lidocaine jelly    lidocaine **OR** lidocaine    aspirin    simvastatin    Pharmacy Consult Request    ondansetron    enoxaparin  (LOVENOX) injection    [] acetaminophen **FOLLOWED BY** acetaminophen    labetalol    magnesium hydroxide    docusate sodium    oxyCODONE immediate-release    morphine injection    nicotine    Labs:  No results for input(s): WBC, RBC, HEMOGLOBIN, HEMATOCRIT, MCV, MCH, RDW, PLATELETCT, MPV, NEUTSPOLYS, LYMPHOCYTES, MONOCYTES, EOSINOPHILS, BASOPHILS, RBCMORPHOLO in the last 72 hours.  No results for input(s): SODIUM, POTASSIUM, CHLORIDE, CO2, GLUCOSE, BUN, CPKTOTAL in the last 72 hours.  No results for input(s): ALBUMIN, TBILIRUBIN, ALKPHOSPHAT, TOTPROTEIN, ALTSGPT, ASTSGOT, CREATININE in the last 72 hours.    Imaging:  DX-PORTABLE FLUORO > 1 HOUR    Result Date: 2023  5/3/2023 2:35 PM HISTORY/REASON FOR EXAM:  Right lower extremity angiogram TECHNIQUE/EXAM DESCRIPTION AND NUMBER OF VIEWS: Portable fluoroscopy for greater than one hour in OR. FINDINGS: The portable fluoroscopy unit was obligated to the procedure for greater than one hour. Actual fluoro time was 1 minute. Fluoroscopy dose(DAP): 2.0735 Gy*cm^2     Portable fluoroscopy utilized for 1 minute. INTERPRETING LOCATION: [address] [place] [address], [id number]    DX-PELVIS-1 OR 2 VIEWS    Result Date: 5/3/2023  5/3/2023 5:25 PM HISTORY/REASON FOR EXAM:  Incorrect Instrument Count.. TECHNIQUE/EXAM DESCRIPTION AND NUMBER OF VIEWS:  1 view(s) of the pelvis. COMPARISON:  None. FINDINGS: Lower lumbar spine hardware is present. Bladder catheter is present. The right lower quadrant and skin staples. The visualized abdomen and the pelvis have no retained instrument.     No retained instrument with visualization of the lower abdomen and the entire pelvis      Micro:  Results       Procedure Component Value Units Date/Time    CULTURE WOUND W/ GRAM STAIN [[id number]]  (Abnormal)  (Susceptibility) Collected: 23 1437    Order Status: Completed Specimen: Wound Updated: 23 1642     Significant Indicator POS     Source WND     Site Right Groin     Culture Result  Growth noted after further incubation, see below for  organism identification.       Gram Stain Result Few WBCs.  No organisms seen.       Culture Result Staphylococcus epidermidis  Rare growth      Narrative:      Surgery - swabs received    Susceptibility       Staphylococcus epidermidis (1)       Antibiotic Interpretation Microscan   Method Status    Azithromycin Sensitive <=2 mcg/mL TAPAN Final    Clindamycin Sensitive <=0.25 mcg/mL TAPAN Final    Cefazolin Resistant <=8 mcg/mL TAPAN Final    Cefepime Resistant 8 mcg/mL TAPAN Final    Daptomycin Sensitive <=0.5 mcg/mL TAPAN Final    Erythromycin Sensitive <=0.25 mcg/mL TAPAN Final    Ampicillin/sulbactam Resistant <=8/4 mcg/mL TAPAN Final    Vancomycin Sensitive 1 mcg/mL TAPAN Final    Oxacillin Resistant >2 mcg/mL TAPAN Final    Trimeth/Sulfa Sensitive <=0.5/9.5 mcg/mL TAPAN Final    Tetracycline Sensitive <=4 mcg/mL TAPAN Final                       Anaerobic Culture [[id number]]  (Abnormal) Collected: 05/19/23 1437    Order Status: Completed Specimen: Wound Updated: 05/24/23 1642     Significant Indicator POS     Source WND     Site Right Groin     Culture Result -      Finegoldia magna  Light growth      Narrative:      Surgery - swabs received    MRSA By PCR (Amp) [[id number]] Collected: 05/20/23 1257    Order Status: Completed Specimen: Respirate from Nares Updated: 05/20/23 1458     MRSA by PCR Negative    Narrative:      Collected By: [id number] JACINTO VELEZ    GRAM STAIN [[id number]] Collected: 05/19/23 1437    Order Status: Completed Specimen: Wound Updated: 05/19/23 1919     Significant Indicator .     Source WND     Site Right Groin     Gram Stain Result Few WBCs.  No organisms seen.      Narrative:      Surgery - swabs received            Assessment:  Dale Dinero is a 68 y.o. male patient with history of peripheral arterial disease and tobacco abuse, chronic pain with narcotic use, remote history of aortobifemoral bypass more than a decade ago, lost to follow-up,  returned to vascular surgery recently and was noted to have a 4 cm pseudoaneurysm in the right common femoral artery area.  He was taken to the OR on 5/3, underwent open repair with a Dacron graft from the right aortobifemoral bypass graft limb to the distal right common femoral artery, also underwent right leg arterial thrombectomy.  At the time, general degeneration was noted but no obvious evidence of infection.  Patient developed superficial wound dehiscence and surrounding cellulitis, return to the OR on 5/19, I&D was performed, superficial fluid collection noted, macroscopically described as a seroma, no extension down to the graft noted to the naked eye and the graft was not exposed during this procedure.  Cultures were obtained and this is growing methicillin-resistant Staph epi and Finegoldia magna.       Agree with vascular surgery recommendations for aggressive antibiotic therapy given relatively easy access for the organisms to travel down and seed the underlying vascular graft.     Pertinent Diagnoses:  Surgical site infection  Polymicrobial infection  Vascular graft pseudoaneurysm, status postrepair   Peripheral arterial disease  Tobacco abuse  Chronic pain with narcotic use    Plan:  -Continue IV vancomycin and p.o. Flagyl while inpatient.  Patient will remain in the hospital over the next few days for wound follow-up  -On discharge, recommend 6 weeks of oral antibiotics with p.o. Bactrim 1 DS tab twice daily + p.o. Augmentin 875 mg twice daily through 6/29/2023, followed by close clinical follow-up off of antibiotic  -Recommend ID clinic follow-up in 2 weeks with repeat CBC with differential, CMP (ordered)  -Monitor levels and renal function closely     Disposition: Anticipate no ID specific disposition needs.  Okay to discharge from ID standpoint if cleared by surgery  Need for PICC line: No     Plan was discussed with the primary team, Dr. Rothman     ID will follow if remains inpatient. Please feel  free to call with questions.

## 2023-05-25 NOTE — PROGRESS NOTES
Assumed care of patient at 0645. Bedside report received. Assessment complete.     A&O x 4, pt using call light appropriately  Mobility: Up independently,  no assistive devices needed   Fall Risk Assessment: No fall risk per ramos chiqui score, bed alarm: n/a,  Precautions in place per flowsheets  Pain: patient reports pain well controlled. Educated patient on pain rating scale, prn medications for pain management. Medicated per MAR.   Diet: Regular, tolerating well  LDA:   IV Access: 20G LFA, CDI/ flushed/ SL  Wounds: WV in place to R groin patent. Healing sites to abd  GI/: + void, + flatus, Last BM 5/23  DVT Prophylaxis: Lovenox, SCD refused ambulating in room, education provided regarding purpose and importance  Palmer Score: 20 Minimal risk for skin breakdown, Interventions per flow sheet    Plan of care discussed. Educated regarding importance of oral care. Oral care kit in patient room.   All questions answered at this time. Call light is within reach, treaded slipper socks on, bed in lowest/ locked position, hourly rounding in place, all needs met at this time.

## 2023-05-25 NOTE — DISCHARGE PLANNING
"[facility] chart review completed. Noted MD Rothman note from this AM @8:04, \"Consider discharge tomorrow with oral antibiotic therapy as recommended by infectious disease\". Current discharge considerations are dc to home with [facility] services for wounds. . [facility]CN will continue to follow and collaborate with discharge planning team as additional post acute needs arise. Thank you.     Completed:  Choice obtained:  for possible wound care needs, [facility] has accepted  [facility] referral sent 5/20   "

## 2023-05-25 NOTE — PROGRESS NOTES
Vascular surgery    Patient awake and alert  Patient looks and feels good  Wound VAC in place  Legs well perfused    Appreciate infectious disease recommendations and will proceed in that manner  VAC change tomorrow  Consider discharge tomorrow with oral antibiotic therapy as recommended by infectious disease  Continue vancomycin and p.o. Flagyl for now    Ashvin Rothman MD

## 2023-05-25 NOTE — CARE PLAN
The patient is Stable - Low risk of patient condition declining or worsening    Shift Goals  Clinical Goals: pain control, wound vac management, abx  Patient Goals: rest, go home  Family Goals: JEREMY    Progress made toward(s) clinical / shift goals:  Pain managed with PRN pain medications. Wound vac patent, dressing CDI. IV and PO abx administered per MAR.  Problem: Pain - Standard  Goal: Alleviation of pain or a reduction in pain to the patient’s comfort goal  Description: Target End Date:  Prior to discharge or change in level of care    Document on Vitals flowsheet    1.  Document pain using the appropriate pain scale per order or unit policy  2.  Educate and implement non-pharmacologic comfort measures (i.e. relaxation, distraction, massage, cold/heat therapy, etc.)  3.  Pain management medications as ordered  4.  Reassess pain after pain med administration per policy  5.  If opiods administered assess patient's response to pain medication is appropriate per POSS sedation scale  6.  Follow pain management plan developed in collaboration with patient and interdisciplinary team (including palliative care or pain specialists if applicable)  Outcome: Progressing     Problem: Knowledge Deficit - Standard  Goal: Patient and family/care givers will demonstrate understanding of plan of care, disease process/condition, diagnostic tests and medications  Description: Target End Date:  1-3 days or as soon as patient condition allows    Document in Patient Education    1.  Patient and family/caregiver oriented to unit, equipment, visitation policy and means for communicating concern  2.  Complete/review Learning Assessment  3.  Assess knowledge level of disease process/condition, treatment plan, diagnostic tests and medications  4.  Explain disease process/condition, treatment plan, diagnostic tests and medications  Outcome: Progressing     Problem: Mobility  Goal: Patient's capacity to carry out activities will  improve  Description: Target End Date:  Prior to discharge or change in level of care    1.  Assess for barriers to mobility/activity  2.  Implement activity per interdisciplinary team recommendations  3.  Target activity level identified and patient/family/caregiver aware of goal  4.  Provide assistive devices  5.  Instruct patient/caregiver on proper use of assistive/adaptive devices  6.  Schedule activities and rest periods to decrease effects of fatigue  7.  Encourage mobilization to extent of ability  8.  Maintain proper body alignment  9.  Provide adequate pain management to allow progressive mobilization  10. Implement pace maker precautions as needed  Outcome: Progressing     Problem: Infection - Standard  Goal: Patient will remain free from infection  Description: Target End Date:  Prior to discharge or change in level of care    1.  Utilize Standard Precautions at all times to reduce the risk of transmission of microorganisms from both recognized and  unrecognized sources of infection  2.  Infection prevention handouts provided (general/device/diagnosis specific) and documented in Patient Education  3.  Educate patient and family/caregiver on isolation precautions if applicable  Outcome: Progressing     Problem: Wound/ / Incision Healing  Goal: Patient's wound/surgical incision will decrease in size and heals properly  Description: Target End Date:  Prior to discharge or change in level of care    Document on LDA    1.  Assess and document surgical incision/wound  2.  Provide incision/wound care per policy and/or provider orders  3.  Manage surgical drains per policy if applicable  4.  Encourage adequate nutrition to promote wound healing  5.  Collaborate with Clinical Dietician  Outcome: Progressing       Patient is not progressing towards the following goals:

## 2023-05-26 LAB
GRAM STN SPEC: NORMAL
SIGNIFICANT IND 70042: NORMAL
SITE SITE: NORMAL
SOURCE SOURCE: NORMAL
VANCOMYCIN PEAK SERPL-MCNC: 19 UG/ML (ref 20–40)

## 2023-05-26 PROCEDURE — 700102 HCHG RX REV CODE 250 W/ 637 OVERRIDE(OP): Performed by: SURGERY

## 2023-05-26 PROCEDURE — 80202 ASSAY OF VANCOMYCIN: CPT

## 2023-05-26 PROCEDURE — 302098 PASTE RING (FLAT): Performed by: SURGERY

## 2023-05-26 PROCEDURE — 700111 HCHG RX REV CODE 636 W/ 250 OVERRIDE (IP): Performed by: SURGERY

## 2023-05-26 PROCEDURE — 700102 HCHG RX REV CODE 250 W/ 637 OVERRIDE(OP): Performed by: INTERNAL MEDICINE

## 2023-05-26 PROCEDURE — A9270 NON-COVERED ITEM OR SERVICE: HCPCS | Performed by: SURGERY

## 2023-05-26 PROCEDURE — A9270 NON-COVERED ITEM OR SERVICE: HCPCS | Performed by: INTERNAL MEDICINE

## 2023-05-26 PROCEDURE — 87205 SMEAR GRAM STAIN: CPT

## 2023-05-26 PROCEDURE — 770001 HCHG ROOM/CARE - MED/SURG/GYN PRIV*

## 2023-05-26 PROCEDURE — 700111 HCHG RX REV CODE 636 W/ 250 OVERRIDE (IP): Performed by: INTERNAL MEDICINE

## 2023-05-26 PROCEDURE — 36415 COLL VENOUS BLD VENIPUNCTURE: CPT

## 2023-05-26 PROCEDURE — 87070 CULTURE OTHR SPECIMN AEROBIC: CPT

## 2023-05-26 PROCEDURE — 97605 NEG PRS WND THER DME<=50SQCM: CPT

## 2023-05-26 PROCEDURE — 700105 HCHG RX REV CODE 258: Performed by: INTERNAL MEDICINE

## 2023-05-26 RX ORDER — SODIUM HYPOCHLORITE 1.25 MG/ML
SOLUTION TOPICAL PRN
Status: DISCONTINUED | OUTPATIENT
Start: 2023-05-26 | End: 2023-05-31 | Stop reason: HOSPADM

## 2023-05-26 RX ADMIN — VANCOMYCIN HYDROCHLORIDE 750 MG: 5 INJECTION, POWDER, LYOPHILIZED, FOR SOLUTION INTRAVENOUS at 03:41

## 2023-05-26 RX ADMIN — OXYCODONE 5 MG: 5 TABLET ORAL at 22:18

## 2023-05-26 RX ADMIN — OXYCODONE 5 MG: 5 TABLET ORAL at 01:31

## 2023-05-26 RX ADMIN — MORPHINE SULFATE 2 MG: 4 INJECTION, SOLUTION INTRAMUSCULAR; INTRAVENOUS at 10:04

## 2023-05-26 RX ADMIN — OXYCODONE 5 MG: 5 TABLET ORAL at 13:37

## 2023-05-26 RX ADMIN — OXYCODONE 5 MG: 5 TABLET ORAL at 09:21

## 2023-05-26 RX ADMIN — METRONIDAZOLE 500 MG: 500 TABLET ORAL at 05:25

## 2023-05-26 RX ADMIN — METRONIDAZOLE 500 MG: 500 TABLET ORAL at 17:32

## 2023-05-26 RX ADMIN — OXYCODONE 5 MG: 5 TABLET ORAL at 17:32

## 2023-05-26 RX ADMIN — VANCOMYCIN HYDROCHLORIDE 750 MG: 5 INJECTION, POWDER, LYOPHILIZED, FOR SOLUTION INTRAVENOUS at 19:49

## 2023-05-26 RX ADMIN — MORPHINE SULFATE 2 MG: 4 INJECTION, SOLUTION INTRAMUSCULAR; INTRAVENOUS at 02:21

## 2023-05-26 RX ADMIN — SIMVASTATIN 20 MG: 20 TABLET, FILM COATED ORAL at 17:33

## 2023-05-26 RX ADMIN — VANCOMYCIN HYDROCHLORIDE 750 MG: 5 INJECTION, POWDER, LYOPHILIZED, FOR SOLUTION INTRAVENOUS at 12:00

## 2023-05-26 RX ADMIN — MORPHINE SULFATE 2 MG: 4 INJECTION, SOLUTION INTRAMUSCULAR; INTRAVENOUS at 19:14

## 2023-05-26 RX ADMIN — DOCUSATE SODIUM 100 MG: 100 CAPSULE, LIQUID FILLED ORAL at 17:32

## 2023-05-26 RX ADMIN — AMLODIPINE BESYLATE 5 MG: 10 TABLET ORAL at 05:25

## 2023-05-26 RX ADMIN — MORPHINE SULFATE 2 MG: 4 INJECTION, SOLUTION INTRAMUSCULAR; INTRAVENOUS at 12:22

## 2023-05-26 RX ADMIN — ASPIRIN 81 MG: 81 TABLET, COATED ORAL at 05:25

## 2023-05-26 RX ADMIN — OXYCODONE 5 MG: 5 TABLET ORAL at 05:25

## 2023-05-26 ASSESSMENT — PATIENT HEALTH QUESTIONNAIRE - PHQ9
SUM OF ALL RESPONSES TO PHQ9 QUESTIONS 1 AND 2: 0
2. FEELING DOWN, DEPRESSED, IRRITABLE, OR HOPELESS: NOT AT ALL
1. LITTLE INTEREST OR PLEASURE IN DOING THINGS: NOT AT ALL

## 2023-05-26 ASSESSMENT — PAIN DESCRIPTION - PAIN TYPE
TYPE: ACUTE PAIN

## 2023-05-26 NOTE — CARE PLAN
The patient is Stable - Low risk of patient condition declining or worsening    Shift Goals  Clinical Goals: Pain control  Patient Goals: Rest  Family Goals: JEREMY    Progress made toward(s) clinical / shift goals:    Problem: Pain - Standard  Goal: Alleviation of pain or a reduction in pain to the patient’s comfort goal  Description: Target End Date:  Prior to discharge or change in level of care    Document on Vitals flowsheet    1.  Document pain using the appropriate pain scale per order or unit policy  2.  Educate and implement non-pharmacologic comfort measures (i.e. relaxation, distraction, massage, cold/heat therapy, etc.)  3.  Pain management medications as ordered  4.  Reassess pain after pain med administration per policy  5.  If opiods administered assess patient's response to pain medication is appropriate per POSS sedation scale  6.  Follow pain management plan developed in collaboration with patient and interdisciplinary team (including palliative care or pain specialists if applicable)  Outcome: Progressing     Problem: Knowledge Deficit - Standard  Goal: Patient and family/care givers will demonstrate understanding of plan of care, disease process/condition, diagnostic tests and medications  Description: Target End Date:  1-3 days or as soon as patient condition allows    Document in Patient Education    1.  Patient and family/caregiver oriented to unit, equipment, visitation policy and means for communicating concern  2.  Complete/review Learning Assessment  3.  Assess knowledge level of disease process/condition, treatment plan, diagnostic tests and medications  4.  Explain disease process/condition, treatment plan, diagnostic tests and medications  Outcome: Progressing     Problem: Infection - Standard  Goal: Patient will remain free from infection  Description: Target End Date:  Prior to discharge or change in level of care    1.  Utilize Standard Precautions at all times to reduce the risk of  transmission of microorganisms from both recognized and  unrecognized sources of infection  2.  Infection prevention handouts provided (general/device/diagnosis specific) and documented in Patient Education  3.  Educate patient and family/caregiver on isolation precautions if applicable  Outcome: Progressing

## 2023-05-26 NOTE — CARE PLAN
The patient is Stable - Low risk of patient condition declining or worsening    Shift Goals  Clinical Goals: pain control, WV management, abx  Patient Goals: rest, dc  Family Goals: JEREMY    Progress made toward(s) clinical / shift goals:  pain managed per MAR. WV remains in place, patent. Antibiotics administered per MAR. Patient able to rest between periods of activity.     Patient is not progressing towards the following goals:    Patient did not DC. POC reviewed with patient

## 2023-05-26 NOTE — PROGRESS NOTES
"Patient requesting IV morphine because \"they're going to cut me open tomorrow.\"  This RN went in to discuss pain regimen with the patient. Patient stated the the doctor had just come in and told the patient that he would have to have another operation tomorrow and stated \"I want the shot of morphine so that my mind can go elsewhere.\"   This RN asked the patient if he was having anxiety about surgery he stated that he was. This RN offered to page the MD for anxiety medications to help with his anxieties over surgery and he declined stating \"I don't need anything for anxiety, the shot of morphine will just take my mind elsewhere and that will be fine\" This RN educated patient regarding pain rating scale and appropriate use of medications. Patient stated \"that's been enough help, you can leave\"    This RN left the room, IV morphine not administered.   "

## 2023-05-26 NOTE — WOUND TEAM
[facility]  Inpatient Services  Wound and Skin Care Evaluation    Admission Date: 5/19/2023     Last order of IP CONSULT TO WOUND CARE was found on 5/21/2023 from Hospital Encounter on 5/18/2023     HPI, PMH, SH: Reviewed    Past Surgical History:   Procedure Laterality Date    DEBRIDEMENT Right 5/19/2023    Procedure: RIGHT GROIN WOUND DEBRIDEMENT;  Surgeon: Negar Machado M.D.;  Location: SURGERY [facility];  Service: General    AR REPR ANEURYSM/GRFT INS,COMMON FEMORAL Right 5/3/2023    Procedure: OPEN REPAIR OF RIGHT GROIN PSEUDOANEURYSM;  Surgeon: Negar Machado M.D.;  Location: SURGERY [facility];  Service: General    AORTOGRAM Right 5/3/2023    Procedure: AORTOGRAM, RIGHT LEG;  Surgeon: Negar Machado M.D.;  Location: SURGERY [facility];  Service: General    THROMBECTOMY Right 5/3/2023    Procedure: THROMBECTOMY, RIGHT LEG;  Surgeon: Negar Machado M.D.;  Location: SURGERY [facility];  Service: General    LAMINOTOMY      lumbar (rods)    MENISCUS REPAIR Left     MULTIPLE CORONARY ARTERY BYPASS       Social History     Tobacco Use    Smoking status: Every Day     Packs/day: 1.00     Years: 52.00     Pack years: 52.00     Types: Cigarettes    Smokeless tobacco: Never   Vaping Use    Vaping Use: Never used   Substance Use Topics    Alcohol use: No     Comment: Patient stopped drinking 5/28/2018     No chief complaint on file.    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 Pt underwent open repair of a right femoral artery pseudoaneurysm as well as a right leg arterial thrombectomy on May 3, 2023. Pt did well and was discharged home the next day. Unfortunately pt had developed a superficial wound dehiscence as well as  surround cellulitis. Pt was therefore admitted and taken to OR for washout with wound vac application. Wound team was subsequently consulted to manage wound vac.    WOUND ASSESSMENT/LDA     Negative Pressure Wound Therapy 05/19/23 Thigh Proximal (Active)   Vacuum Serial Number [id number] 05/26/23 0800   NPWT Pump Mode / Pressure Setting Ulta;Continuous;125 mmHg 05/26/23 1000   Dressing Type Medium;Black Foam (Regular) 05/26/23 1000   Number of Foam Pieces Used 2 05/26/23 1000   Canister Changed No 05/26/23 1000   Output (mL) 0 mL 05/22/23 1556   NEXT Dressing Change/Treatment Date 05/29/23 05/26/23 1000   VAC VeraFlo Pressure (mm/Hg) 125 mmHg 05/24/23 1950   WOUND NURSE ONLY - Time Spent with Patient (mins) 60 05/26/23 1000     Wound 05/19/23 Full Thickness Wound Groin Right (Active)   Wound Image    05/26/23 1000   Site Assessment Red;Yellow;Tunneling 05/26/23 1000   Periwound Assessment Intact 05/26/23 1000   Margins Defined edges;Unattached edges 05/26/23 1000   Closure Secondary intention 05/26/23 1000   Drainage Amount Small 05/26/23 1000   Drainage Description Serosanguineous 05/26/23 1000   Treatments Cleansed;Offloading;Site care 05/26/23 1000   Wound Cleansing Approved Wound Cleanser 05/26/23 1000   Periwound Protectant Drape;Paste Ring;Skin Protectant Wipes to Periwound 05/26/23 1000   Dressing Cleansing/Solutions Not Applicable 05/26/23 1000   Dressing Options Wound Vac 05/26/23 1000   Dressing Changed Changed 05/26/23 1000   Dressing Status Clean;Dry;Intact 05/26/23 1000   Dressing Change/Treatment Frequency Monday, Wednesday, Friday, and As Needed 05/26/23 1000   NEXT Dressing Change/Treatment Date 05/29/23 05/26/23 1000   NEXT Weekly Photo (Inpatient Only) 06/02/23 05/26/23 1000   Non-staged Wound Description Full thickness 05/26/23 1000   Wound Length (cm) 10.1 cm 05/26/23 1000   Wound Width (cm) 3.4 cm 05/26/23 1000   Wound Depth (cm) 1.5 cm 05/26/23 1000   Wound Surface Area (cm^2) 34.34 cm^2  05/26/23 1000   Wound Volume (cm^3) 51.51 cm^3 05/26/23 1000   Wound Healing % 20 05/26/23 1000   Tunneling (cm) 1.8 cm 05/26/23 1000   Tunneling Clock Position of Wound 9 05/26/23 1000   Shape oval 05/26/23 1000   Wound Odor None 05/26/23 1000   Exposed Structures None 05/26/23 1000   WOUND NURSE ONLY - Time Spent with Patient (mins) 60 05/26/23 1000       Vascular:    NEGRITA:   No results found.    Lab Values:    Lab Results   Component Value Date/Time    WBC 7.8 05/20/2023 12:05 AM    RBC 4.98 05/20/2023 12:05 AM    HEMOGLOBIN 14.8 05/20/2023 12:05 AM    HEMATOCRIT 44.2 05/20/2023 12:05 AM    HBA1C 5.3 05/28/2015 03:23 PM      Culture Results show:  Recent Results (from the past 720 hour(s))   CULTURE WOUND W/ GRAM STAIN    Collection Time: 05/19/23  2:37 PM    Specimen: Wound   Result Value Ref Range    Significant Indicator POS (POS)     Source WND     Site Right Groin     Culture Result (A)      Growth noted after further incubation, see below for  organism identification.      Gram Stain Result Few WBCs.  No organisms seen.       Culture Result Staphylococcus epidermidis  Rare growth   (A)        Susceptibility    Staphylococcus epidermidis - TAPAN     Azithromycin <=2 Sensitive mcg/mL     Clindamycin <=0.25 Sensitive mcg/mL     Cefazolin <=8 Resistant mcg/mL     Cefepime 8 Resistant mcg/mL     Daptomycin <=0.5 Sensitive mcg/mL     Erythromycin <=0.25 Sensitive mcg/mL     Ampicillin/sulbactam <=8/4 Resistant mcg/mL     Vancomycin 1 Sensitive mcg/mL     Oxacillin >2 Resistant mcg/mL     Trimeth/Sulfa <=0.5/9.5 Sensitive mcg/mL     Tetracycline <=4 Sensitive mcg/mL       Pain Level/Medicated:  Pre-medicated with PO Oxy 30min prior and IV Morphine at end of dressing change. 4% Lidocaine applied and allowed to dwell over the wound for 25 minutes.         INTERVENTIONS BY WOUND TEAM:  Chart and images reviewed. Discussed with bedside RN. All areas of concern (based on picture review, LDA review and discussion with  bedside RN) have been thoroughly assessed. Documentation of areas based on significant findings. This RN in to assess patient. Performed standard wound care which includes appropriate positioning, dressing removal and non-selective debridement. Pictures and measurements obtained weekly if/when required.    Preparation for Dressing removal: Dressing soaked with NS  Non-selectively Debrided with:  Wound cleanser and gauze.  Sharp debridement: NA  Katiana wound: Cleansed with Wound cleanser and gauze, Prepped with no sting, one paste ring.   Primary Dressing: One piece of V black foam packed into undermining at 0900, the rest of the foam used to fill wound bed.   Secondary (Outer) Dressing: Button, Trac pad applied and suction resumed. VF settings in chart.     Advanced Wound Care Discharge Planning  Number of Clinicians necessary to complete wound care: 1  Is patient requiring IV pain medications for dressing changes: yes  Length of time for dressing change 30 min. (This does not include chart review, pre-medication time, set up, clean up or time spent charting.)    Interdisciplinary consultation: Patient, Bedside RN, Wound RN Dr. Everette Alfred (vascular)     EVALUATION / RATIONALE FOR TREATMENT:  Most Recent Date:    5/26/23: Undermining at 0900 persists. Wound bed clean and red. Wound culture obtained. Continue regular VAC for now pending MD decision regarding whether patient needs to return to OR or discharge home.     @1200 - Dr. Machado requested VF placement to R groin wound. Full VAC change performed, VF applied to R groin wound.     05/23/23: Slough debrided with scissors and forceps. Continued with regular vac per MD order. Pt has small area of tunneling with slough present. Foam applied into tunnel in attempt to breakup slough. Continued with POC.    05/21/23: Pt with open wound to the right groin, Picture did not save in Epic will get picture at next visit. MD requested no veraflo be applied. Applied regular  NPWT to assist with granulation tissue development. Pt likely to DC later this week. Will plan to change again on Tuesday. Will need a wound care appointment by Friday.      Goals: Steady decrease in wound area and depth weekly.    WOUND TEAM PLAN OF CARE ([X] for frequency of wound follow up,):   Nursing to follow dressing orders written for wound care. Contact wound team if area fails to progress, deteriorates or with any questions/concerns if something comes up before next scheduled follow up (See below as to whether wound is following and frequency of wound follow up)  Dressing changes by wound team:                   Follow up 3 times weekly:                NPWT change 3 times weekly:   MWF  Follow up 1-2 times weekly:      Follow up Bi-Monthly:           Follow up Monthly (High Risk):                        Follow up as needed:     Other (explain):     NURSING PLAN OF CARE ORDERS (X):  Dressing changes: See Dressing Care orders: X  Skin care: See Skin Care orders:   RN Prevention Protocol:   Rectal tube care: See Rectal Tube Care orders:   Other orders:    RSKIN:   CURRENTLY IN PLACE (X), APPLIED THIS VISIT (A), ORDERED (O):   Q shift Palmer:  X  Q shift pressure point assessments:  X    Surface/Positioning   Standard Mattress/Trauma Bed          Low Airloss        X, turned on this visit  ICU Low Airloss   Bariatric IRIS     Waffle cushion        Waffle Overlay          Reposition q 2 hours      TAPs Turning system     Z Abdias Pillow     Offloading/Redistribution JEREMY  Sacral Offloading Dressing (Silicone dressing)     Heel Offloading Dressing (Silicone dressing)         Heel float boots (Prevalon boot)             Float Heels off Bed with Pillows           Respiratory Room Air  Silicone O2 tubing         Gray Foam Ear protectors     Cannula fixation Device (Tender )          High flow offloading Clip    Elastic head band offloading device      Anchorfast                                                          Trach with Optifoam split foam             Containment/Moisture Prevention Continent    Rectal tube or BMS    Purwick/Condom Cath        Moran Catheter    Barrier wipes           Barrier paste       Antifungal tx      Interdry        Mobilization JEREMY      Up to chair        Ambulate      PT/OT      Nutrition       Dietician        Diabetes Education      PO   X  TF     TPN     NPO   # days     Other        Anticipated discharge plans:   LTACH:        SNF/Rehab:                  Home Health Care:       X vs,    Outpatient Wound Center:          X  Self/Family Care:        Other:                  Vac Discharge Needs:   Vac Discharge plan is purely a recommendation from wound team and not a requirement for discharge unless otherwise stated by physician.  Not Applicable Pt not on a wound vac:       Regular Vac while inpatient, alternative dressing at DC:        Regular Vac in use and continued at DC:          X  Reg. Vac w/ Skin Sub/Biologic in use. Will need to be changed 2x wkly:      Veraflo Vac while inpatient, ok to transition to Regular Vac on Discharge (Bedside RN to Clamp small instillation tubing at time of DC):           Veraflo Vac while inpatient, would benefit from remaining on Veraflo Vac upon discharge:

## 2023-05-26 NOTE — DISCHARGE PLANNING
"[facility] TCN chart review completed. Collaborated with EZEKIEL Thomas. Current discharge considerations are home with [facility] (for wound care). Note [facility] has accepted. Patient currently on IV ABX. However, per ID Leland Thurman on 5/24,\"Start IV vancomycin and p.o. Flagyl for now.  Patient will remain in the hospital over the next few days for wound follow-up.  On discharge, anticipate 6 weeks of oral antibiotics with Bactrim and Augmentin, followed by close clinical follow-up off of antibiotic\".     Patient seen at bedside with spouse present. Patient very frustrated as he is NPO waiting to hear from MD as to whether or not he will have another surgery or procedure completed today. TCN will continue to follow and collaborate with discharge planning team as additional post acute needs arise. Thank you.    Completed:  Choice obtained:  for possible wound care needs, [facility] has accepted  [facility] referral sent 5/20     "

## 2023-05-26 NOTE — PROGRESS NOTES
Received report from previous shift RN  Assessment complete.  A&O x 4. Patient calls appropriately.  Patient is upself. Bed alarm off.   Patient has 8/10 pain. Pain managed with prescribed medications.  Denies N&V. Tolerating cardiac diet.  RLQ transverse incision.  R groin wound vac.  + void, + flatus, + BM.  Patient denies SOB.  SCD's refused.    Review plan of care with patient. Call light and personal belongings with in reach. Hourly rounding in place. All needs met at this time.

## 2023-05-26 NOTE — WOUND TEAM
Assisted Chel JONES (Wound RN), RN with wound care, non-selective debridement performed using wound cleanser/NS and gauze. Please see Chel JONES (Wound RN) wound note for further wound care details.

## 2023-05-27 LAB — VANCOMYCIN TROUGH SERPL-MCNC: 13.2 UG/ML (ref 10–20)

## 2023-05-27 PROCEDURE — 700105 HCHG RX REV CODE 258: Performed by: SURGERY

## 2023-05-27 PROCEDURE — A9270 NON-COVERED ITEM OR SERVICE: HCPCS | Performed by: SURGERY

## 2023-05-27 PROCEDURE — 36415 COLL VENOUS BLD VENIPUNCTURE: CPT

## 2023-05-27 PROCEDURE — 770001 HCHG ROOM/CARE - MED/SURG/GYN PRIV*

## 2023-05-27 PROCEDURE — 700105 HCHG RX REV CODE 258: Performed by: INTERNAL MEDICINE

## 2023-05-27 PROCEDURE — 700111 HCHG RX REV CODE 636 W/ 250 OVERRIDE (IP): Performed by: INTERNAL MEDICINE

## 2023-05-27 PROCEDURE — 80202 ASSAY OF VANCOMYCIN: CPT

## 2023-05-27 PROCEDURE — A9270 NON-COVERED ITEM OR SERVICE: HCPCS | Performed by: INTERNAL MEDICINE

## 2023-05-27 PROCEDURE — 700102 HCHG RX REV CODE 250 W/ 637 OVERRIDE(OP): Performed by: SURGERY

## 2023-05-27 PROCEDURE — 700111 HCHG RX REV CODE 636 W/ 250 OVERRIDE (IP): Performed by: SURGERY

## 2023-05-27 PROCEDURE — 700102 HCHG RX REV CODE 250 W/ 637 OVERRIDE(OP): Performed by: INTERNAL MEDICINE

## 2023-05-27 RX ADMIN — VANCOMYCIN HYDROCHLORIDE 750 MG: 5 INJECTION, POWDER, LYOPHILIZED, FOR SOLUTION INTRAVENOUS at 03:42

## 2023-05-27 RX ADMIN — VANCOMYCIN HYDROCHLORIDE 1250 MG: 5 INJECTION, POWDER, LYOPHILIZED, FOR SOLUTION INTRAVENOUS at 20:15

## 2023-05-27 RX ADMIN — AMLODIPINE BESYLATE 5 MG: 10 TABLET ORAL at 06:02

## 2023-05-27 RX ADMIN — VANCOMYCIN HYDROCHLORIDE 750 MG: 5 INJECTION, POWDER, LYOPHILIZED, FOR SOLUTION INTRAVENOUS at 11:58

## 2023-05-27 RX ADMIN — OXYCODONE 5 MG: 5 TABLET ORAL at 20:16

## 2023-05-27 RX ADMIN — SIMVASTATIN 20 MG: 20 TABLET, FILM COATED ORAL at 16:33

## 2023-05-27 RX ADMIN — MORPHINE SULFATE 2 MG: 4 INJECTION, SOLUTION INTRAMUSCULAR; INTRAVENOUS at 13:28

## 2023-05-27 RX ADMIN — ASPIRIN 81 MG: 81 TABLET, COATED ORAL at 06:02

## 2023-05-27 RX ADMIN — MORPHINE SULFATE 2 MG: 4 INJECTION, SOLUTION INTRAMUSCULAR; INTRAVENOUS at 05:03

## 2023-05-27 RX ADMIN — OXYCODONE 5 MG: 5 TABLET ORAL at 11:54

## 2023-05-27 RX ADMIN — METRONIDAZOLE 500 MG: 500 TABLET ORAL at 16:32

## 2023-05-27 RX ADMIN — OXYCODONE 5 MG: 5 TABLET ORAL at 07:43

## 2023-05-27 RX ADMIN — MORPHINE SULFATE 2 MG: 4 INJECTION, SOLUTION INTRAMUSCULAR; INTRAVENOUS at 09:59

## 2023-05-27 RX ADMIN — MORPHINE SULFATE 2 MG: 4 INJECTION, SOLUTION INTRAMUSCULAR; INTRAVENOUS at 17:55

## 2023-05-27 RX ADMIN — METRONIDAZOLE 500 MG: 500 TABLET ORAL at 06:02

## 2023-05-27 RX ADMIN — MORPHINE SULFATE 2 MG: 4 INJECTION, SOLUTION INTRAMUSCULAR; INTRAVENOUS at 01:34

## 2023-05-27 RX ADMIN — OXYCODONE 5 MG: 5 TABLET ORAL at 03:25

## 2023-05-27 RX ADMIN — MORPHINE SULFATE 2 MG: 4 INJECTION, SOLUTION INTRAMUSCULAR; INTRAVENOUS at 21:24

## 2023-05-27 RX ADMIN — DOCUSATE SODIUM 100 MG: 100 CAPSULE, LIQUID FILLED ORAL at 06:02

## 2023-05-27 RX ADMIN — OXYCODONE 5 MG: 5 TABLET ORAL at 16:31

## 2023-05-27 RX ADMIN — LISINOPRIL 10 MG: 10 TABLET ORAL at 06:02

## 2023-05-27 ASSESSMENT — PAIN DESCRIPTION - PAIN TYPE
TYPE: ACUTE PAIN

## 2023-05-27 NOTE — CARE PLAN
The patient is Stable - Low risk of patient condition declining or worsening    Shift Goals  Clinical Goals: pain control, abx, monitor WV  Patient Goals: pain control  Family Goals: JEREMY    Progress made toward(s) clinical / shift goals:      Patient's pain will be somewhat controlled with PRN pain medication and increased ambulation. Patient's wound will continue to heal with compliance with abx therapy, maintaining nutritional status, and increased mobility.    Problem: Pain - Standard  Goal: Alleviation of pain or a reduction in pain to the patient’s comfort goal  Outcome: Progressing     Problem: Wound/ / Incision Healing  Goal: Patient's wound/surgical incision will decrease in size and heals properly  Outcome: Progressing

## 2023-05-27 NOTE — PROGRESS NOTES
"  VASCULAR SURGERY SERVICE  Progress Note  ___________________________________    5/26/2023:  MK, pain controlled, right groin wound VAC changed at bedside today and there is a small sinus that probes deeper into the wound and I was able to get a small amount of fluid out of it which we sent for culture.  We have decided to switch to a vera flow wound VAC to try and help sterilize the wound.  Patient is on IV antibiotics of vancomycin and Flagyl for MRSE and Finegoldia    Vitals  BP (!) 149/78   Pulse 78   Temp 36.6 °C (97.9 °F) (Temporal)   Resp 17   Ht 1.791 m (5' 10.5\")   Wt 75 kg (165 lb 5.5 oz)   SpO2 92%   BMI 23.39 kg/m²     Exam  General: alert, conversant, NAD  Extremities: Right groin wound VAC changed at bedside, small sinus that probes deeper into the wound and a small amount of fluid was expressed which was not grossly purulent but we sent for culture    A/P)  Complex right groin wound.  Concern for possible infection of the underlying dacryon bypass graft.  We will switch to vera flow wound VAC with Dakin's solution to help cleanse the wound more aggressively.  We will continue the current IV antibiotic regimen.  The wound will be reevaluated on Monday to determine the progress and see if the patient can be sent home with a standard wound VAC or if further surgery will be necessary.    I will be following Mr. Dinero until Monday morning and then Dr. Machado will take back over    Gary Gaviria MD  [facility]  [place] preferred or call my office [phone]  __________________________________________________________________  Patient:Dale Dinero   MRN:[id number]   CSN:[id number]      "

## 2023-05-27 NOTE — PROGRESS NOTES
Bedside report received.  Assessment complete.  A&O x 4. Patient calls appropriately.  Patient ambulates with no assist. Bed alarm off.   Patient has 7/10 pain. Pain managed with prescribed medications.  Denies N&V. Tolerating cardiac diet.  Surgical RLQ incision closed with sutures RADHA, WV to R groin CDI.  + void, + flatus, + BM.  Patient denies SOB.  SCD's refused.  Review plan with of care with patient. Call light and personal belongings within reach. Hourly rounding in place. All needs met at this time.

## 2023-05-27 NOTE — CARE PLAN
The patient is Stable - Low risk of patient condition declining or worsening    Shift Goals  Clinical Goals: Pain control, IV abx  Patient Goals: Rest, pain control  Family Goals: JEREMY    Progress made toward(s) clinical / shift goals:    Problem: Pain - Standard  Goal: Alleviation of pain or a reduction in pain to the patient’s comfort goal  Description: Target End Date:  Prior to discharge or change in level of care    Document on Vitals flowsheet    1.  Document pain using the appropriate pain scale per order or unit policy  2.  Educate and implement non-pharmacologic comfort measures (i.e. relaxation, distraction, massage, cold/heat therapy, etc.)  3.  Pain management medications as ordered  4.  Reassess pain after pain med administration per policy  5.  If opiods administered assess patient's response to pain medication is appropriate per POSS sedation scale  6.  Follow pain management plan developed in collaboration with patient and interdisciplinary team (including palliative care or pain specialists if applicable)  Outcome: Progressing     Problem: Knowledge Deficit - Standard  Goal: Patient and family/care givers will demonstrate understanding of plan of care, disease process/condition, diagnostic tests and medications  Description: Target End Date:  1-3 days or as soon as patient condition allows    Document in Patient Education    1.  Patient and family/caregiver oriented to unit, equipment, visitation policy and means for communicating concern  2.  Complete/review Learning Assessment  3.  Assess knowledge level of disease process/condition, treatment plan, diagnostic tests and medications  4.  Explain disease process/condition, treatment plan, diagnostic tests and medications  Outcome: Progressing     Problem: Mobility  Goal: Patient's capacity to carry out activities will improve  Description: Target End Date:  Prior to discharge or change in level of care    1.  Assess for barriers to  mobility/activity  2.  Implement activity per interdisciplinary team recommendations  3.  Target activity level identified and patient/family/caregiver aware of goal  4.  Provide assistive devices  5.  Instruct patient/caregiver on proper use of assistive/adaptive devices  6.  Schedule activities and rest periods to decrease effects of fatigue  7.  Encourage mobilization to extent of ability  8.  Maintain proper body alignment  9.  Provide adequate pain management to allow progressive mobilization  10. Implement pace maker precautions as needed  Outcome: Progressing     Problem: Infection - Standard  Goal: Patient will remain free from infection  Description: Target End Date:  Prior to discharge or change in level of care    1.  Utilize Standard Precautions at all times to reduce the risk of transmission of microorganisms from both recognized and  unrecognized sources of infection  2.  Infection prevention handouts provided (general/device/diagnosis specific) and documented in Patient Education  3.  Educate patient and family/caregiver on isolation precautions if applicable  Outcome: Progressing     Problem: Wound/ / Incision Healing  Goal: Patient's wound/surgical incision will decrease in size and heals properly  Description: Target End Date:  Prior to discharge or change in level of care    Document on LDA    1.  Assess and document surgical incision/wound  2.  Provide incision/wound care per policy and/or provider orders  3.  Manage surgical drains per policy if applicable  4.  Encourage adequate nutrition to promote wound healing  5.  Collaborate with Clinical Dietician  Outcome: Progressing

## 2023-05-27 NOTE — PROGRESS NOTES
Pharmacy Vancomycin Kinetics Note for 5/27/2023     68 y.o. male on Vancomycin day # 4     Vancomycin Indication (AUC Dosing):    Vancomycin Indication (Two level/Trough based Dosing): Skin/skin structure infection (goal trough 10-15)    Provider specified end date: 06/07/23    Active Antibiotics (From admission, onward)      Ordered     Ordering Provider       Sat May 27, 2023 12:56 PM    05/27/23 1256  vancomycin (VANCOCIN) 1,250 mg in  mL IVPB  (vancomycin (VANCOCIN) IV (LD + Maintenance))  EVERY 12 HOURS         Negar Machado M.D.       Wed May 24, 2023 10:07 AM    05/24/23 1007  MD Alert...Vancomycin per Pharmacy  PHARMACY TO DOSE        Question:  Indication(s) for vancomycin?  Answer:  Skin and soft tissue infection    Navi Ha M.D.    05/24/23 1007  metroNIDAZOLE (FLAGYL) tablet 500 mg  EVERY 12 HOURS         Navi Ha M.D.            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 Pt was taken to OR on 5/19 for I+D. ID now consulting and Zyvox has been changed to Vanco and Flagyl.    Allergies:     Patient has no known allergies.     Pertinent cultures to date:     Results       Procedure Component Value Units Date/Time    GRAM STAIN [[id number]] Collected: 05/26/23 1024    Order Status: Completed Specimen: Wound Updated: 05/26/23 2022     Significant Indicator .     Source WND     Site Right Groin Incision     Gram Stain Result Few WBCs.  Rare Gram negative rods.      Narrative:      Collected By: [id number] ZULEMA WHITFIELD  Right groin  Collected By: [id number] ZULEMA WHITFIELD    CULTURE WOUND W/ GRAM STAIN [[id number]] Collected: 05/26/23 1024    Order Status: Sent Specimen: Wound from Incision Updated: 05/26/23 2022     Significant Indicator NEG     Source WND     Site Right Groin  Incision     Culture Result -     Gram Stain Result Few WBCs.  Rare Gram negative rods.      Narrative:      Collected By: [id number] ZULEMA COHEN.  Right groin  Collected By: [id number] ZULEMA COHEN.    Anaerobic Culture [[id number]] Collected: 05/26/23 1029    Order Status: Sent Specimen: Body Fluid from Incision     CULTURE WOUND W/ GRAM STAIN [[id number]]  (Abnormal)  (Susceptibility) Collected: 05/19/23 1437    Order Status: Completed Specimen: Wound Updated: 05/24/23 1642     Significant Indicator POS     Source WND     Site Right Groin     Culture Result Growth noted after further incubation, see below for  organism identification.       Gram Stain Result Few WBCs.  No organisms seen.       Culture Result Staphylococcus epidermidis  Rare growth      Narrative:      Surgery - swabs received    Anaerobic Culture [[id number]]  (Abnormal) Collected: 05/19/23 1437    Order Status: Completed Specimen: Wound Updated: 05/24/23 1642     Significant Indicator POS     Source WND     Site Right Groin     Culture Result -      Finegoldia magna  Light growth      Narrative:      Surgery - swabs received    MRSA By PCR (Amp) [[id number]] Collected: 05/20/23 1257    Order Status: Completed Specimen: Respirate from Nares Updated: 05/20/23 1458     MRSA by PCR Negative    Narrative:      Collected By: [id number] JACINTO VELEZ    GRAM STAIN [[id number]] Collected: 05/19/23 1437    Order Status: Completed Specimen: Wound Updated: 05/19/23 1919     Significant Indicator .     Source WND     Site Right Groin     Gram Stain Result Few WBCs.  No organisms seen.      Narrative:      Surgery - swabs received            Labs:     CrCl cannot be calculated (Patient's most recent lab result is older than the maximum 7 days allowed.).  No results for input(s): WBC, NEUTSPOLYS, BANDSSTABS in the last 72 hours.  No results for input(s): BUN, CREATININE, ALBUMIN in the last 72 hours.    Intake/Output Summary (Last 24 hours) at 5/27/2023 1258  Last  "data filed at 2023 0646  Gross per 24 hour   Intake 1044.76 ml   Output 650 ml   Net 394.76 ml      /72   Pulse 61   Temp 36.7 °C (98.1 °F) (Temporal)   Resp 16   Ht 1.791 m (5' 10.5\")   Wt 75 kg (165 lb 5.5 oz)   SpO2 93%  Temp (24hrs), Av.7 °C (98 °F), Min:36.6 °C (97.9 °F), Max:36.7 °C (98.1 °F)      List concerns for Vancomycin clearance:     None    Pharmacokinetics:     Two level kinetics:   Ke (hr ^-1): 0.0795  Half life: 8.7  Vd: Steady state Vd : 71.148  Calculated AUC: 397 mg·hr/L    Trough kinetics:   Recent Labs     234 23  0329   VANCOTROUGH  --  13.2   VANCOPEAK 19.0*  --        A/P:   - AUC of 397 is below goal of 400-600. Per patient specific pharmacokinetics, will increase dose and extend dosing interval.   - Change to vancomycin 1250 mg (~16 mg/kg) q12h  - Predicted vancomycin AUC from two level test calculator: 441 mg·hr/L  - Will order levels in 4-5 days, sooner if significant change in renal fx or volume status  - Renal fx stable, will continue to monitor          Tameka Ledesma, PharmD    "

## 2023-05-28 PROCEDURE — 700102 HCHG RX REV CODE 250 W/ 637 OVERRIDE(OP): Performed by: SURGERY

## 2023-05-28 PROCEDURE — A9270 NON-COVERED ITEM OR SERVICE: HCPCS | Performed by: SURGERY

## 2023-05-28 PROCEDURE — A9270 NON-COVERED ITEM OR SERVICE: HCPCS | Performed by: INTERNAL MEDICINE

## 2023-05-28 PROCEDURE — 700102 HCHG RX REV CODE 250 W/ 637 OVERRIDE(OP): Performed by: INTERNAL MEDICINE

## 2023-05-28 PROCEDURE — 700111 HCHG RX REV CODE 636 W/ 250 OVERRIDE (IP): Performed by: SURGERY

## 2023-05-28 PROCEDURE — 700105 HCHG RX REV CODE 258: Performed by: SURGERY

## 2023-05-28 PROCEDURE — 770001 HCHG ROOM/CARE - MED/SURG/GYN PRIV*

## 2023-05-28 RX ORDER — OXYCODONE HYDROCHLORIDE 5 MG/1
5-10 TABLET ORAL
Status: DISCONTINUED | OUTPATIENT
Start: 2023-05-28 | End: 2023-05-31 | Stop reason: HOSPADM

## 2023-05-28 RX ORDER — MORPHINE SULFATE 4 MG/ML
2-4 INJECTION INTRAVENOUS
Status: DISCONTINUED | OUTPATIENT
Start: 2023-05-28 | End: 2023-05-31 | Stop reason: HOSPADM

## 2023-05-28 RX ADMIN — DOCUSATE SODIUM 100 MG: 100 CAPSULE, LIQUID FILLED ORAL at 04:26

## 2023-05-28 RX ADMIN — AMLODIPINE BESYLATE 5 MG: 10 TABLET ORAL at 04:26

## 2023-05-28 RX ADMIN — LISINOPRIL 10 MG: 10 TABLET ORAL at 04:26

## 2023-05-28 RX ADMIN — MORPHINE SULFATE 2 MG: 4 INJECTION, SOLUTION INTRAMUSCULAR; INTRAVENOUS at 01:59

## 2023-05-28 RX ADMIN — METRONIDAZOLE 500 MG: 500 TABLET ORAL at 16:38

## 2023-05-28 RX ADMIN — ASPIRIN 81 MG: 81 TABLET, COATED ORAL at 04:25

## 2023-05-28 RX ADMIN — VANCOMYCIN HYDROCHLORIDE 1250 MG: 5 INJECTION, POWDER, LYOPHILIZED, FOR SOLUTION INTRAVENOUS at 19:45

## 2023-05-28 RX ADMIN — OXYCODONE 10 MG: 5 TABLET ORAL at 19:42

## 2023-05-28 RX ADMIN — OXYCODONE 10 MG: 5 TABLET ORAL at 12:53

## 2023-05-28 RX ADMIN — MORPHINE SULFATE 4 MG: 4 INJECTION, SOLUTION INTRAMUSCULAR; INTRAVENOUS at 14:53

## 2023-05-28 RX ADMIN — DOCUSATE SODIUM 100 MG: 100 CAPSULE, LIQUID FILLED ORAL at 16:39

## 2023-05-28 RX ADMIN — NICOTINE 7 MG: 7 PATCH, EXTENDED RELEASE TRANSDERMAL at 04:25

## 2023-05-28 RX ADMIN — MORPHINE SULFATE 2 MG: 4 INJECTION, SOLUTION INTRAMUSCULAR; INTRAVENOUS at 10:36

## 2023-05-28 RX ADMIN — ENOXAPARIN SODIUM 40 MG: 100 INJECTION SUBCUTANEOUS at 16:39

## 2023-05-28 RX ADMIN — OXYCODONE 5 MG: 5 TABLET ORAL at 00:18

## 2023-05-28 RX ADMIN — MORPHINE SULFATE 2 MG: 4 INJECTION, SOLUTION INTRAMUSCULAR; INTRAVENOUS at 06:34

## 2023-05-28 RX ADMIN — METRONIDAZOLE 500 MG: 500 TABLET ORAL at 04:26

## 2023-05-28 RX ADMIN — OXYCODONE 5 MG: 5 TABLET ORAL at 08:44

## 2023-05-28 RX ADMIN — OXYCODONE 10 MG: 5 TABLET ORAL at 16:38

## 2023-05-28 RX ADMIN — SIMVASTATIN 20 MG: 20 TABLET, FILM COATED ORAL at 16:38

## 2023-05-28 RX ADMIN — OXYCODONE 5 MG: 5 TABLET ORAL at 04:27

## 2023-05-28 RX ADMIN — VANCOMYCIN HYDROCHLORIDE 1250 MG: 5 INJECTION, POWDER, LYOPHILIZED, FOR SOLUTION INTRAVENOUS at 08:21

## 2023-05-28 ASSESSMENT — PAIN DESCRIPTION - PAIN TYPE
TYPE: ACUTE PAIN

## 2023-05-28 NOTE — PROGRESS NOTES
Assumed care of patient at 0645. Bedside report received. Assessment complete.  AA&Ox4. Denies CP/SOB.  Reporting 6/10 pain. Medicated per MAR.  Educated patient regarding pharmacologic and non pharmacologic modalities for pain management.  Skin per flowsheets  Tolerating regular diet. Denies N/V.  + void. Last BM 5/27  Pt ambulates independently.  All needs met at this time. Call light within reach. Pt calls appropriately. Bed low and locked, non skid socks in place. Hourly rounding in place.

## 2023-05-28 NOTE — CARE PLAN
Problem: Pain - Standard  Goal: Alleviation of pain or a reduction in pain to the patient’s comfort goal  Outcome: Progressing     Problem: Knowledge Deficit - Standard  Goal: Patient and family/care givers will demonstrate understanding of plan of care, disease process/condition, diagnostic tests and medications  Outcome: Progressing     Problem: Mobility  Goal: Patient's capacity to carry out activities will improve  Outcome: Progressing   The patient is Stable - Low risk of patient condition declining or worsening    Shift Goals  Clinical Goals: Pain Control  Patient Goals: Pain Control  Family Goals: N/A    Progress made toward(s) clinical / shift goals:  Pain Controlled per MAR, educated patient on plan of care this shift     Patient is not progressing towards the following goals:

## 2023-05-28 NOTE — PROGRESS NOTES
Pt AO x 4  Vitals signs stable  Pt denies chest pain or SOB  O2 sat >90% on RA breathing unlabored  Pt 7/10 R groin pain, PRNs given   Pt denies N/V/D  Pt is tolerating Regular diet  + voiding  + flatus, last BM on 5/27, Bowel sounds present  Pt ambulates self   SCDs refused     Plan of care discussed with pt. Safety education done. Falls precautions in place.   Pt safety maintained. Hourly rounding done.

## 2023-05-28 NOTE — PROGRESS NOTES
"  VASCULAR SURGERY SERVICE  Progress Note  ___________________________________    5/26/2023:  MK, pain controlled, right groin wound VAC changed at bedside today and there is a small sinus that probes deeper into the wound and I was able to get a small amount of fluid out of it which we sent for culture.  We have decided to switch to a vera flow wound VAC to try and help sterilize the wound.  Patient is on IV antibiotics of vancomycin and Flagyl for MRSSOBEIDA and Eddyldia    5/27/23: pain seems less controlled today although exam hasn't changed at all and patient looks clinically the same, maybe it's a consequence of ambulation. No new concerns otherwise.    Vitals  /67   Pulse 65   Temp 36.5 °C (97.7 °F) (Temporal)   Resp 18   Ht 1.791 m (5' 10.5\")   Wt 75 kg (165 lb 5.5 oz)   SpO2 92%   BMI 23.39 kg/m²     Exam  General: alert, conversant, NAD  Extremities: Right groin wound VAC changed at bedside on 5/26, small sinus that probes deeper into the wound and a small amount of fluid was expressed which was not grossly purulent but we sent for culture. The current veraflo vac is intact, no blood in the output    A/P)  Continue veraflo vac    We will continue the current IV antibiotic regimen.    The wound will be reevaluated on Monday to determine the progress and see if the patient can be sent home with a standard wound VAC or if further surgery will be necessary.    I will be following Mr. Dinero until Monday morning and then Dr. Machado will take back over    Gary Gaviria MD  [facility]  Voal[place] preferred or call my office [phone]  __________________________________________________________________  Patient:Dale Dinero   MRN:[id number]   CSN:[id number]      "

## 2023-05-28 NOTE — DISCHARGE PLANNING
"[facility] chart review completed. Collaborated with EZEKIEL Gonzalez.  Current discharge considerations are for home with HH services (right groin wound care) currently on a veraflow wound vac and [facility] referral when medically cleared.   Per ID Leland Thurman note on 5/24,\"Start IV vancomycin and p.o. Flagyl for now.  Patient will remain in the hospital over the next few days for wound follow-up.  On discharge, anticipate 6 weeks of oral antibiotics with Bactrim and Augmentin, followed by close clinical follow-up off of antibiotic\".  [facility] has accepted.     Per Vascular Surgeon note by MD Everette on 5/28/23 at 12:43 PM, \"wound will be reevaluated on Monday to determine the progress and see if the patient can be sent home with a standard wound VAC or if further surgery will be necessary\".  TCN will continue to follow and collaborate with discharge planning team as additional post acute needs arise. Thank you.    Completed:  Choice obtained:  for possible wound care needs, [facility] has accepted  [facility] referral sent 5/20   "

## 2023-05-29 LAB
ALBUMIN SERPL BCP-MCNC: 3.6 G/DL (ref 3.2–4.9)
ALBUMIN/GLOB SERPL: 1.2 G/DL
ALP SERPL-CCNC: 53 U/L (ref 30–99)
ALT SERPL-CCNC: 20 U/L (ref 2–50)
ANION GAP SERPL CALC-SCNC: 12 MMOL/L (ref 7–16)
AST SERPL-CCNC: 26 U/L (ref 12–45)
BACTERIA WND AEROBE CULT: NORMAL
BASOPHILS # BLD AUTO: 0.9 % (ref 0–1.8)
BASOPHILS # BLD: 0.08 K/UL (ref 0–0.12)
BILIRUB SERPL-MCNC: 0.3 MG/DL (ref 0.1–1.5)
BUN SERPL-MCNC: 14 MG/DL (ref 8–22)
CALCIUM ALBUM COR SERPL-MCNC: 9.3 MG/DL (ref 8.5–10.5)
CALCIUM SERPL-MCNC: 9 MG/DL (ref 8.5–10.5)
CHLORIDE SERPL-SCNC: 104 MMOL/L (ref 96–112)
CO2 SERPL-SCNC: 21 MMOL/L (ref 20–33)
CREAT SERPL-MCNC: 0.71 MG/DL (ref 0.5–1.4)
EOSINOPHIL # BLD AUTO: 0.28 K/UL (ref 0–0.51)
EOSINOPHIL NFR BLD: 3.2 % (ref 0–6.9)
ERYTHROCYTE [DISTWIDTH] IN BLOOD BY AUTOMATED COUNT: 41.6 FL (ref 35.9–50)
GFR SERPLBLD CREATININE-BSD FMLA CKD-EPI: 101 ML/MIN/1.73 M 2
GLOBULIN SER CALC-MCNC: 2.9 G/DL (ref 1.9–3.5)
GLUCOSE SERPL-MCNC: 91 MG/DL (ref 65–99)
GRAM STN SPEC: NORMAL
HCT VFR BLD AUTO: 42.6 % (ref 42–52)
HGB BLD-MCNC: 14.2 G/DL (ref 14–18)
IMM GRANULOCYTES # BLD AUTO: 0.03 K/UL (ref 0–0.11)
IMM GRANULOCYTES NFR BLD AUTO: 0.3 % (ref 0–0.9)
LYMPHOCYTES # BLD AUTO: 2.78 K/UL (ref 1–4.8)
LYMPHOCYTES NFR BLD: 31.4 % (ref 22–41)
MCH RBC QN AUTO: 29.2 PG (ref 27–33)
MCHC RBC AUTO-ENTMCNC: 33.3 G/DL (ref 32.3–36.5)
MCV RBC AUTO: 87.7 FL (ref 81.4–97.8)
MONOCYTES # BLD AUTO: 1.35 K/UL (ref 0–0.85)
MONOCYTES NFR BLD AUTO: 15.3 % (ref 0–13.4)
NEUTROPHILS # BLD AUTO: 4.32 K/UL (ref 1.82–7.42)
NEUTROPHILS NFR BLD: 48.9 % (ref 44–72)
NRBC # BLD AUTO: 0 K/UL
NRBC BLD-RTO: 0 /100 WBC (ref 0–0.2)
PLATELET # BLD AUTO: 202 K/UL (ref 164–446)
PMV BLD AUTO: 9.4 FL (ref 9–12.9)
POTASSIUM SERPL-SCNC: 4.4 MMOL/L (ref 3.6–5.5)
PROT SERPL-MCNC: 6.5 G/DL (ref 6–8.2)
RBC # BLD AUTO: 4.86 M/UL (ref 4.7–6.1)
SIGNIFICANT IND 70042: NORMAL
SITE SITE: NORMAL
SODIUM SERPL-SCNC: 137 MMOL/L (ref 135–145)
SOURCE SOURCE: NORMAL
WBC # BLD AUTO: 8.8 K/UL (ref 4.8–10.8)

## 2023-05-29 PROCEDURE — A9270 NON-COVERED ITEM OR SERVICE: HCPCS | Performed by: SURGERY

## 2023-05-29 PROCEDURE — A9270 NON-COVERED ITEM OR SERVICE: HCPCS | Performed by: INTERNAL MEDICINE

## 2023-05-29 PROCEDURE — 700111 HCHG RX REV CODE 636 W/ 250 OVERRIDE (IP): Performed by: SURGERY

## 2023-05-29 PROCEDURE — 700102 HCHG RX REV CODE 250 W/ 637 OVERRIDE(OP): Performed by: SURGERY

## 2023-05-29 PROCEDURE — 700105 HCHG RX REV CODE 258: Performed by: SURGERY

## 2023-05-29 PROCEDURE — 36415 COLL VENOUS BLD VENIPUNCTURE: CPT

## 2023-05-29 PROCEDURE — 85025 COMPLETE CBC W/AUTO DIFF WBC: CPT

## 2023-05-29 PROCEDURE — 302098 PASTE RING (FLAT): Performed by: SURGERY

## 2023-05-29 PROCEDURE — 80053 COMPREHEN METABOLIC PANEL: CPT

## 2023-05-29 PROCEDURE — 700102 HCHG RX REV CODE 250 W/ 637 OVERRIDE(OP): Performed by: INTERNAL MEDICINE

## 2023-05-29 PROCEDURE — 97605 NEG PRS WND THER DME<=50SQCM: CPT

## 2023-05-29 PROCEDURE — 770001 HCHG ROOM/CARE - MED/SURG/GYN PRIV*

## 2023-05-29 RX ADMIN — NICOTINE 7 MG: 7 PATCH, EXTENDED RELEASE TRANSDERMAL at 04:21

## 2023-05-29 RX ADMIN — AMLODIPINE BESYLATE 5 MG: 10 TABLET ORAL at 04:22

## 2023-05-29 RX ADMIN — VANCOMYCIN HYDROCHLORIDE 1250 MG: 5 INJECTION, POWDER, LYOPHILIZED, FOR SOLUTION INTRAVENOUS at 08:16

## 2023-05-29 RX ADMIN — ENOXAPARIN SODIUM 40 MG: 100 INJECTION SUBCUTANEOUS at 16:23

## 2023-05-29 RX ADMIN — OXYCODONE 10 MG: 5 TABLET ORAL at 20:36

## 2023-05-29 RX ADMIN — LISINOPRIL 10 MG: 10 TABLET ORAL at 04:22

## 2023-05-29 RX ADMIN — METRONIDAZOLE 500 MG: 500 TABLET ORAL at 16:23

## 2023-05-29 RX ADMIN — MORPHINE SULFATE 4 MG: 4 INJECTION, SOLUTION INTRAMUSCULAR; INTRAVENOUS at 21:36

## 2023-05-29 RX ADMIN — METRONIDAZOLE 500 MG: 500 TABLET ORAL at 04:22

## 2023-05-29 RX ADMIN — OXYCODONE 10 MG: 5 TABLET ORAL at 00:32

## 2023-05-29 RX ADMIN — OXYCODONE 10 MG: 5 TABLET ORAL at 10:43

## 2023-05-29 RX ADMIN — OXYCODONE 10 MG: 5 TABLET ORAL at 07:21

## 2023-05-29 RX ADMIN — DOCUSATE SODIUM 100 MG: 100 CAPSULE, LIQUID FILLED ORAL at 16:23

## 2023-05-29 RX ADMIN — SIMVASTATIN 20 MG: 20 TABLET, FILM COATED ORAL at 16:23

## 2023-05-29 RX ADMIN — ASPIRIN 81 MG: 81 TABLET, COATED ORAL at 04:22

## 2023-05-29 RX ADMIN — VANCOMYCIN HYDROCHLORIDE 1250 MG: 5 INJECTION, POWDER, LYOPHILIZED, FOR SOLUTION INTRAVENOUS at 20:40

## 2023-05-29 RX ADMIN — OXYCODONE 10 MG: 5 TABLET ORAL at 17:22

## 2023-05-29 RX ADMIN — OXYCODONE 5 MG: 5 TABLET ORAL at 13:44

## 2023-05-29 RX ADMIN — MORPHINE SULFATE 4 MG: 4 INJECTION, SOLUTION INTRAMUSCULAR; INTRAVENOUS at 11:06

## 2023-05-29 RX ADMIN — OXYCODONE 10 MG: 5 TABLET ORAL at 04:21

## 2023-05-29 ASSESSMENT — PAIN DESCRIPTION - PAIN TYPE
TYPE: ACUTE PAIN
TYPE: ACUTE PAIN;CHRONIC PAIN

## 2023-05-29 NOTE — PROGRESS NOTES
VASCULAR SURGERY PROGRESS NOTE       Awake, no complaints.    AF, VSS.    General: Pleasant male in none apparent distress.  Lungs: Clear to auscultation bilaterally.  Cardiovascular: Regular rate and rhythm.  Abdomen: Soft, nondistended, nontender.  Incision in the right lower abdomen is intact, jhealing.  Right lower extremity: Wound is clean with granulation tissue.  Foot is warm, well-perfused with palpable posterior tibial pulses with multiphasic flow.    Labs: Reviewed.    Assessment: Status post right groin wound debridement and wound VAC placement.    Plan:  Improving.  Wound is now clean.  Discontinue VeraFlo.  Resume regular wound VAC.  Remove sutures in right lower abdominal wound.    Discussed with patient.  All questions were answered.    Discussed with wound care nurse.  Appreciate wound care nurse's management of this patient.

## 2023-05-29 NOTE — PROGRESS NOTES
Bedside report received, assessment completed    A&O x  4, pt calls appropriately  Mobility: Up self  Fall Risk Assessment: Low, door notifications in use  Pain Assessment / Reassessment completed, medication provided per MAR  Diet: Regular  LDA:   IV Access: 20 L FA, CDI/ flushed/ SL  Wound vac: R groin     GI/: + void, + flatus, 5/28  BM  DVT Prophylaxis: Lovenox, SCD's refused  Skin: per flowsheets     Reviewed plan of care with patient, bed in lowest position and locked, pt resting comfortably now, call light within reach, all needs met at this time. Interventions will be executed per plan of care

## 2023-05-29 NOTE — PROGRESS NOTES
4 Eyes Skin Assessment Completed by EVERT Ferrer and Misti RN.    Head WDL  Ears WDL  Nose WDL  Mouth WDL  Neck WDL  Breast/Chest WDL  Shoulder Blades WDL  Spine WDL  (R) Arm/Elbow/Hand Edema  (L) Arm/Elbow/Hand Bruising, Discoloration, Weeping, and Edema  Abdomen WDL  Groin Swelling  Scrotum/Coccyx/Buttocks Redness and Blanching  (R) Leg Swelling, Weeping, and Edema  (L) Leg Swelling, Weeping, and Edema  (R) Heel/Foot/Toe Edema  (L) Heel/Foot/Toe Edema          Devices In Places Blood Pressure Cuff, Pulse Ox, and SCD's      Interventions In Place Sacral Mepilex, Pillows, and Barrier Cream    Possible Skin Injury No    Pictures Uploaded Into Epic N/A  Wound Consult Placed N/A  RN Wound Prevention Protocol Ordered No

## 2023-05-29 NOTE — CARE PLAN
The patient is Stable - Low risk of patient condition declining or worsening    Shift Goals  Clinical Goals: Pain control and monitor wound vac  Patient Goals: pain control  Family Goals: N/A    Progress made toward(s) clinical / shift goals:  Pain managed per MAR. Intermittently monitoring wound vac throughout shift. Pt sleeping intermittently.      Problem: Pain - Standard  Goal: Alleviation of pain or a reduction in pain to the patient’s comfort goal  Outcome: Progressing     Problem: Wound/ / Incision Healing  Goal: Patient's wound/surgical incision will decrease in size and heals properly  Outcome: Progressing

## 2023-05-29 NOTE — PROGRESS NOTES
Assumed care of patient at 0645. Bedside report received Assessment complete.  AA&Ox4. Denies CP/SOB.  Reporting 7/10 pain. Medicated per MAR.   Educated patient regarding pharmacologic and non pharmacologic modalities for pain management.  Skin per flowsheets  Tolerating Regular diet. Denies N/V.  + void.  Last BM 5/28  R Groin Wound Vac to Verlo,  Patent at this time   Pt ambulates independently.  All needs met at this time. Call light within reach. Pt calls appropriately. Bed low and locked, non skid socks in place. Hourly rounding in place.

## 2023-05-29 NOTE — DISCHARGE PLANNING
"[facility] T[facility] chart review completed. Collaborated with EZEKIEL Thomas.    Current discharge considerations are for home with HH services (right groin wound care) currently on a veraflow wound vac, [facility] referral and close outpatient f/u when medically cleared pending wound care consult.   [facility]  has accepted.  Patient seen at bedside and states he is disappointed that he will no longer be able to go on his family trip to [place] on 6/6/23.  He states his family can provide transportation home at discharge.  TCN will continue to follow and collaborate with discharge planning team as additional post acute needs arise. Thank you.    Completed:  Choice obtained:  for possible wound care needs, [facility] has accepted  [facility] referral sent 5/20     Addendum:    1255- Per Vascular Surgery progress note by Dr. Machado at 12:00 PM, \"Wound is now clean.  Discontinue VeraFlo.  Resume regular wound VAC\".   "

## 2023-05-29 NOTE — CARE PLAN
Problem: Pain - Standard  Goal: Alleviation of pain or a reduction in pain to the patient’s comfort goal  Outcome: Progressing     Problem: Knowledge Deficit - Standard  Goal: Patient and family/care givers will demonstrate understanding of plan of care, disease process/condition, diagnostic tests and medications  Outcome: Progressing     Problem: Mobility  Goal: Patient's capacity to carry out activities will improve  Outcome: Progressing   The patient is Stable - Low risk of patient condition declining or worsening    Shift Goals  Clinical Goals: Pain Control/ Wound Vac Change  Patient Goals: Wound Vac Change  Family Goals: N/A    Progress made toward(s) clinical / shift goals:  Pain Controlled per MAR, educated patient on plan of care this shift, Patient performed ADL's independently     Patient is not progressing towards the following goals:

## 2023-05-29 NOTE — WOUND TEAM
[facility]  Inpatient Services  Wound and Skin Care Evaluation    Admission Date: 5/19/2023     Last order of IP CONSULT TO WOUND CARE was found on 5/21/2023 from Hospital Encounter on 5/18/2023     HPI, PMH, SH: Reviewed    Past Surgical History:   Procedure Laterality Date    DEBRIDEMENT Right 5/19/2023    Procedure: RIGHT GROIN WOUND DEBRIDEMENT;  Surgeon: Negar Machado M.D.;  Location: SURGERY [facility];  Service: General    IN REPR ANEURYSM/GRFT INS,COMMON FEMORAL Right 5/3/2023    Procedure: OPEN REPAIR OF RIGHT GROIN PSEUDOANEURYSM;  Surgeon: Negar Machado M.D.;  Location: SURGERY [facility];  Service: General    AORTOGRAM Right 5/3/2023    Procedure: AORTOGRAM, RIGHT LEG;  Surgeon: Negar Machado M.D.;  Location: SURGERY [facility];  Service: General    THROMBECTOMY Right 5/3/2023    Procedure: THROMBECTOMY, RIGHT LEG;  Surgeon: Negar Machado M.D.;  Location: SURGERY [facility];  Service: General    LAMINOTOMY      lumbar (rods)    MENISCUS REPAIR Left     MULTIPLE CORONARY ARTERY BYPASS       Social History     Tobacco Use    Smoking status: Every Day     Packs/day: 1.00     Years: 52.00     Pack years: 52.00     Types: Cigarettes    Smokeless tobacco: Never   Vaping Use    Vaping Use: Never used   Substance Use Topics    Alcohol use: No     Comment: Patient stopped drinking 5/28/2018     No chief complaint on file.    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 Pt underwent open repair of a right femoral artery pseudoaneurysm as well as a right leg arterial thrombectomy on May 3, 2023. Pt did well and was discharged home the next day. Unfortunately pt had developed a superficial wound dehiscence as well as  surround cellulitis. Pt was therefore admitted and taken to OR for washout with wound vac application. Wound team was subsequently consulted to manage wound vac.    WOUND ASSESSMENT/LDA     Negative Pressure Wound Therapy 05/19/23 Thigh Proximal (Active)   Vacuum Serial Number [id number] 05/29/23 1115   NPWT Pump Mode / Pressure Setting Ulta;Continuous;125 mmHg 05/29/23 1115   Dressing Type Medium;Black Foam (Regular) 05/29/23 1115   Number of Foam Pieces Used 2 05/29/23 1115   Canister Changed No 05/29/23 1115   Output (mL) 0 mL 05/29/23 1120   NEXT Dressing Change/Treatment Date 05/31/23 05/29/23 1115   WOUND NURSE ONLY - Time Spent with Patient (mins) 30 05/29/23 1115     Wound 05/03/23 Incision Abdomen Lower Right (Active)   Site Assessment Pink;Red;Intact;Dry 05/29/23 0721   Periwound Assessment Pink;Red;Dry;Intact 05/29/23 0721   Margins Attached edges;Defined edges 05/29/23 0721   Closure Sutures removed;Open to air 05/29/23 0721   Drainage Amount None 05/29/23 0721   Drainage Description JEREMY 05/21/23 0937   Dressing Options Open to Air 05/29/23 0721   Dressing Changed Observed 05/29/23 0721   Dressing Status Open to Air 05/29/23 0721       Wound 05/19/23 Full Thickness Wound Groin Right (Active)   Wound Image   05/29/23 1115   Site Assessment Pink;Red;Yellow;Granulation tissue;Slough 05/29/23 1115   Periwound Assessment Dry;Intact;Scar tissue;Other (Comment) 05/29/23 1115   Margins Attached edges;Defined edges 05/29/23 1115   Closure Secondary intention 05/29/23 1115   Drainage Amount Scant 05/29/23 1115   Drainage Description Serosanguineous 05/29/23 1115   Treatments Cleansed;Offloading;Site care 05/29/23 1115   Wound Cleansing Approved Wound Cleanser 05/29/23 1115   Periwound Protectant Skin Protectant Wipes to Periwound;Paste Ring;Drape 05/29/23 1115   Dressing Cleansing/Solutions 1/4 Strength Dakin's Solution 05/28/23 1930   Dressing Options Wound Vac;Mepilex 05/29/23 1115   Dressing Changed Changed  05/29/23 1115   Dressing Status Clean;Dry;Intact 05/29/23 1115   Dressing Change/Treatment Frequency Monday, Wednesday, Friday, and As Needed 05/29/23 1115   NEXT Dressing Change/Treatment Date 05/31/23 05/29/23 1115   NEXT Weekly Photo (Inpatient Only) 06/02/23 05/29/23 1115   Non-staged Wound Description Full thickness 05/29/23 1115   Wound Length (cm) 7.1 cm 05/29/23 1115   Wound Width (cm) 3.2 cm 05/29/23 1115   Wound Depth (cm) 1.6 cm 05/29/23 1115   Wound Surface Area (cm^2) 22.72 cm^2 05/29/23 1115   Wound Volume (cm^3) 36.352 cm^3 05/29/23 1115   Wound Healing % 43 05/29/23 1115   Wound Bed Granulation (%) 70 % 05/29/23 1115   Wound Bed Slough (%) 25 % 05/29/23 1115   Tunneling (cm) 1.8 cm 05/26/23 1000   Tunneling Clock Position of Wound 9 05/26/23 1000   Shape irregular oval 05/29/23 1115   Wound Odor None 05/29/23 1115   Exposed Structures Muscle 05/29/23 1115   WOUND NURSE ONLY - Time Spent with Patient (mins) 30 05/29/23 1115     Vascular:    NEGRITA:   No results found.    Lab Values:    Lab Results   Component Value Date/Time    WBC 8.8 05/29/2023 12:28 AM    RBC 4.86 05/29/2023 12:28 AM    HEMOGLOBIN 14.2 05/29/2023 12:28 AM    HEMATOCRIT 42.6 05/29/2023 12:28 AM    HBA1C 5.3 05/28/2015 03:23 PM      Culture Results show:  Recent Results (from the past 720 hour(s))   CULTURE WOUND W/ GRAM STAIN    Collection Time: 05/26/23 10:24 AM    Specimen: Incision; Wound   Result Value Ref Range    Significant Indicator NEG     Source WND     Site Right Groin Incision     Culture Result No growth at 72 hours.     Gram Stain Result Few WBCs.  Rare Gram negative rods.          Pain Level/Medicated:  Pre-medicated with PO Oxy 30min prior and IV Morphine at end of dressing change. 4% Lidocaine applied and allowed to dwell over the wound for 25 minutes.         INTERVENTIONS BY WOUND TEAM:  Chart and images reviewed. Discussed with bedside RN. All areas of concern (based on picture review, LDA review and discussion with  bedside RN) have been thoroughly assessed. Documentation of areas based on significant findings. This RN in to assess patient. Performed standard wound care which includes appropriate positioning, dressing removal and non-selective debridement. Pictures and measurements obtained weekly if/when required.    Preparation for Dressing removal: Dressing soaked with NS  Non-selectively Debrided with:  Wound cleanser and gauze.  Sharp debridement: NA  Katiana wound: Cleansed with Wound cleanser and gauze, Prepped with no sting, one paste ring.   Primary Dressing: One piece of V black foam packed into undermining at 0900, the rest of the foam used to fill wound bed.   Secondary (Outer) Dressing: Button, Trac pad applied and suction resumed. Reg vac settings in chart.     Advanced Wound Care Discharge Planning  Number of Clinicians necessary to complete wound care: 1  Is patient requiring IV pain medications for dressing changes: yes  Length of time for dressing change 30 min. (This does not include chart review, pre-medication time, set up, clean up or time spent charting.)    Interdisciplinary consultation: Patient, Bedside RN, Dr Machado at bedside    EVALUATION / RATIONALE FOR TREATMENT:  Most Recent Date:  5/29/23: Undermining at 0900 decreasing in measurement. Wound bed clean and red with scant yellow slough. Per MD Machado at bedside changed from veroflo to regular VAC in anticipation of discharge home. Also, order to remove sutures to abdominal incision, incision is intact with scant brown scabbing. Sutures removed without difficulty.      5/26/23: Undermining at 0900 persists. Wound bed clean and red. Wound culture obtained. Continue regular VAC for now pending MD decision regarding whether patient needs to return to OR or discharge home.     @1200 - Dr. Machado requested VF placement to R groin wound. Full VAC change performed, VF applied to R groin wound.     05/23/23: Slough debrided with scissors and forceps.  Continued with regular vac per MD order. Pt has small area of tunneling with slough present. Foam applied into tunnel in attempt to breakup slough. Continued with POC.    05/21/23: Pt with open wound to the right groin, Picture did not save in Epic will get picture at next visit. MD requested no veraflo be applied. Applied regular NPWT to assist with granulation tissue development. Pt likely to DC later this week. Will plan to change again on Tuesday. Will need a wound care appointment by Friday.      Goals: Steady decrease in wound area and depth weekly.    WOUND TEAM PLAN OF CARE ([X] for frequency of wound follow up,):   Nursing to follow dressing orders written for wound care. Contact wound team if area fails to progress, deteriorates or with any questions/concerns if something comes up before next scheduled follow up (See below as to whether wound is following and frequency of wound follow up)  Dressing changes by wound team:                   Follow up 3 times weekly:                NPWT change 3 times weekly:   MWF  Follow up 1-2 times weekly:      Follow up Bi-Monthly:           Follow up Monthly (High Risk):                        Follow up as needed:     Other (explain):     NURSING PLAN OF CARE ORDERS (X):  Dressing changes: See Dressing Care orders: X  Skin care: See Skin Care orders:   RN Prevention Protocol:   Rectal tube care: See Rectal Tube Care orders:   Other orders:    RSKIN:   CURRENTLY IN PLACE (X), APPLIED THIS VISIT (A), ORDERED (O):   Q shift Palmer:  X  Q shift pressure point assessments:  X    Surface/Positioning   Standard Mattress/Trauma Bed          Low Airloss        X, turned on this visit  ICU Low Airloss   Bariatric IRIS     Waffle cushion        Waffle Overlay          Reposition q 2 hours      TAPs Turning system     Z Abdias Pillow     Offloading/Redistribution JEREMY  Sacral Offloading Dressing (Silicone dressing)     Heel Offloading Dressing (Silicone dressing)         Heel float  boots (Prevalon boot)             Float Heels off Bed with Pillows           Respiratory Room Air  Silicone O2 tubing         Gray Foam Ear protectors     Cannula fixation Device (Tender )          High flow offloading Clip    Elastic head band offloading device      Anchorfast                                                         Trach with Optifoam split foam             Containment/Moisture Prevention Continent    Rectal tube or BMS    Purwick/Condom Cath        Moran Catheter    Barrier wipes           Barrier paste       Antifungal tx      Interdry        Mobilization JEREMY      Up to chair        Ambulate      PT/OT      Nutrition       Dietician        Diabetes Education      PO   X  TF     TPN     NPO   # days     Other        Anticipated discharge plans:   LTACH:        SNF/Rehab:                  Home Health Care:       X vs,    Outpatient Wound Center:          X  Self/Family Care:        Other:                  Vac Discharge Needs:   Vac Discharge plan is purely a recommendation from wound team and not a requirement for discharge unless otherwise stated by physician.  Not Applicable Pt not on a wound vac:       Regular Vac while inpatient, alternative dressing at DC:        Regular Vac in use and continued at DC:          X  Reg. Vac w/ Skin Sub/Biologic in use. Will need to be changed 2x wkly:      Veraflo Vac while inpatient, ok to transition to Regular Vac on Discharge (Bedside RN to Clamp small instillation tubing at time of DC):           Veraflo Vac while inpatient, would benefit from remaining on Veraflo Vac upon discharge:

## 2023-05-30 ENCOUNTER — HOME CARE VISIT (OUTPATIENT)
Dept: HOME HEALTH SERVICES | Facility: HOME HEALTHCARE | Age: 68
End: 2023-05-30

## 2023-05-30 PROCEDURE — 700111 HCHG RX REV CODE 636 W/ 250 OVERRIDE (IP): Performed by: SURGERY

## 2023-05-30 PROCEDURE — 770001 HCHG ROOM/CARE - MED/SURG/GYN PRIV*

## 2023-05-30 PROCEDURE — 700102 HCHG RX REV CODE 250 W/ 637 OVERRIDE(OP): Performed by: INTERNAL MEDICINE

## 2023-05-30 PROCEDURE — 700102 HCHG RX REV CODE 250 W/ 637 OVERRIDE(OP): Performed by: SURGERY

## 2023-05-30 PROCEDURE — 99232 SBSQ HOSP IP/OBS MODERATE 35: CPT | Performed by: INTERNAL MEDICINE

## 2023-05-30 PROCEDURE — A9270 NON-COVERED ITEM OR SERVICE: HCPCS | Performed by: SURGERY

## 2023-05-30 PROCEDURE — A9270 NON-COVERED ITEM OR SERVICE: HCPCS | Performed by: INTERNAL MEDICINE

## 2023-05-30 PROCEDURE — 700105 HCHG RX REV CODE 258: Performed by: SURGERY

## 2023-05-30 RX ORDER — SULFAMETHOXAZOLE AND TRIMETHOPRIM 800; 160 MG/1; MG/1
1 TABLET ORAL EVERY 12 HOURS
Status: DISCONTINUED | OUTPATIENT
Start: 2023-05-30 | End: 2023-05-30

## 2023-05-30 RX ORDER — SULFAMETHOXAZOLE AND TRIMETHOPRIM 800; 160 MG/1; MG/1
1 TABLET ORAL EVERY 12 HOURS
Status: DISCONTINUED | OUTPATIENT
Start: 2023-05-30 | End: 2023-05-31 | Stop reason: HOSPADM

## 2023-05-30 RX ORDER — AMOXICILLIN AND CLAVULANATE POTASSIUM 875; 125 MG/1; MG/1
1 TABLET, FILM COATED ORAL EVERY 12 HOURS
Status: DISCONTINUED | OUTPATIENT
Start: 2023-05-30 | End: 2023-05-31 | Stop reason: HOSPADM

## 2023-05-30 RX ORDER — AMOXICILLIN AND CLAVULANATE POTASSIUM 875; 125 MG/1; MG/1
1 TABLET, FILM COATED ORAL EVERY 12 HOURS
Status: DISCONTINUED | OUTPATIENT
Start: 2023-05-30 | End: 2023-05-30

## 2023-05-30 RX ADMIN — AMLODIPINE BESYLATE 5 MG: 10 TABLET ORAL at 04:25

## 2023-05-30 RX ADMIN — OXYCODONE 5 MG: 5 TABLET ORAL at 12:14

## 2023-05-30 RX ADMIN — VANCOMYCIN HYDROCHLORIDE 1250 MG: 5 INJECTION, POWDER, LYOPHILIZED, FOR SOLUTION INTRAVENOUS at 07:58

## 2023-05-30 RX ADMIN — SULFAMETHOXAZOLE AND TRIMETHOPRIM 1 TABLET: 800; 160 TABLET ORAL at 21:34

## 2023-05-30 RX ADMIN — OXYCODONE 10 MG: 5 TABLET ORAL at 04:24

## 2023-05-30 RX ADMIN — METRONIDAZOLE 500 MG: 500 TABLET ORAL at 04:25

## 2023-05-30 RX ADMIN — ASPIRIN 81 MG: 81 TABLET, COATED ORAL at 04:24

## 2023-05-30 RX ADMIN — AMOXICILLIN AND CLAVULANATE POTASSIUM 1 TABLET: 875; 125 TABLET, FILM COATED ORAL at 21:33

## 2023-05-30 RX ADMIN — OXYCODONE 5 MG: 5 TABLET ORAL at 21:34

## 2023-05-30 RX ADMIN — OXYCODONE 10 MG: 5 TABLET ORAL at 15:31

## 2023-05-30 RX ADMIN — SULFAMETHOXAZOLE AND TRIMETHOPRIM 1 TABLET: 800; 160 TABLET ORAL at 09:08

## 2023-05-30 RX ADMIN — NICOTINE 7 MG: 7 PATCH, EXTENDED RELEASE TRANSDERMAL at 04:24

## 2023-05-30 RX ADMIN — OXYCODONE 10 MG: 5 TABLET ORAL at 18:38

## 2023-05-30 RX ADMIN — OXYCODONE 10 MG: 5 TABLET ORAL at 07:55

## 2023-05-30 RX ADMIN — SIMVASTATIN 20 MG: 20 TABLET, FILM COATED ORAL at 18:38

## 2023-05-30 RX ADMIN — LISINOPRIL 10 MG: 10 TABLET ORAL at 04:24

## 2023-05-30 RX ADMIN — AMOXICILLIN AND CLAVULANATE POTASSIUM 1 TABLET: 875; 125 TABLET, FILM COATED ORAL at 09:08

## 2023-05-30 ASSESSMENT — ENCOUNTER SYMPTOMS
DIARRHEA: 0
FEVER: 0
MYALGIAS: 1

## 2023-05-30 ASSESSMENT — PAIN DESCRIPTION - PAIN TYPE
TYPE: ACUTE PAIN
TYPE: ACUTE PAIN;SURGICAL PAIN
TYPE: ACUTE PAIN

## 2023-05-30 NOTE — DISCHARGE PLANNING
Case Management Discharge Planning    Admission Date: 5/19/2023  GMLOS: 5.3  ALOS: 11    6-Clicks ADL Score: 24  6-Clicks Mobility Score: 24      Anticipated Discharge Dispo: Discharge Disposition: D/T to home under care of home health w/planned hosp IP readmit (86)    DME Needed: No    Action(s) Taken: Updated Provider/Nurse on Discharge Plan    Pt has been accepted By [facility] and has a wound vac at bedside thru [facility].   Pt is on Vancomycin  IV with end date 5/30 per MAR.    Per Infectious Disease Plan  is oral Bactrim and Augmentin  with end date 6/29 on discharge.     Escalations Completed: None    Medically Clear: No    Next Steps:   This RN CM to continue to assist Pt with discharge as needed    Barriers to Discharge:   Medical clearance    Is the patient up for discharge tomorrow: No

## 2023-05-30 NOTE — PROGRESS NOTES
Bedside report received, assessment completed    A&O x  4, pt calls appropriately  Mobility: Up self  Fall Risk Assessment: Low, door notifications in use  Pain Assessment / Reassessment completed, medication provided per MAR  Diet: Regular  LDA:   IV Access: 20 L FA, CDI/ flushed/ SL  Wound vac: R groin     GI/: + void, + flatus, 5/29 BM  DVT Prophylaxis: Lovenox, SCD refused  Skin: per flowsheets     Reviewed plan of care with patient, bed in lowest position and locked, pt resting comfortably now, call light within reach, all needs met at this time. Interventions will be executed per plan of care

## 2023-05-30 NOTE — DISCHARGE PLANNING
[facility] chart review completed. Collaborated with EZEKIEL Thomas.    Current discharge considerations are for home with HH services (right groin wound care) currently on a regular wound vac, [facility] referral and close outpatient f/u when medically cleared pending wound care consult.   Per collaboration with EZEKIEL and case conference, plan for possible discharge home tomorrow on Wednesday following wound vac at bedside visit.  Patient now off Vanco IV and is on oral ABX including Bactrim and Augmentin until 6/29/23.   [facility] has accepted.  Patient seen at bedside and states he will have transportation home from family at discharge.  [facility] will continue to follow and collaborate with discharge planning team as additional post acute needs arise. Thank you.    Completed:  Choice obtained:  for possible wound care needs, [facility] has accepted  [facility] referral sent 5/20

## 2023-05-30 NOTE — CARE PLAN
The patient is Stable - Low risk of patient condition declining or worsening    Shift Goals  Clinical Goals: Pain control and monitor wound vac  Patient Goals: pain control  Family Goals: N/A    Progress made toward(s) clinical / shift goals:       Problem: Pain - Standard  Goal: Alleviation of pain or a reduction in pain to the patient’s comfort goal  Outcome: Progressing     Problem: Knowledge Deficit - Standard  Goal: Patient and family/care givers will demonstrate understanding of plan of care, disease process/condition, diagnostic tests and medications  Outcome: Progressing     Problem: Mobility  Goal: Patient's capacity to carry out activities will improve  Outcome: Progressing     Problem: Infection - Standard  Goal: Patient will remain free from infection  Outcome: Progressing

## 2023-05-30 NOTE — PROGRESS NOTES
VASCULAR SURGERY PROGRESS NOTE       Awake, no complaints.    AF, VSS.     General: Pleasant male in none apparent distress.  Lungs: Clear to auscultation bilaterally.  Cardiovascular: Regular rate and rhythm.  Abdomen: Soft, nondistended, nontender.  Incision in the right lower abdomen is intact, jhealing.  Right lower extremity: Wound vacs is in place with no surrounding erythema, drainage, or fluctuation.  Foot is warm, well-perfused with palpable posterior tibial pulses with multiphasic flow.     Labs: Reviewed.     Assessment: Status post right groin wound debridement and wound VAC placement.     Plan:  Improving.  Continue wound VAC.  Will reassess wound tomorrow with VAC change.      Discussed with patient.  All questions were answered.     Appreciate infectious disease service's and wound care nurse's management of this patient.

## 2023-05-30 NOTE — PROGRESS NOTES
Bedside report received, assessment completed    A&O x  4, pt calls appropriately  Mobility: Up self, no assistive devices needed   Fall Risk Assessment: n/a, bed alarm n/a, door notifications n/a  Pain Assessment / Reassessment completed, medication provided per MAR  Diet: Regular  LDA:   IV Access: no PIV access ok  GI/: + void, + flatus,  5/29 BM  DVT Prophylaxis: lovenox, SCD on while in bed   Palmer Score: 21, Interventions per flow sheet  Procedures:    - R -groin wound debridement   D/C Plan:    - Home Wednesday 5/31 w/ home health, and home wound vac (at bedside)    Reviewed plan of care with patient, bed in lowest position and locked, pt resting comfortably now, call light within reach, all needs met at this time. Interventions will be executed per plan of care

## 2023-05-30 NOTE — PROGRESS NOTES
Infectious Disease Progress Note    Author: Navi Ha M.D. Date & Time of service: 2023  8:03 AM    Chief Complaint:  Follow-up for groin wound infection    Interval History:   patient remains afebrile, no CBC this morning, tolerating vancomycin and Flagyl, creatinine 0.68, cultures reviewed as below.  Had a new IV placed and is burning.  Requesting switch to p.o. antibiotics.    Labs Reviewed and Medications Reviewed.    Review of Systems:  Review of Systems   Constitutional:  Negative for fever.   Gastrointestinal:  Negative for diarrhea.   Musculoskeletal:  Positive for myalgias.   Skin:  Negative for itching and rash.       Hemodynamics:  Temp (24hrs), Av.7 °C (98.1 °F), Min:36.5 °C (97.7 °F), Max:37.1 °C (98.8 °F)  Temperature: 36.7 °C (98.1 °F)  Pulse  Av.9  Min: 55  Max: 99   Blood Pressure : 126/74       Physical Exam:  Physical Exam  Vitals and nursing note reviewed.   Constitutional:       General: He is not in acute distress.     Appearance: He is not ill-appearing.   HENT:      Mouth/Throat:      Pharynx: No oropharyngeal exudate.   Eyes:      Conjunctiva/sclera: Conjunctivae normal.   Cardiovascular:      Rate and Rhythm: Normal rate.   Pulmonary:      Effort: Pulmonary effort is normal. No respiratory distress.      Breath sounds: No stridor.   Abdominal:      General: There is no distension.      Tenderness: There is no abdominal tenderness.   Musculoskeletal:         General: No swelling or tenderness.      Comments: Right groin with wound VAC in place.  Wound care pictures from yesterday reviewed, no significant surrounding erythema or active discharge   Skin:     Findings: No erythema.   Neurological:      Mental Status: He is alert.         Meds:    Current Facility-Administered Medications:     oxyCODONE immediate-release    morphine injection    vancomycin    dakins 0.125% (1/4 strength)    MD Alert...Vancomycin per Pharmacy    metroNIDAZOLE    lisinopril     hydrALAZINE    amLODIPine    lidocaine jelly    lidocaine **OR** lidocaine    aspirin    simvastatin    ondansetron    enoxaparin (LOVENOX) injection    [] acetaminophen **FOLLOWED BY** acetaminophen    labetalol    magnesium hydroxide    docusate sodium    nicotine    Labs:  Recent Labs     238   WBC 8.8   RBC 4.86   HEMOGLOBIN 14.2   HEMATOCRIT 42.6   MCV 87.7   MCH 29.2   RDW 41.6   PLATELETCT 202   MPV 9.4   NEUTSPOLYS 48.90   LYMPHOCYTES 31.40   MONOCYTES 15.30*   EOSINOPHILS 3.20   BASOPHILS 0.90     Recent Labs     23  0028   SODIUM 137   POTASSIUM 4.4   CHLORIDE 104   CO2 21   GLUCOSE 91   BUN 14     Recent Labs     238   ALBUMIN 3.6   TBILIRUBIN 0.3   ALKPHOSPHAT 53   TOTPROTEIN 6.5   ALTSGPT 20   ASTSGOT 26   CREATININE 0.71       Imaging:  DX-PORTABLE FLUORO > 1 HOUR    Result Date: 2023  5/3/2023 2:35 PM HISTORY/REASON FOR EXAM:  Right lower extremity angiogram TECHNIQUE/EXAM DESCRIPTION AND NUMBER OF VIEWS: Portable fluoroscopy for greater than one hour in OR. FINDINGS: The portable fluoroscopy unit was obligated to the procedure for greater than one hour. Actual fluoro time was 1 minute. Fluoroscopy dose(DAP): 2.0735 Gy*cm^2     Portable fluoroscopy utilized for 1 minute. INTERPRETING LOCATION: [address] [place] [address], [id number]    DX-PELVIS-1 OR 2 VIEWS    Result Date: 5/3/2023  5/3/2023 5:25 PM HISTORY/REASON FOR EXAM:  Incorrect Instrument Count.. TECHNIQUE/EXAM DESCRIPTION AND NUMBER OF VIEWS:  1 view(s) of the pelvis. COMPARISON:  None. FINDINGS: Lower lumbar spine hardware is present. Bladder catheter is present. The right lower quadrant and skin staples. The visualized abdomen and the pelvis have no retained instrument.     No retained instrument with visualization of the lower abdomen and the entire pelvis      Micro:  Results       Procedure Component Value Units Date/Time    CULTURE WOUND W/ GRAM STAIN [[id number]] Collected: 23 1024    Order Status:  Completed Specimen: Wound from Incision Updated: 05/29/23 0921     Significant Indicator NEG     Source WND     Site Right Groin Incision     Culture Result No growth at 72 hours.     Gram Stain Result Few WBCs.  Rare Gram negative rods.      Narrative:      Collected By: [id number].  Right groin  Collected By: [id number].    GRAM STAIN [[id number]] Collected: 05/26/23 1024    Order Status: Completed Specimen: Wound Updated: 05/26/23 2022     Significant Indicator .     Source WND     Site Right Groin Incision     Gram Stain Result Few WBCs.  Rare Gram negative rods.      Narrative:      Collected By: [id number] LookIt.  Right groin  Collected By: [id number] LookIt.    Anaerobic Culture [[id number]] Collected: 05/26/23 1029    Order Status: Canceled Specimen: Other from Incision     CULTURE WOUND W/ GRAM STAIN [[id number]]  (Abnormal)  (Susceptibility) Collected: 05/19/23 1437    Order Status: Completed Specimen: Wound Updated: 05/24/23 1642     Significant Indicator POS     Source WND     Site Right Groin     Culture Result Growth noted after further incubation, see below for  organism identification.       Gram Stain Result Few WBCs.  No organisms seen.       Culture Result Staphylococcus epidermidis  Rare growth      Narrative:      Surgery - swabs received    Susceptibility       Staphylococcus epidermidis (1)       Antibiotic Interpretation Microscan   Method Status    Azithromycin Sensitive <=2 mcg/mL TAPAN Final    Clindamycin Sensitive <=0.25 mcg/mL TAPAN Final    Cefazolin Resistant <=8 mcg/mL TAPAN Final    Cefepime Resistant 8 mcg/mL TAPAN Final    Daptomycin Sensitive <=0.5 mcg/mL TAAPN Final    Erythromycin Sensitive <=0.25 mcg/mL TAPAN Final    Ampicillin/sulbactam Resistant <=8/4 mcg/mL TAPAN Final    Vancomycin Sensitive 1 mcg/mL TAPAN Final    Oxacillin Resistant >2 mcg/mL TAPAN Final    Trimeth/Sulfa Sensitive <=0.5/9.5 mcg/mL TAPAN Final    Tetracycline Sensitive <=4 mcg/mL TAPAN  Final                       Anaerobic Culture [[id number]]  (Abnormal) Collected: 05/19/23 5015    Order Status: Completed Specimen: Wound Updated: 05/24/23 1642     Significant Indicator POS     Source WND     Site Right Groin     Culture Result -      Finegoldia magna  Light growth      Narrative:      Surgery - swabs received            Assessment:  Dale Dinero is a 68 y.o. male patient with history of peripheral arterial disease and tobacco abuse, chronic pain with narcotic use, remote history of aortobifemoral bypass more than a decade ago, lost to follow-up, returned to vascular surgery recently and was noted to have a 4 cm pseudoaneurysm in the right common femoral artery area.  He was taken to the OR on 5/3, underwent open repair with a Dacron graft from the right aortobifemoral bypass graft limb to the distal right common femoral artery, also underwent right leg arterial thrombectomy.  At the time, general degeneration was noted but no obvious evidence of infection.  Patient developed superficial wound dehiscence and surrounding cellulitis, return to the OR on 5/19, I&D was performed, superficial fluid collection noted, macroscopically described as a seroma, no extension down to the graft noted to the naked eye and the graft was not exposed during this procedure.  Cultures were obtained and this is growing methicillin-resistant Staph epi and Finegoldia magna.       Agree with vascular surgery recommendations for aggressive antibiotic therapy given relatively easy access for the organisms to travel down and seed the underlying vascular graft.     Pertinent Diagnoses:  Surgical site infection  Polymicrobial infection  Vascular graft pseudoaneurysm, status postrepair   Peripheral arterial disease  Tobacco abuse  Chronic pain with narcotic use    Plan:  -Will switch antibiotics to planned discharge medications - at least 6 weeks of oral antibiotics with p.o. Bactrim 1 DS tab twice daily + p.o. Augmentin  875 mg twice daily through 6/29/2023, followed by close clinical follow-up off of antibiotic versus prolonging duration depending on clinical course  -Recommend ID clinic follow-up in 2 weeks with repeat CBC with differential, CMP (ordered)  -Monitor levels and renal function closely     Disposition: Anticipate no ID specific disposition needs.  Okay to discharge from ID standpoint if cleared by surgery  Need for PICC line: No     Plan was discussed with charge RN     ID will sign off.  Please call with questions.  ID clinic follow-up in 2 weeks.  Okay to schedule with NP Shell

## 2023-05-30 NOTE — CARE PLAN
The patient is Stable - Low risk of patient condition declining or worsening    Shift Goals  Clinical Goals: Pain control and monitor wound vac  Patient Goals: pain control  Family Goals: N/A    Progress made toward(s) clinical / shift goals:  Pain controlled per MAR. Intermittently monitoring wound vac. Pt sleeping intermittently throughout night.      Problem: Pain - Standard  Goal: Alleviation of pain or a reduction in pain to the patient’s comfort goal  Outcome: Progressing     Problem: Wound/ / Incision Healing  Goal: Patient's wound/surgical incision will decrease in size and heals properly  Outcome: Progressing

## 2023-05-31 ENCOUNTER — PHARMACY VISIT (OUTPATIENT)
Dept: PHARMACY | Facility: MEDICAL CENTER | Age: 68
End: 2023-05-31
Payer: COMMERCIAL

## 2023-05-31 VITALS
OXYGEN SATURATION: 94 % | RESPIRATION RATE: 18 BRPM | SYSTOLIC BLOOD PRESSURE: 146 MMHG | TEMPERATURE: 98.8 F | WEIGHT: 165.34 LBS | DIASTOLIC BLOOD PRESSURE: 78 MMHG | BODY MASS INDEX: 23.15 KG/M2 | HEIGHT: 71 IN | HEART RATE: 78 BPM

## 2023-05-31 PROCEDURE — A9270 NON-COVERED ITEM OR SERVICE: HCPCS | Performed by: SURGERY

## 2023-05-31 PROCEDURE — 302098 PASTE RING (FLAT): Performed by: SURGERY

## 2023-05-31 PROCEDURE — A9270 NON-COVERED ITEM OR SERVICE: HCPCS | Performed by: INTERNAL MEDICINE

## 2023-05-31 PROCEDURE — 97605 NEG PRS WND THER DME<=50SQCM: CPT

## 2023-05-31 PROCEDURE — 97602 WOUND(S) CARE NON-SELECTIVE: CPT

## 2023-05-31 PROCEDURE — RXMED WILLOW AMBULATORY MEDICATION CHARGE: Performed by: SURGERY

## 2023-05-31 PROCEDURE — 700102 HCHG RX REV CODE 250 W/ 637 OVERRIDE(OP): Performed by: SURGERY

## 2023-05-31 PROCEDURE — 700101 HCHG RX REV CODE 250: Performed by: SURGERY

## 2023-05-31 PROCEDURE — 700102 HCHG RX REV CODE 250 W/ 637 OVERRIDE(OP): Performed by: INTERNAL MEDICINE

## 2023-05-31 RX ORDER — SULFAMETHOXAZOLE AND TRIMETHOPRIM 800; 160 MG/1; MG/1
1 TABLET ORAL 2 TIMES DAILY
Qty: 60 TABLET | Refills: 0 | Status: ACTIVE | OUTPATIENT
Start: 2023-05-31 | End: 2023-06-30

## 2023-05-31 RX ORDER — AMOXICILLIN AND CLAVULANATE POTASSIUM 875; 125 MG/1; MG/1
1 TABLET, FILM COATED ORAL 2 TIMES DAILY
Qty: 60 TABLET | Refills: 0 | Status: ACTIVE | OUTPATIENT
Start: 2023-05-31 | End: 2023-06-30

## 2023-05-31 RX ORDER — HYDROCODONE BITARTRATE AND ACETAMINOPHEN 5; 325 MG/1; MG/1
1 TABLET ORAL EVERY 6 HOURS PRN
Qty: 30 TABLET | Refills: 0 | Status: SHIPPED | OUTPATIENT
Start: 2023-05-31 | End: 2023-06-05 | Stop reason: SDUPTHER

## 2023-05-31 RX ADMIN — ASPIRIN 81 MG: 81 TABLET, COATED ORAL at 06:23

## 2023-05-31 RX ADMIN — LISINOPRIL 10 MG: 10 TABLET ORAL at 06:25

## 2023-05-31 RX ADMIN — LIDOCAINE HYDROCHLORIDE 1 APPLICATION: 40 SOLUTION TOPICAL at 09:45

## 2023-05-31 RX ADMIN — AMOXICILLIN AND CLAVULANATE POTASSIUM 1 TABLET: 875; 125 TABLET, FILM COATED ORAL at 06:23

## 2023-05-31 RX ADMIN — OXYCODONE 10 MG: 5 TABLET ORAL at 06:24

## 2023-05-31 RX ADMIN — SULFAMETHOXAZOLE AND TRIMETHOPRIM 1 TABLET: 800; 160 TABLET ORAL at 06:25

## 2023-05-31 RX ADMIN — NICOTINE 7 MG: 7 PATCH, EXTENDED RELEASE TRANSDERMAL at 06:23

## 2023-05-31 RX ADMIN — OXYCODONE 10 MG: 5 TABLET ORAL at 03:29

## 2023-05-31 RX ADMIN — OXYCODONE 10 MG: 5 TABLET ORAL at 09:36

## 2023-05-31 RX ADMIN — AMLODIPINE BESYLATE 5 MG: 10 TABLET ORAL at 06:25

## 2023-05-31 ASSESSMENT — PAIN DESCRIPTION - PAIN TYPE: TYPE: ACUTE PAIN

## 2023-05-31 NOTE — PROGRESS NOTES
Assumed care of patient at 0700. Patient is alert and oriented, respirations are unlabored and regular on room air. Patient sitting up in bed at this time. Lung sounds clear throughout. Abdomen soft, tender to RLQ/groin, +bowel sounds, BM 5/31, wound vac to right groin/RLQ, intact. Due to be changed before discharge today. Right pedal pulse +1. Denies numbness/tingling. Bed in lowest position, call light within reach, patient states no needs at this time.

## 2023-05-31 NOTE — DISCHARGE PLANNING
0917-  Agency/Facility Name: [facility]   Spoke To: Intake  Outcome: DPA called to inform intake that pt is getting discharged today.

## 2023-05-31 NOTE — PROGRESS NOTES
Pt AO x 4  Vitals signs stable  Pt denies chest pain or SOB  O2 sat >90% on RA breathing unlabored  Pt 98599 R groin pain, PRNs given   Pt denies N/V/D  Pt is tolerating Regular diet  + voiding  + flatus, last BM on 5/30, Bowel sounds present  Pt ambulates self   SCDs refused      Plan of care discussed with pt. Safety education done. Falls precautions in place.   Pt safety maintained. Hourly rounding done.

## 2023-05-31 NOTE — DISCHARGE PLANNING
"[facility] chart review completed. Collaborated with EZEKIEL Thomas.    Current discharge considerations are for home with HH services (right groin wound care) currently on a regular wound vac, [facility] referral and close outpatient f/u when medically cleared pending wound care bedside.   [facility]  has accepted.  Per collaboration with EZEKIEL and case conference, plan for possible discharge home today following bedside visit for wound re-assessment and wound vac change.  Per chart review from Navi Ha ID note on 5/30/23, \"at least 6 weeks of oral antibiotics with p.o. Bactrim 1 DS tab twice daily + p.o. Augmentin 875 mg twice daily through 6/29/2023, followed by close clinical follow-up off of antibiotic versus prolonging duration depending on clinical course\".   Patient seen at bedside and states he is hopeful to go home today .  He states his family can drive him home at discharge.  TCN will continue to follow and collaborate with discharge planning team as additional post acute needs arise. Thank you.    Completed:  Choice obtained:  for possible wound care needs, [facility] has accepted  [facility] referral sent 5/20   "

## 2023-05-31 NOTE — DISCHARGE INSTRUCTIONS
Wound vac to be changed by home health 3 times a week.   Resume home medications.  Take Bactrim and Augmentin as prescribed.  Take Tylenol as needed for mild pain.  For moderate pain, take Norco as prescribed.   Follow up with Dr. Machado next Thursday.  Call [phone] for appointment and for any problem.   Follow up with Infectious Disease service in 2 weeks.

## 2023-05-31 NOTE — PROGRESS NOTES
Patient switched over to home wound vac. Hospital wound vac discontinued and placed at nursing station to be returned.

## 2023-05-31 NOTE — CARE PLAN
The patient is Stable - Low risk of patient condition declining or worsening    Shift Goals  Clinical Goals: pain control  Patient Goals: pain control  Family Goals: N/A    Progress made toward(s) clinical / shift goals:  Pt endorses 7/10 R groin pain managed with oxycodone 10mg prn. Pt educated on non-opioid pain management.     Patient is not progressing towards the following goals:

## 2023-05-31 NOTE — WOUND TEAM
[facility]  Inpatient Services  Wound and Skin Care Evaluation    Admission Date: 5/19/2023     Last order of IP CONSULT TO WOUND CARE was found on 5/21/2023 from Hospital Encounter on 5/18/2023     HPI, PMH, SH: Reviewed    Past Surgical History:   Procedure Laterality Date    DEBRIDEMENT Right 5/19/2023    Procedure: RIGHT GROIN WOUND DEBRIDEMENT;  Surgeon: Negar Machado M.D.;  Location: SURGERY [facility];  Service: General    MA REPR ANEURYSM/GRFT INS,COMMON FEMORAL Right 5/3/2023    Procedure: OPEN REPAIR OF RIGHT GROIN PSEUDOANEURYSM;  Surgeon: Negar Machado M.D.;  Location: SURGERY [facility];  Service: General    AORTOGRAM Right 5/3/2023    Procedure: AORTOGRAM, RIGHT LEG;  Surgeon: Negar Machado M.D.;  Location: SURGERY [facility];  Service: General    THROMBECTOMY Right 5/3/2023    Procedure: THROMBECTOMY, RIGHT LEG;  Surgeon: Negar Machado M.D.;  Location: SURGERY [facility];  Service: General    LAMINOTOMY      lumbar (rods)    MENISCUS REPAIR Left     MULTIPLE CORONARY ARTERY BYPASS       Social History     Tobacco Use    Smoking status: Every Day     Packs/day: 1.00     Years: 52.00     Pack years: 52.00     Types: Cigarettes    Smokeless tobacco: Never   Vaping Use    Vaping Use: Never used   Substance Use Topics    Alcohol use: No     Comment: Patient stopped drinking 5/28/2018     No chief complaint on file.    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 Pt underwent open repair of a right femoral artery pseudoaneurysm as well as a right leg arterial thrombectomy on May 3, 2023. Pt did well and was discharged home the next day. Unfortunately pt had developed a superficial wound dehiscence as well as  surround cellulitis. Pt was therefore admitted and taken to OR for washout with wound vac application. Wound team was subsequently consulted to manage wound vac.    WOUND ASSESSMENT/LDA      Negative Pressure Wound Therapy 05/19/23 Thigh Proximal (Active)   Vacuum Serial Number [id number] 05/29/23 2030   NPWT Pump Mode / Pressure Setting Ulta;Continuous;125 mmHg    Dressing Type Black Foam (Regular);Small    Number of Foam Pieces Used 3    Canister Changed No    Output (mL) 0 mL    NEXT Dressing Change/Treatment Date 06/02/23    VAC VeraFlo Irrigant Other (Comments)    VAC VeraFlo Soak Time (mins) 0    VAC VeraFlo Instill Volume (ml) 0    VAC VeraFlo - Therapy Time (hrs) 0    VAC VeraFlo Pressure (mm/Hg) Intermittent;125 mmHg    WOUND NURSE ONLY - Time Spent with Patient (mins) 30       Wound 05/19/23 Full Thickness Wound Groin Right (Active)   Wound Image    05/31/23 1100   Site Assessment Red;Yellow;Granulation tissue    Periwound Assessment Clean;Dry;Intact    Margins Attached edges;Defined edges    Closure Secondary intention    Drainage Amount Scant    Drainage Description Serosanguineous    Treatments Cleansed;Site care;Offloading    Wound Cleansing Approved Wound Cleanser    Periwound Protectant Skin Protectant Wipes to Periwound    Dressing Cleansing/Solutions Not Applicable    Dressing Options Wound Vac;Mepilex Heel    Dressing Changed Changed    Dressing Status Clean;Dry;Intact    Dressing Change/Treatment Frequency Monday, Wednesday, Friday, and As Needed    NEXT Dressing Change/Treatment Date 06/02/23    NEXT Weekly Photo (Inpatient Only) 06/07/23    Non-staged Wound Description Full thickness    Wound Length (cm) 9.5 cm    Wound Width (cm) 3.3 cm    Wound Depth (cm) 2.2 cm    Wound Surface Area (cm^2) 31.35 cm^2    Wound Volume (cm^3) 68.97 cm^3    Wound Healing % -8    Wound Bed Granulation (%) 70 %    Wound Bed Slough (%) 25 %    Tunneling (cm) 1.8 cm    Tunneling Clock Position of Wound 9    Shape  Irregular ova    Wound Odor None    Exposed Structures JEREMY    WOUND NURSE ONLY - Time Spent with Patient (mins) 60      Vascular:    NEGRITA:   No results found.    Lab Values:    Lab Results   Component Value Date/Time    WBC 8.8 05/29/2023 12:28 AM    RBC 4.86 05/29/2023 12:28 AM    HEMOGLOBIN 14.2 05/29/2023 12:28 AM    HEMATOCRIT 42.6 05/29/2023 12:28 AM    HBA1C 5.3 05/28/2015 03:23 PM      Culture Results show:  Recent Results (from the past 720 hour(s))   CULTURE WOUND W/ GRAM STAIN    Collection Time: 05/26/23 10:24 AM    Specimen: Incision; Wound   Result Value Ref Range    Significant Indicator NEG     Source WND     Site Right Groin Incision     Culture Result No growth at 72 hours.     Gram Stain Result Few WBCs.  Rare Gram negative rods.          Pain Level/Medicated:  Pre-medicated with PO Oxy 30min prior. 4% Lidocaine applied and allowed to dwell over the wound for 30 minutes.         INTERVENTIONS BY WOUND TEAM:  Chart and images reviewed. Discussed with bedside RN. All areas of concern (based on picture review, LDA review and discussion with bedside RN) have been thoroughly assessed. Documentation of areas based on significant findings. This RN in to assess patient. Performed standard wound care which includes appropriate positioning, dressing removal and non-selective debridement. Pictures and measurements obtained weekly if/when required.    Preparation for Dressing removal: Dressing soaked with NS  Non-selectively Debrided with:  Wound cleanser and gauze.  Sharp debridement: NA  Katiana wound: Cleansed with Wound cleanser and gauze, Prepped with no sting, two paste rings, drape bridge toward lateral thigh.   Primary Dressing: One piece of spiraled half thickness black foam tucked into depth and out along mini drape bridge. 2nd piece of half thickness cut to fit black foam to fill wound depth. All foam secured with drape.   Secondary (Outer) Dressing: 3rd and final piece of half thickness circular black  foam applied as a button for trac pad. Trac pad applied. Suction resumed no leaks. Fenestrated heel offloading dressing around vac tubing.     Advanced Wound Care Discharge Planning  Number of Clinicians necessary to complete wound care: 1  Is patient requiring IV pain medications for dressing changes: yes  Length of time for dressing change 30 min. (This does not include chart review, pre-medication time, set up, clean up or time spent charting.)    Interdisciplinary consultation: Patient, Bedside RN (Charleen), Wound RN (Cintia JOYNER) Dr Machado at bedside    EVALUATION / RATIONALE FOR TREATMENT:  Most Recent Date:  05/31/23: Continues to progress well. Pt cleared for DC, Pt will follow up with home health care. Home vac at bedside and pt educated on use.     5/29/23: Undermining at 0900 decreasing in measurement. Wound bed clean and red with scant yellow slough. Per MD Machado at bedside changed from veroflo to regular VAC in anticipation of discharge home. Also, order to remove sutures to abdominal incision, incision is intact with scant brown scabbing. Sutures removed without difficulty.    5/26/23: Undermining at 0900 persists. Wound bed clean and red. Wound culture obtained. Continue regular VAC for now pending MD decision regarding whether patient needs to return to OR or discharge home.   @1200 - Dr. Machado requested VF placement to R groin wound. Full VAC change performed, VF applied to R groin wound.   05/23/23: Slough debrided with scissors and forceps. Continued with regular vac per MD order. Pt has small area of tunneling with slough present. Foam applied into tunnel in attempt to breakup slough. Continued with POC.  05/21/23: Pt with open wound to the right groin, Picture did not save in Epic will get picture at next visit. MD requested no veraflo be applied. Applied regular NPWT to assist with granulation tissue development. Pt likely to DC later this week. Will plan to change again on Tuesday. Will need a  wound care appointment by Friday.      Goals: Steady decrease in wound area and depth weekly.    WOUND TEAM PLAN OF CARE ([X] for frequency of wound follow up,):   Nursing to follow dressing orders written for wound care. Contact wound team if area fails to progress, deteriorates or with any questions/concerns if something comes up before next scheduled follow up (See below as to whether wound is following and frequency of wound follow up)  Dressing changes by wound team:                   Follow up 3 times weekly:                NPWT change 3 times weekly:   MWF  Follow up 1-2 times weekly:      Follow up Bi-Monthly:           Follow up Monthly (High Risk):                        Follow up as needed:     Other (explain):     NURSING PLAN OF CARE ORDERS (X):  Dressing changes: See Dressing Care orders: X  Skin care: See Skin Care orders:   RN Prevention Protocol:   Rectal tube care: See Rectal Tube Care orders:   Other orders:    RSKIN:   CURRENTLY IN PLACE (X), APPLIED THIS VISIT (A), ORDERED (O):   Q shift Palmer:  X  Q shift pressure point assessments:  X    Surface/Positioning   Standard Mattress/Trauma Bed          Low Airloss        X, turned on this visit  ICU Low Airloss   Bariatric IRIS     Waffle cushion        Waffle Overlay          Reposition q 2 hours      TAPs Turning system     Z Abdias Pillow     Offloading/Redistribution JEREMY  Sacral Offloading Dressing (Silicone dressing)     Heel Offloading Dressing (Silicone dressing)         Heel float boots (Prevalon boot)             Float Heels off Bed with Pillows           Respiratory Room Air  Silicone O2 tubing         Gray Foam Ear protectors     Cannula fixation Device (Tender )          High flow offloading Clip    Elastic head band offloading device      Anchorfast                                                         Trach with Optifoam split foam             Containment/Moisture Prevention Continent    Rectal tube or BMS    Purwick/Condom Cath         Moran Catheter    Barrier wipes           Barrier paste       Antifungal tx      Interdry        Mobilization JEREMY      Up to chair        Ambulate      PT/OT      Nutrition       Dietician        Diabetes Education      PO   X  TF     TPN     NPO   # days     Other        Anticipated discharge plans:   LTACH:        SNF/Rehab:                  Home Health Care:       X vs,    Outpatient Wound Center:          X  Self/Family Care:        Other:                  Vac Discharge Needs:   Vac Discharge plan is purely a recommendation from wound team and not a requirement for discharge unless otherwise stated by physician.  Not Applicable Pt not on a wound vac:       Regular Vac while inpatient, alternative dressing at DC:        Regular Vac in use and continued at DC:          X  Reg. Vac w/ Skin Sub/Biologic in use. Will need to be changed 2x wkly:      Veraflo Vac while inpatient, ok to transition to Regular Vac on Discharge (Bedside RN to Clamp small instillation tubing at time of DC):           Veraflo Vac while inpatient, would benefit from remaining on Veraflo Vac upon discharge:

## 2023-05-31 NOTE — PROGRESS NOTES
Discharge instructions reviewed and signed, all questions answered, no PIV, home wound vac in place, awaiting Meds to Beds.

## 2023-06-01 NOTE — DISCHARGE SUMMARY
DATE OF ADMISSION:  05/19/2023   DATE OF DISCHARGE:  05/31/2023     ADMITTING DIAGNOSES:  1.  Right groin wound dehiscence and cellulitis.  2.  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  3.  Tobacco use.  4.  Hypertension.     DISCHARGE DIAGNOSES:  1.  Right groin wound dehiscence and cellulitis.  2.  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  3.  Tobacco use.  4.  Hypertension.     PROCEDURES:  1.  Excisional debridement of right groin wound (skin and subcutaneous   tissues, 7x3 cm).  2.  Right groin wound VAC application.     These procedures were performed on 05/19/2023.     HOSPITAL COURSE:  The patient is a pleasant 68-year-old male who was taken to   the operating room for debridement of the right groin wound dehiscence and   cellulitis following open repair of a large right common femoral   pseudoaneurysm.  The graft was not exposed at the time of the procedure.  A wound VAC was placed at the completion of the procedure.  He   tolerated the procedure well and was admitted to floor postoperatively.  He   was started on Unasyn and Zyvox, which were converted to Vancomycin and Flagyl   as his wounds grew Staph epididymitis as well as Finegoldia magna, at the recommendation of Infectious   Disease service. The wound was healing   well except for having a sinus tract with some fibrin exudate, which was treated   with 2 days of VeraFlo with Dakin.  Following the VeraFlo and Dakin, the   wound became clean and regular wound VAC was reapplied.  The wound was   inspected today, 05/31/2023 and was found to be healing well with good granulation tissue and  no evidence of infection.  The patient would like to go home and therefore will   be discharged home today.     DISCHARGE INSTRUCTIONS:  MEDICATIONS:  Resume home medication including aspirin.     The patient was also prescribed Augmentin 875 b.i.d. for 1 month and Bactrim   double strength 1 p.o. b.i.d. for 1  month at the recommendation of Infectious   Disease service.  The patient was also prescribed Norco 5/325 one p.o. q. 6   hours p.r.n. pain, dispensed #30.     Home health was arranged for home wound VAC change.     Followup appointment with Dr. Machado next Thursday and to call my office for   appointment and for any problem.     The patient was also advised to follow up with Infectious Disease service in 2   weeks at the recommendation of Infectious Disease service.     CONDITION AT THE TIME OF DISCHARGE:  Stable.        ______________________________  Negar Machado MD    TQN/DEREK    DD:  05/31/2023 12:04  DT:  05/31/2023 17:25    Job#:  [id number]

## 2023-06-02 ENCOUNTER — HOME CARE VISIT (OUTPATIENT)
Dept: HOME HEALTH SERVICES | Facility: HOME HEALTHCARE | Age: 68
End: 2023-06-02
Payer: MEDICARE

## 2023-06-02 VITALS
BODY MASS INDEX: 23.62 KG/M2 | WEIGHT: 165 LBS | SYSTOLIC BLOOD PRESSURE: 120 MMHG | OXYGEN SATURATION: 98 % | HEART RATE: 78 BPM | RESPIRATION RATE: 20 BRPM | DIASTOLIC BLOOD PRESSURE: 75 MMHG | TEMPERATURE: 97.5 F | HEIGHT: 70 IN

## 2023-06-02 PROCEDURE — 665001 SOC-HOME HEALTH

## 2023-06-02 PROCEDURE — G0493 RN CARE EA 15 MIN HH/HOSPICE: HCPCS

## 2023-06-02 ASSESSMENT — ENCOUNTER SYMPTOMS: PERSON REPORTING PAIN: PATIENT

## 2023-06-02 ASSESSMENT — FIBROSIS 4 INDEX: FIB4 SCORE: 1.9

## 2023-06-05 ENCOUNTER — HOME CARE VISIT (OUTPATIENT)
Dept: HOME HEALTH SERVICES | Facility: HOME HEALTHCARE | Age: 68
End: 2023-06-05
Payer: MEDICARE

## 2023-06-05 ENCOUNTER — OFFICE VISIT (OUTPATIENT)
Dept: MEDICAL GROUP | Facility: MEDICAL CENTER | Age: 68
End: 2023-06-05
Payer: MEDICARE

## 2023-06-05 ENCOUNTER — DOCUMENTATION (OUTPATIENT)
Dept: MEDICAL GROUP | Facility: PHYSICIAN GROUP | Age: 68
End: 2023-06-05

## 2023-06-05 VITALS
TEMPERATURE: 98.4 F | HEIGHT: 70 IN | HEART RATE: 83 BPM | OXYGEN SATURATION: 95 % | WEIGHT: 164 LBS | BODY MASS INDEX: 23.48 KG/M2 | DIASTOLIC BLOOD PRESSURE: 56 MMHG | SYSTOLIC BLOOD PRESSURE: 118 MMHG

## 2023-06-05 DIAGNOSIS — Z09 HOSPITAL DISCHARGE FOLLOW-UP: ICD-10-CM

## 2023-06-05 DIAGNOSIS — I10 ESSENTIAL HYPERTENSION: ICD-10-CM

## 2023-06-05 DIAGNOSIS — Z12.11 COLON CANCER SCREENING: ICD-10-CM

## 2023-06-05 DIAGNOSIS — T81.30XA WOUND DEHISCENCE: ICD-10-CM

## 2023-06-05 DIAGNOSIS — L08.9 SOFT TISSUE INFECTION: ICD-10-CM

## 2023-06-05 PROCEDURE — 3074F SYST BP LT 130 MM HG: CPT | Performed by: FAMILY MEDICINE

## 2023-06-05 PROCEDURE — G0493 RN CARE EA 15 MIN HH/HOSPICE: HCPCS

## 2023-06-05 PROCEDURE — 99214 OFFICE O/P EST MOD 30 MIN: CPT | Performed by: FAMILY MEDICINE

## 2023-06-05 PROCEDURE — 3078F DIAST BP <80 MM HG: CPT | Performed by: FAMILY MEDICINE

## 2023-06-05 RX ORDER — HYDROCODONE BITARTRATE AND ACETAMINOPHEN 5; 325 MG/1; MG/1
1 TABLET ORAL EVERY 8 HOURS PRN
Qty: 21 TABLET | Refills: 0 | Status: SHIPPED | OUTPATIENT
Start: 2023-06-09 | End: 2023-06-16

## 2023-06-05 ASSESSMENT — ACTIVITIES OF DAILY LIVING (ADL): OASIS_M1830: 03

## 2023-06-05 ASSESSMENT — ENCOUNTER SYMPTOMS
PAIN LOCATION - PAIN FREQUENCY: CONSTANT
PAIN LOCATION - PAIN DURATION: FEW HOURS
DYSPNEA ACTIVITY LEVEL: AFTER AMBULATING 10 - 20 FT
VOMITING: DENIES
STOOL FREQUENCY: DAILY
PAIN LOCATION - PAIN QUALITY: ACHY
HIGHEST PAIN SEVERITY IN PAST 24 HOURS: 8/10
PAIN SEVERITY GOAL: 3/10
SUBJECTIVE PAIN PROGRESSION: UNCHANGED
SHORTNESS OF BREATH: 1
NAUSEA: DENIES
DEBILITATING PAIN: 1
LAST BOWEL MOVEMENT: 66627
FATIGUES EASILY: 1
PAIN LOCATION: RIGHT GROIN
LOWEST PAIN SEVERITY IN PAST 24 HOURS: 6/10
BOWEL PATTERN NORMAL: 1
PAIN: 1
PAIN LOCATION - PAIN SEVERITY: 8/10
DRY SKIN: 1

## 2023-06-05 ASSESSMENT — FIBROSIS 4 INDEX: FIB4 SCORE: 1.9

## 2023-06-05 NOTE — PROGRESS NOTES
Medication chart review for [facility] services    Received referral from [facility].   Medications reviewed  compared with discharge summary if available.  Discharge summary date, if applicable:   5/31/23    Current medication list per [facility]     Medication list one, patient is currently taking    Current Outpatient Medications:     amoxicillin-clavulanate, 1 Tablet, Oral, BID    sulfamethoxazole-trimethoprim, 1 Tablet, Oral, BID    HYDROcodone-acetaminophen, 1 Tablet, Oral, Q6HRS PRN    aspirin, 81 mg, Oral, QAM    lisinopril, 5 mg, Oral, DAILY    simvastatin, TAKE 1 TABLET BY MOUTH ONCE DAILY IN THE EVENING      Medication list two, drugs that the patient has been prescribed or recommended to take by their healthcare provider on discharge summary    MEDICATIONS:  Resume home medication including aspirin.     The patient was also prescribed Augmentin 875 b.i.d. for 1 month and Bactrim   double strength 1 p.o. b.i.d. for 1 month at the recommendation of Infectious   Disease service.  The patient was also prescribed Norco 5/325 one p.o. q. 6   hours p.r.n. pain, dispensed #30.       No Known Allergies    Labs     Lab Results   Component Value Date/Time    SODIUM 137 05/29/2023 12:28 AM    POTASSIUM 4.4 05/29/2023 12:28 AM    CHLORIDE 104 05/29/2023 12:28 AM    CO2 21 05/29/2023 12:28 AM    GLUCOSE 91 05/29/2023 12:28 AM    BUN 14 05/29/2023 12:28 AM    CREATININE 0.71 05/29/2023 12:28 AM     Lab Results   Component Value Date/Time    ALKPHOSPHAT 53 05/29/2023 12:28 AM    ASTSGOT 26 05/29/2023 12:28 AM    ALTSGPT 20 05/29/2023 12:28 AM    TBILIRUBIN 0.3 05/29/2023 12:28 AM    INR 1.04 04/18/2019 09:30 AM    ALBUMIN 3.6 05/29/2023 12:28 AM        Assessment for clinically significant drug interactions, drug omissions/additions, duplicative therapies.            CC   William Harman M.D.  [address] [place] [address]  [place] [address]  Fax: [phone]    [facility]  Phone [phone] fax [phone]    This note was created using voice recognition software (Dragon). The accuracy of the dictation is limited by the abilities of the software. I have reviewed the note prior to signing, however some errors in grammar and context are still possible. If you have any questions related to this note please do not hesitate to contact our office.

## 2023-06-05 NOTE — PROGRESS NOTES
CC: Hospital discharge follow-up    HPI:   Dale presents today posthospitalization follow-up    Patient was admitted to [facility] on 5/19/2023 was taken to the operating room for debridement of the right groin wound dehiscence and   cellulitis following open repair of a large right common femoral pseudoaneurysm.  The graft was not exposed at the time of the procedure.  A wound VAC was placed at the completion of the procedure.  He tolerated the procedure well and was admitted to floor postoperatively.  He was started on Unasyn and Zyvox, which were converted to Vancomycin and Flagyl as his wounds grew Staph epididymitis as well as Finegoldia magna, at the recommendation of Infectious Disease specialist. The wound was healing   well except for having a sinus tract with some fibrin exudate, which was treated with 2 days of VeraFlo with Dakin.  Following the VeraFlo and Dakin, the wound became clean and regular wound VAC was reapplied.  The wound was inspected before discharge, and was found to be healing well with good granulation tissue and no evidence of infection.  The patient was discharged in stable condition on 5/31/2023.    Patient came in today for follow-up.  He has been taking oral antibiotics(Augmentin twice a day and Bactrim twice a day) prescribed by infectious disease.  He also been on pain medication, patient stated that the pain wakes him up at night needs a refill of his pain medication.   reviewed patient will finish his better pain medication on 6/8, will refill the medication starting from 6/9 for 1 week.  Patient advised to follow-up with his vascular surgeon.        Patient Active Problem List    Diagnosis Date Noted    Hospital discharge follow-up 06/05/2023    Soft tissue infection 05/25/2023    Wound dehiscence 05/19/2023    Pseudoaneurysm of femoral artery (HCC) 05/02/2023    Smokers' cough (HCC) 12/17/2019    PVD (peripheral vascular disease) ([facility]) 12/17/2019    Other fatigue  "10/03/2018    Dyslipidemia 10/03/2018    Callus of foot 10/03/2018    Other viral warts 10/03/2018    Chronic hepatitis C without hepatic coma (HCC) 09/15/2017    Essential hypertension 01/17/2017    Tobacco dependence 01/17/2017    COPD (chronic obstructive pulmonary disease) (HCC) 05/28/2015    CAD (coronary artery disease) 05/28/2015    Near syncope 05/28/2015    Non compliance w medication regimen 05/28/2015       Current Outpatient Medications   Medication Sig Dispense Refill    amoxicillin-clavulanate (AUGMENTIN) 875-125 MG Tab Take 1 Tablet by mouth 2 times a day for 30 days. 60 Tablet 0    sulfamethoxazole-trimethoprim (BACTRIM DS) 800-160 MG tablet Take 1 Tablet by mouth 2 times a day for 30 days. 60 Tablet 0    HYDROcodone-acetaminophen (NORCO) 5-325 MG Tab per tablet Take 1 Tablet by mouth every 6 hours as needed (Pain) for up to 8 days. 30 Tablet 0    aspirin 81 MG EC tablet Take 81 mg by mouth every morning. Indications: dvt prevention      lisinopril (PRINIVIL) 5 MG Tab Take 1 Tablet by mouth every day. 100 Tablet 3    simvastatin (ZOCOR) 20 MG Tab TAKE 1 TABLET BY MOUTH ONCE DAILY IN THE EVENING 90 Tablet 0     No current facility-administered medications for this visit.         Allergies as of 06/05/2023    (No Known Allergies)        ROS: Denies any chest pain, Shortness of breath, Changes bowel or bladder, Lower extremity edema.    Physical Exam:  /56 (BP Location: Right arm, Patient Position: Sitting, BP Cuff Size: Adult)   Pulse 83   Temp 36.9 °C (98.4 °F) (Temporal)   Ht 1.778 m (5' 10\")   Wt 74.4 kg (164 lb)   SpO2 95%   BMI 23.53 kg/m²   Gen.: Well-developed, well-nourished, no apparent distress,pleasant and cooperative with the examination  Skin:  Warm and dry with good turgor. No rashes or suspicious lesions in visible areas  HEENT:Sinuses nontender with palpation, TMs clear, nares patent with pink mucosa and clear rhinorrhea,no septal deviation ,polyps or lesions. lips without " lesions, oropharynx clear.  Neck: Trachea midline,no masses or adenopathy. No JVD.  Cor: Regular rate and rhythm without murmur, gallop or rub.  Lungs: Respirations unlabored.Clear to auscultation with equal breath sounds bilaterally. No wheezes, rhonchi.  Extremities: No cyanosis, clubbing or edema.  Groin wound was examined, it is well sealed, no sign infection, drain is in place.      Assessment and Plan.   68 y.o. male     1. Hospital discharge follow-up  Hospital discharge summary reviewed.    2. Wound dehiscence  3. Soft tissue infection  Status post debridement.   S/p IV antibiotic treatment.  Has improved.  Continue on oral antibiotics: Augmentin and Bactrim  Continue on pain medication starting from 6/9 for 1 week.    - HYDROcodone-acetaminophen (NORCO) 5-325 MG Tab per tablet; Take 1 Tablet by mouth every 8 hours as needed (Pain) for up to 7 days. Indications: moderate - severe pain  Dispense: 21 Tablet; Refill: 0  - Consent for Opiate Prescription    4. Essential hypertension  Controlled.  Continue on lisinopril 5 mg daily    5. Colon cancer screening  Refused colonoscopy, okay to do a fit test    - OCCULT BLOOD FECES IMMUNOASSAY; Future

## 2023-06-06 VITALS
DIASTOLIC BLOOD PRESSURE: 70 MMHG | HEART RATE: 78 BPM | OXYGEN SATURATION: 98 % | SYSTOLIC BLOOD PRESSURE: 120 MMHG | RESPIRATION RATE: 18 BRPM | TEMPERATURE: 97.8 F

## 2023-06-06 ASSESSMENT — ENCOUNTER SYMPTOMS
DEBILITATING PAIN: 1
PERSON REPORTING PAIN: PATIENT
PAIN LOCATION - PAIN SEVERITY: 6/10
PAIN LOCATION: RIGHT INGUINAL
LAST BOWEL MOVEMENT: 66630
PAIN LOCATION - EXACERBATING FACTORS: WOUND DRESSING CHANGE
FATIGUES EASILY: 1
PAIN LOCATION - RELIEVING FACTORS: PAIN MEDICATION
VOMITING: DENIES
PAIN LOCATION - PAIN FREQUENCY: INTERMITTENT
PAIN SEVERITY GOAL: 3/10
NAUSEA: DENIES
LOWEST PAIN SEVERITY IN PAST 24 HOURS: 6/10
PAIN: 1
SUBJECTIVE PAIN PROGRESSION: UNCHANGED
PAIN LOCATION - PAIN DURATION: 2-3 HRS
PAIN LOCATION - PAIN QUALITY: ACHY
BOWEL PATTERN NORMAL: 1
HIGHEST PAIN SEVERITY IN PAST 24 HOURS: 8/10

## 2023-06-07 ENCOUNTER — HOME CARE VISIT (OUTPATIENT)
Dept: HOME HEALTH SERVICES | Facility: HOME HEALTHCARE | Age: 68
End: 2023-06-07
Payer: MEDICARE

## 2023-06-07 PROCEDURE — G0299 HHS/HOSPICE OF RN EA 15 MIN: HCPCS

## 2023-06-08 ENCOUNTER — HOME CARE VISIT (OUTPATIENT)
Dept: HOME HEALTH SERVICES | Facility: HOME HEALTHCARE | Age: 68
End: 2023-06-08
Payer: MEDICARE

## 2023-06-08 ENCOUNTER — TELEPHONE (OUTPATIENT)
Dept: INFECTIOUS DISEASES | Facility: MEDICAL CENTER | Age: 68
End: 2023-06-08

## 2023-06-08 ENCOUNTER — OFFICE VISIT (OUTPATIENT)
Dept: VASCULAR SURGERY | Facility: MEDICAL CENTER | Age: 68
End: 2023-06-08
Payer: MEDICARE

## 2023-06-08 VITALS
DIASTOLIC BLOOD PRESSURE: 62 MMHG | WEIGHT: 163.3 LBS | BODY MASS INDEX: 22.86 KG/M2 | TEMPERATURE: 98 F | HEIGHT: 71 IN | HEART RATE: 95 BPM | OXYGEN SATURATION: 97 % | SYSTOLIC BLOOD PRESSURE: 116 MMHG

## 2023-06-08 VITALS
TEMPERATURE: 97.6 F | DIASTOLIC BLOOD PRESSURE: 78 MMHG | HEART RATE: 98 BPM | RESPIRATION RATE: 20 BRPM | OXYGEN SATURATION: 95 % | SYSTOLIC BLOOD PRESSURE: 126 MMHG

## 2023-06-08 VITALS
OXYGEN SATURATION: 98 % | RESPIRATION RATE: 18 BRPM | SYSTOLIC BLOOD PRESSURE: 122 MMHG | HEART RATE: 88 BPM | DIASTOLIC BLOOD PRESSURE: 60 MMHG | TEMPERATURE: 97.6 F

## 2023-06-08 DIAGNOSIS — T81.30XA WOUND DEHISCENCE: ICD-10-CM

## 2023-06-08 PROCEDURE — 3074F SYST BP LT 130 MM HG: CPT | Performed by: SURGERY

## 2023-06-08 PROCEDURE — 99024 POSTOP FOLLOW-UP VISIT: CPT | Performed by: SURGERY

## 2023-06-08 PROCEDURE — 3078F DIAST BP <80 MM HG: CPT | Performed by: SURGERY

## 2023-06-08 PROCEDURE — G0299 HHS/HOSPICE OF RN EA 15 MIN: HCPCS

## 2023-06-08 ASSESSMENT — ENCOUNTER SYMPTOMS
LAST BOWEL MOVEMENT: 66632
LOWEST PAIN SEVERITY IN PAST 24 HOURS: 7/10
PERSON REPORTING PAIN: PATIENT
PAIN SEVERITY GOAL: 1/10
STOOL FREQUENCY: LESS THAN DAILY
PAIN LOCATION - EXACERBATING FACTORS: MOVEMENT, DRESSING CHANGE
VOMITING: DENIES
MUSCLE WEAKNESS: 1
PAIN LOCATION - PAIN DURATION: DAILY
PAIN LOCATION - PAIN QUALITY: ACHY, SHARP
NAUSEA: DENIES
ASSOCIATED SYMPTOMS: DENIES
BOWEL PATTERN NORMAL: 1
SUBJECTIVE PAIN PROGRESSION: WAXING AND WANING
PAIN: 1
PAIN LOCATION - PAIN FREQUENCY: INTERMITTENT
PAIN LOCATION - PAIN SEVERITY: 7/10
PAIN LOCATION - RELIEVING FACTORS: PAIN MED
HIGHEST PAIN SEVERITY IN PAST 24 HOURS: 8/10
FATIGUES EASILY: 1

## 2023-06-08 ASSESSMENT — FIBROSIS 4 INDEX: FIB4 SCORE: 1.9

## 2023-06-08 NOTE — NURSING NOTE
PRN SN visit for wound management  Pt saw surgeon earlier today- WV was removed during visit. PRN visit to replace wound vac.   Pt reports 6/10 pain taking Matador for pain relief (took med prior to visit). Wound cleansed and new wound vac applied. Suction to 125 mmHg continuous. Patient tolerated well. Next visit 6/10

## 2023-06-08 NOTE — Clinical Note
PRN SN visit for wound management  Pt saw surgeon earlier today- WV was removed during visit. PRN visit to replace wound vac.   Pt reports 6/10 pain taking Justice for pain relief (took med prior to visit). Wound cleansed and new wound vac applied. Suction to 125 mmHg continuous. Patient tolerated well. Next visit 6/10

## 2023-06-08 NOTE — PROGRESS NOTES
"                 VASCULAR SURGERY                    Postop Note  _________________________________    6/8/2023    Mr. Dinero comes to the clinic today for follow-up, status post right groin wound debridement and wound VAC placement on May 19, 2023.  He has been receiving wound VAC changes 3 times a week at home by home health.  He denies fever, chills, nausea, vomiting.  He is on Bactrim and Augmentin.  He has not followed up with infectious disease service yet.    Vitals  /62 (BP Location: Left arm, Patient Position: Sitting, BP Cuff Size: Adult)   Pulse 95   Temp 36.7 °C (98 °F) (Temporal)   Ht 1.791 m (5' 10.5\")   Wt 74.1 kg (163 lb 4.8 oz)   SpO2 97%   BMI 23.10 kg/m²     Exam  General: Pleasant male in none apparent distress.  Abdomen: Soft, nondistended, nontender.  Well-healed wound in his right lower abdomen without erythema, drainage, or fluctuation.  Right lower extremity: Wound is healing well with granulation tissue.  There is a 1 cm wide and 1 cm depth area in the middle of the wound without fibrinous exudate or drainage.  There is no surrounding erythema or fluctuation.  Right posterior tibial pulses are palpable with multiphasic flow.    Assessment: Status post right groin debridement and wound VAC placement.    Pain: Doing well.  Continue wound VAC and antibiotics.  I reminded patient to make an appointment to follow-up with the infectious disease service.  I also asked patient to follow-up with me on June 22, 2023 for wound check and to call me for any problem at any time.  He indicated understanding.  All questions were answered.      Negar Machado MD  [facility]  [phone]  [address], [place] [address]   "

## 2023-06-09 ENCOUNTER — HOSPITAL ENCOUNTER (OUTPATIENT)
Dept: LAB | Facility: MEDICAL CENTER | Age: 68
End: 2023-06-09
Attending: INTERNAL MEDICINE
Payer: MEDICARE

## 2023-06-09 DIAGNOSIS — L08.9 SOFT TISSUE INFECTION: ICD-10-CM

## 2023-06-09 DIAGNOSIS — T81.30XA WOUND DEHISCENCE: ICD-10-CM

## 2023-06-09 LAB
BASOPHILS # BLD AUTO: 0.8 % (ref 0–1.8)
BASOPHILS # BLD: 0.07 K/UL (ref 0–0.12)
EOSINOPHIL # BLD AUTO: 0.2 K/UL (ref 0–0.51)
EOSINOPHIL NFR BLD: 2.2 % (ref 0–6.9)
ERYTHROCYTE [DISTWIDTH] IN BLOOD BY AUTOMATED COUNT: 44.1 FL (ref 35.9–50)
HCT VFR BLD AUTO: 47.5 % (ref 42–52)
HGB BLD-MCNC: 15.7 G/DL (ref 14–18)
IMM GRANULOCYTES # BLD AUTO: 0.02 K/UL (ref 0–0.11)
IMM GRANULOCYTES NFR BLD AUTO: 0.2 % (ref 0–0.9)
LYMPHOCYTES # BLD AUTO: 3.25 K/UL (ref 1–4.8)
LYMPHOCYTES NFR BLD: 36 % (ref 22–41)
MCH RBC QN AUTO: 29.6 PG (ref 27–33)
MCHC RBC AUTO-ENTMCNC: 33.1 G/DL (ref 32.3–36.5)
MCV RBC AUTO: 89.6 FL (ref 81.4–97.8)
MONOCYTES # BLD AUTO: 0.82 K/UL (ref 0–0.85)
MONOCYTES NFR BLD AUTO: 9.1 % (ref 0–13.4)
NEUTROPHILS # BLD AUTO: 4.66 K/UL (ref 1.82–7.42)
NEUTROPHILS NFR BLD: 51.7 % (ref 44–72)
NRBC # BLD AUTO: 0 K/UL
NRBC BLD-RTO: 0 /100 WBC (ref 0–0.2)
PLATELET # BLD AUTO: 299 K/UL (ref 164–446)
PMV BLD AUTO: 9.4 FL (ref 9–12.9)
RBC # BLD AUTO: 5.3 M/UL (ref 4.7–6.1)
WBC # BLD AUTO: 9 K/UL (ref 4.8–10.8)

## 2023-06-09 PROCEDURE — 80053 COMPREHEN METABOLIC PANEL: CPT

## 2023-06-09 PROCEDURE — 85025 COMPLETE CBC W/AUTO DIFF WBC: CPT

## 2023-06-09 PROCEDURE — 36415 COLL VENOUS BLD VENIPUNCTURE: CPT

## 2023-06-10 ENCOUNTER — PATIENT MESSAGE (OUTPATIENT)
Dept: MEDICAL GROUP | Facility: MEDICAL CENTER | Age: 68
End: 2023-06-10

## 2023-06-10 ENCOUNTER — HOME CARE VISIT (OUTPATIENT)
Dept: HOME HEALTH SERVICES | Facility: HOME HEALTHCARE | Age: 68
End: 2023-06-10
Payer: MEDICARE

## 2023-06-10 DIAGNOSIS — N52.9 ERECTILE DYSFUNCTION, UNSPECIFIED ERECTILE DYSFUNCTION TYPE: ICD-10-CM

## 2023-06-10 DIAGNOSIS — Z12.11 COLON CANCER SCREENING: ICD-10-CM

## 2023-06-10 LAB
ALBUMIN SERPL BCP-MCNC: 4.5 G/DL (ref 3.2–4.9)
ALBUMIN/GLOB SERPL: 1.7 G/DL
ALP SERPL-CCNC: 58 U/L (ref 30–99)
ALT SERPL-CCNC: 10 U/L (ref 2–50)
ANION GAP SERPL CALC-SCNC: 14 MMOL/L (ref 7–16)
AST SERPL-CCNC: 11 U/L (ref 12–45)
BILIRUB SERPL-MCNC: 0.4 MG/DL (ref 0.1–1.5)
BUN SERPL-MCNC: 12 MG/DL (ref 8–22)
CALCIUM ALBUM COR SERPL-MCNC: 8.9 MG/DL (ref 8.5–10.5)
CALCIUM SERPL-MCNC: 9.3 MG/DL (ref 8.5–10.5)
CHLORIDE SERPL-SCNC: 102 MMOL/L (ref 96–112)
CO2 SERPL-SCNC: 22 MMOL/L (ref 20–33)
CREAT SERPL-MCNC: 0.97 MG/DL (ref 0.5–1.4)
GFR SERPLBLD CREATININE-BSD FMLA CKD-EPI: 86 ML/MIN/1.73 M 2
GLOBULIN SER CALC-MCNC: 2.6 G/DL (ref 1.9–3.5)
GLUCOSE SERPL-MCNC: 83 MG/DL (ref 65–99)
POTASSIUM SERPL-SCNC: 5.1 MMOL/L (ref 3.6–5.5)
PROT SERPL-MCNC: 7.1 G/DL (ref 6–8.2)
SODIUM SERPL-SCNC: 138 MMOL/L (ref 135–145)

## 2023-06-10 PROCEDURE — G0299 HHS/HOSPICE OF RN EA 15 MIN: HCPCS

## 2023-06-11 VITALS
TEMPERATURE: 97.6 F | RESPIRATION RATE: 18 BRPM | OXYGEN SATURATION: 97 % | SYSTOLIC BLOOD PRESSURE: 102 MMHG | HEART RATE: 79 BPM | DIASTOLIC BLOOD PRESSURE: 60 MMHG

## 2023-06-11 ASSESSMENT — ENCOUNTER SYMPTOMS
BOWEL PATTERN NORMAL: 1
PERSON REPORTING PAIN: PATIENT
LAST BOWEL MOVEMENT: 66635
SUBJECTIVE PAIN PROGRESSION: GRADUALLY IMPROVING
PAIN: 1
PAIN SEVERITY GOAL: 0/10
LOWEST PAIN SEVERITY IN PAST 24 HOURS: 1/10
HIGHEST PAIN SEVERITY IN PAST 24 HOURS: 5/10

## 2023-06-11 ASSESSMENT — PAIN SCALES - PAIN ASSESSMENT IN ADVANCED DEMENTIA (PAINAD)
TOTALSCORE: 0
CONSOLABILITY: 0 - NO NEED TO CONSOLE.
FACIALEXPRESSION: 0 - SMILING OR INEXPRESSIVE.
BODYLANGUAGE: 0 - RELAXED.
NEGVOCALIZATION: 0 - NONE.

## 2023-06-12 ENCOUNTER — HOME CARE VISIT (OUTPATIENT)
Dept: HOME HEALTH SERVICES | Facility: HOME HEALTHCARE | Age: 68
End: 2023-06-12
Payer: MEDICARE

## 2023-06-12 PROCEDURE — G0299 HHS/HOSPICE OF RN EA 15 MIN: HCPCS

## 2023-06-12 NOTE — CASE COMMUNICATION
Quality Review for SOC OASIS by LUIS A Miles RN on  June 12, 2023     Edits completed by LUIS A Miles RN:  1.  and  diagnosis coding updated per chart review.  2.  1 is yes, patient does have a diagnosis of PVD  3.  is 2 per response to [id number] F stating patient needs supervision for upper body dressing  4.  is 3 per ambulation status  5. Completed F2F information

## 2023-06-13 PROCEDURE — G0180 MD CERTIFICATION HHA PATIENT: HCPCS | Performed by: FAMILY MEDICINE

## 2023-06-13 NOTE — CASE COMMUNICATION
Noted and agree with all changes. Thank you.     Adele Wayne RN    ----- Message -----  From: Jennifer Miles R.N.  Sent: 6/12/2023   9:44 AM PDT  To: Rock Wayne R.N.      Quality Review for SOC OASIS by LUIS A Miles RN on  June 12, 2023     Edits completed by LUIS A Miles RN:  1.  and  diagnosis coding updated per chart review.  2.  1 is yes, patient does have a diagnosis of PVD  3.  is 2 per r esponse to [id number] F stating patient needs supervision for upper body dressing  4.  is 3 per ambulation status  5. Completed F2F information

## 2023-06-14 ENCOUNTER — HOME CARE VISIT (OUTPATIENT)
Dept: HOME HEALTH SERVICES | Facility: HOME HEALTHCARE | Age: 68
End: 2023-06-14
Payer: MEDICARE

## 2023-06-14 VITALS
SYSTOLIC BLOOD PRESSURE: 105 MMHG | HEART RATE: 87 BPM | OXYGEN SATURATION: 94 % | RESPIRATION RATE: 16 BRPM | DIASTOLIC BLOOD PRESSURE: 70 MMHG | TEMPERATURE: 97.4 F

## 2023-06-14 PROCEDURE — G0299 HHS/HOSPICE OF RN EA 15 MIN: HCPCS

## 2023-06-14 ASSESSMENT — ENCOUNTER SYMPTOMS
DEBILITATING PAIN: 1
PAIN LOCATION: RIGHT GROIN
PAIN LOCATION - RELIEVING FACTORS: PAIN PILL
PAIN LOCATION - EXACERBATING FACTORS: RESTING
PERSON REPORTING PAIN: PATIENT
PAIN LOCATION - PAIN FREQUENCY: CONSTANT
PAIN: 1
LIMITED RANGE OF MOTION: 1
SUBJECTIVE PAIN PROGRESSION: WAXING AND WANING
STOOL FREQUENCY: DAILY
LOWEST PAIN SEVERITY IN PAST 24 HOURS: 5/10
BOWEL PATTERN NORMAL: 1
HIGHEST PAIN SEVERITY IN PAST 24 HOURS: 5/10
PAIN SEVERITY GOAL: 5/10
LAST BOWEL MOVEMENT: 66637
PAIN LOCATION - PAIN SEVERITY: 5/10
HYPERTENSION: 1

## 2023-06-14 ASSESSMENT — PAIN SCALES - PAIN ASSESSMENT IN ADVANCED DEMENTIA (PAINAD)
FACIALEXPRESSION: 0 - SMILING OR INEXPRESSIVE.
NEGVOCALIZATION: 0 - NONE.
TOTALSCORE: 0
CONSOLABILITY: 0 - NO NEED TO CONSOLE.
BODYLANGUAGE: 0 - RELAXED.

## 2023-06-15 ENCOUNTER — HOSPITAL ENCOUNTER (OUTPATIENT)
Facility: MEDICAL CENTER | Age: 68
End: 2023-06-15
Attending: FAMILY MEDICINE
Payer: MEDICARE

## 2023-06-15 DIAGNOSIS — Z12.11 COLON CANCER SCREENING: ICD-10-CM

## 2023-06-15 PROCEDURE — 82274 ASSAY TEST FOR BLOOD FECAL: CPT

## 2023-06-16 ENCOUNTER — HOME CARE VISIT (OUTPATIENT)
Dept: HOME HEALTH SERVICES | Facility: HOME HEALTHCARE | Age: 68
End: 2023-06-16
Payer: MEDICARE

## 2023-06-16 PROCEDURE — G0299 HHS/HOSPICE OF RN EA 15 MIN: HCPCS

## 2023-06-16 NOTE — CASE COMMUNICATION
ACTION NEEDED PLEASE  Wound and Periwound shows signs of yeast infection with odor. He is still on Augmentin and Bactrim until end of the month.

## 2023-06-18 VITALS
SYSTOLIC BLOOD PRESSURE: 100 MMHG | SYSTOLIC BLOOD PRESSURE: 100 MMHG | HEART RATE: 91 BPM | RESPIRATION RATE: 16 BRPM | DIASTOLIC BLOOD PRESSURE: 67 MMHG | TEMPERATURE: 97.7 F | DIASTOLIC BLOOD PRESSURE: 62 MMHG | HEART RATE: 74 BPM | RESPIRATION RATE: 17 BRPM | TEMPERATURE: 97.5 F | OXYGEN SATURATION: 95 % | OXYGEN SATURATION: 95 %

## 2023-06-18 LAB — IMM ASSAY OCC BLD FITOB: POSITIVE

## 2023-06-18 ASSESSMENT — ENCOUNTER SYMPTOMS
STOOL FREQUENCY: TWICE DAILY
PAIN LOCATION - RELIEVING FACTORS: RESTING, PAIN PILL
PAIN LOCATION - PAIN QUALITY: ACHY/DULL
PAIN SEVERITY GOAL: 0/10
BOWEL PATTERN NORMAL: 1
PAIN LOCATION: R GROIN
PAIN LOCATION - PAIN FREQUENCY: INTERMITTENT
LIMITED RANGE OF MOTION: 1
PAIN LOCATION - PAIN SEVERITY: 5/10
PAIN LOCATION - PAIN QUALITY: ACHY
HYPERTENSION: 1
PAIN LOCATION - PAIN SEVERITY: 4/10
BOWEL PATTERN NORMAL: 1
SUBJECTIVE PAIN PROGRESSION: GRADUALLY IMPROVING
LAST BOWEL MOVEMENT: 66639
PERSON REPORTING PAIN: PATIENT
PAIN: 1
PAIN: 1
MUSCLE WEAKNESS: 1
PAIN LOCATION - EXACERBATING FACTORS: WOUND CARE
HYPERTENSION: 1
PAIN LOCATION - PAIN FREQUENCY: INTERMITTENT
STOOL FREQUENCY: TWICE DAILY
PAIN LOCATION - EXACERBATING FACTORS: WOUND CARE, WALKING
PAIN LOCATION: R GROIN
HIGHEST PAIN SEVERITY IN PAST 24 HOURS: 4/10
HIGHEST PAIN SEVERITY IN PAST 24 HOURS: 5/10
LAST BOWEL MOVEMENT: 66641
LOWEST PAIN SEVERITY IN PAST 24 HOURS: 0/10
SUBJECTIVE PAIN PROGRESSION: GRADUALLY IMPROVING
PERSON REPORTING PAIN: PATIENT
LOWEST PAIN SEVERITY IN PAST 24 HOURS: 0/10
PAIN SEVERITY GOAL: 0/10

## 2023-06-18 ASSESSMENT — PAIN SCALES - PAIN ASSESSMENT IN ADVANCED DEMENTIA (PAINAD)
NEGVOCALIZATION: 0 - NONE.
TOTALSCORE: 0
BODYLANGUAGE: 0 - RELAXED.
CONSOLABILITY: 0 - NO NEED TO CONSOLE.
NEGVOCALIZATION: 0 - NONE.
FACIALEXPRESSION: 0 - SMILING OR INEXPRESSIVE.
BODYLANGUAGE: 0 - RELAXED.
CONSOLABILITY: 0 - NO NEED TO CONSOLE.
TOTALSCORE: 0
FACIALEXPRESSION: 0 - SMILING OR INEXPRESSIVE.

## 2023-06-19 ENCOUNTER — HOME CARE VISIT (OUTPATIENT)
Dept: HOME HEALTH SERVICES | Facility: HOME HEALTHCARE | Age: 68
End: 2023-06-19
Payer: MEDICARE

## 2023-06-19 DIAGNOSIS — R19.5 OCCULT BLOOD POSITIVE STOOL: ICD-10-CM

## 2023-06-19 PROCEDURE — G0299 HHS/HOSPICE OF RN EA 15 MIN: HCPCS

## 2023-06-22 ENCOUNTER — HOSPITAL ENCOUNTER (OUTPATIENT)
Facility: MEDICAL CENTER | Age: 68
End: 2023-06-22
Attending: FAMILY MEDICINE
Payer: MEDICARE

## 2023-06-22 ENCOUNTER — HOME CARE VISIT (OUTPATIENT)
Dept: HOME HEALTH SERVICES | Facility: HOME HEALTHCARE | Age: 68
End: 2023-06-22
Payer: MEDICARE

## 2023-06-22 ENCOUNTER — OFFICE VISIT (OUTPATIENT)
Dept: VASCULAR SURGERY | Facility: MEDICAL CENTER | Age: 68
End: 2023-06-22
Payer: MEDICARE

## 2023-06-22 VITALS
DIASTOLIC BLOOD PRESSURE: 60 MMHG | RESPIRATION RATE: 16 BRPM | OXYGEN SATURATION: 97 % | TEMPERATURE: 98 F | SYSTOLIC BLOOD PRESSURE: 110 MMHG | HEART RATE: 76 BPM

## 2023-06-22 VITALS
RESPIRATION RATE: 16 BRPM | OXYGEN SATURATION: 97 % | SYSTOLIC BLOOD PRESSURE: 110 MMHG | HEART RATE: 75 BPM | DIASTOLIC BLOOD PRESSURE: 62 MMHG | TEMPERATURE: 97.8 F

## 2023-06-22 VITALS
WEIGHT: 163 LBS | HEART RATE: 89 BPM | HEIGHT: 70 IN | OXYGEN SATURATION: 97 % | BODY MASS INDEX: 23.34 KG/M2 | TEMPERATURE: 97.8 F | SYSTOLIC BLOOD PRESSURE: 118 MMHG | DIASTOLIC BLOOD PRESSURE: 62 MMHG

## 2023-06-22 DIAGNOSIS — T81.30XA WOUND DEHISCENCE: ICD-10-CM

## 2023-06-22 PROCEDURE — 99024 POSTOP FOLLOW-UP VISIT: CPT | Performed by: SURGERY

## 2023-06-22 PROCEDURE — G0299 HHS/HOSPICE OF RN EA 15 MIN: HCPCS

## 2023-06-22 PROCEDURE — 3078F DIAST BP <80 MM HG: CPT | Performed by: SURGERY

## 2023-06-22 PROCEDURE — A6210 FOAM DRG >16<=48 SQ IN W/O B: HCPCS

## 2023-06-22 PROCEDURE — A4452 WATERPROOF TAPE: HCPCS

## 2023-06-22 PROCEDURE — 82274 ASSAY TEST FOR BLOOD FECAL: CPT

## 2023-06-22 PROCEDURE — 3074F SYST BP LT 130 MM HG: CPT | Performed by: SURGERY

## 2023-06-22 ASSESSMENT — PAIN SCALES - PAIN ASSESSMENT IN ADVANCED DEMENTIA (PAINAD)
BODYLANGUAGE: 0 - RELAXED.
CONSOLABILITY: 0 - NO NEED TO CONSOLE.
TOTALSCORE: 0
NEGVOCALIZATION: 0 - NONE.
FACIALEXPRESSION: 0 - SMILING OR INEXPRESSIVE.

## 2023-06-22 ASSESSMENT — ENCOUNTER SYMPTOMS
PAIN LOCATION: R GROIN
PERSON REPORTING PAIN: PATIENT
LAST BOWEL MOVEMENT: 66647
PAIN LOCATION: RIGHT GROIN WOUND
LAST BOWEL MOVEMENT: 66644
PAIN LOCATION - EXACERBATING FACTORS: WOUND CARE
PAIN LOCATION - RELIEVING FACTORS: RESTING
HIGHEST PAIN SEVERITY IN PAST 24 HOURS: 2/10
LOWEST PAIN SEVERITY IN PAST 24 HOURS: 2/10
PAIN: 1
PAIN LOCATION - PAIN QUALITY: PINCHING
PAIN SEVERITY GOAL: 2/10
PAIN: 1
SUBJECTIVE PAIN PROGRESSION: GRADUALLY IMPROVING
PAIN LOCATION - PAIN FREQUENCY: INTERMITTENT
VOMITING: NO
BOWEL PATTERN NORMAL: 1
PAIN LOCATION - PAIN QUALITY: DULL
PAIN LOCATION - PAIN SEVERITY: 2/10
PAIN LOCATION - PAIN SEVERITY: 3/10
PAIN LOCATION - PAIN FREQUENCY: CONSTANT
PERSON REPORTING PAIN: PATIENT
SUBJECTIVE PAIN PROGRESSION: GRADUALLY IMPROVING
NAUSEA: NO

## 2023-06-22 ASSESSMENT — FIBROSIS 4 INDEX: FIB4 SCORE: 0.77

## 2023-06-22 NOTE — PROGRESS NOTES
"                 VASCULAR SURGERY                    Postop Note  _________________________________    6/22/2023    Mr. Dinero comes to the clinic today for follow-up, status post right groin wound debridement and wound VAC placement on May 19, 2023.  He has been receiving wound VAC changes 3 times a week at home by home health.  He has some skin irritation from the wound vac and the home health nurse has been treating it with antifungal medication.  He denies fever, chills, nausea, vomiting.  He is on Bactrim and Augmentin.  He still has not followed up with infectious disease service yet.     Vitals  /62 (BP Location: Left arm, Patient Position: Sitting, BP Cuff Size: Adult)   Pulse 89   Temp 36.6 °C (97.8 °F) (Temporal)   Ht 1.778 m (5' 10\")   Wt 73.9 kg (163 lb)   SpO2 97%   BMI 23.39 kg/m²        Exam  General: Pleasant male in none apparent distress.  Abdomen: Soft, nondistended, nontender.  Well-healed wound in his right lower abdomen without erythema, drainage, or fluctuation.  Right lower extremity: Wound is healing well with granulation tissue, now very superficial and about 4x2cm in size.  There is mild irritation of the surrounding skin from the wound vac.  Right posterior tibial pulses are palpable with multiphasic flow.     Assessment: Status post right groin debridement and wound VAC placement.     Pain: Doing well.  Continue antibiotics.  I wrote a note for patient to give to the Home Health Nurse to discontinue the wound vac and start patient on Hydrofera Blue.  I also gave patient a prescription for Hydrofera Blue to give to Home Health Nurse.  I reminded patient to make an appointment to follow-up with the infectious disease service.  I also asked patient to follow-up with me after he is back from his upcoming trip.  He knows to call me for any problem at any time.  He indicated understanding.  All questions were answered.    Negar Machado MD  [facility] " [facility]  [phone]  [address], [facility] [address]

## 2023-06-23 DIAGNOSIS — Z12.11 COLON CANCER SCREENING: ICD-10-CM

## 2023-06-23 DIAGNOSIS — R19.5 OCCULT BLOOD POSITIVE STOOL: ICD-10-CM

## 2023-06-24 ENCOUNTER — HOME CARE VISIT (OUTPATIENT)
Dept: HOME HEALTH SERVICES | Facility: HOME HEALTHCARE | Age: 68
End: 2023-06-24
Payer: MEDICARE

## 2023-06-24 PROCEDURE — G0299 HHS/HOSPICE OF RN EA 15 MIN: HCPCS

## 2023-06-25 VITALS
TEMPERATURE: 97.7 F | RESPIRATION RATE: 16 BRPM | SYSTOLIC BLOOD PRESSURE: 108 MMHG | OXYGEN SATURATION: 92 % | HEART RATE: 86 BPM | DIASTOLIC BLOOD PRESSURE: 60 MMHG

## 2023-06-25 DIAGNOSIS — R19.5 OCCULT BLOOD POSITIVE STOOL: ICD-10-CM

## 2023-06-25 LAB — IMM ASSAY OCC BLD FITOB: POSITIVE

## 2023-06-25 ASSESSMENT — ENCOUNTER SYMPTOMS
DENIES PAIN: 1
MUSCLE WEAKNESS: 1

## 2023-06-26 ENCOUNTER — HOME CARE VISIT (OUTPATIENT)
Dept: HOME HEALTH SERVICES | Facility: HOME HEALTHCARE | Age: 68
End: 2023-06-26
Payer: MEDICARE

## 2023-06-26 PROCEDURE — G0299 HHS/HOSPICE OF RN EA 15 MIN: HCPCS

## 2023-06-28 ENCOUNTER — HOME CARE VISIT (OUTPATIENT)
Dept: HOME HEALTH SERVICES | Facility: HOME HEALTHCARE | Age: 68
End: 2023-06-28
Payer: MEDICARE

## 2023-06-29 VITALS
DIASTOLIC BLOOD PRESSURE: 64 MMHG | TEMPERATURE: 97.8 F | HEART RATE: 81 BPM | RESPIRATION RATE: 17 BRPM | SYSTOLIC BLOOD PRESSURE: 110 MMHG | OXYGEN SATURATION: 97 %

## 2023-06-29 ASSESSMENT — ENCOUNTER SYMPTOMS
HYPERTENSION: 1
LOWEST PAIN SEVERITY IN PAST 24 HOURS: 0/10
LAST BOWEL MOVEMENT: 66651
PAIN SEVERITY GOAL: 0/10
PERSON REPORTING PAIN: PATIENT
HIGHEST PAIN SEVERITY IN PAST 24 HOURS: 0/10
STOOL FREQUENCY: DAILY
BOWEL PATTERN NORMAL: 1
DENIES PAIN: 1

## 2023-06-29 ASSESSMENT — PAIN SCALES - PAIN ASSESSMENT IN ADVANCED DEMENTIA (PAINAD)
BODYLANGUAGE: 0 - RELAXED.
TOTALSCORE: 0
NEGVOCALIZATION: 0 - NONE.
FACIALEXPRESSION: 0 - SMILING OR INEXPRESSIVE.
CONSOLABILITY: 0 - NO NEED TO CONSOLE.

## 2023-06-30 ENCOUNTER — HOME CARE VISIT (OUTPATIENT)
Dept: HOME HEALTH SERVICES | Facility: HOME HEALTHCARE | Age: 68
End: 2023-06-30
Payer: MEDICARE

## 2023-07-03 ENCOUNTER — HOME CARE VISIT (OUTPATIENT)
Dept: HOME HEALTH SERVICES | Facility: HOME HEALTHCARE | Age: 68
End: 2023-07-03
Payer: MEDICARE

## 2023-07-05 ENCOUNTER — OFFICE VISIT (OUTPATIENT)
Dept: INFECTIOUS DISEASES | Facility: MEDICAL CENTER | Age: 68
End: 2023-07-05
Attending: NURSE PRACTITIONER
Payer: MEDICARE

## 2023-07-05 ENCOUNTER — HOME CARE VISIT (OUTPATIENT)
Dept: HOME HEALTH SERVICES | Facility: HOME HEALTHCARE | Age: 68
End: 2023-07-05
Payer: MEDICARE

## 2023-07-05 VITALS
HEART RATE: 77 BPM | BODY MASS INDEX: 22.5 KG/M2 | TEMPERATURE: 98.4 F | SYSTOLIC BLOOD PRESSURE: 118 MMHG | DIASTOLIC BLOOD PRESSURE: 62 MMHG | RESPIRATION RATE: 16 BRPM | WEIGHT: 160.72 LBS | HEIGHT: 71 IN | OXYGEN SATURATION: 96 %

## 2023-07-05 DIAGNOSIS — I73.9 PVD (PERIPHERAL VASCULAR DISEASE) (HCC): ICD-10-CM

## 2023-07-05 DIAGNOSIS — K92.1 BLOOD IN STOOL: ICD-10-CM

## 2023-07-05 DIAGNOSIS — L08.9 SOFT TISSUE INFECTION: ICD-10-CM

## 2023-07-05 DIAGNOSIS — A49.02 MRSA INFECTION: ICD-10-CM

## 2023-07-05 DIAGNOSIS — I72.4 PSEUDOANEURYSM OF FEMORAL ARTERY (HCC): ICD-10-CM

## 2023-07-05 PROCEDURE — 99212 OFFICE O/P EST SF 10 MIN: CPT | Performed by: NURSE PRACTITIONER

## 2023-07-05 PROCEDURE — 3078F DIAST BP <80 MM HG: CPT | Performed by: NURSE PRACTITIONER

## 2023-07-05 PROCEDURE — 3074F SYST BP LT 130 MM HG: CPT | Performed by: NURSE PRACTITIONER

## 2023-07-05 PROCEDURE — 99211 OFF/OP EST MAY X REQ PHY/QHP: CPT | Performed by: NURSE PRACTITIONER

## 2023-07-05 PROCEDURE — 665001 SOC-HOME HEALTH

## 2023-07-05 PROCEDURE — G0493 RN CARE EA 15 MIN HH/HOSPICE: HCPCS

## 2023-07-05 ASSESSMENT — ENCOUNTER SYMPTOMS
SHORTNESS OF BREATH: 0
FOCAL WEAKNESS: 0
HEADACHES: 0
VOMITING: 0
COUGH: 0
DIZZINESS: 0
BRUISES/BLEEDS EASILY: 0
ABDOMINAL PAIN: 0
FEVER: 0
WHEEZING: 0
PALPITATIONS: 0
NERVOUS/ANXIOUS: 0
BLOOD IN STOOL: 1
CHILLS: 0
MYALGIAS: 0
SPUTUM PRODUCTION: 0
DOUBLE VISION: 0
BLURRED VISION: 0
WEIGHT LOSS: 0
NAUSEA: 0

## 2023-07-05 ASSESSMENT — FIBROSIS 4 INDEX: FIB4 SCORE: 0.77

## 2023-07-05 NOTE — PROGRESS NOTES
INFECTIOUS  DISEASE  OUTPATIENT CLINIC  NOTE   Subjective   Primary care provider: William Harman M.D..     Reason for Follow Up:   Follow-up for   1. Soft tissue infection  CBC WITH DIFFERENTIAL    Comp Metabolic Panel    Blood Culture      2. Pseudoaneurysm of femoral artery (HCC)        3. PVD (peripheral vascular disease) (HCC)        4. MRSA infection        5. Blood in stool            HPI: Patient previously seen and treated by ID team as inpatient during hospital admission.   Dale Dinero is a 68 y.o. male admitted 5/19/2023.  Patient has history of peripheral arterial disease, tobacco abuse, chronic pain with narcotic use, has a remote history of aortobifemoral bypass more than a decade ago.  Patient was lost to follow-up but returned to vascular surgery recently and was noted to have a 4 cm pseudoaneurysm in the right common femoral artery area.  He was taken to the OR on 5/3, underwent open repair with a Dacron graft from the right aortobifemoral bypass graft limb to the distal right common femoral artery, also underwent right leg arterial thrombectomy.  At the time, general degeneration was noted but no obvious evidence of infection.  No cultures were obtained.     Patient was initially doing well but developed superficial wound dehiscence and surrounding cellulitis and was taken back to the OR on 5/19.  I&D was performed, superficial fluid collection noted, macroscopically described as a seroma, no extension down to the graft noted to the naked eye and the graft was not exposed during this procedure.  Cultures were obtained and this is growing methicillin-resistant staph epi and Finegoldia magna.  Vascular surgery considering aggressive antibiotic use given concern for seeding of underlying vascular graft. 6 weeks of oral antibiotics with p.o. Bactrim 1 DS tab twice daily + p.o. Augmentin 875 mg twice daily through 6/29/2023, followed by close clinical follow-up off of antibiotic versus  prolonging duration depending on clinical course      07/05/23- Today Patient reports feeling well.  Denies drainage, pungent odor, redness, pain. Denies feeling generally ill, fevers/chills, general malaise, headache, n/v/d.  Right groin wound continues to heal without any signs of active infection.  Recently completed antibiotic course on 6/29/2023.  Repeat blood cultures while off antibiotics 7 days for evaluation possible infected right aortobifemoral bypass graft.  Labs from 6/9/2023 mostly unremarkable.     Current Antimicrobials: None  Previous Antimicrobials: Vancomycin, Flagyl, Bactrim, Augmentin    Other Current Medications:  Home Medications    Medication Sig Taking? Last Dose Authorizing Provider   aspirin 81 MG EC tablet Take 81 mg by mouth every morning. Indications: dvt prevention Yes Taking Physician Outpatient   lisinopril (PRINIVIL) 5 MG Tab Take 1 Tablet by mouth every day. Yes Taking William Harman M.D.   simvastatin (ZOCOR) 20 MG Tab TAKE 1 TABLET BY MOUTH ONCE DAILY IN THE EVENING Yes Taking William Harman M.D.        PMH:  Past Medical History:   Diagnosis Date    Anesthesia     Awareness under anesthesia     CAD (coronary artery disease)     Cancer (HCC)     skin    COPD (chronic obstructive pulmonary disease) (HCC)     Emphysema of lung (HCC)     Heart burn     Hep C w/o coma, chronic (HCC)     Hepatitis C     treated    High cholesterol     Hypertension     states controlled on meds    Indigestion     Renal disorder     stage 1 kidney disease    Snoring      Past Surgical History:   Procedure Laterality Date    DEBRIDEMENT Right 5/19/2023    Procedure: RIGHT GROIN WOUND DEBRIDEMENT;  Surgeon: Negar Machado M.D.;  Location: SURGERY [facility];  Service: General    PA REPR ANEURYSM/GRFT INS,COMMON FEMORAL Right 5/3/2023    Procedure: OPEN REPAIR OF RIGHT GROIN PSEUDOANEURYSM;  Surgeon: Negar Machado M.D.;  Location: SURGERY [facility];  Service: General    AORTOGRAM  Right 5/3/2023    Procedure: AORTOGRAM, RIGHT LEG;  Surgeon: Negar Machado M.D.;  Location: SURGERY [facility];  Service: General    THROMBECTOMY Right 5/3/2023    Procedure: THROMBECTOMY, RIGHT LEG;  Surgeon: Negar Machado M.D.;  Location: SURGERY [facility];  Service: General    LAMINOTOMY      lumbar (rods)    MENISCUS REPAIR Left     MULTIPLE CORONARY ARTERY BYPASS       Family History   Problem Relation Age of Onset    Other Mother         accident    Cancer Father         Colan    Diabetes Father     Cancer Brother         lymphoma     Social History     Socioeconomic History    Marital status:      Spouse name: Not on file    Number of children: Not on file    Years of education: Not on file    Highest education level: 12th grade   Occupational History    Not on file   Tobacco Use    Smoking status: Every Day     Packs/day: 1.00     Years: 52.00     Pack years: 52.00     Types: Cigarettes    Smokeless tobacco: Never   Vaping Use    Vaping Use: Never used   Substance and Sexual Activity    Alcohol use: No     Comment: Patient stopped drinking 5/28/2018    Drug use: No    Sexual activity: Never   Other Topics Concern    Not on file   Social History Narrative    Not on file     Social Determinants of Health     Financial Resource Strain: Low Risk  (5/25/2023)    Overall Financial Resource Strain (CARDIA)     Difficulty of Paying Living Expenses: Not hard at all   Food Insecurity: No Food Insecurity (5/25/2023)    Hunger Vital Sign     Worried About Running Out of Food in the Last Year: Never true     Ran Out of Food in the Last Year: Never true   Transportation Needs: No Transportation Needs (5/25/2023)    PRAPARE - Transportation     Lack of Transportation (Medical): No     Lack of Transportation (Non-Medical): No   Physical Activity: Unknown (5/25/2023)    Exercise Vital Sign     Days of Exercise per Week: Patient refused     Minutes of Exercise per Session: Patient refused   Stress: Unknown  (5/25/2023)    [employer] - Occupational Stress Questionnaire     Feeling of Stress : Patient refused   Social Connections: Socially Integrated (5/25/2023)    Social Connection and Isolation Panel [NHANES]     Frequency of Communication with Friends and Family: More than three times a week     Frequency of Social Gatherings with Friends and Family: More than three times a week     Attends Restorationist Services: More than 4 times per year     Active Member of Clubs or Organizations: Yes     Attends Club or Organization Meetings: More than 4 times per year     Marital Status:    Intimate Partner Violence: Not on file   Housing Stability: Unknown (5/25/2023)    Housing Stability Vital Sign     Unable to Pay for Housing in the Last Year: No     Number of Places Lived in the Last Year: Not on file     Unstable Housing in the Last Year: No           Allergies/Intolerances:  No Known Allergies    ROS:   Review of Systems   Constitutional:  Negative for chills, fever, malaise/fatigue and weight loss.   HENT:  Negative for congestion and hearing loss.    Eyes:  Negative for blurred vision and double vision.   Respiratory:  Negative for cough, sputum production, shortness of breath and wheezing.    Cardiovascular:  Negative for chest pain and palpitations.   Gastrointestinal:  Positive for blood in stool. Negative for abdominal pain, nausea and vomiting.   Genitourinary:  Negative for dysuria.   Musculoskeletal:  Negative for myalgias.   Skin:  Negative for itching and rash.        Right groin surgical wound healing without surrounding redness or drainage   Neurological:  Negative for dizziness, focal weakness and headaches.   Endo/Heme/Allergies:  Does not bruise/bleed easily.   Psychiatric/Behavioral:  The patient is not nervous/anxious.       ROS was reviewed and were negative except as above.    Objective    Most Recent Vital Signs:  Blood Pressure 118/62 (BP Location: Left arm, Patient  "Position: Sitting, BP Cuff Size: Adult)   Pulse 77   Temperature 36.9 °C (98.4 °F) (Temporal)   Respiration 16   Height 1.791 m (5' 10.5\")   Weight 72.9 kg (160 lb 11.5 oz)   Oxygen Saturation 96%   Body Mass Index 22.73 kg/m²     Physical Exam:  Physical Exam  Vitals reviewed.   Constitutional:       Appearance: Normal appearance.   HENT:      Head: Normocephalic and atraumatic.      Nose: Nose normal.      Mouth/Throat:      Mouth: Mucous membranes are moist.   Eyes:      Pupils: Pupils are equal, round, and reactive to light.   Cardiovascular:      Rate and Rhythm: Normal rate.   Pulmonary:      Effort: Pulmonary effort is normal.      Breath sounds: Normal breath sounds.   Abdominal:      Palpations: Abdomen is soft.   Musculoskeletal:         General: No tenderness.      Cervical back: Normal range of motion and neck supple.      Comments: Healing right groin site without surrounding erythema, swelling, drainage.   Skin:     General: Skin is warm and dry.      Coloration: Skin is not jaundiced.      Findings: No erythema or rash.   Neurological:      Mental Status: He is alert and oriented to person, place, and time.      Motor: No weakness.   Psychiatric:         Mood and Affect: Mood normal.         Behavior: Behavior normal.          Pertinent Lab/Imaging Results:  [unfilled]  @CMP@  WBC   Date/Time Value Ref Range Status   06/09/2023 09:36 AM 9.0 4.8 - 10.8 K/uL Final     RBC   Date/Time Value Ref Range Status   06/09/2023 09:36 AM 5.30 4.70 - 6.10 M/uL Final     Hemoglobin   Date/Time Value Ref Range Status   06/09/2023 09:36 AM 15.7 14.0 - 18.0 g/dL Final     Hematocrit   Date/Time Value Ref Range Status   06/09/2023 09:36 AM 47.5 42.0 - 52.0 % Final     MCV   Date/Time Value Ref Range Status   06/09/2023 09:36 AM 89.6 81.4 - 97.8 fL Final     MCH   Date/Time Value Ref Range Status   06/09/2023 09:36 AM 29.6 27.0 - 33.0 pg Final     MCHC   Date/Time Value Ref Range Status   06/09/2023 09:36 AM 33.1 " 32.3 - 36.5 g/dL Final     Comment:     Please note new reference range effective 05/22/2023.     MPV   Date/Time Value Ref Range Status   06/09/2023 09:36 AM 9.4 9.0 - 12.9 fL Final      Sodium   Date/Time Value Ref Range Status   06/09/2023 09:36  135 - 145 mmol/L Final     Potassium   Date/Time Value Ref Range Status   06/09/2023 09:36 AM 5.1 3.6 - 5.5 mmol/L Final     Chloride   Date/Time Value Ref Range Status   06/09/2023 09:36  96 - 112 mmol/L Final     Co2   Date/Time Value Ref Range Status   06/09/2023 09:36 AM 22 20 - 33 mmol/L Final     Glucose   Date/Time Value Ref Range Status   06/09/2023 09:36 AM 83 65 - 99 mg/dL Final     Bun   Date/Time Value Ref Range Status   06/09/2023 09:36 AM 12 8 - 22 mg/dL Final     Creatinine   Date/Time Value Ref Range Status   06/09/2023 09:36 AM 0.97 0.50 - 1.40 mg/dL Final     Alkaline Phosphatase   Date/Time Value Ref Range Status   06/09/2023 09:36 AM 58 30 - 99 U/L Final     AST(SGOT)   Date/Time Value Ref Range Status   06/09/2023 09:36 AM 11 (L) 12 - 45 U/L Final     ALT(SGPT)   Date/Time Value Ref Range Status   06/09/2023 09:36 AM 10 2 - 50 U/L Final     Total Bilirubin   Date/Time Value Ref Range Status   06/09/2023 09:36 AM 0.4 0.1 - 1.5 mg/dL Final      No results found for: CPKTOTAL       No results found for: BLOODCULTU, BLDCULT, BCHOLD    No results found for: BLOODCULTU, BLDCULT, BCHOLD         Impression/Assessment      1. Soft tissue infection  CBC WITH DIFFERENTIAL    Comp Metabolic Panel    Blood Culture      2. Pseudoaneurysm of femoral artery (HCC)        3. PVD (peripheral vascular disease) (HCC)        4. MRSA infection        5. Blood in stool            Dale Dinero is a 68 y.o. male patient with history of peripheral arterial disease and tobacco abuse, chronic pain with narcotic use, remote history of aortobifemoral bypass more than a decade ago, lost to follow-up, returned to vascular surgery recently and was noted to have a 4  cm pseudoaneurysm in the right common femoral artery area.  He was taken to the OR on 5/3, underwent open repair with a Dacron graft from the right aortobifemoral bypass graft limb to the distal right common femoral artery, also underwent right leg arterial thrombectomy.  At the time, general degeneration was noted but no obvious evidence of infection.  Patient developed superficial wound dehiscence and surrounding cellulitis, return to the OR on 5/19, I&D was performed, superficial fluid collection noted, macroscopically described as a seroma, no extension down to the graft noted to the naked eye and the graft was not exposed during this procedure.  Cultures were obtained and this is growing methicillin-resistant Staph epi and Finegoldia magna.  Vascular surgery considering aggressive antibiotic use given concern for seeding of underlying vascular graft. 6 weeks of oral antibiotics with p.o. Bactrim 1 DS tab twice daily + p.o. Augmentin 875 mg twice daily through 6/29/2023, followed by close clinical follow-up off of antibiotic versus prolonging duration depending on clinical course  Agree with vascular surgery recommendations for aggressive antibiotic therapy given relatively easy access for the organisms to travel down and seed the underlying vascular graft.    7/5/23- Patient reports feeling well.   Right groin wound continues to heal without any signs of active infection.  Recently completed antibiotic course on 6/29/2023.  Repeat blood cultures while off antibiotics 14 days for evaluation possible infected right aortobifemoral bypass graft.  Labs from 6/9/2023 mostly unremarkable.      If Right wound site shows signs of recurrence infection, patient develops fever or chills, or worsening labs there is highly likelihood the vascular graft was infected and patient will require chronic antibiotics  PLAN:   -Completed 6-week course of p.o. Bactrim and Augmentin on 6/29/2023.  Hold antibiotics at this time monitor  for reoccurrence of infection  -Medication education provided and S/S of side effects discussed   -Repeat blood cultures while off antibiotics for 14 days for further evaluation  -Repeat CBC/CMP continuous monitoring for infection  -Continue routine follow-up with wound care for right groin  -Continue routine follow-up with vascular surgery  -Education provided on signs and symptoms of infection and when to report to ER/call 911  -Pending colonoscopy for evaluation of blood in stool, ordered by PCP     Return visit: 2 weeks. Follow up with primary care physician for chronic medical problems      I have performed a physical exam,  updated ROS and plan today. I have reviewed previous images, labs, and provider notes.      RANDAL Fuller.    All Patients should seek medical re-evaluation or report to the ER for new, increasing or worsening symptoms. In some circumstances medical conditions can change from the initial evaluation and may require emergent medical re-evaluation. This includes but is not limited to chest pain, shortness of breath, atypical abdominal pain, atypical headache, ALOC, fever >101, low blood pressure, high respiratory rate (above 30), low oxygen saturation (below 90%), acute delirium, abnormal bleeding, inability to tolerate any intake, weakness on one side of the body, any worsened or concerning conditions.    Please note that this dictation was created using voice recognition software. I have worked with technical experts from [employer] to optimize the interface.  I have made every reasonable attempt to correct obvious errors, but there may be errors of grammar and possibly content that I did not discover before finalizing the note.

## 2023-07-06 VITALS
OXYGEN SATURATION: 97 % | HEART RATE: 78 BPM | TEMPERATURE: 97.4 F | DIASTOLIC BLOOD PRESSURE: 70 MMHG | RESPIRATION RATE: 18 BRPM | SYSTOLIC BLOOD PRESSURE: 125 MMHG

## 2023-07-06 ASSESSMENT — ENCOUNTER SYMPTOMS
NAUSEA: DENIES
DENIES PAIN: 1
STOOL FREQUENCY: DAILY
FATIGUES EASILY: 1
BOWEL PATTERN NORMAL: 1
PERSON REPORTING PAIN: PATIENT
VOMITING: DENIES
LAST BOWEL MOVEMENT: 66660

## 2023-07-07 ENCOUNTER — HOME CARE VISIT (OUTPATIENT)
Dept: HOME HEALTH SERVICES | Facility: HOME HEALTHCARE | Age: 68
End: 2023-07-07
Payer: MEDICARE

## 2023-07-07 VITALS
OXYGEN SATURATION: 98 % | SYSTOLIC BLOOD PRESSURE: 125 MMHG | DIASTOLIC BLOOD PRESSURE: 80 MMHG | HEART RATE: 76 BPM | TEMPERATURE: 97.6 F | RESPIRATION RATE: 18 BRPM

## 2023-07-07 PROCEDURE — G0493 RN CARE EA 15 MIN HH/HOSPICE: HCPCS

## 2023-07-07 ASSESSMENT — ENCOUNTER SYMPTOMS
PERSON REPORTING PAIN: PATIENT
DENIES PAIN: 1
STOOL FREQUENCY: DAILY
BOWEL PATTERN NORMAL: 1
LAST BOWEL MOVEMENT: 66661
VOMITING: DENIES
NAUSEA: DENIES

## 2023-07-10 ENCOUNTER — HOME CARE VISIT (OUTPATIENT)
Dept: HOME HEALTH SERVICES | Facility: HOME HEALTHCARE | Age: 68
End: 2023-07-10
Payer: MEDICARE

## 2023-07-10 VITALS
SYSTOLIC BLOOD PRESSURE: 128 MMHG | HEART RATE: 73 BPM | TEMPERATURE: 97.8 F | OXYGEN SATURATION: 97 % | DIASTOLIC BLOOD PRESSURE: 70 MMHG | RESPIRATION RATE: 17 BRPM

## 2023-07-10 PROCEDURE — G0299 HHS/HOSPICE OF RN EA 15 MIN: HCPCS

## 2023-07-10 ASSESSMENT — ENCOUNTER SYMPTOMS
LAST BOWEL MOVEMENT: 66665
DRY SKIN: 1
MUSCLE WEAKNESS: 1
LIMITED RANGE OF MOTION: 1
PERSON REPORTING PAIN: PATIENT
DENIES PAIN: 1
BOWEL PATTERN NORMAL: 1
FATIGUES EASILY: 1

## 2023-07-11 RX ORDER — SILDENAFIL 50 MG/1
50 TABLET, FILM COATED ORAL
Qty: 10 TABLET | Refills: 3 | Status: SHIPPED | OUTPATIENT
Start: 2023-07-11 | End: 2023-07-11

## 2023-07-11 RX ORDER — SILDENAFIL 50 MG/1
50 TABLET, FILM COATED ORAL
Qty: 10 TABLET | Refills: 3 | Status: SHIPPED | OUTPATIENT
Start: 2023-07-11 | End: 2023-07-30 | Stop reason: SDUPTHER

## 2023-07-12 ENCOUNTER — HOME CARE VISIT (OUTPATIENT)
Dept: HOME HEALTH SERVICES | Facility: HOME HEALTHCARE | Age: 68
End: 2023-07-12
Payer: MEDICARE

## 2023-07-12 ENCOUNTER — HOSPITAL ENCOUNTER (OUTPATIENT)
Dept: LAB | Facility: MEDICAL CENTER | Age: 68
End: 2023-07-12
Attending: NURSE PRACTITIONER
Payer: MEDICARE

## 2023-07-12 DIAGNOSIS — L08.9 SOFT TISSUE INFECTION: ICD-10-CM

## 2023-07-12 LAB
ALBUMIN SERPL BCP-MCNC: 4.4 G/DL (ref 3.2–4.9)
ALBUMIN/GLOB SERPL: 1.5 G/DL
ALP SERPL-CCNC: 63 U/L (ref 30–99)
ALT SERPL-CCNC: 9 U/L (ref 2–50)
ANION GAP SERPL CALC-SCNC: 16 MMOL/L (ref 7–16)
AST SERPL-CCNC: 12 U/L (ref 12–45)
BASOPHILS # BLD AUTO: 0.7 % (ref 0–1.8)
BASOPHILS # BLD: 0.05 K/UL (ref 0–0.12)
BILIRUB SERPL-MCNC: 0.5 MG/DL (ref 0.1–1.5)
BUN SERPL-MCNC: 9 MG/DL (ref 8–22)
CALCIUM ALBUM COR SERPL-MCNC: 9.2 MG/DL (ref 8.5–10.5)
CALCIUM SERPL-MCNC: 9.5 MG/DL (ref 8.5–10.5)
CHLORIDE SERPL-SCNC: 103 MMOL/L (ref 96–112)
CO2 SERPL-SCNC: 22 MMOL/L (ref 20–33)
CREAT SERPL-MCNC: 0.69 MG/DL (ref 0.5–1.4)
EOSINOPHIL # BLD AUTO: 0.14 K/UL (ref 0–0.51)
EOSINOPHIL NFR BLD: 2.1 % (ref 0–6.9)
ERYTHROCYTE [DISTWIDTH] IN BLOOD BY AUTOMATED COUNT: 47.8 FL (ref 35.9–50)
GFR SERPLBLD CREATININE-BSD FMLA CKD-EPI: 102 ML/MIN/1.73 M 2
GLOBULIN SER CALC-MCNC: 3 G/DL (ref 1.9–3.5)
GLUCOSE SERPL-MCNC: 81 MG/DL (ref 65–99)
HCT VFR BLD AUTO: 47.5 % (ref 42–52)
HGB BLD-MCNC: 15.6 G/DL (ref 14–18)
IMM GRANULOCYTES # BLD AUTO: 0.02 K/UL (ref 0–0.11)
IMM GRANULOCYTES NFR BLD AUTO: 0.3 % (ref 0–0.9)
LYMPHOCYTES # BLD AUTO: 2.83 K/UL (ref 1–4.8)
LYMPHOCYTES NFR BLD: 42.1 % (ref 22–41)
MCH RBC QN AUTO: 29.7 PG (ref 27–33)
MCHC RBC AUTO-ENTMCNC: 32.8 G/DL (ref 32.3–36.5)
MCV RBC AUTO: 90.5 FL (ref 81.4–97.8)
MONOCYTES # BLD AUTO: 0.63 K/UL (ref 0–0.85)
MONOCYTES NFR BLD AUTO: 9.4 % (ref 0–13.4)
NEUTROPHILS # BLD AUTO: 3.05 K/UL (ref 1.82–7.42)
NEUTROPHILS NFR BLD: 45.4 % (ref 44–72)
NRBC # BLD AUTO: 0 K/UL
NRBC BLD-RTO: 0 /100 WBC (ref 0–0.2)
PLATELET # BLD AUTO: 221 K/UL (ref 164–446)
PMV BLD AUTO: 9.8 FL (ref 9–12.9)
POTASSIUM SERPL-SCNC: 3.9 MMOL/L (ref 3.6–5.5)
PROT SERPL-MCNC: 7.4 G/DL (ref 6–8.2)
RBC # BLD AUTO: 5.25 M/UL (ref 4.7–6.1)
SODIUM SERPL-SCNC: 141 MMOL/L (ref 135–145)
WBC # BLD AUTO: 6.7 K/UL (ref 4.8–10.8)

## 2023-07-12 PROCEDURE — 87040 BLOOD CULTURE FOR BACTERIA: CPT

## 2023-07-12 PROCEDURE — 80053 COMPREHEN METABOLIC PANEL: CPT

## 2023-07-12 PROCEDURE — 85025 COMPLETE CBC W/AUTO DIFF WBC: CPT

## 2023-07-12 PROCEDURE — G0299 HHS/HOSPICE OF RN EA 15 MIN: HCPCS

## 2023-07-12 PROCEDURE — 36415 COLL VENOUS BLD VENIPUNCTURE: CPT

## 2023-07-14 ENCOUNTER — HOME CARE VISIT (OUTPATIENT)
Dept: HOME HEALTH SERVICES | Facility: HOME HEALTHCARE | Age: 68
End: 2023-07-14
Payer: MEDICARE

## 2023-07-14 PROCEDURE — G0299 HHS/HOSPICE OF RN EA 15 MIN: HCPCS

## 2023-07-16 VITALS
TEMPERATURE: 97.6 F | SYSTOLIC BLOOD PRESSURE: 148 MMHG | OXYGEN SATURATION: 97 % | HEART RATE: 74 BPM | RESPIRATION RATE: 16 BRPM | RESPIRATION RATE: 16 BRPM | OXYGEN SATURATION: 97 % | TEMPERATURE: 97.8 F | DIASTOLIC BLOOD PRESSURE: 74 MMHG | SYSTOLIC BLOOD PRESSURE: 120 MMHG | HEART RATE: 64 BPM | DIASTOLIC BLOOD PRESSURE: 70 MMHG

## 2023-07-16 ASSESSMENT — PAIN SCALES - PAIN ASSESSMENT IN ADVANCED DEMENTIA (PAINAD)
CONSOLABILITY: 0 - NO NEED TO CONSOLE.
CONSOLABILITY: 0 - NO NEED TO CONSOLE.
NEGVOCALIZATION: 0 - NONE.
BODYLANGUAGE: 0 - RELAXED.
BODYLANGUAGE: 0 - RELAXED.
NEGVOCALIZATION: 0 - NONE.
FACIALEXPRESSION: 0 - SMILING OR INEXPRESSIVE.
FACIALEXPRESSION: 0 - SMILING OR INEXPRESSIVE.
TOTALSCORE: 0
TOTALSCORE: 0

## 2023-07-16 ASSESSMENT — ENCOUNTER SYMPTOMS
DENIES PAIN: 1
BOWEL PATTERN NORMAL: 1
HYPERTENSION: 1
LAST BOWEL MOVEMENT: 66667
STOOL FREQUENCY: DAILY
STOOL FREQUENCY: DAILY
LAST BOWEL MOVEMENT: 66669
PERSON REPORTING PAIN: PATIENT
BOWEL PATTERN NORMAL: 1
DENIES PAIN: 1
PERSON REPORTING PAIN: PATIENT

## 2023-07-17 ENCOUNTER — HOME CARE VISIT (OUTPATIENT)
Dept: HOME HEALTH SERVICES | Facility: HOME HEALTHCARE | Age: 68
End: 2023-07-17
Payer: MEDICARE

## 2023-07-17 LAB
BACTERIA BLD CULT: NORMAL
SIGNIFICANT IND 70042: NORMAL
SITE SITE: NORMAL
SOURCE SOURCE: NORMAL

## 2023-07-17 PROCEDURE — G0493 RN CARE EA 15 MIN HH/HOSPICE: HCPCS

## 2023-07-17 ASSESSMENT — ENCOUNTER SYMPTOMS
DIZZINESS: 0
COUGH: 0
NERVOUS/ANXIOUS: 0
VOMITING: 0
BLOOD IN STOOL: 1
WHEEZING: 0
PALPITATIONS: 0
FEVER: 0
SPUTUM PRODUCTION: 0
HEADACHES: 0
SHORTNESS OF BREATH: 0
NAUSEA: 0
CHILLS: 0
MYALGIAS: 0
FOCAL WEAKNESS: 0
ABDOMINAL PAIN: 0
BRUISES/BLEEDS EASILY: 0
DOUBLE VISION: 0
BLURRED VISION: 0
WEIGHT LOSS: 0

## 2023-07-18 ENCOUNTER — OFFICE VISIT (OUTPATIENT)
Dept: INFECTIOUS DISEASES | Facility: MEDICAL CENTER | Age: 68
End: 2023-07-18
Attending: NURSE PRACTITIONER
Payer: MEDICARE

## 2023-07-18 ENCOUNTER — OFFICE VISIT (OUTPATIENT)
Dept: VASCULAR SURGERY | Facility: MEDICAL CENTER | Age: 68
End: 2023-07-18
Payer: MEDICARE

## 2023-07-18 VITALS
TEMPERATURE: 98.1 F | SYSTOLIC BLOOD PRESSURE: 118 MMHG | HEIGHT: 71 IN | RESPIRATION RATE: 16 BRPM | WEIGHT: 161.38 LBS | OXYGEN SATURATION: 96 % | HEART RATE: 76 BPM | DIASTOLIC BLOOD PRESSURE: 70 MMHG | BODY MASS INDEX: 22.59 KG/M2

## 2023-07-18 VITALS
WEIGHT: 161 LBS | HEART RATE: 78 BPM | BODY MASS INDEX: 22.54 KG/M2 | SYSTOLIC BLOOD PRESSURE: 112 MMHG | HEIGHT: 71 IN | TEMPERATURE: 98.2 F | DIASTOLIC BLOOD PRESSURE: 66 MMHG | OXYGEN SATURATION: 97 %

## 2023-07-18 DIAGNOSIS — A49.02 MRSA INFECTION: ICD-10-CM

## 2023-07-18 DIAGNOSIS — I72.4 PSEUDOANEURYSM OF FEMORAL ARTERY (HCC): ICD-10-CM

## 2023-07-18 DIAGNOSIS — I73.9 PERIPHERAL ARTERIAL DISEASE (HCC): ICD-10-CM

## 2023-07-18 DIAGNOSIS — I73.9 PVD (PERIPHERAL VASCULAR DISEASE) (HCC): ICD-10-CM

## 2023-07-18 DIAGNOSIS — L08.9 SOFT TISSUE INFECTION: ICD-10-CM

## 2023-07-18 PROCEDURE — 3074F SYST BP LT 130 MM HG: CPT | Performed by: NURSE PRACTITIONER

## 2023-07-18 PROCEDURE — 99212 OFFICE O/P EST SF 10 MIN: CPT | Performed by: NURSE PRACTITIONER

## 2023-07-18 PROCEDURE — 3078F DIAST BP <80 MM HG: CPT | Performed by: SURGERY

## 2023-07-18 PROCEDURE — 99024 POSTOP FOLLOW-UP VISIT: CPT | Performed by: SURGERY

## 2023-07-18 PROCEDURE — 3074F SYST BP LT 130 MM HG: CPT | Performed by: SURGERY

## 2023-07-18 PROCEDURE — 3078F DIAST BP <80 MM HG: CPT | Performed by: NURSE PRACTITIONER

## 2023-07-18 ASSESSMENT — FIBROSIS 4 INDEX
FIB4 SCORE: 1.19
FIB4 SCORE: 1.19

## 2023-07-18 NOTE — PROGRESS NOTES
"                 VASCULAR SURGERY                    Postop Note  _________________________________    7/18/2023    Mr. Dinero comes to the clinic today for follow-up, status post right groin wound debridement and wound VAC placement on May 19, 2023.  He has no complaints except for some numbness in the right leg from the hip down when he walks.  His right groin wound has healed up completely. He was seen by infectious disease service and was told that he no longer needs to be on antibiotics.     Vitals   /66 (BP Location: Left arm, Patient Position: Sitting, BP Cuff Size: Adult)   Pulse 78   Temp 36.8 °C (98.2 °F) (Temporal)   Ht 1.791 m (5' 10.5\")   Wt 73 kg (161 lb)   SpO2 97%   BMI 22.77 kg/m²      Exam  General: Pleasant male in none apparent distress.  Abdomen: Soft, nondistended, nontender.  Well-healed wound in his right lower abdomen without erythema, drainage, or fluctuation.  Right lower extremity: Groin wound has completely healed without erythema, drainage, or fluctuation.  Right posterior tibial pulses are palpable with multiphasic flow.     Assessment: Status post right groin debridement and wound VAC placement.     Pain: Doing well.  I am not sure about the etiology of his right leg numbness with ambulation since his right leg's circulation is normal on physical exam.  I asked patient to continue walking.  I ordered follow-up right lower extremity arterial study to be done in 2 months and asked patient to see me after the study is done.  He knows to call me for any problem at any time.  I counseled patient to stop smoking as well.      Negar Machado MD  [facility]  [phone]  [address] [facility] [address], [place] [address]   "

## 2023-07-18 NOTE — PROGRESS NOTES
INFECTIOUS  DISEASE  OUTPATIENT CLINIC  NOTE   Subjective   Primary care provider: William Harman M.D..     Reason for Follow Up:   Follow-up for   1. Soft tissue infection        2. Pseudoaneurysm of femoral artery (HCC)        3. PVD (peripheral vascular disease) (HCC)        4. MRSA infection            HPI: Patient previously seen and treated by ID team as inpatient during hospital admission.   Dale Dinero is a 68 y.o. male admitted 5/19/2023.  Patient has history of peripheral arterial disease, tobacco abuse, chronic pain with narcotic use, has a remote history of aortobifemoral bypass more than a decade ago.  Patient was lost to follow-up but returned to vascular surgery recently and was noted to have a 4 cm pseudoaneurysm in the right common femoral artery area.  He was taken to the OR on 5/3, underwent open repair with a Dacron graft from the right aortobifemoral bypass graft limb to the distal right common femoral artery, also underwent right leg arterial thrombectomy.  At the time, general degeneration was noted but no obvious evidence of infection.  No cultures were obtained.     Patient was initially doing well but developed superficial wound dehiscence and surrounding cellulitis and was taken back to the OR on 5/19.  I&D was performed, superficial fluid collection noted, macroscopically described as a seroma, no extension down to the graft noted to the naked eye and the graft was not exposed during this procedure.  Cultures were obtained and this is growing methicillin-resistant staph epi and Finegoldia magna.  Vascular surgery considering aggressive antibiotic use given concern for seeding of underlying vascular graft. 6 weeks of oral antibiotics with p.o. Bactrim 1 DS tab twice daily + p.o. Augmentin 875 mg twice daily through 6/29/2023, followed by close clinical follow-up off of antibiotic versus prolonging duration depending on clinical course    07/05/23- Today Patient reports  feeling well.  Denies drainage, pungent odor, redness, pain. Denies feeling generally ill, fevers/chills, general malaise, headache, n/v/d.  Right groin wound continues to heal without any signs of active infection.  Recently completed 6 week course antibiotic course on 6/29/2023.  Repeat blood cultures while off antibiotics 7 days for evaluation possible infected right aortobifemoral bypass graft.  Labs from 6/9/2023 mostly unremarkable.     7/18/23- Repeat labs while off antibiotics with no signs of active infection. WBC WNL 6.7, Neutrophils WNL,  CMP unremarkable, and repeat BC No growth after 5 days of incubation. No further need for antibiotics a this time.      Current Antimicrobials: None  Previous Antimicrobials: Vancomycin, Flagyl, Bactrim, Augmentin    Other Current Medications:  Home Medications    Medication Sig Taking? Last Dose Authorizing Provider   sildenafil citrate (VIAGRA) 50 MG tablet Take 1 Tablet by mouth 1 time a day as needed for Erectile Dysfunction. Yes Taking William Harman M.D.   aspirin 81 MG EC tablet Take 81 mg by mouth every morning. Indications: dvt prevention Yes Taking Physician Outpatient   lisinopril (PRINIVIL) 5 MG Tab Take 1 Tablet by mouth every day. Yes Taking William Harman M.D.   simvastatin (ZOCOR) 20 MG Tab TAKE 1 TABLET BY MOUTH ONCE DAILY IN THE EVENING Yes Taking William Harman M.D.        PMH:  Past Medical History:   Diagnosis Date    Anesthesia     Awareness under anesthesia     CAD (coronary artery disease)     Cancer (HCC)     skin    COPD (chronic obstructive pulmonary disease) (HCC)     Emphysema of lung (HCC)     Heart burn     Hep C w/o coma, chronic (HCC)     Hepatitis C     treated    High cholesterol     Hypertension     states controlled on meds    Indigestion     Renal disorder     stage 1 kidney disease    Snoring      Past Surgical History:   Procedure Laterality Date    DEBRIDEMENT Right 5/19/2023    Procedure: RIGHT GROIN  WOUND DEBRIDEMENT;  Surgeon: Negar Machado M.D.;  Location: SURGERY [facility];  Service: General    MT REPR ANEURYSM/GRFT INS,COMMON FEMORAL Right 5/3/2023    Procedure: OPEN REPAIR OF RIGHT GROIN PSEUDOANEURYSM;  Surgeon: Negar Machado M.D.;  Location: SURGERY [facility];  Service: General    AORTOGRAM Right 5/3/2023    Procedure: AORTOGRAM, RIGHT LEG;  Surgeon: Negar Machado M.D.;  Location: SURGERY [facility];  Service: General    THROMBECTOMY Right 5/3/2023    Procedure: THROMBECTOMY, RIGHT LEG;  Surgeon: Negar Machado M.D.;  Location: SURGERY [facility];  Service: General    LAMINOTOMY      lumbar (rods)    MENISCUS REPAIR Left     MULTIPLE CORONARY ARTERY BYPASS       Family History   Problem Relation Age of Onset    Other Mother         accident    Cancer Father         Colan    Diabetes Father     Cancer Brother         lymphoma     Social History     Socioeconomic History    Marital status:      Spouse name: Not on file    Number of children: Not on file    Years of education: Not on file    Highest education level: 12th grade   Occupational History    Not on file   Tobacco Use    Smoking status: Every Day     Packs/day: 1.00     Years: 52.00     Pack years: 52.00     Types: Cigarettes    Smokeless tobacco: Never   Vaping Use    Vaping Use: Never used   Substance and Sexual Activity    Alcohol use: No     Comment: Patient stopped drinking 5/28/2018    Drug use: No    Sexual activity: Never   Other Topics Concern    Not on file   Social History Narrative    Not on file     Social Determinants of Health     Financial Resource Strain: Low Risk  (5/25/2023)    Overall Financial Resource Strain (CARDIA)     Difficulty of Paying Living Expenses: Not hard at all   Food Insecurity: No Food Insecurity (5/25/2023)    Hunger Vital Sign     Worried About Running Out of Food in the Last Year: Never true     Ran Out of Food in the Last Year: Never true   Transportation Needs: No Transportation Needs  (5/25/2023)    PRAPARE - Transportation     Lack of Transportation (Medical): No     Lack of Transportation (Non-Medical): No   Physical Activity: Unknown (5/25/2023)    Exercise Vital Sign     Days of Exercise per Week: Patient refused     Minutes of Exercise per Session: Patient refused   Stress: Unknown (5/25/2023)    [place] [employer] - Occupational Stress Questionnaire     Feeling of Stress : Patient refused   Social Connections: Socially Integrated (5/25/2023)    Social Connection and Isolation Panel [NHANES]     Frequency of Communication with Friends and Family: More than three times a week     Frequency of Social Gatherings with Friends and Family: More than three times a week     Attends Muslim Services: More than 4 times per year     Active Member of Clubs or Organizations: Yes     Attends Club or Organization Meetings: More than 4 times per year     Marital Status:    Intimate Partner Violence: Not on file   Housing Stability: Unknown (5/25/2023)    Housing Stability Vital Sign     Unable to Pay for Housing in the Last Year: No     Number of Places Lived in the Last Year: Not on file     Unstable Housing in the Last Year: No           Allergies/Intolerances:  No Known Allergies    ROS:   Review of Systems   Constitutional:  Negative for chills, fever, malaise/fatigue and weight loss.   HENT:  Negative for congestion and hearing loss.    Eyes:  Negative for blurred vision and double vision.   Respiratory:  Negative for cough, sputum production, shortness of breath and wheezing.    Cardiovascular:  Negative for chest pain and palpitations.   Gastrointestinal:  Positive for blood in stool. Negative for abdominal pain, nausea and vomiting.   Genitourinary:  Negative for dysuria.   Musculoskeletal:  Negative for myalgias.   Skin:  Negative for itching and rash.        Right groin surgical wound healing without surrounding redness or drainage   Neurological:  Negative for  "dizziness, focal weakness and headaches.   Endo/Heme/Allergies:  Does not bruise/bleed easily.   Psychiatric/Behavioral:  The patient is not nervous/anxious.       ROS was reviewed and were negative except as above.    Objective    Most Recent Vital Signs:  Blood Pressure 118/70 (BP Location: Right arm, Patient Position: Sitting, BP Cuff Size: Adult)   Pulse 76   Temperature 36.7 °C (98.1 °F) (Temporal)   Respiration 16   Height 1.791 m (5' 10.5\")   Weight 73.2 kg (161 lb 6 oz)   Oxygen Saturation 96%   Body Mass Index 22.83 kg/m²     Physical Exam:  Physical Exam  Vitals reviewed.   Constitutional:       Appearance: Normal appearance.   HENT:      Head: Normocephalic and atraumatic.      Nose: Nose normal.      Mouth/Throat:      Mouth: Mucous membranes are moist.   Eyes:      Pupils: Pupils are equal, round, and reactive to light.   Cardiovascular:      Rate and Rhythm: Normal rate.   Pulmonary:      Effort: Pulmonary effort is normal.      Breath sounds: Normal breath sounds.   Abdominal:      Palpations: Abdomen is soft.   Musculoskeletal:         General: No tenderness.      Cervical back: Normal range of motion and neck supple.      Comments: Healing right groin site without surrounding erythema, swelling, drainage.   Skin:     General: Skin is warm and dry.      Coloration: Skin is not jaundiced.      Findings: No erythema or rash.   Neurological:      Mental Status: He is alert and oriented to person, place, and time.      Motor: No weakness.   Psychiatric:         Mood and Affect: Mood normal.         Behavior: Behavior normal.          Pertinent Lab/Imaging Results:  [unfilled]  @CMP@  WBC   Date/Time Value Ref Range Status   07/12/2023 07:00 AM 6.7 4.8 - 10.8 K/uL Final     RBC   Date/Time Value Ref Range Status   07/12/2023 07:00 AM 5.25 4.70 - 6.10 M/uL Final     Hemoglobin   Date/Time Value Ref Range Status   07/12/2023 07:00 AM 15.6 14.0 - 18.0 g/dL Final     Hematocrit   Date/Time Value Ref " Range Status   07/12/2023 07:00 AM 47.5 42.0 - 52.0 % Final     MCV   Date/Time Value Ref Range Status   07/12/2023 07:00 AM 90.5 81.4 - 97.8 fL Final     MCH   Date/Time Value Ref Range Status   07/12/2023 07:00 AM 29.7 27.0 - 33.0 pg Final     MCHC   Date/Time Value Ref Range Status   07/12/2023 07:00 AM 32.8 32.3 - 36.5 g/dL Final     Comment:     Please note new reference range effective 05/22/2023.     MPV   Date/Time Value Ref Range Status   07/12/2023 07:00 AM 9.8 9.0 - 12.9 fL Final      Sodium   Date/Time Value Ref Range Status   07/12/2023 07:00  135 - 145 mmol/L Final     Potassium   Date/Time Value Ref Range Status   07/12/2023 07:00 AM 3.9 3.6 - 5.5 mmol/L Final     Chloride   Date/Time Value Ref Range Status   07/12/2023 07:00  96 - 112 mmol/L Final     Co2   Date/Time Value Ref Range Status   07/12/2023 07:00 AM 22 20 - 33 mmol/L Final     Glucose   Date/Time Value Ref Range Status   07/12/2023 07:00 AM 81 65 - 99 mg/dL Final     Bun   Date/Time Value Ref Range Status   07/12/2023 07:00 AM 9 8 - 22 mg/dL Final     Creatinine   Date/Time Value Ref Range Status   07/12/2023 07:00 AM 0.69 0.50 - 1.40 mg/dL Final     Alkaline Phosphatase   Date/Time Value Ref Range Status   07/12/2023 07:00 AM 63 30 - 99 U/L Final     AST(SGOT)   Date/Time Value Ref Range Status   07/12/2023 07:00 AM 12 12 - 45 U/L Final     ALT(SGPT)   Date/Time Value Ref Range Status   07/12/2023 07:00 AM 9 2 - 50 U/L Final     Total Bilirubin   Date/Time Value Ref Range Status   07/12/2023 07:00 AM 0.5 0.1 - 1.5 mg/dL Final      No results found for: CPKTOTAL       No results found for: BLOODCULTU, BLDCULT, BCHOLD    No results found for: BLOODCULTU, BLDCULT, BCHOLD   Contains abnormal data CULTURE WOUND W/ GRAM STAIN  Order: [id number]  Status: Final result     Visible to patient: Yes (seen)     Next appt: 07/18/2023 at 09:00 AM in Infectious Diseases ([facility]ALL[facility] FullerP.R.NKirsten)     Specimen Information: Wound   0 Result  Notes      Component 1 mo ago   Significant Indicator POS Positive (POS)    Source WND    Site Right Groin    Culture Result  Abnormal   Growth noted after further incubation, see below for   organism identification.     Gram Stain Result Few WBCs.   No organisms seen.    Culture Result  Abnormal   Staphylococcus epidermidis   Rare growth     Resulting Agency M        Susceptibility     Staphylococcus epidermidis     TAPAN     Ampicillin/sulbactam <=8/4 mcg/mL Resistant     Azithromycin <=2 mcg/mL Sensitive     Cefazolin <=8 mcg/mL Resistant     Cefepime 8 mcg/mL Resistant     Clindamycin <=0.25 mcg/mL Sensitive     Daptomycin <=0.5 mcg/mL Sensitive     Erythromycin <=0.25 mcg/mL Sensitive     Oxacillin >2 mcg/mL Resistant     Tetracycline <=4 mcg/mL Sensitive     Trimeth/Sulfa <=0.5/9.5 m... Sensitive     Vancomycin 1 mcg/mL Sensitive                 Narrative  Performed by: MEGAN  Surgery - swabs received      Specimen Collected: 05/19/23  2:37 PM Last Resulted: 05/23/23 11:17 AM       Lab Flowsheet    Order Details    View Encounter    Lab and Collection Details    Routing    Result History     View All Conversations on this Encounter           Result Care Coordination      Patient Communication     Add Comments   Seen Back to Top            Contains abnormal data Anaerobic Culture  Order: [id number] - Reflex for Order [id number]  Status: Final result     Visible to patient: Yes (seen)     Next appt: 07/18/2023 at 09:00 AM in Infectious Diseases (Hermann Greenfield A.P.R.N.)     Specimen Information: Wound   0 Result Notes      Component 1 mo ago   Significant Indicator POS Positive (POS)    Source WND    Site Right Groin    Culture Result - Abnormal     Culture Result  Abnormal   Finegoldia magna   Light growth     Resulting Agency M              Narrative  Performed by: MEGAN  Surgery - swabs received      Specimen Collected: 05/19/23  2:37 PM Last Resulted: 05/24/23  4:42 PM                 Impression/Assessment      1. Soft  tissue infection        2. Pseudoaneurysm of femoral artery (HCC)        3. PVD (peripheral vascular disease) ([facility])        4. MRSA infection            Dale Dinero is a 68 y.o. male patient with history of peripheral arterial disease and tobacco abuse, chronic pain with narcotic use, remote history of aortobifemoral bypass more than a decade ago, lost to follow-up, returned to vascular surgery recently and was noted to have a 4 cm pseudoaneurysm in the right common femoral artery area.  He was taken to the OR on 5/3, underwent open repair with a Dacron graft from the right aortobifemoral bypass graft limb to the distal right common femoral artery, also underwent right leg arterial thrombectomy.  At the time, general degeneration was noted but no obvious evidence of infection.  Patient developed superficial wound dehiscence and surrounding cellulitis, return to the OR on 5/19, I&D was performed, superficial fluid collection noted, macroscopically described as a seroma, no extension down to the graft noted to the naked eye and the graft was not exposed during this procedure.  Cultures were obtained and this is growing methicillin-resistant Staph epi and Finegoldia magna.  Vascular surgery considering aggressive antibiotic use given concern for seeding of underlying vascular graft. 6 weeks of oral antibiotics with p.o. Bactrim 1 DS tab twice daily + p.o. Augmentin 875 mg twice daily through 6/29/2023, followed by close clinical follow-up off of antibiotic versus prolonging duration depending on clinical course  Agree with vascular surgery recommendations for aggressive antibiotic therapy given relatively easy access for the organisms to travel down and seed the underlying vascular graft.     7/18/23- Repeat labs while off antibiotics with no signs of active infection. WBC WNL 6.7, Neutrophils WNL,  CMP unremarkable, and repeat BC No growth after 5 days of incubation. No further need for antibiotics a this time  patient has completed 6-week course of antibiotic therapy. No previous BC were positive for infection.     If Right wound site shows signs of recurrence infection, patient develops fever or chills, or worsening labs there is highly likelihood the vascular graft was infected and patient will require chronic antibiotics 6 months to 1 year.   PLAN:   -Completed 6-week course of p.o. Bactrim and Augmentin on 6/29/2023.  Motoring while off antibiotics shows no signs of infection. No further need for antibiotics at this time.    -Continue routine follow-up with vascular surgery  -Education provided on signs and symptoms of infection and when to report to ER/call 911      Return visit: PRN. Follow up with primary care physician for chronic medical problems      I have performed a physical exam,  updated ROS and plan today. I have reviewed previous images, labs, and provider notes.      RANDAL Fuller.    All Patients should seek medical re-evaluation or report to the ER for new, increasing or worsening symptoms. In some circumstances medical conditions can change from the initial evaluation and may require emergent medical re-evaluation. This includes but is not limited to chest pain, shortness of breath, atypical abdominal pain, atypical headache, ALOC, fever >101, low blood pressure, high respiratory rate (above 30), low oxygen saturation (below 90%), acute delirium, abnormal bleeding, inability to tolerate any intake, weakness on one side of the body, any worsened or concerning conditions.    Please note that this dictation was created using voice recognition software. I have worked with technical experts from [facility] to optimize the interface.  I have made every reasonable attempt to correct obvious errors, but there may be errors of grammar and possibly content that I did not discover before finalizing the note.

## 2023-07-20 ENCOUNTER — HOME CARE VISIT (OUTPATIENT)
Dept: HOME HEALTH SERVICES | Facility: HOME HEALTHCARE | Age: 68
End: 2023-07-20
Payer: MEDICARE

## 2023-07-20 ENCOUNTER — TELEPHONE (OUTPATIENT)
Dept: HEALTH INFORMATION MANAGEMENT | Facility: OTHER | Age: 68
End: 2023-07-20
Payer: MEDICARE

## 2023-07-20 VITALS
TEMPERATURE: 97.6 F | DIASTOLIC BLOOD PRESSURE: 70 MMHG | RESPIRATION RATE: 16 BRPM | OXYGEN SATURATION: 94 % | SYSTOLIC BLOOD PRESSURE: 128 MMHG | HEART RATE: 64 BPM

## 2023-07-20 SDOH — ECONOMIC STABILITY: FOOD INSECURITY: MEALS PER DAY: 3

## 2023-07-20 ASSESSMENT — PAIN SCALES - PAIN ASSESSMENT IN ADVANCED DEMENTIA (PAINAD)
CONSOLABILITY: 0 - NO NEED TO CONSOLE.
FACIALEXPRESSION: 0 - SMILING OR INEXPRESSIVE.
BODYLANGUAGE: 0 - RELAXED.
NEGVOCALIZATION: 0 - NONE.
TOTALSCORE: 0

## 2023-07-20 ASSESSMENT — ENCOUNTER SYMPTOMS
APPETITE LEVEL: FAIR
BOWEL PATTERN NORMAL: 1
DENIES PAIN: 1
HYPERTENSION: 1
LAST BOWEL MOVEMENT: 66672
PERSON REPORTING PAIN: PATIENT
CHANGE IN APPETITE: UNCHANGED

## 2023-07-20 ASSESSMENT — ACTIVITIES OF DAILY LIVING (ADL)
OASIS_M1830: 00
HOME_HEALTH_OASIS: 00

## 2023-07-20 NOTE — CASE COMMUNICATION
Quality Review Completed for DC OASIS by AR Castañeda RN on 7/20/2023:     Edits completed by AR Castañeda RN:  1.  is NO, this episode is not between the specified dates of 10/1-3/31  2.  D. Therapeutic diet #4 and #5 for HTN management  3.  changed to not healing, the wound is over 30 days old and healing by secondary intention with recent complications of cellulitis and dehiscience

## 2023-07-20 NOTE — TELEPHONE ENCOUNTER
Tamir said that, he just have a couple surgeries and is not interested in scheduling assessments.

## 2023-07-21 NOTE — CASE COMMUNICATION
agree to all changes made  ----- Message -----  From: Apple Castañeda R.N.  Sent: 7/20/2023   7:15 AM PDT  To: Brandon Gonzales R.N.      Quality Review Completed for DC OASIS by AR Castañeda, RN on 7/20/2023:     Edits completed by AR Castañeda RN:  1.  is NO, this episode is not between the specified dates of 10/1-3/31  2.  D. Therapeutic diet #4 and #5 for HTN management  3.  changed to not healing, the wound is over 30  days old and healing by secondary intention with recent complications of cellulitis and dehiscience

## 2023-07-30 DIAGNOSIS — N52.9 ERECTILE DYSFUNCTION, UNSPECIFIED ERECTILE DYSFUNCTION TYPE: ICD-10-CM

## 2023-07-31 RX ORDER — SILDENAFIL 50 MG/1
50 TABLET, FILM COATED ORAL
Qty: 10 TABLET | Refills: 3 | Status: SHIPPED | OUTPATIENT
Start: 2023-07-31 | End: 2023-10-17 | Stop reason: SDUPTHER

## 2023-07-31 RX ORDER — SIMVASTATIN 20 MG
20 TABLET ORAL EVERY EVENING
Qty: 90 TABLET | Refills: 0 | Status: SHIPPED | OUTPATIENT
Start: 2023-07-31 | End: 2023-09-29 | Stop reason: SDUPTHER

## 2023-08-03 ENCOUNTER — PATIENT OUTREACH (OUTPATIENT)
Dept: HEALTH INFORMATION MANAGEMENT | Facility: OTHER | Age: 68
End: 2023-08-03
Payer: MEDICARE

## 2023-08-03 DIAGNOSIS — I10 ESSENTIAL HYPERTENSION: ICD-10-CM

## 2023-08-03 DIAGNOSIS — J44.9 CHRONIC OBSTRUCTIVE PULMONARY DISEASE, UNSPECIFIED COPD TYPE (HCC): ICD-10-CM

## 2023-08-03 DIAGNOSIS — I25.810 CORONARY ARTERY DISEASE INVOLVING AUTOLOGOUS VEIN CORONARY BYPASS GRAFT WITHOUT ANGINA PECTORIS: ICD-10-CM

## 2023-08-03 NOTE — PROGRESS NOTES
This RN spoke with patient over the phone to introduce CCM program. Per patient, he would like more information but not right now. When asked when this RN should call back he requested in a month. Next outreach: 09/05/2023.

## 2023-09-07 ENCOUNTER — PATIENT OUTREACH (OUTPATIENT)
Dept: HEALTH INFORMATION MANAGEMENT | Facility: OTHER | Age: 68
End: 2023-09-07
Payer: MEDICARE

## 2023-09-07 DIAGNOSIS — I10 ESSENTIAL HYPERTENSION: ICD-10-CM

## 2023-09-08 NOTE — PROGRESS NOTES
CHW tried calling the pt as requested by the CCM nurse to introduce the CCM program. Pt did not answer and this CHW left a VM requesting a return call. 9/7  Community Health Worker Follow-Up    Reason for outreach: CHW called the pt as requested by the CCM nurse to introduce the CCM program.    CHW Interventions: Pt declined the program. Pt stated he does not want to enroll at this time.    Specific Resources Provided:  Housing/Shelter: n/a  Transportation: n/a  Food: n/a  Financial: n/a  Social Supports: n/a  Other: n/a    Plan: CHW will not follow.

## 2023-09-29 DIAGNOSIS — I10 ESSENTIAL HYPERTENSION: ICD-10-CM

## 2023-10-01 RX ORDER — LISINOPRIL 5 MG/1
5 TABLET ORAL DAILY
Qty: 100 TABLET | Refills: 3 | Status: SHIPPED | OUTPATIENT
Start: 2023-10-01 | End: 2023-10-17 | Stop reason: SDUPTHER

## 2023-10-01 RX ORDER — SIMVASTATIN 20 MG
20 TABLET ORAL EVERY EVENING
Qty: 90 TABLET | Refills: 0 | Status: SHIPPED | OUTPATIENT
Start: 2023-10-01 | End: 2023-10-17 | Stop reason: SDUPTHER

## 2023-10-17 DIAGNOSIS — I10 ESSENTIAL HYPERTENSION: ICD-10-CM

## 2023-10-17 DIAGNOSIS — N52.9 ERECTILE DYSFUNCTION, UNSPECIFIED ERECTILE DYSFUNCTION TYPE: ICD-10-CM

## 2023-10-19 PROCEDURE — RXMED WILLOW AMBULATORY MEDICATION CHARGE: Performed by: FAMILY MEDICINE

## 2023-10-19 RX ORDER — SILDENAFIL 50 MG/1
50 TABLET, FILM COATED ORAL
Qty: 10 TABLET | Refills: 3 | Status: SHIPPED | OUTPATIENT
Start: 2023-10-19

## 2023-10-19 RX ORDER — SIMVASTATIN 20 MG
20 TABLET ORAL EVERY EVENING
Qty: 100 TABLET | Refills: 0 | Status: SHIPPED | OUTPATIENT
Start: 2023-10-19 | End: 2024-01-16 | Stop reason: SDUPTHER

## 2023-10-19 RX ORDER — LISINOPRIL 5 MG/1
5 TABLET ORAL DAILY
Qty: 100 TABLET | Refills: 3 | Status: SHIPPED | OUTPATIENT
Start: 2023-10-19

## 2023-10-23 PROCEDURE — RXMED WILLOW AMBULATORY MEDICATION CHARGE: Performed by: FAMILY MEDICINE

## 2023-10-25 ENCOUNTER — PHARMACY VISIT (OUTPATIENT)
Dept: PHARMACY | Facility: MEDICAL CENTER | Age: 68
End: 2023-10-25
Payer: COMMERCIAL

## 2023-12-08 ENCOUNTER — HOSPITAL ENCOUNTER (OUTPATIENT)
Dept: RADIOLOGY | Facility: MEDICAL CENTER | Age: 68
End: 2023-12-08
Attending: SURGERY
Payer: MEDICARE

## 2023-12-08 DIAGNOSIS — I73.9 PERIPHERAL ARTERIAL DISEASE (HCC): ICD-10-CM

## 2023-12-08 PROCEDURE — 93926 LOWER EXTREMITY STUDY: CPT | Mod: RT

## 2023-12-08 PROCEDURE — 93922 UPR/L XTREMITY ART 2 LEVELS: CPT

## 2023-12-12 ENCOUNTER — OFFICE VISIT (OUTPATIENT)
Dept: VASCULAR SURGERY | Facility: MEDICAL CENTER | Age: 68
End: 2023-12-12
Payer: MEDICARE

## 2023-12-12 VITALS
RESPIRATION RATE: 16 BRPM | TEMPERATURE: 97.9 F | OXYGEN SATURATION: 97 % | BODY MASS INDEX: 24.62 KG/M2 | DIASTOLIC BLOOD PRESSURE: 80 MMHG | WEIGHT: 172 LBS | SYSTOLIC BLOOD PRESSURE: 138 MMHG | HEART RATE: 76 BPM | HEIGHT: 70 IN

## 2023-12-12 DIAGNOSIS — I73.9 PERIPHERAL ARTERIAL DISEASE (HCC): ICD-10-CM

## 2023-12-12 DIAGNOSIS — E78.5 DYSLIPIDEMIA: ICD-10-CM

## 2023-12-12 DIAGNOSIS — I10 PRIMARY HYPERTENSION: ICD-10-CM

## 2023-12-12 DIAGNOSIS — Z72.0 TOBACCO USE: ICD-10-CM

## 2023-12-12 PROCEDURE — 99214 OFFICE O/P EST MOD 30 MIN: CPT | Performed by: SURGERY

## 2023-12-12 PROCEDURE — 3079F DIAST BP 80-89 MM HG: CPT | Performed by: SURGERY

## 2023-12-12 PROCEDURE — 3075F SYST BP GE 130 - 139MM HG: CPT | Performed by: SURGERY

## 2023-12-12 ASSESSMENT — FIBROSIS 4 INDEX: FIB4 SCORE: 1.19

## 2023-12-13 ENCOUNTER — APPOINTMENT (OUTPATIENT)
Dept: ADMISSIONS | Facility: MEDICAL CENTER | Age: 68
End: 2023-12-13
Attending: SURGERY
Payer: MEDICARE

## 2023-12-13 ENCOUNTER — TELEPHONE (OUTPATIENT)
Dept: VASCULAR SURGERY | Facility: MEDICAL CENTER | Age: 68
End: 2023-12-13
Payer: MEDICARE

## 2023-12-13 NOTE — PROGRESS NOTES
"  VASCULAR SURGERY SERVICE  OFFICE FOLLOW UP      Date: 12/12/2023    Referring Provider: William Harman MD    Consulting Physician: Negar Machado MD - [facility]     -------------------------------------------------------------------------------------------------    Chief complaint:  \"Here for follow-up\".    HPI:  Mr. Dinero comes to the clinic today with his wife for follow-up.  He underwent open repair of right groin pseudoaneurysm on May 3, 2023 and right groin wound debridement and wound VAC placement on May 19, 2023.  He has no complaints.  He denies fever, chills, nausea, or vomiting.  He still smokes but tells me that he has cut down significantly.    Recent arterial duplex showed the interposition graft from the right aortobifemoral bypass graft limb to the distal common femoral artery to be patent, severe stenosis of the right mid/distal superficial femoral artery, and NEGRITA 0.78 on the right and 1.17 on the left.    Past Medical History:   Diagnosis Date    Anesthesia     Awareness under anesthesia     CAD (coronary artery disease)     Cancer (HCC)     skin    COPD (chronic obstructive pulmonary disease) (HCC)     Emphysema of lung (HCC)     Heart burn     Hep C w/o coma, chronic (HCC)     Hepatitis C     treated    High cholesterol     Hypertension     states controlled on meds    Indigestion     Renal disorder     stage 1 kidney disease    Snoring        Past Surgical History:   Procedure Laterality Date    DEBRIDEMENT Right 5/19/2023    Procedure: RIGHT GROIN WOUND DEBRIDEMENT;  Surgeon: Negar Macahdo M.D.;  Location: SURGERY [facility];  Service: General    MI REPR ANEURYSM/GRFT INS,COMMON FEMORAL Right 5/3/2023    Procedure: OPEN REPAIR OF RIGHT GROIN PSEUDOANEURYSM;  Surgeon: Negar Machado M.D.;  Location: SURGERY [facility];  Service: General    AORTOGRAM Right 5/3/2023    Procedure: AORTOGRAM, RIGHT LEG;  Surgeon: Negar Machado M.D.;  Location: SURGERY [facility];  Service: " General    THROMBECTOMY Right 5/3/2023    Procedure: THROMBECTOMY, RIGHT LEG;  Surgeon: Negar Machado M.D.;  Location: SURGERY [facility];  Service: General    LAMINOTOMY      lumbar (rods)    MENISCUS REPAIR Left     MULTIPLE CORONARY ARTERY BYPASS         Current Outpatient Medications   Medication Sig Dispense Refill    sildenafil citrate (VIAGRA) 50 MG tablet Take 1 Tablet by mouth 1 time a day as needed for Erectile Dysfunction. 10 Tablet 3    lisinopril (PRINIVIL) 5 MG Tab Take 1 Tablet by mouth every day. Indications: High Blood Pressure Disorder 100 Tablet 3    simvastatin (ZOCOR) 20 MG Tab Take 1 Tablet by mouth every evening. Indications: High Amount of Fats in the Blood 100 Tablet 0    aspirin 81 MG EC tablet Take 81 mg by mouth every morning. Indications: dvt prevention       No current facility-administered medications for this visit.       Social History     Socioeconomic History    Marital status:      Spouse name: Not on file    Number of children: Not on file    Years of education: Not on file    Highest education level: 12th grade   Occupational History    Not on file   Tobacco Use    Smoking status: Every Day     Current packs/day: 1.00     Average packs/day: 1 pack/day for 52.0 years (52.0 ttl pk-yrs)     Types: Cigarettes    Smokeless tobacco: Never   Vaping Use    Vaping Use: Never used   Substance and Sexual Activity    Alcohol use: No     Comment: Patient stopped drinking 5/28/2018    Drug use: No    Sexual activity: Never   Other Topics Concern    Not on file   Social History Narrative    Not on file     Social Determinants of Health     Financial Resource Strain: Low Risk  (5/25/2023)    Overall Financial Resource Strain (CARDIA)     Difficulty of Paying Living Expenses: Not hard at all   Food Insecurity: No Food Insecurity (5/25/2023)    Hunger Vital Sign     Worried About Running Out of Food in the Last Year: Never true     Ran Out of Food in the Last Year: Never true    Transportation Needs: No Transportation Needs (5/25/2023)    PRAPARE - Transportation     Lack of Transportation (Medical): No     Lack of Transportation (Non-Medical): No   Physical Activity: Unknown (5/25/2023)    Exercise Vital Sign     Days of Exercise per Week: Patient refused     Minutes of Exercise per Session: Patient refused   Stress: Unknown (5/25/2023)    [employer] - Occupational Stress Questionnaire     Feeling of Stress : Patient refused   Social Connections: Feeling Socially Integrated (7/17/2023)    OASIS : Social Isolation     Frequency of experiencing loneliness or isolation: Never   Intimate Partner Violence: Not on file   Housing Stability: Unknown (5/25/2023)    Housing Stability Vital Sign     Unable to Pay for Housing in the Last Year: No     Number of Places Lived in the Last Year: Not on file     Unstable Housing in the Last Year: No       Family History   Problem Relation Age of Onset    Other Mother         accident    Cancer Father         Colan    Diabetes Father     Cancer Brother         lymphoma       Allergies:  Patient has no known allergies.    Review of Systems:    Constitutional: Negative for fever, chills, weight loss,   HENT:   Negative for hearing loss or tinnitus    Eyes:    Negative for blurred vision, double vision, or loss of vision  Respiratory:  Negative for cough, hemoptysis, or wheezing    Cardiac:  Negative for chest pain or palpitations or orthopnea  Vascular:  Negative for claudication or rest pain   Gastrointestinal: Negative for nausea, vomiting, or abdominal pain     Negative for hematochezia or melena   Genitourinary: Negative for dysuria, frequency, or hematuria   Musculoskeletal: Negative for myalgias, back pain, or joint pain  Skin:   Negative for itching or rash  Neurological:  Negative for dizziness, headaches, or tremors     Negative for speech disturbance     Negative for extremity weakness or  "paresthesias  Endo/Heme:  Negative for easy bruising or bleeding  Psychiatric:  Negative for depression, suicidal ideas, or hallucinations    Physical Exam:  /80 (BP Location: Left arm, Patient Position: Sitting, BP Cuff Size: Adult)   Pulse 76   Temp 36.6 °C (97.9 °F)   Resp 16   Ht 1.778 m (5' 10\")   Wt 78 kg (172 lb)   SpO2 97%     Constitutional: Alert, oriented, no acute distress  HEENT:  Normocephalic and atraumatic, EOMI  Neck:   Supple, no JVD  Cardiovascular: Regular rate and rhythm  Pulmonary:  Good air entry bilaterally  Abdominal:  Soft, non-tender, non-distended     Aortic impulse not widened.  Well-healed scars.  Musculoskeletal: No edema, no tenderness  Neurological:  CN II-XII grossly intact, no focal deficits  Skin:   Skin is warm and dry. No rash noted.  Psychiatric:  Normal mood and affect.  Lower extremities:    Well-healed groin scars.  Multiphasic flow over left pedal arteries.  Monophasic flow over right pedal artery.  Feet are warm.  No ulceration.    Radiology:  Reviewed.    Assessment:  -History of open repair of right common femoral artery pseudoaneurysm.  -Peripheral arterial disease, right lower extremity.  -Coronary artery disease.  -Hypertension.  -Dyslipidemia.  -Tobacco use.    Plan:  I had a long discussion with patient and his wife.  Study results were reviewed with them.  Patient has severe stenosis at the mid/distal right common femoral artery with essentially no collateral flows seen on the last angiogram.  I therefore discussed with patient the option of undergoing right leg angiogram and right superficial femoral artery angioplasty/stenting via popliteal approach to prevent occlusion of the right superficial femoral artery which if happened can cause critical limb ischemia, especially since there is no collateral flow around the stenotic area.  Risks of the procedure were discussed which include, but not limited to, bleeding, infection, nerve injury, contrast " reaction, contrast-induced renal failure, and perioperative anesthetic complications.  Patient indicated understanding and would like to proceed.  All questions were answered.  At his request, the procedure will be performed on December 20, 2023, pending IR availability.    I again strongly counseled patient to stop smoking to prevent progression of disease.      Negar Machado MD  [facility]   [place] preferred or call my office [phone]  __________________________________________________________________  Patient:Dale Dinero   MRN:[id number]   CSN:[id number]

## 2023-12-13 NOTE — TELEPHONE ENCOUNTER
I called patient and left a message to schedule IR procedure with Dr. Machado.     Patient called back to schedule IR procedure with Dr. Machado for 12/20 @ 9:30 am. Patient is to check in @ 7:30 am in the [address] floor.     Patient has been instructed nothing to eat or drink 8 hours prior and he need a .

## 2023-12-18 ENCOUNTER — PRE-ADMISSION TESTING (OUTPATIENT)
Dept: ADMISSIONS | Facility: MEDICAL CENTER | Age: 68
End: 2023-12-18
Attending: SURGERY
Payer: MEDICARE

## 2023-12-18 DIAGNOSIS — Z01.812 PRE-OPERATIVE LABORATORY EXAMINATION: ICD-10-CM

## 2023-12-19 ENCOUNTER — HOSPITAL ENCOUNTER (OUTPATIENT)
Dept: LAB | Facility: MEDICAL CENTER | Age: 68
End: 2023-12-19
Attending: SURGERY
Payer: MEDICARE

## 2023-12-19 DIAGNOSIS — Z01.812 PRE-OPERATIVE LABORATORY EXAMINATION: ICD-10-CM

## 2023-12-19 LAB
ANION GAP SERPL CALC-SCNC: 12 MMOL/L (ref 7–16)
BASOPHILS # BLD AUTO: 0.7 % (ref 0–1.8)
BASOPHILS # BLD: 0.06 K/UL (ref 0–0.12)
BUN SERPL-MCNC: 17 MG/DL (ref 8–22)
CALCIUM SERPL-MCNC: 9 MG/DL (ref 8.5–10.5)
CHLORIDE SERPL-SCNC: 106 MMOL/L (ref 96–112)
CO2 SERPL-SCNC: 22 MMOL/L (ref 20–33)
CREAT SERPL-MCNC: 0.85 MG/DL (ref 0.5–1.4)
EOSINOPHIL # BLD AUTO: 0.21 K/UL (ref 0–0.51)
EOSINOPHIL NFR BLD: 2.4 % (ref 0–6.9)
ERYTHROCYTE [DISTWIDTH] IN BLOOD BY AUTOMATED COUNT: 43.8 FL (ref 35.9–50)
GFR SERPLBLD CREATININE-BSD FMLA CKD-EPI: 95 ML/MIN/1.73 M 2
GLUCOSE SERPL-MCNC: 72 MG/DL (ref 65–99)
HCT VFR BLD AUTO: 49.1 % (ref 42–52)
HGB BLD-MCNC: 16.2 G/DL (ref 14–18)
IMM GRANULOCYTES # BLD AUTO: 0.03 K/UL (ref 0–0.11)
IMM GRANULOCYTES NFR BLD AUTO: 0.3 % (ref 0–0.9)
LYMPHOCYTES # BLD AUTO: 3.72 K/UL (ref 1–4.8)
LYMPHOCYTES NFR BLD: 41.8 % (ref 22–41)
MCH RBC QN AUTO: 29 PG (ref 27–33)
MCHC RBC AUTO-ENTMCNC: 33 G/DL (ref 32.3–36.5)
MCV RBC AUTO: 87.8 FL (ref 81.4–97.8)
MONOCYTES # BLD AUTO: 0.92 K/UL (ref 0–0.85)
MONOCYTES NFR BLD AUTO: 10.3 % (ref 0–13.4)
NEUTROPHILS # BLD AUTO: 3.96 K/UL (ref 1.82–7.42)
NEUTROPHILS NFR BLD: 44.5 % (ref 44–72)
NRBC # BLD AUTO: 0 K/UL
NRBC BLD-RTO: 0 /100 WBC (ref 0–0.2)
PLATELET # BLD AUTO: 225 K/UL (ref 164–446)
PMV BLD AUTO: 10.1 FL (ref 9–12.9)
POTASSIUM SERPL-SCNC: 4.2 MMOL/L (ref 3.6–5.5)
RBC # BLD AUTO: 5.59 M/UL (ref 4.7–6.1)
SODIUM SERPL-SCNC: 140 MMOL/L (ref 135–145)
WBC # BLD AUTO: 8.9 K/UL (ref 4.8–10.8)

## 2023-12-19 PROCEDURE — 36415 COLL VENOUS BLD VENIPUNCTURE: CPT

## 2023-12-19 PROCEDURE — 85025 COMPLETE CBC W/AUTO DIFF WBC: CPT

## 2023-12-19 PROCEDURE — 80048 BASIC METABOLIC PNL TOTAL CA: CPT

## 2023-12-20 ENCOUNTER — HOSPITAL ENCOUNTER (OUTPATIENT)
Facility: MEDICAL CENTER | Age: 68
End: 2023-12-20
Attending: SURGERY | Admitting: SURGERY
Payer: MEDICARE

## 2023-12-20 ENCOUNTER — PHARMACY VISIT (OUTPATIENT)
Dept: PHARMACY | Facility: MEDICAL CENTER | Age: 68
End: 2023-12-20
Payer: COMMERCIAL

## 2023-12-20 ENCOUNTER — HOSPITAL ENCOUNTER (OUTPATIENT)
Dept: RADIOLOGY | Facility: MEDICAL CENTER | Age: 68
End: 2023-12-20
Attending: SURGERY
Payer: MEDICARE

## 2023-12-20 VITALS
OXYGEN SATURATION: 92 % | SYSTOLIC BLOOD PRESSURE: 148 MMHG | DIASTOLIC BLOOD PRESSURE: 94 MMHG | HEART RATE: 66 BPM | RESPIRATION RATE: 15 BRPM

## 2023-12-20 VITALS
TEMPERATURE: 97.5 F | HEIGHT: 70 IN | RESPIRATION RATE: 19 BRPM | BODY MASS INDEX: 24.52 KG/M2 | OXYGEN SATURATION: 95 % | DIASTOLIC BLOOD PRESSURE: 83 MMHG | WEIGHT: 171.3 LBS | SYSTOLIC BLOOD PRESSURE: 165 MMHG | HEART RATE: 68 BPM

## 2023-12-20 DIAGNOSIS — I73.9 PVD (PERIPHERAL VASCULAR DISEASE) (HCC): ICD-10-CM

## 2023-12-20 DIAGNOSIS — I73.9 PERIPHERAL VASCULAR DISEASE, UNSPECIFIED (HCC): ICD-10-CM

## 2023-12-20 PROCEDURE — 99153 MOD SED SAME PHYS/QHP EA: CPT | Performed by: SURGERY

## 2023-12-20 PROCEDURE — 160035 HCHG PACU - 1ST 60 MINS PHASE I

## 2023-12-20 PROCEDURE — 99152 MOD SED SAME PHYS/QHP 5/>YRS: CPT | Performed by: SURGERY

## 2023-12-20 PROCEDURE — 75710 ARTERY X-RAYS ARM/LEG: CPT | Mod: 26,59,RT | Performed by: SURGERY

## 2023-12-20 PROCEDURE — 700111 HCHG RX REV CODE 636 W/ 250 OVERRIDE (IP): Performed by: SURGERY

## 2023-12-20 PROCEDURE — 700102 HCHG RX REV CODE 250 W/ 637 OVERRIDE(OP)

## 2023-12-20 PROCEDURE — 700105 HCHG RX REV CODE 258: Performed by: SURGERY

## 2023-12-20 PROCEDURE — 160036 HCHG PACU - EA ADDL 30 MINS PHASE I

## 2023-12-20 PROCEDURE — 160002 HCHG RECOVERY MINUTES (STAT)

## 2023-12-20 PROCEDURE — 700111 HCHG RX REV CODE 636 W/ 250 OVERRIDE (IP)

## 2023-12-20 PROCEDURE — RXMED WILLOW AMBULATORY MEDICATION CHARGE: Performed by: SURGERY

## 2023-12-20 PROCEDURE — A9270 NON-COVERED ITEM OR SERVICE: HCPCS

## 2023-12-20 PROCEDURE — 37224 PR REVASCULARIZE FEM/POP ARTERY,ANGIOPLASTY: CPT | Mod: RT | Performed by: SURGERY

## 2023-12-20 PROCEDURE — 700117 HCHG RX CONTRAST REV CODE 255: Performed by: SURGERY

## 2023-12-20 PROCEDURE — 99153 MOD SED SAME PHYS/QHP EA: CPT

## 2023-12-20 RX ORDER — HEPARIN SODIUM 1000 [USP'U]/ML
INJECTION, SOLUTION INTRAVENOUS; SUBCUTANEOUS
Status: COMPLETED
Start: 2023-12-20 | End: 2023-12-20

## 2023-12-20 RX ORDER — CLOPIDOGREL BISULFATE 75 MG/1
75 TABLET ORAL DAILY
Qty: 30 TABLET | Refills: 4 | Status: SHIPPED | OUTPATIENT
Start: 2023-12-20

## 2023-12-20 RX ORDER — MIDAZOLAM HYDROCHLORIDE 1 MG/ML
.5-2 INJECTION INTRAMUSCULAR; INTRAVENOUS PRN
Status: CANCELLED | OUTPATIENT
Start: 2023-12-20 | End: 2023-12-20

## 2023-12-20 RX ORDER — CLOPIDOGREL 300 MG/1
300 TABLET, FILM COATED ORAL ONCE
Status: COMPLETED | OUTPATIENT
Start: 2023-12-20 | End: 2023-12-20

## 2023-12-20 RX ORDER — SODIUM CHLORIDE, SODIUM LACTATE, POTASSIUM CHLORIDE, CALCIUM CHLORIDE 600; 310; 30; 20 MG/100ML; MG/100ML; MG/100ML; MG/100ML
INJECTION, SOLUTION INTRAVENOUS CONTINUOUS
Status: ACTIVE | OUTPATIENT
Start: 2023-12-20 | End: 2023-12-20

## 2023-12-20 RX ORDER — IODIXANOL 270 MG/ML
24 INJECTION, SOLUTION INTRAVASCULAR ONCE
Status: COMPLETED | OUTPATIENT
Start: 2023-12-20 | End: 2023-12-20

## 2023-12-20 RX ORDER — ONDANSETRON 2 MG/ML
4 INJECTION INTRAMUSCULAR; INTRAVENOUS PRN
Status: ACTIVE | OUTPATIENT
Start: 2023-12-20 | End: 2023-12-20

## 2023-12-20 RX ORDER — LIDOCAINE HYDROCHLORIDE 10 MG/ML
INJECTION, SOLUTION INFILTRATION; PERINEURAL
Status: DISCONTINUED
Start: 2023-12-20 | End: 2023-12-20 | Stop reason: HOSPADM

## 2023-12-20 RX ORDER — SODIUM CHLORIDE 9 MG/ML
INJECTION, SOLUTION INTRAVENOUS CONTINUOUS
Status: CANCELLED | OUTPATIENT
Start: 2023-12-20

## 2023-12-20 RX ORDER — CLOPIDOGREL 300 MG/1
TABLET, FILM COATED ORAL
Status: COMPLETED
Start: 2023-12-20 | End: 2023-12-20

## 2023-12-20 RX ORDER — CEFAZOLIN SODIUM 1 G/3ML
INJECTION, POWDER, FOR SOLUTION INTRAMUSCULAR; INTRAVENOUS
Status: COMPLETED
Start: 2023-12-20 | End: 2023-12-20

## 2023-12-20 RX ORDER — MIDAZOLAM HYDROCHLORIDE 1 MG/ML
INJECTION INTRAMUSCULAR; INTRAVENOUS
Status: COMPLETED
Start: 2023-12-20 | End: 2023-12-20

## 2023-12-20 RX ORDER — PROTAMINE SULFATE 10 MG/ML
INJECTION, SOLUTION INTRAVENOUS
Status: COMPLETED
Start: 2023-12-20 | End: 2023-12-20

## 2023-12-20 RX ORDER — ONDANSETRON 2 MG/ML
4 INJECTION INTRAMUSCULAR; INTRAVENOUS PRN
Status: CANCELLED | OUTPATIENT
Start: 2023-12-20 | End: 2023-12-20

## 2023-12-20 RX ORDER — SODIUM CHLORIDE 9 MG/ML
500 INJECTION, SOLUTION INTRAVENOUS
Status: ACTIVE | OUTPATIENT
Start: 2023-12-20 | End: 2023-12-20

## 2023-12-20 RX ORDER — MIDAZOLAM HYDROCHLORIDE 1 MG/ML
.5-2 INJECTION INTRAMUSCULAR; INTRAVENOUS PRN
Status: ACTIVE | OUTPATIENT
Start: 2023-12-20 | End: 2023-12-20

## 2023-12-20 RX ORDER — SODIUM CHLORIDE 9 MG/ML
500 INJECTION, SOLUTION INTRAVENOUS
Status: CANCELLED | OUTPATIENT
Start: 2023-12-20 | End: 2023-12-20

## 2023-12-20 RX ADMIN — IODIXANOL 24 ML: 270 INJECTION, SOLUTION INTRAVASCULAR at 09:45

## 2023-12-20 RX ADMIN — FENTANYL CITRATE 50 MCG: 50 INJECTION, SOLUTION INTRAMUSCULAR; INTRAVENOUS at 08:38

## 2023-12-20 RX ADMIN — CLOPIDOGREL BISULFATE 300 MG: 300 TABLET, FILM COATED ORAL at 09:20

## 2023-12-20 RX ADMIN — CEFAZOLIN 2 G: 1 INJECTION, POWDER, FOR SOLUTION INTRAMUSCULAR; INTRAVENOUS at 08:36

## 2023-12-20 RX ADMIN — MIDAZOLAM HYDROCHLORIDE 1 MG: 1 INJECTION, SOLUTION INTRAMUSCULAR; INTRAVENOUS at 08:38

## 2023-12-20 RX ADMIN — FENTANYL CITRATE 25 MCG: 50 INJECTION, SOLUTION INTRAMUSCULAR; INTRAVENOUS at 08:44

## 2023-12-20 RX ADMIN — PROTAMINE SULFATE 30 MG: 10 INJECTION, SOLUTION INTRAVENOUS at 09:05

## 2023-12-20 RX ADMIN — CLOPIDOGREL 300 MG: 300 TABLET, FILM COATED ORAL at 09:20

## 2023-12-20 RX ADMIN — HEPARIN SODIUM 5000 UNITS: 1000 INJECTION, SOLUTION INTRAVENOUS; SUBCUTANEOUS at 08:48

## 2023-12-20 RX ADMIN — MIDAZOLAM HYDROCHLORIDE 0.5 MG: 1 INJECTION, SOLUTION INTRAMUSCULAR; INTRAVENOUS at 08:44

## 2023-12-20 ASSESSMENT — PAIN DESCRIPTION - PAIN TYPE
TYPE: SURGICAL PAIN

## 2023-12-20 ASSESSMENT — FIBROSIS 4 INDEX: FIB4 SCORE: 1.173333333333333333

## 2023-12-20 NOTE — DISCHARGE INSTRUCTIONS
Minimal activity at home for 2 days.  No Pushing, pulling, or heavy lifting (10 LBS) for 2 weeks.   Keep dressings on for 2 days, then remove.  Keep incisions dry and clean.   Okay to shower after 2 days. ·   No bath for 10 days .     Resume home medications.    Take Plavix as prescribed. .     Follow up with Dr. Machado in 2-3 weeks.    Call [phone] for appointment and for any problem.     What to Expect Post Anesthesia    Rest and take it easy for the first 24 hours.  A responsible adult is recommended to remain with you during that time.  It is normal to feel sleepy.  We encourage you to not do anything that requires balance, judgment or coordination.    FOR 24 HOURS DO NOT:  Drive, operate machinery or run household appliances.  Drink beer or alcoholic beverages.  Make important decisions or sign legal documents.    To avoid nausea, slowly advance diet as tolerated, avoiding spicy or greasy foods for the first day.  Add more substantial food to your diet according to your provider's instructions.  Babies can be fed formula or breast milk as soon as they are hungry.  INCREASE FLUIDS AND FIBER TO AVOID CONSTIPATION.    MILD FLU-LIKE SYMPTOMS ARE NORMAL.  YOU MAY EXPERIENCE GENERALIZED MUSCLE ACHES, THROAT IRRITATION, HEADACHE AND/OR SOME NAUSEA.

## 2023-12-20 NOTE — OR NURSING
"0924 Pt arrived from IR via gurney. Report given by anesthesia and RN. Awake alert, clear speech, follows commands. DENIES pain and nausea. 3L nc even non labored breathing. Lung clear airway patent. SB. Skin pink warm dry. Piv patent. RLE dressing cdi no drainage, no hematoma. Doppler pulse heard, pedal and post tibial. RLE pink warm brisk cap refill, wiggles toes. Strong dorsi flex/ext, c/o tingling in great toe. Glasses with pt.    0930 updated Stacie, spouse, via phone    0955 RLE dressing cdi no drainage, no hematoma. [place] warm brisk cap refill.    1059 resting comfortably. RA even non labored breathing. RLE no changes. DENIES pain and nausea     1559 c/o numbness from calf to great big toe. \"Tingling.\" RLE pink warm brisk cap refill. Pulse heard with doppler. Notified MD Machado.     1205 Per MD Meyer, lidocaine placed to RLE intra-op    1219 [place], spouse at bedside. Given discharge instructions, signed, verbalized understanding.     1220 RLE dressing cdi no drainage, no hematoma. Doppler pulse heard, pedal and post tibial. RLE pink warm brisk cap refill, wiggles toes. Strong dorsi flex/ext    1230 ambulates to bathroom, x1 void. Steady on feet. Pt dressed, given all personal belongings.    1236 RLE no drainage, no hematoma. Piv removed. Transferred to wheelchair.     1243 Escorted via wheelchair with RN to dc. Awake alert. RA even non labored breathing. Denies pain and nausea.  RLE pink warm brisk cap refill, wiggles toes. Pulse hear with doppler.       "

## 2023-12-20 NOTE — OP REPORT
DATE OF SERVICE:  12/20/2023     SURGEON:  Negar Machado MD     ANESTHESIA:  Intravenous sedation with fentanyl and Versed for 33 minutes and   local anesthesia with 10 mL of 1% lidocaine solution.     PREOPERATIVE DIAGNOSIS:  Peripheral arterial disease with right leg   claudication.     POSTOPERATIVE DIAGNOSIS:  Peripheral arterial disease with right leg   claudication.     PROCEDURES:  1.  Percutaneous cannulation of right popliteal artery under ultrasound   guidance using posterior approach.  2.  Catheter, right leg.  3.  Right leg angiogram.  4.  Percutaneous, transluminal angioplasty of right superficial femoral artery   stenosis using 4x200 mm drug-coated balloon.     INDICATIONS FOR PROCEDURE:  This is a pleasant gentleman with multiple medical   problems, extensive vascular disease, who on recent vascular study was found   to have severe to critical right superficial femoral artery stenosis.    Discussion was made with the patient.  He would like to undergo angiogram with   possible endovascular intervention, fully understanding all risks.     DESCRIPTION OF PROCEDURE:  Informed consent was obtained.  The patient was   taken to the endovascular suite and was placed in the supine position.  He was   given Ancef intravenously. The patient was then placed in prone position.  His right popliteal fossa area was sterilely prepped and draped in the normal fashion.  A timeout procedure was done.  Intravenous sedation was performed with fentanyl and Versed.  The patient was   closely monitored.     Next, Duplex evaluation of the right popliteal artery was performed.  It was   found to be patent.  Under ultrasound guidance, the right popliteal artery was   percutaneously cannulated using micropuncture kit, after the skin and   subcutaneous tissue were anesthetized with 10 mL of 1% lidocaine solution.  The   microcatheter was removed and replaced with a 4-Armenian sheath.  A Glidewire   was advanced into the  proximal superficial femoral artery past the stenotic   areas.  Over the Glidewire, a glide catheter was advanced into the proximal   right superficial femoral artery.  A right leg angiogram was obtained using   diluted contrast.     Next, the 4-[place]wanan sheath was upsized to a 5-[place]wanan sheath.  The patient was   given heparin 5000 units intravenously.  After the heparin was allowed to   circulate systemically for 3 minutes, over the guidewire, percutaneous,   transluminal angioplasty of the right superficial femoral artery was performed   using 4x200 mm drug-coated balloon.  Followup angiogram was obtained and   showed complete restoration of luminal patency with preservation of distal   flow.     The 5-[place]an sheath was removed and hemostasis was achieved with manual   compression.  Sterile dressing was applied.     IMPRESSION:  The right superficial femoral artery had multiple areas of severe   to critical stenosis.  Following percutaneous, transluminal angioplasty using   4x200 mm drug-coated balloon, there was a complete restoration of luminal   patency.  The popliteal artery is patent with 3-vessel runoff seen.     ESTIMATED BLOOD LOSS:  10 mL.     COUNTS:  Sponge and instrument count was correct x2.     CONTRAST USED:  24 mL Visipaque.     The patient was then placed in a supine position and taken to recovery area in   stable condition with brisk Doppler flow signals over the right pedal   arteries.        ______________________________  MD SLY Smith/YASH    DD:  12/20/2023 09:49  DT:  12/20/2023 10:45    Job#:  [id number]

## 2023-12-20 NOTE — OR SURGEON
Immediate Post OP Note    PreOp Diagnosis: Right leg peripheral arterial disease with claudication.      PostOp Diagnosis: Same.      Procedure(s):  Right leg angiogram.  Right popliteal artery angioplasty using 4 x 200 mm drug-coated balloon.    Surgeon: Negar Machado MD.    Type of Anesthesia: Venous sedation with fentanyl and Versed for 33 minutes and local anesthesia with 10 mm 1% lidocaine solution.      IR staff:      Specimens removed if any: None.  * No specimens in log *    Estimated Blood Loss: 10 mL.    Contrast used: 24 ml of Visipaque.    Findings: Multi areas of severe to critical stenosis in the right superficial femoral artery, treated with angioplasty.    Complications: None.      Dictated, #[id number].        12/20/2023 9:40 AM Negar Machado M.D.

## 2023-12-20 NOTE — PROGRESS NOTES
Pt presents to IR1. Pt was consented by MD at bedside, confirmed by this RN and consent at bedside. Patient underwent a RLE angiogram with popliteal artery angioplasty by Dr. Machado. Site marked and initialed by MD prior to procedure starting and verified by patient and procedure team. Pedal pulses prior to case Right doppler only, marked and Left 2+. Patient was placed in a prone position. Right Popliteal artery  was accessed. Vitals were taken every 5 minutes and remained stable during procedure (see doc flow sheet for results). CO2 waveform capnography was monitored and remained WNL throughout procedure. Manual Pressure was used to achieve hemostasis. Report called to Earl LOYD. Pt transported via gurney with RN to PPU bay 5 in a stable condition.    Right Popliteal Artery :  [employer]tr[employer] In.Pact Admiral Paclitaxel-coated PTA Balloon [id number] 130cm  REF: [id number]  LOT: [id number]  Exp: 08/10/2024

## 2024-01-16 PROCEDURE — RXMED WILLOW AMBULATORY MEDICATION CHARGE: Performed by: FAMILY MEDICINE

## 2024-01-16 RX ORDER — SIMVASTATIN 20 MG
20 TABLET ORAL EVERY EVENING
Qty: 100 TABLET | Refills: 0 | Status: SHIPPED | OUTPATIENT
Start: 2024-01-16

## 2024-01-17 ENCOUNTER — PHARMACY VISIT (OUTPATIENT)
Dept: PHARMACY | Facility: MEDICAL CENTER | Age: 69
End: 2024-01-17
Payer: COMMERCIAL

## 2024-01-22 PROCEDURE — RXMED WILLOW AMBULATORY MEDICATION CHARGE: Performed by: SURGERY

## 2024-01-23 ENCOUNTER — OFFICE VISIT (OUTPATIENT)
Dept: VASCULAR SURGERY | Facility: MEDICAL CENTER | Age: 69
End: 2024-01-23
Payer: MEDICARE

## 2024-01-23 ENCOUNTER — PHARMACY VISIT (OUTPATIENT)
Dept: PHARMACY | Facility: MEDICAL CENTER | Age: 69
End: 2024-01-23
Payer: COMMERCIAL

## 2024-01-23 VITALS
HEART RATE: 79 BPM | HEIGHT: 70 IN | WEIGHT: 171 LBS | TEMPERATURE: 98.3 F | SYSTOLIC BLOOD PRESSURE: 132 MMHG | DIASTOLIC BLOOD PRESSURE: 70 MMHG | BODY MASS INDEX: 24.48 KG/M2 | OXYGEN SATURATION: 94 %

## 2024-01-23 DIAGNOSIS — I73.9 PERIPHERAL ARTERIAL DISEASE (HCC): ICD-10-CM

## 2024-01-23 PROCEDURE — 3078F DIAST BP <80 MM HG: CPT | Performed by: SURGERY

## 2024-01-23 PROCEDURE — 99024 POSTOP FOLLOW-UP VISIT: CPT | Performed by: SURGERY

## 2024-01-23 PROCEDURE — 3075F SYST BP GE 130 - 139MM HG: CPT | Performed by: SURGERY

## 2024-01-23 ASSESSMENT — FIBROSIS 4 INDEX: FIB4 SCORE: 1.173333333333333333

## 2024-01-24 NOTE — PROGRESS NOTES
"                 VASCULAR SURGERY                    Postop Note  _________________________________    1/23/2024    Mr. Dinero returns to the clinic today for follow-up, status post right leg angiogram and angioplasty of severe to critical right superficial femoral stenoses using drug-coated balloon on December 28, 2023.  He has no complaints.  Unfortunately, he continues to smoke.    Vitals  /70 (BP Location: Left arm, Patient Position: Sitting, BP Cuff Size: Adult)   Pulse 79   Temp 36.8 °C (98.3 °F) (Temporal)   Ht 1.778 m (5' 10\")   Wt 77.6 kg (171 lb)   SpO2 94%   BMI 24.54 kg/m²     Exam  General: Pleasant male in none apparent distress.  Right lower extremity: Well-healed scars in groins.  No pulsatile mass in popliteal fossa area.  Multiphasic flow signals are obtained over the right pedal arteries.    Assessment: Postop.    Plan: Doing well.  I counseled patient to stop smoking completely to prevent recurrence of disease.  I ordered follow-up right lower extremity arterial duplex to be done in 3 months and asked patient to see me after the study is done.  Patient knows to call me for any problem with his leg at any time.  I answered all of his questions.          Negar Machado MD  [facility]  [phone] [address], [place] [address]    "

## 2024-03-13 ENCOUNTER — TELEPHONE (OUTPATIENT)
Dept: HEALTH INFORMATION MANAGEMENT | Facility: OTHER | Age: 69
End: 2024-03-13
Payer: MEDICARE

## 2024-04-23 ENCOUNTER — HOSPITAL ENCOUNTER (OUTPATIENT)
Dept: RADIOLOGY | Facility: MEDICAL CENTER | Age: 69
End: 2024-04-23
Attending: SURGERY
Payer: MEDICARE

## 2024-04-23 DIAGNOSIS — I73.9 PERIPHERAL ARTERIAL DISEASE (HCC): ICD-10-CM

## 2024-04-23 PROCEDURE — 93926 LOWER EXTREMITY STUDY: CPT | Mod: RT

## 2024-04-23 PROCEDURE — 93922 UPR/L XTREMITY ART 2 LEVELS: CPT

## 2024-05-02 PROCEDURE — RXMED WILLOW AMBULATORY MEDICATION CHARGE: Performed by: FAMILY MEDICINE

## 2024-05-02 RX ORDER — SIMVASTATIN 20 MG
20 TABLET ORAL EVERY EVENING
Qty: 100 TABLET | Refills: 0 | Status: SHIPPED | OUTPATIENT
Start: 2024-05-02

## 2024-05-03 ENCOUNTER — PHARMACY VISIT (OUTPATIENT)
Dept: PHARMACY | Facility: MEDICAL CENTER | Age: 69
End: 2024-05-03
Payer: COMMERCIAL

## 2024-05-07 ENCOUNTER — OFFICE VISIT (OUTPATIENT)
Dept: VASCULAR SURGERY | Facility: MEDICAL CENTER | Age: 69
End: 2024-05-07
Payer: MEDICARE

## 2024-05-07 VITALS
OXYGEN SATURATION: 97 % | SYSTOLIC BLOOD PRESSURE: 122 MMHG | WEIGHT: 171 LBS | HEART RATE: 77 BPM | TEMPERATURE: 98.1 F | DIASTOLIC BLOOD PRESSURE: 60 MMHG | HEIGHT: 70 IN | BODY MASS INDEX: 24.48 KG/M2

## 2024-05-07 DIAGNOSIS — I10 PRIMARY HYPERTENSION: ICD-10-CM

## 2024-05-07 DIAGNOSIS — E78.5 DYSLIPIDEMIA: ICD-10-CM

## 2024-05-07 DIAGNOSIS — I73.9 PERIPHERAL ARTERIAL DISEASE (HCC): ICD-10-CM

## 2024-05-07 DIAGNOSIS — Z95.828 S/P AORTOBIFEMORAL BYPASS SURGERY: ICD-10-CM

## 2024-05-07 DIAGNOSIS — Z72.0 TOBACCO USE: ICD-10-CM

## 2024-05-07 PROCEDURE — 3078F DIAST BP <80 MM HG: CPT | Performed by: SURGERY

## 2024-05-07 PROCEDURE — 3074F SYST BP LT 130 MM HG: CPT | Performed by: SURGERY

## 2024-05-07 PROCEDURE — 99213 OFFICE O/P EST LOW 20 MIN: CPT | Performed by: SURGERY

## 2024-05-07 ASSESSMENT — FIBROSIS 4 INDEX: FIB4 SCORE: 1.19

## 2024-05-07 NOTE — PROGRESS NOTES
"  VASCULAR SURGERY SERVICE  OFFICE FOLLOW UP      Date: 5/7/2024    Referring Provider: William Harman MD    Consulting Physician: Negar Machado MD - [facility]     -------------------------------------------------------------------------------------------------    Chief complaint:  \"Here for follow-up\".    HPI:  Mr. Dinero returns to the clinic today for follow-up, status post right leg angiogram and angioplasty of severe to critical right superficial femoral artery stenosis using drug-coated balloon on December 28, 2023, open repair of right groin pseudoaneurysm on May 3, 2023 and right groin wound debridement and wound VAC placement on May 19, 2023, and aortobifemoral bypass on September 22, 2014.  He has no complaints except for 1 episodes of epistaxis.  He wantS to stop the Plavix.  Unfortunately, he continues to smoke.    Recent arterial studies showed normal bilateral ABIs and no evidence of recurrent stenosis of the right mid to distal superficial femoral artery.    Past Medical History:   Diagnosis Date    Anesthesia     Awareness under anesthesia     CAD (coronary artery disease)     Cancer (HCC)     skin    COPD (chronic obstructive pulmonary disease) (HCC)     Emphysema of lung (HCC)     Heart burn     Hep C w/o coma, chronic (HCC)     Hepatitis C     treated    High cholesterol     Hypertension     states controlled on meds    Indigestion     Renal disorder     stage 1 kidney disease    Snoring        Past Surgical History:   Procedure Laterality Date    DEBRIDEMENT Right 5/19/2023    Procedure: RIGHT GROIN WOUND DEBRIDEMENT;  Surgeon: Negar Machado M.D.;  Location: SURGERY [facility];  Service: General    NM REPR ANEURYSM/GRFT INS,COMMON FEMORAL Right 5/3/2023    Procedure: OPEN REPAIR OF RIGHT GROIN PSEUDOANEURYSM;  Surgeon: Negar Machado M.D.;  Location: SURGERY [facility];  Service: General    AORTOGRAM Right 5/3/2023    Procedure: AORTOGRAM, RIGHT LEG;  Surgeon: Negar Machado, " M.D.;  Location: SURGERY [facility];  Service: General    THROMBECTOMY Right 5/3/2023    Procedure: THROMBECTOMY, RIGHT LEG;  Surgeon: Negar Machado M.D.;  Location: SURGERY [facility];  Service: General    LAMINOTOMY      lumbar (rods)    MENISCUS REPAIR Left     MULTIPLE CORONARY ARTERY BYPASS         Current Outpatient Medications   Medication Sig Dispense Refill    simvastatin (ZOCOR) 20 MG Tab Take 1 Tablet by mouth every evening. Indications: High Amount of Fats in the Blood 100 Tablet 0    clopidogrel (PLAVIX) 75 MG Tab Take 1 Tablet by mouth every day. 30 Tablet 4    sildenafil citrate (VIAGRA) 50 MG tablet Take 1 Tablet by mouth 1 time a day as needed for Erectile Dysfunction. 10 Tablet 3    lisinopril (PRINIVIL) 5 MG Tab Take 1 Tablet by mouth every day. Indications: High Blood Pressure Disorder 100 Tablet 3    aspirin 81 MG EC tablet Take 81 mg by mouth every morning. Indications: dvt prevention       No current facility-administered medications for this visit.       Social History     Socioeconomic History    Marital status:      Spouse name: Not on file    Number of children: Not on file    Years of education: Not on file    Highest education level: 12th grade   Occupational History    Not on file   Tobacco Use    Smoking status: Every Day     Current packs/day: 1.00     Average packs/day: 1 pack/day for 104.2 years (104.2 ttl pk-yrs)     Types: Cigarettes     Start date: 3/8/1972    Smokeless tobacco: Never    Tobacco comments:     Working on quiting   Vaping Use    Vaping Use: Never used   Substance and Sexual Activity    Alcohol use: No     Comment: Patient stopped drinking 5/28/2018    Drug use: No    Sexual activity: Never   Other Topics Concern    Not on file   Social History Narrative    Not on file     Social Determinants of Health     Financial Resource Strain: Low Risk  (5/25/2023)    Overall Financial Resource Strain (CARDIA)     Difficulty of Paying Living Expenses: Not hard at  all   Food Insecurity: No Food Insecurity (5/25/2023)    Hunger Vital Sign     Worried About Running Out of Food in the Last Year: Never true     Ran Out of Food in the Last Year: Never true   Transportation Needs: No Transportation Needs (5/25/2023)    PRAPARE - Transportation     Lack of Transportation (Medical): No     Lack of Transportation (Non-Medical): No   Physical Activity: Patient Declined (5/25/2023)    Exercise Vital Sign     Days of Exercise per Week: Patient declined     Minutes of Exercise per Session: Patient declined   Stress: Patient Declined (5/25/2023)    [employer] - Occupational Stress Questionnaire     Feeling of Stress : Patient declined   Social Connections: Feeling Socially Integrated (7/17/2023)    OASIS : Social Isolation     Frequency of experiencing loneliness or isolation: Never   Intimate Partner Violence: Not on file   Housing Stability: Unknown (5/25/2023)    Housing Stability Vital Sign     Unable to Pay for Housing in the Last Year: No     Number of Places Lived in the Last Year: Not on file     Unstable Housing in the Last Year: No       Family History   Problem Relation Age of Onset    Other Mother         accident    Cancer Father         Colan    Diabetes Father     Cancer Brother         lymphoma       Allergies:  Patient has no known allergies.    Review of Systems:    Constitutional: Negative for fever, chills, weight loss,   HENT:    Negative for hearing loss or tinnitus    Eyes:    Negative for blurred vision, double vision, or loss of vision  Respiratory:  Negative for cough, hemoptysis, or wheezing    Cardiac:  Negative for chest pain or palpitations or orthopnea  Vascular:  Negative for claudication or rest pain   Gastrointestinal: Negative for nausea, vomiting, or abdominal pain     Negative for hematochezia or melena   Genitourinary: Negative for dysuria, frequency, or hematuria   Musculoskeletal: Negative for myalgias, back pain,  "or joint pain  Skin:   Negative for itching or rash  Neurological:  Negative for dizziness, headaches, or tremors     Negative for speech disturbance     Negative for extremity weakness or paresthesias  Endo/Heme:  Negative for easy bruising or bleeding  Psychiatric:  Negative for depression, suicidal ideas, or hallucinations    Physical Exam:  /60 (BP Location: Left arm, Patient Position: Sitting, BP Cuff Size: Adult)   Pulse 77   Temp 36.7 °C (98.1 °F) (Temporal)   Ht 1.778 m (5' 10\")   Wt 77.6 kg (171 lb)   SpO2 97%     Constitutional:          Alert, oriented, no acute distress  HEENT:                       Normocephalic and atraumatic, EOMI  Neck:                          Supple, no JVD, no bruits.  Cardiovascular:         Regular rate and rhythm  Pulmonary:                Good air entry bilaterally  Abdominal:                Soft, non-tender, non-distended                                      Aortic impulse not widened.  Well-healed scars.  Musculoskeletal:       No edema, no tenderness  Neurological:             CN II-XII grossly intact, no focal deficits  Skin:                           Skin is warm and dry. No rash noted.  Psychiatric:                Normal mood and affect.  Lower extremities:    Well-healed groin scars.  Multiphasic flow over bilateral pedal arteries.  Feet are warm.  No ulceration.     Radiology:  Reviewed.     Assessment:  -History of open repair of right common femoral artery pseudoaneurysm.  -Peripheral arterial disease.  -History of aortobifemoral bypass.  -Coronary artery disease.  -Hypertension.  -Dyslipidemia.  -Tobacco use.     Plan:  I had a long discussion with patient.  Study results were reviewed with him.  He is doing quite well from vascular standpoint.  I counseled him to stop smoking and to follow-up with his primary care provider for continuation of risk factor modification treatment program.  I told him that he could come off Plavix and stay on aspirin once a " day.  I ordered follow-up vascular studies to be done in 6 months and asked patient to see me after the studies are done.  He knows to call me for any question or any problem with his legs at any time.  All questions were answered.         Negar Machado MD  [facility]   [place] preferred or call my office [phone]  __________________________________________________________________  Patient:Dale Dinero   MRN:[id number]   CSN:[id number]

## 2024-05-15 PROCEDURE — RXMED WILLOW AMBULATORY MEDICATION CHARGE: Performed by: FAMILY MEDICINE

## 2024-05-16 ENCOUNTER — PHARMACY VISIT (OUTPATIENT)
Dept: PHARMACY | Facility: MEDICAL CENTER | Age: 69
End: 2024-05-16
Payer: COMMERCIAL

## 2024-05-29 ENCOUNTER — HOSPITAL ENCOUNTER (OUTPATIENT)
Dept: RADIOLOGY | Facility: MEDICAL CENTER | Age: 69
End: 2024-05-29
Attending: FAMILY MEDICINE
Payer: MEDICARE

## 2024-05-29 ENCOUNTER — OFFICE VISIT (OUTPATIENT)
Dept: MEDICAL GROUP | Facility: MEDICAL CENTER | Age: 69
End: 2024-05-29
Payer: MEDICARE

## 2024-05-29 VITALS
RESPIRATION RATE: 18 BRPM | OXYGEN SATURATION: 96 % | HEIGHT: 70 IN | BODY MASS INDEX: 23.36 KG/M2 | WEIGHT: 163.14 LBS | SYSTOLIC BLOOD PRESSURE: 122 MMHG | HEART RATE: 78 BPM | TEMPERATURE: 98 F | DIASTOLIC BLOOD PRESSURE: 62 MMHG

## 2024-05-29 DIAGNOSIS — M54.42 CHRONIC BILATERAL LOW BACK PAIN WITH BILATERAL SCIATICA: ICD-10-CM

## 2024-05-29 DIAGNOSIS — J44.9 CHRONIC OBSTRUCTIVE PULMONARY DISEASE, UNSPECIFIED COPD TYPE (HCC): ICD-10-CM

## 2024-05-29 DIAGNOSIS — M54.41 CHRONIC BILATERAL LOW BACK PAIN WITH BILATERAL SCIATICA: ICD-10-CM

## 2024-05-29 DIAGNOSIS — J41.0 SMOKERS' COUGH (HCC): ICD-10-CM

## 2024-05-29 DIAGNOSIS — G89.29 CHRONIC BILATERAL LOW BACK PAIN WITH BILATERAL SCIATICA: ICD-10-CM

## 2024-05-29 PROBLEM — T81.30XA WOUND DEHISCENCE: Status: RESOLVED | Noted: 2023-05-19 | Resolved: 2024-05-29

## 2024-05-29 ASSESSMENT — PATIENT HEALTH QUESTIONNAIRE - PHQ9: CLINICAL INTERPRETATION OF PHQ2 SCORE: 0

## 2024-05-29 ASSESSMENT — FIBROSIS 4 INDEX: FIB4 SCORE: 1.19

## 2024-05-29 NOTE — PROGRESS NOTES
Chief Complaint   Patient presents with    Back Pain    Leg Pain       Subjective:     HPI:   Dale Dinero presents today with the following: He is a patient of Dr. Harman    1. Chronic bilateral low back pain with bilateral sciatica  He has chronic bilateral low back pain.  He has history of a failed back surgery.  He has bilateral pain radiation into both buttocks and sciatic regions.  He is unable to straighten out well.  He is having great difficulty walking more than 50 feet or so.  He is understandably frustrated with the situation.  Lumbar x-ray order discussed and placed.  He may also need an MRI.  Referral to neurosurgery discussed and placed.    2. Chronic obstructive pulmonary disease, unspecified COPD type ([facility])/Smokers' cough ([facility])  Stable. Currently using inhalers as prescribed.  He denies side effects.  Denies recent or current exacerbation.   Reports no current shortness of breath or chest pain.  He has a chronic daily cough.  Unfortunately he continues to smoke and does not plan to stop.  He is not on oxygen therapy.  Last PFT: 2021          Patient Active Problem List    Diagnosis Date Noted    Hospital discharge follow-up 06/05/2023    Soft tissue infection 05/25/2023    Pseudoaneurysm of femoral artery ([facility]) 05/02/2023    Smokers' cough ([facility]) 12/17/2019    PVD (peripheral vascular disease) ([facility]) 12/17/2019    Other fatigue 10/03/2018    Dyslipidemia 10/03/2018    Callus of foot 10/03/2018    Other viral warts 10/03/2018    Essential hypertension 01/17/2017    Tobacco dependence 01/17/2017    COPD (chronic obstructive pulmonary disease) ([facility]) 05/28/2015    CAD (coronary artery disease) 05/28/2015    Near syncope 05/28/2015    Non compliance w medication regimen 05/28/2015       Current medicines (including changes today)  Current Outpatient Medications   Medication Sig Dispense Refill    simvastatin (ZOCOR) 20 MG Tab Take 1 Tablet by mouth every evening. Indications: High Amount of Fats in  "the Blood 100 Tablet 0    sildenafil citrate (VIAGRA) 50 MG tablet Take 1 Tablet by mouth 1 time a day as needed for Erectile Dysfunction. 10 Tablet 3    lisinopril (PRINIVIL) 5 MG Tab Take 1 Tablet by mouth every day. Indications: High Blood Pressure Disorder 100 Tablet 3    aspirin 81 MG EC tablet Take 81 mg by mouth every morning. Indications: dvt prevention       No current facility-administered medications for this visit.       No Known Allergies    ROS: As per HPI       Objective:     /62   Pulse 78   Temp 36.7 °C (98 °F)   Resp 18   Ht 1.778 m (5' 10\")   Wt 74 kg (163 lb 2.3 oz)   SpO2 96%  Body mass index is 23.41 kg/m².    Physical Exam:  Constitutional: Well-developed and well-nourished. Not diaphoretic. No distress. Lucid and fluent.  Skin: Skin is warm and dry. No rash noted.  Head: Atraumatic without lesions.  Eyes: Conjunctivae and extraocular motions are normal. Pupils are equal, round, and reactive to light. No scleral icterus.   Ears:  External ears unremarkable.  Neck: Supple, trachea midline. No thyromegaly present. No cervical or supraclavicular lymphadenopathy. No JVD appreciated  Cardiovascular: Regular rate and rhythm.  Normal S1, S2 without murmur appreciated.  Chest: Effort normal. Clear to auscultation throughout. No adventitious sounds.   Back: Spinous process tenderness.  No erythema or localization.  The lower back is extremely straight and stiff.  Unable to fully straighten the back.  Tenderness over both gluteal areas mostly SI joint regions.    Extremities: No cyanosis, clubbing, erythema, nor edema.   Neurological: Alert and oriented x 3.  Patient walks with a slow relatively halting gait.  His legs are slightly flexed.  It seems to be quite difficult for him to straighten the back.  Psychiatric:  Behavior, mood, and affect are appropriate.       Assessment and Plan:     69 y.o. male with the following issues:    1. Chronic bilateral low back pain with bilateral sciatica  " DX-LUMBAR SPINE-2 OR 3 VIEWS    Referral to Neurosurgery      2. Chronic obstructive pulmonary disease, unspecified COPD type (HCC)        3. Smokers' cough (HCC)              Followup: Return if symptoms worsen or fail to improve.

## 2024-06-12 ENCOUNTER — HOSPITAL ENCOUNTER (OUTPATIENT)
Dept: RADIOLOGY | Facility: MEDICAL CENTER | Age: 69
End: 2024-06-12
Attending: FAMILY MEDICINE
Payer: MEDICARE

## 2024-06-12 DIAGNOSIS — M54.42 CHRONIC BILATERAL LOW BACK PAIN WITH BILATERAL SCIATICA: ICD-10-CM

## 2024-06-12 DIAGNOSIS — G89.29 CHRONIC BILATERAL LOW BACK PAIN WITH BILATERAL SCIATICA: ICD-10-CM

## 2024-06-12 DIAGNOSIS — M54.41 CHRONIC BILATERAL LOW BACK PAIN WITH BILATERAL SCIATICA: ICD-10-CM

## 2024-06-12 PROCEDURE — 72148 MRI LUMBAR SPINE W/O DYE: CPT

## 2024-06-13 DIAGNOSIS — G89.29 CHRONIC BILATERAL LOW BACK PAIN WITH BILATERAL SCIATICA: ICD-10-CM

## 2024-06-13 DIAGNOSIS — M54.41 CHRONIC BILATERAL LOW BACK PAIN WITH BILATERAL SCIATICA: ICD-10-CM

## 2024-06-13 DIAGNOSIS — M54.42 CHRONIC BILATERAL LOW BACK PAIN WITH BILATERAL SCIATICA: ICD-10-CM

## 2024-07-08 ENCOUNTER — OFFICE VISIT (OUTPATIENT)
Dept: MEDICAL GROUP | Facility: MEDICAL CENTER | Age: 69
End: 2024-07-08
Payer: MEDICARE

## 2024-07-08 ENCOUNTER — PATIENT MESSAGE (OUTPATIENT)
Dept: MEDICAL GROUP | Facility: MEDICAL CENTER | Age: 69
End: 2024-07-08

## 2024-07-08 VITALS
DIASTOLIC BLOOD PRESSURE: 60 MMHG | SYSTOLIC BLOOD PRESSURE: 102 MMHG | TEMPERATURE: 98 F | OXYGEN SATURATION: 95 % | HEIGHT: 70 IN | WEIGHT: 160 LBS | RESPIRATION RATE: 18 BRPM | BODY MASS INDEX: 22.9 KG/M2 | HEART RATE: 84 BPM

## 2024-07-08 DIAGNOSIS — R94.31 ABNORMAL EKG: ICD-10-CM

## 2024-07-08 DIAGNOSIS — Z01.818 PREOPERATIVE CLEARANCE: ICD-10-CM

## 2024-07-08 DIAGNOSIS — J44.9 CHRONIC OBSTRUCTIVE PULMONARY DISEASE, UNSPECIFIED COPD TYPE (HCC): ICD-10-CM

## 2024-07-08 DIAGNOSIS — I10 ESSENTIAL HYPERTENSION: ICD-10-CM

## 2024-07-08 DIAGNOSIS — M54.41 CHRONIC BILATERAL LOW BACK PAIN WITH BILATERAL SCIATICA: ICD-10-CM

## 2024-07-08 DIAGNOSIS — G89.29 CHRONIC BILATERAL LOW BACK PAIN WITH BILATERAL SCIATICA: ICD-10-CM

## 2024-07-08 DIAGNOSIS — M54.42 CHRONIC BILATERAL LOW BACK PAIN WITH BILATERAL SCIATICA: ICD-10-CM

## 2024-07-08 PROBLEM — I73.9 PVD (PERIPHERAL VASCULAR DISEASE) (HCC): Status: RESOLVED | Noted: 2019-12-17 | Resolved: 2024-07-08

## 2024-07-08 PROCEDURE — 93000 ELECTROCARDIOGRAM COMPLETE: CPT | Performed by: FAMILY MEDICINE

## 2024-07-08 PROCEDURE — 3078F DIAST BP <80 MM HG: CPT | Performed by: FAMILY MEDICINE

## 2024-07-08 PROCEDURE — 3074F SYST BP LT 130 MM HG: CPT | Performed by: FAMILY MEDICINE

## 2024-07-08 PROCEDURE — 99214 OFFICE O/P EST MOD 30 MIN: CPT | Performed by: FAMILY MEDICINE

## 2024-07-08 ASSESSMENT — FIBROSIS 4 INDEX: FIB4 SCORE: 1.19

## 2024-07-23 ENCOUNTER — TELEPHONE (OUTPATIENT)
Dept: HEALTH INFORMATION MANAGEMENT | Facility: OTHER | Age: 69
End: 2024-07-23
Payer: MEDICARE

## 2024-07-29 ENCOUNTER — HOSPITAL ENCOUNTER (OUTPATIENT)
Dept: CARDIOLOGY | Facility: MEDICAL CENTER | Age: 69
End: 2024-07-29
Attending: FAMILY MEDICINE
Payer: MEDICARE

## 2024-07-29 DIAGNOSIS — R94.31 ABNORMAL EKG: ICD-10-CM

## 2024-07-29 DIAGNOSIS — Z01.818 PREOPERATIVE CLEARANCE: ICD-10-CM

## 2024-07-29 LAB
LV EJECT FRACT  99904: 65
LV EJECT FRACT MOD 2C 99903: 69.05
LV EJECT FRACT MOD 4C 99902: 61.78
LV EJECT FRACT MOD BP 99901: 65.65

## 2024-07-29 PROCEDURE — 93306 TTE W/DOPPLER COMPLETE: CPT

## 2024-08-01 ENCOUNTER — APPOINTMENT (OUTPATIENT)
Dept: ADMISSIONS | Facility: MEDICAL CENTER | Age: 69
End: 2024-08-01
Attending: NEUROLOGICAL SURGERY
Payer: MEDICARE

## 2024-08-01 PROCEDURE — RXMED WILLOW AMBULATORY MEDICATION CHARGE: Performed by: FAMILY MEDICINE

## 2024-08-01 RX ORDER — SIMVASTATIN 20 MG
20 TABLET ORAL EVERY EVENING
Qty: 100 TABLET | Refills: 0 | Status: SHIPPED | OUTPATIENT
Start: 2024-08-01

## 2024-08-05 ENCOUNTER — APPOINTMENT (OUTPATIENT)
Dept: ADMISSIONS | Facility: MEDICAL CENTER | Age: 69
End: 2024-08-05
Payer: MEDICARE

## 2024-08-05 ENCOUNTER — PHARMACY VISIT (OUTPATIENT)
Dept: PHARMACY | Facility: MEDICAL CENTER | Age: 69
End: 2024-08-05
Payer: COMMERCIAL

## 2024-08-06 ENCOUNTER — APPOINTMENT (OUTPATIENT)
Dept: ADMISSIONS | Facility: MEDICAL CENTER | Age: 69
End: 2024-08-06
Attending: NEUROLOGICAL SURGERY
Payer: MEDICARE

## 2024-08-06 ENCOUNTER — APPOINTMENT (OUTPATIENT)
Dept: MEDICAL GROUP | Facility: MEDICAL CENTER | Age: 69
End: 2024-08-06
Payer: MEDICARE

## 2024-08-07 ENCOUNTER — HOSPITAL ENCOUNTER (OUTPATIENT)
Dept: RADIOLOGY | Facility: MEDICAL CENTER | Age: 69
End: 2024-08-07
Attending: NEUROLOGICAL SURGERY | Admitting: NEUROLOGICAL SURGERY
Payer: MEDICARE

## 2024-08-07 ENCOUNTER — PRE-ADMISSION TESTING (OUTPATIENT)
Dept: ADMISSIONS | Facility: MEDICAL CENTER | Age: 69
End: 2024-08-07
Attending: NEUROLOGICAL SURGERY
Payer: MEDICARE

## 2024-08-07 DIAGNOSIS — Z01.812 PRE-OPERATIVE LABORATORY EXAMINATION: ICD-10-CM

## 2024-08-07 DIAGNOSIS — Z01.811 PRE-OPERATIVE RESPIRATORY EXAMINATION: ICD-10-CM

## 2024-08-07 LAB
ALBUMIN SERPL BCP-MCNC: 4.2 G/DL (ref 3.2–4.9)
ALBUMIN/GLOB SERPL: 1.6 G/DL
ALP SERPL-CCNC: 62 U/L (ref 30–99)
ALT SERPL-CCNC: <5 U/L (ref 2–50)
ANION GAP SERPL CALC-SCNC: 13 MMOL/L (ref 7–16)
APTT PPP: 27.2 SEC (ref 24.7–36)
AST SERPL-CCNC: <5 U/L (ref 12–45)
BASOPHILS # BLD AUTO: 0.6 % (ref 0–1.8)
BASOPHILS # BLD: 0.06 K/UL (ref 0–0.12)
BILIRUB SERPL-MCNC: 0.7 MG/DL (ref 0.1–1.5)
BUN SERPL-MCNC: 12 MG/DL (ref 8–22)
CALCIUM ALBUM COR SERPL-MCNC: 9 MG/DL (ref 8.5–10.5)
CALCIUM SERPL-MCNC: 9.2 MG/DL (ref 8.5–10.5)
CHLORIDE SERPL-SCNC: 105 MMOL/L (ref 96–112)
CO2 SERPL-SCNC: 20 MMOL/L (ref 20–33)
CREAT SERPL-MCNC: 0.71 MG/DL (ref 0.5–1.4)
EOSINOPHIL # BLD AUTO: 0.18 K/UL (ref 0–0.51)
EOSINOPHIL NFR BLD: 1.8 % (ref 0–6.9)
ERYTHROCYTE [DISTWIDTH] IN BLOOD BY AUTOMATED COUNT: 42.7 FL (ref 35.9–50)
GFR SERPLBLD CREATININE-BSD FMLA CKD-EPI: 100 ML/MIN/1.73 M 2
GLOBULIN SER CALC-MCNC: 2.6 G/DL (ref 1.9–3.5)
GLUCOSE SERPL-MCNC: 90 MG/DL (ref 65–99)
HCT VFR BLD AUTO: 48.6 % (ref 42–52)
HGB BLD-MCNC: 16.7 G/DL (ref 14–18)
IMM GRANULOCYTES # BLD AUTO: 0.04 K/UL (ref 0–0.11)
IMM GRANULOCYTES NFR BLD AUTO: 0.4 % (ref 0–0.9)
INR PPP: 0.97 (ref 0.87–1.13)
LYMPHOCYTES # BLD AUTO: 3.66 K/UL (ref 1–4.8)
LYMPHOCYTES NFR BLD: 36.5 % (ref 22–41)
MCH RBC QN AUTO: 30.2 PG (ref 27–33)
MCHC RBC AUTO-ENTMCNC: 34.4 G/DL (ref 32.3–36.5)
MCV RBC AUTO: 87.9 FL (ref 81.4–97.8)
MONOCYTES # BLD AUTO: 0.79 K/UL (ref 0–0.85)
MONOCYTES NFR BLD AUTO: 7.9 % (ref 0–13.4)
NEUTROPHILS # BLD AUTO: 5.29 K/UL (ref 1.82–7.42)
NEUTROPHILS NFR BLD: 52.8 % (ref 44–72)
NRBC # BLD AUTO: 0 K/UL
NRBC BLD-RTO: 0 /100 WBC (ref 0–0.2)
PLATELET # BLD AUTO: 214 K/UL (ref 164–446)
PMV BLD AUTO: 9.9 FL (ref 9–12.9)
POTASSIUM SERPL-SCNC: 3.9 MMOL/L (ref 3.6–5.5)
PROT SERPL-MCNC: 6.8 G/DL (ref 6–8.2)
PROTHROMBIN TIME: 13 SEC (ref 12–14.6)
RBC # BLD AUTO: 5.53 M/UL (ref 4.7–6.1)
SODIUM SERPL-SCNC: 138 MMOL/L (ref 135–145)
WBC # BLD AUTO: 10 K/UL (ref 4.8–10.8)

## 2024-08-07 PROCEDURE — 85730 THROMBOPLASTIN TIME PARTIAL: CPT

## 2024-08-07 PROCEDURE — 71046 X-RAY EXAM CHEST 2 VIEWS: CPT

## 2024-08-07 PROCEDURE — 80053 COMPREHEN METABOLIC PANEL: CPT

## 2024-08-07 PROCEDURE — 85610 PROTHROMBIN TIME: CPT

## 2024-08-07 PROCEDURE — 36415 COLL VENOUS BLD VENIPUNCTURE: CPT

## 2024-08-07 PROCEDURE — 85025 COMPLETE CBC W/AUTO DIFF WBC: CPT

## 2024-08-08 ENCOUNTER — APPOINTMENT (RX ONLY)
Dept: URBAN - METROPOLITAN AREA CLINIC 159 | Facility: CLINIC | Age: 69
Setting detail: DERMATOLOGY
End: 2024-08-08

## 2024-08-08 PROBLEM — D48.5 NEOPLASM OF UNCERTAIN BEHAVIOR OF SKIN: Status: ACTIVE | Noted: 2024-08-08

## 2024-08-08 PROCEDURE — 11102 TANGNTL BX SKIN SINGLE LES: CPT

## 2024-08-08 PROCEDURE — ? BIOPSY BY SHAVE METHOD

## 2024-08-08 NOTE — PROCEDURE: BIOPSY BY SHAVE METHOD
Detail Level: Detailed
Depth Of Biopsy: dermis
Was A Bandage Applied: Yes
Size Of Lesion In Cm: 0.8
X Size Of Lesion In Cm: 0.6
Biopsy Type: H and E
Biopsy Method: Dermablade
Anesthesia Type: 2% lidocaine with epinephrine
Anesthesia Volume In Cc: 0.5
Additional Anesthesia Volume In Cc (Will Not Render If 0): 0
Hemostasis: Drysol
Wound Care: Petrolatum
Dressing: bandage
Destruction After The Procedure: No
Type Of Destruction Used: Curettage
Curettage Text: The wound bed was treated with curettage after the biopsy was performed.
Cryotherapy Text: The wound bed was treated with cryotherapy after the biopsy was performed.
Electrodesiccation Text: The wound bed was treated with electrodesiccation after the biopsy was performed.
Electrodesiccation And Curettage Text: The wound bed was treated with electrodesiccation and curettage after the biopsy was performed.
Silver Nitrate Text: The wound bed was treated with silver nitrate after the biopsy was performed.
Lab: -[id number]
Consent: Verbal consent was obtained and risks were reviewed including but not limited to scarring, infection, bleeding, scabbing, incomplete removal, nerve damage and allergy to anesthesia.
Post-Care Instructions: I reviewed with the patient in detail post-care instructions. Patient is to keep the biopsy site dry overnight, and then apply bacitracin twice daily until healed. Patient may apply hydrogen peroxide soaks to remove any crusting.
Notification Instructions: Patient will be notified of biopsy results. However, patient instructed to call the office if not contacted within 2 weeks.
Billing Type: Third-Party Bill
Information: Selecting Yes will display possible errors in your note based on the variables you have selected. This validation is only offered as a suggestion for you. PLEASE NOTE THAT THE VALIDATION TEXT WILL BE REMOVED WHEN YOU FINALIZE YOUR NOTE. IF YOU WANT TO FAX A PRELIMINARY NOTE YOU WILL NEED TO TOGGLE THIS TO 'NO' IF YOU DO NOT WANT IT IN YOUR FAXED NOTE.

## 2024-08-12 ENCOUNTER — APPOINTMENT (OUTPATIENT)
Dept: RADIOLOGY | Facility: MEDICAL CENTER | Age: 69
End: 2024-08-12
Attending: NEUROLOGICAL SURGERY
Payer: MEDICARE

## 2024-08-12 ENCOUNTER — ANESTHESIA (OUTPATIENT)
Dept: SURGERY | Facility: MEDICAL CENTER | Age: 69
End: 2024-08-12
Payer: MEDICARE

## 2024-08-12 ENCOUNTER — PHARMACY VISIT (OUTPATIENT)
Dept: PHARMACY | Facility: MEDICAL CENTER | Age: 69
End: 2024-08-12
Payer: COMMERCIAL

## 2024-08-12 ENCOUNTER — ANESTHESIA EVENT (OUTPATIENT)
Dept: SURGERY | Facility: MEDICAL CENTER | Age: 69
End: 2024-08-12
Payer: MEDICARE

## 2024-08-12 ENCOUNTER — HOSPITAL ENCOUNTER (OUTPATIENT)
Facility: MEDICAL CENTER | Age: 69
End: 2024-08-12
Attending: NEUROLOGICAL SURGERY | Admitting: NEUROLOGICAL SURGERY
Payer: MEDICARE

## 2024-08-12 VITALS
SYSTOLIC BLOOD PRESSURE: 164 MMHG | RESPIRATION RATE: 16 BRPM | HEART RATE: 59 BPM | WEIGHT: 174.82 LBS | HEIGHT: 68 IN | BODY MASS INDEX: 26.5 KG/M2 | DIASTOLIC BLOOD PRESSURE: 77 MMHG | TEMPERATURE: 95.9 F | OXYGEN SATURATION: 92 %

## 2024-08-12 PROCEDURE — 110454 HCHG SHELL REV 250: Performed by: NEUROLOGICAL SURGERY

## 2024-08-12 PROCEDURE — 700111 HCHG RX REV CODE 636 W/ 250 OVERRIDE (IP): Performed by: NEUROLOGICAL SURGERY

## 2024-08-12 PROCEDURE — RXMED WILLOW AMBULATORY MEDICATION CHARGE: Performed by: NURSE PRACTITIONER

## 2024-08-12 PROCEDURE — 160029 HCHG SURGERY MINUTES - 1ST 30 MINS LEVEL 4: Performed by: NEUROLOGICAL SURGERY

## 2024-08-12 PROCEDURE — 700105 HCHG RX REV CODE 258: Performed by: ANESTHESIOLOGY

## 2024-08-12 PROCEDURE — 700102 HCHG RX REV CODE 250 W/ 637 OVERRIDE(OP): Performed by: ANESTHESIOLOGY

## 2024-08-12 PROCEDURE — 160048 HCHG OR STATISTICAL LEVEL 1-5: Performed by: NEUROLOGICAL SURGERY

## 2024-08-12 PROCEDURE — 160041 HCHG SURGERY MINUTES - EA ADDL 1 MIN LEVEL 4: Performed by: NEUROLOGICAL SURGERY

## 2024-08-12 PROCEDURE — 160025 RECOVERY II MINUTES (STATS): Performed by: NEUROLOGICAL SURGERY

## 2024-08-12 PROCEDURE — 160002 HCHG RECOVERY MINUTES (STAT): Performed by: NEUROLOGICAL SURGERY

## 2024-08-12 PROCEDURE — 72020 X-RAY EXAM OF SPINE 1 VIEW: CPT

## 2024-08-12 PROCEDURE — 700101 HCHG RX REV CODE 250: Performed by: ANESTHESIOLOGY

## 2024-08-12 PROCEDURE — 160035 HCHG PACU - 1ST 60 MINS PHASE I: Performed by: NEUROLOGICAL SURGERY

## 2024-08-12 PROCEDURE — A9270 NON-COVERED ITEM OR SERVICE: HCPCS | Performed by: ANESTHESIOLOGY

## 2024-08-12 PROCEDURE — 160046 HCHG PACU - 1ST 60 MINS PHASE II: Performed by: NEUROLOGICAL SURGERY

## 2024-08-12 PROCEDURE — 110371 HCHG SHELL REV 272: Performed by: NEUROLOGICAL SURGERY

## 2024-08-12 PROCEDURE — 700111 HCHG RX REV CODE 636 W/ 250 OVERRIDE (IP): Performed by: ANESTHESIOLOGY

## 2024-08-12 PROCEDURE — 160009 HCHG ANES TIME/MIN: Performed by: NEUROLOGICAL SURGERY

## 2024-08-12 PROCEDURE — 700101 HCHG RX REV CODE 250: Performed by: NEUROLOGICAL SURGERY

## 2024-08-12 PROCEDURE — 160036 HCHG PACU - EA ADDL 30 MINS PHASE I: Performed by: NEUROLOGICAL SURGERY

## 2024-08-12 RX ORDER — SODIUM CHLORIDE, SODIUM LACTATE, POTASSIUM CHLORIDE, CALCIUM CHLORIDE 600; 310; 30; 20 MG/100ML; MG/100ML; MG/100ML; MG/100ML
INJECTION, SOLUTION INTRAVENOUS CONTINUOUS
Status: DISCONTINUED | OUTPATIENT
Start: 2024-08-12 | End: 2024-08-13 | Stop reason: HOSPADM

## 2024-08-12 RX ORDER — OXYCODONE AND ACETAMINOPHEN 5; 325 MG/1; MG/1
TABLET ORAL
Qty: 56 TABLET | Refills: 0 | OUTPATIENT
Start: 2024-08-12 | End: 2024-08-21 | Stop reason: SDUPTHER

## 2024-08-12 RX ORDER — HYDROMORPHONE HYDROCHLORIDE 1 MG/ML
0.4 INJECTION, SOLUTION INTRAMUSCULAR; INTRAVENOUS; SUBCUTANEOUS
Status: DISCONTINUED | OUTPATIENT
Start: 2024-08-12 | End: 2024-08-13 | Stop reason: HOSPADM

## 2024-08-12 RX ORDER — CEFAZOLIN SODIUM 1 G/3ML
INJECTION, POWDER, FOR SOLUTION INTRAMUSCULAR; INTRAVENOUS PRN
Status: DISCONTINUED | OUTPATIENT
Start: 2024-08-12 | End: 2024-08-12 | Stop reason: SURG

## 2024-08-12 RX ORDER — SODIUM CHLORIDE, SODIUM LACTATE, POTASSIUM CHLORIDE, CALCIUM CHLORIDE 600; 310; 30; 20 MG/100ML; MG/100ML; MG/100ML; MG/100ML
INJECTION, SOLUTION INTRAVENOUS CONTINUOUS
Status: ACTIVE | OUTPATIENT
Start: 2024-08-12 | End: 2024-08-12

## 2024-08-12 RX ORDER — SODIUM CHLORIDE, SODIUM LACTATE, POTASSIUM CHLORIDE, CALCIUM CHLORIDE 600; 310; 30; 20 MG/100ML; MG/100ML; MG/100ML; MG/100ML
INJECTION, SOLUTION INTRAVENOUS
Status: DISCONTINUED | OUTPATIENT
Start: 2024-08-12 | End: 2024-08-12 | Stop reason: SURG

## 2024-08-12 RX ORDER — BUPIVACAINE HYDROCHLORIDE AND EPINEPHRINE 5; 5 MG/ML; UG/ML
INJECTION, SOLUTION PERINEURAL
Status: DISCONTINUED | OUTPATIENT
Start: 2024-08-12 | End: 2024-08-12 | Stop reason: HOSPADM

## 2024-08-12 RX ORDER — MIDAZOLAM HYDROCHLORIDE 1 MG/ML
INJECTION INTRAMUSCULAR; INTRAVENOUS PRN
Status: DISCONTINUED | OUTPATIENT
Start: 2024-08-12 | End: 2024-08-12 | Stop reason: SURG

## 2024-08-12 RX ORDER — LIDOCAINE HYDROCHLORIDE 20 MG/ML
INJECTION, SOLUTION EPIDURAL; INFILTRATION; INTRACAUDAL; PERINEURAL PRN
Status: DISCONTINUED | OUTPATIENT
Start: 2024-08-12 | End: 2024-08-12 | Stop reason: SURG

## 2024-08-12 RX ORDER — ROCURONIUM BROMIDE 10 MG/ML
INJECTION, SOLUTION INTRAVENOUS PRN
Status: DISCONTINUED | OUTPATIENT
Start: 2024-08-12 | End: 2024-08-12 | Stop reason: SURG

## 2024-08-12 RX ORDER — ACETAMINOPHEN 500 MG
1000 TABLET ORAL ONCE
Status: COMPLETED | OUTPATIENT
Start: 2024-08-12 | End: 2024-08-12

## 2024-08-12 RX ORDER — ALBUTEROL SULFATE 5 MG/ML
2.5 SOLUTION RESPIRATORY (INHALATION)
Status: DISCONTINUED | OUTPATIENT
Start: 2024-08-12 | End: 2024-08-13 | Stop reason: HOSPADM

## 2024-08-12 RX ORDER — EPHEDRINE SULFATE 50 MG/ML
INJECTION, SOLUTION INTRAVENOUS PRN
Status: DISCONTINUED | OUTPATIENT
Start: 2024-08-12 | End: 2024-08-12 | Stop reason: SURG

## 2024-08-12 RX ORDER — OXYCODONE HCL 5 MG/5 ML
10 SOLUTION, ORAL ORAL
Status: DISCONTINUED | OUTPATIENT
Start: 2024-08-12 | End: 2024-08-13 | Stop reason: HOSPADM

## 2024-08-12 RX ORDER — DEXAMETHASONE SODIUM PHOSPHATE 4 MG/ML
INJECTION, SOLUTION INTRA-ARTICULAR; INTRALESIONAL; INTRAMUSCULAR; INTRAVENOUS; SOFT TISSUE PRN
Status: DISCONTINUED | OUTPATIENT
Start: 2024-08-12 | End: 2024-08-12 | Stop reason: SURG

## 2024-08-12 RX ORDER — CEFAZOLIN SODIUM 1 G/3ML
INJECTION, POWDER, FOR SOLUTION INTRAMUSCULAR; INTRAVENOUS
Status: DISCONTINUED | OUTPATIENT
Start: 2024-08-12 | End: 2024-08-12 | Stop reason: HOSPADM

## 2024-08-12 RX ORDER — HYDRALAZINE HYDROCHLORIDE 20 MG/ML
5 INJECTION INTRAMUSCULAR; INTRAVENOUS
Status: DISCONTINUED | OUTPATIENT
Start: 2024-08-12 | End: 2024-08-13 | Stop reason: HOSPADM

## 2024-08-12 RX ORDER — HYDROMORPHONE HYDROCHLORIDE 1 MG/ML
0.5 INJECTION, SOLUTION INTRAMUSCULAR; INTRAVENOUS; SUBCUTANEOUS
Status: DISCONTINUED | OUTPATIENT
Start: 2024-08-12 | End: 2024-08-13 | Stop reason: HOSPADM

## 2024-08-12 RX ORDER — MAGNESIUM SULFATE HEPTAHYDRATE 40 MG/ML
INJECTION, SOLUTION INTRAVENOUS PRN
Status: DISCONTINUED | OUTPATIENT
Start: 2024-08-12 | End: 2024-08-12 | Stop reason: SURG

## 2024-08-12 RX ORDER — HYDROMORPHONE HYDROCHLORIDE 2 MG/ML
INJECTION, SOLUTION INTRAMUSCULAR; INTRAVENOUS; SUBCUTANEOUS PRN
Status: DISCONTINUED | OUTPATIENT
Start: 2024-08-12 | End: 2024-08-12 | Stop reason: SURG

## 2024-08-12 RX ORDER — CELECOXIB 200 MG/1
200 CAPSULE ORAL ONCE
Status: COMPLETED | OUTPATIENT
Start: 2024-08-12 | End: 2024-08-12

## 2024-08-12 RX ORDER — HALOPERIDOL 5 MG/ML
1 INJECTION INTRAMUSCULAR
Status: DISCONTINUED | OUTPATIENT
Start: 2024-08-12 | End: 2024-08-13 | Stop reason: HOSPADM

## 2024-08-12 RX ORDER — ONDANSETRON 2 MG/ML
INJECTION INTRAMUSCULAR; INTRAVENOUS PRN
Status: DISCONTINUED | OUTPATIENT
Start: 2024-08-12 | End: 2024-08-12 | Stop reason: SURG

## 2024-08-12 RX ORDER — HYDROMORPHONE HYDROCHLORIDE 1 MG/ML
0.2 INJECTION, SOLUTION INTRAMUSCULAR; INTRAVENOUS; SUBCUTANEOUS
Status: DISCONTINUED | OUTPATIENT
Start: 2024-08-12 | End: 2024-08-13 | Stop reason: HOSPADM

## 2024-08-12 RX ORDER — ONDANSETRON 2 MG/ML
4 INJECTION INTRAMUSCULAR; INTRAVENOUS
Status: DISCONTINUED | OUTPATIENT
Start: 2024-08-12 | End: 2024-08-13 | Stop reason: HOSPADM

## 2024-08-12 RX ORDER — OXYCODONE HCL 5 MG/5 ML
5 SOLUTION, ORAL ORAL
Status: DISCONTINUED | OUTPATIENT
Start: 2024-08-12 | End: 2024-08-13 | Stop reason: HOSPADM

## 2024-08-12 RX ADMIN — PROPOFOL 200 MG: 10 INJECTION, EMULSION INTRAVENOUS at 18:25

## 2024-08-12 RX ADMIN — ROCURONIUM BROMIDE 70 MG: 50 INJECTION, SOLUTION INTRAVENOUS at 18:25

## 2024-08-12 RX ADMIN — LIDOCAINE HYDROCHLORIDE 80 MG: 20 INJECTION, SOLUTION EPIDURAL; INFILTRATION; INTRACAUDAL at 18:25

## 2024-08-12 RX ADMIN — ACETAMINOPHEN 1000 MG: 500 TABLET ORAL at 17:47

## 2024-08-12 RX ADMIN — DEXAMETHASONE SODIUM PHOSPHATE 10 MG: 4 INJECTION INTRA-ARTICULAR; INTRALESIONAL; INTRAMUSCULAR; INTRAVENOUS; SOFT TISSUE at 18:28

## 2024-08-12 RX ADMIN — SODIUM CHLORIDE, POTASSIUM CHLORIDE, SODIUM LACTATE AND CALCIUM CHLORIDE: 600; 310; 30; 20 INJECTION, SOLUTION INTRAVENOUS at 18:21

## 2024-08-12 RX ADMIN — HYDROMORPHONE HYDROCHLORIDE 0.5 MG: 2 INJECTION INTRAMUSCULAR; INTRAVENOUS; SUBCUTANEOUS at 18:25

## 2024-08-12 RX ADMIN — HYDROMORPHONE HYDROCHLORIDE 0.5 MG: 2 INJECTION INTRAMUSCULAR; INTRAVENOUS; SUBCUTANEOUS at 18:45

## 2024-08-12 RX ADMIN — SUGAMMADEX 200 MG: 100 INJECTION, SOLUTION INTRAVENOUS at 19:11

## 2024-08-12 RX ADMIN — METHOCARBAMOL 1000 MG: 100 INJECTION, SOLUTION INTRAMUSCULAR; INTRAVENOUS at 19:59

## 2024-08-12 RX ADMIN — SODIUM CHLORIDE, POTASSIUM CHLORIDE, SODIUM LACTATE AND CALCIUM CHLORIDE: 600; 310; 30; 20 INJECTION, SOLUTION INTRAVENOUS at 19:12

## 2024-08-12 RX ADMIN — MIDAZOLAM HYDROCHLORIDE 2 MG: 2 INJECTION, SOLUTION INTRAMUSCULAR; INTRAVENOUS at 18:21

## 2024-08-12 RX ADMIN — EPHEDRINE SULFATE 10 MG: 50 INJECTION, SOLUTION INTRAVENOUS at 19:00

## 2024-08-12 RX ADMIN — ONDANSETRON 4 MG: 2 INJECTION INTRAMUSCULAR; INTRAVENOUS at 19:11

## 2024-08-12 RX ADMIN — CEFAZOLIN 2 G: 1 INJECTION, POWDER, FOR SOLUTION INTRAMUSCULAR; INTRAVENOUS at 18:21

## 2024-08-12 RX ADMIN — MAGNESIUM SULFATE HEPTAHYDRATE 4 G: 2 INJECTION, SOLUTION INTRAVENOUS at 18:36

## 2024-08-12 RX ADMIN — CELECOXIB 200 MG: 200 CAPSULE ORAL at 17:47

## 2024-08-12 RX ADMIN — FENTANYL CITRATE 50 MCG: 50 INJECTION, SOLUTION INTRAMUSCULAR; INTRAVENOUS at 19:48

## 2024-08-12 ASSESSMENT — FIBROSIS 4 INDEX: FIB4 SCORE: 0.66

## 2024-08-12 ASSESSMENT — PAIN DESCRIPTION - PAIN TYPE
TYPE: SURGICAL PAIN

## 2024-08-12 ASSESSMENT — PAIN SCALES - GENERAL: PAIN_LEVEL: 0

## 2024-08-12 NOTE — DISCHARGE INSTRUCTIONS
HOME CARE INSTRUCTIONS    ACTIVITY: Rest and take it easy for the first 24 hours.  A responsible adult is recommended to remain with you during that time.  It is normal to feel sleepy.  We encourage you to not do anything that requires balance, judgment or coordination.    FOR 24 HOURS DO NOT:  Drive, operate machinery or run household appliances.  Drink beer or alcoholic beverages.  Make important decisions or sign legal documents.    SPECIAL INSTRUCTIONS:     Activity as tolerated.    May remove incisional dressing tomorrow at 2000.  Once dressing off may shower at that time.   May remove drain bandaid 24 hours after removal.   Keep dressing open to air.   Ok to get incision wet, do not submerge in water until steristrips off.   Keep steristrips 7-10 days. No NSAIDs for one week.   Follow up in four weeks.   Pericolace OTC 2 PO qday while taking pain meds     DIET: To avoid nausea, slowly advance diet as tolerated, avoiding spicy or greasy foods for the first day.  Add more substantial food to your diet according to your physician's instructions.  Babies can be fed formula or breast milk as soon as they are hungry.  INCREASE FLUIDS AND FIBER TO AVOID CONSTIPATION.    SURGICAL DRESSING/BATHING: _____    MEDICATIONS: Resume taking daily medication.  Take prescribed pain medication with food.  If no medication is prescribed, you may take non-aspirin pain medication if needed.  PAIN MEDICATION CAN BE VERY CONSTIPATING.  Take a stool softener or laxative such as senokot, pericolace, or milk of magnesia if needed.    Prescription given for _percocet_.  Last pain medication given at _____.    A follow-up appointment should be arranged with your doctor in 1-2 weeks; call to schedule.    You should CALL YOUR PHYSICIAN if you develop:  Fever greater than 101 degrees F.  Pain not relieved by medication, or persistent nausea or vomiting.  Excessive bleeding (blood soaking through dressing) or unexpected drainage from the  wound.  Extreme redness or swelling around the incision site, drainage of pus or foul smelling drainage.  Inability to urinate or empty your bladder within 8 hours.  Problems with breathing or chest pain.    You should call 911 if you develop problems with breathing or chest pain.  If you are unable to contact your doctor or surgical center, you should go to the nearest emergency room or urgent care center.  Physician's telephone #: [phone]    MILD FLU-LIKE SYMPTOMS ARE NORMAL.  YOU MAY EXPERIENCE GENERALIZED MUSCLE ACHES, THROAT IRRITATION, HEADACHE AND/OR SOME NAUSEA.    If any questions arise, call your doctor.  If your doctor is not available, please feel free to call the Surgical Center at [phone].  The Center is open Monday through Friday from 7AM to 7PM.      A registered nurse may call you a few days after your surgery to see how you are doing after your procedure.    You may also receive a survey in the mail within the next two weeks and we ask that you take a few moments to complete the survey and return it to us.  Our goal is to provide you with very good care and we value your comments.     Depression / Suicide Risk    As you are discharged from this [facility] facility, it is important to learn how to keep safe from harming yourself.    Recognize the warning signs:  Abrupt changes in personality, positive or negative- including increase in energy   Giving away possessions  Change in eating patterns- significant weight changes-  positive or negative  Change in sleeping patterns- unable to sleep or sleeping all the time   Unwillingness or inability to communicate  Depression  Unusual sadness, discouragement and loneliness  Talk of wanting to die  Neglect of personal appearance   Rebelliousness- reckless behavior  Withdrawal from people/activities they love  Confusion- inability to concentrate     If you or a loved one observes any of these behaviors or has concerns about self-harm, here's  what you can do:  Talk about it- your feelings and reasons for harming yourself  Remove any means that you might use to hurt yourself (examples: pills, rope, extension cords, firearm)  Get professional help from the community (Mental Health, Substance Abuse, psychological counseling)  Do not be alone:Call your Safe Contact- someone whom you trust who will be there for you.  Call your local CRISIS HOTLINE 880-0065 or 985-610-4435  Call your local Children's Mobile Crisis Response Team [place] (838) 069-2788 or www.[employer]  Call the toll free National Suicide Prevention Hotlines   National Suicide Prevention Lifeline 444-507-CJJT (0387)  National Hope Line Network 800-SUICIDE (386-8900)    I acknowledge receipt and understanding of these Home Care instructions.

## 2024-08-12 NOTE — PROGRESS NOTES
Medication history reviewed with PT at bedside    [facility] rec is complete per PT reporting    Allergies reviewed.     Patient denies any outpatient antibiotics in the last 30 days.     Patient is not taking anticoagulants.    Preferred pharmacy for this visit - [facility] on [address] ([phone])

## 2024-08-13 NOTE — ANESTHESIA PROCEDURE NOTES
Airway    Date/Time: 8/12/2024 6:26 PM    Performed by: Chandana Owens M.D.  Authorized by: Chandana Owens M.D.    Location:  OR  Urgency:  Elective  Difficult Airway: No    Indications for Airway Management:  Anesthesia      Spontaneous Ventilation: absent    Sedation Level:  Deep  Preoxygenated: Yes    Patient Position:  Sniffing  Mask Difficulty Assessment:  1 - vent by mask  Final Airway Type:  Endotracheal airway  Final Endotracheal Airway:  ETT  Cuffed: Yes    Technique Used for Successful ETT Placement:  Direct laryngoscopy    Insertion Site:  Oral  Blade Type:  Soria  Laryngoscope Blade/Videolaryngoscope Blade Size:  2  ETT Size (mm):  7.0  Measured from:  Lips  ETT to Lips (cm):  24  Placement Verified by: auscultation and capnometry    Cormack-Lehane Classification:  Grade I - full view of glottis  Number of Attempts at Approach:  1

## 2024-08-13 NOTE — OR NURSING
2048  Pt arrived from PACU, report received from RN. VSS, Dressing Clean, dry intact to lower mid back without drainage.                      Pt remains on gurney.. Tolerating Solids, liquids. Denies pain, nausea at present.    2100  Hemovac drained of 17 cc total  (5cc plus 12 cc) of blood and stringy clot. Pt voided.    2115 Pt sats dropping to 86%  despite coaching; given IS and instructed in use with adequate return demonstration to 2000cc inspired volume.     2122 APRN for Maylin Thurman gives order to pull Hemovac and plase dressing over site and verbal orders to dc to home.  Hemovac pulled and dressing placed.    2135 DC to home.

## 2024-08-13 NOTE — ANESTHESIA PREPROCEDURE EVALUATION
Case: [id number] Date/Time: 08/12/24 1245    Procedure: POSTERIOR BILATERAL L3-4 LUMBAR INTERNAL DECOMPRESSION    Pre-op diagnosis: SPINAL STENOSIS LUMBAR REGION    Location: [facility] OR 05 / SURGERY [facility]    Surgeons: Franc Carlisle M.D.          Current smoker with COPD (no oxygen or inhalers), PAD, s/p aortobifem, hep C (treated), echo from 7/2024 showed no significant abnormalities. Hx of CAD per records with some unchanged EKG abnormalities.    Relevant Problems   PULMONARY   (positive) COPD (chronic obstructive pulmonary disease) (HCC)      CARDIAC   (positive) CAD (coronary artery disease)   (positive) Essential hypertension   (positive) Pseudoaneurysm of femoral artery (HCC)       Physical Exam    Airway   Mallampati: II  TM distance: >3 FB  Neck ROM: full       Cardiovascular - normal exam  Rhythm: regular  Rate: normal  (-) murmur     Dental - normal exam           Pulmonary - normal exam  Breath sounds clear to auscultation     Abdominal    Neurological - normal exam                   Anesthesia Plan    ASA 3 (severe vascular disease, s/p aortobifem,)   ASA physical status 3 criteria: other (comment)    Plan - general       Airway plan will be ETT          Induction: intravenous    Postoperative Plan: Postoperative administration of opioids is intended.    Pertinent diagnostic labs and testing reviewed    Informed Consent:    Anesthetic plan and risks discussed with patient.    Use of blood products discussed with: patient whom consented to blood products.

## 2024-08-13 NOTE — ANESTHESIA POSTPROCEDURE EVALUATION
Patient: Dale Dinero    Procedure Summary       Date: 08/12/24 Room / Location: [facility] 05 / SURGERY [facility]    Anesthesia Start: 1821 Anesthesia Stop: 1929    Procedure: POSTERIOR BILATERAL L3-4 LUMBAR INTERNAL DECOMPRESSION (Back) Diagnosis: (SPINAL STENOSIS LUMBAR REGION)    Surgeons: Frnac Carlisle M.D. Responsible Provider: Chandana Owens M.D.    Anesthesia Type: general ASA Status: 3            Final Anesthesia Type: general  Last vitals  BP   Blood Pressure : (!) 140/64    Temp   36.3 °C (97.3 °F)    Pulse   76   Resp   15    SpO2   99 %      Anesthesia Post Evaluation    Patient location during evaluation: PACU  Patient participation: complete - patient participated  Level of consciousness: awake and alert  Pain score: 0    Airway patency: patent  Anesthetic complications: no  Cardiovascular status: hemodynamically stable  Respiratory status: acceptable  Hydration status: euvolemic    PONV: none          No notable events documented.     Nurse Pain Score: 0 (NPRS)

## 2024-08-13 NOTE — ANESTHESIA TIME REPORT
Anesthesia Start and Stop Event Times       Date Time Event    8/12/2024 1735 Ready for Procedure     1821 Anesthesia Start     1929 Anesthesia Stop          Responsible Staff  08/12/24      Name Role Begin End    Chandana Owens M.D. Anesth 1821 1929          Overtime Reason:  no overtime (within assigned shift)    Comments:

## 2024-08-13 NOTE — OP REPORT
DATE OF SERVICE:  08/12/2024     PREOPERATIVE DIAGNOSIS:  L3-L4 stenosis with neurogenic claudication.     POSTOPERATIVE DIAGNOSIS:  L3 L4 stenosis with neurogenic claudication.     OPERATION:  Bilateral L3-L4 partial laminectomy, medial facetectomies,   decompression of thecal sac, bilateral L4 nerve roots.     SURGEON:  Franc Carlisle MD     ASSISTANT:  ALFREDA Masters     ANESTHESIA:  General endotracheal.     ANESTHESIOLOGIST:  Chandana Owens MD     PREPARATION:  ChloraPrep.     MEDICATIONS:  The patient given Ancef prior to incision.     INDICATIONS:  The patient had previous fusion at 4-5.  He has developed a   junctional stenosis at 3-4 with neurogenic claudication refractory to   nonoperative therapies.  The patient was felt to be a candidate for operative   decompression to relieve radicular pain.  The patient understood major risks   and complications such as paralysis exceedingly rare, biggest risk of surgery   is nonresponse, which is 10%, for which further surgery may or may not be   indicated, small risk of wound infection, spinal fluid leak, chance to require   another operation rest of his life 15%.  The patient understanding and agreed   to proceed and signed a consent.     NEED FOR SURGICAL ASSISTANCE:  Surgical assistant required throughout the   surgery for retraction, suction, irrigation, cleaning of instruments, keep the   case moving forward to minimize operative time.     DESCRIPTION OF PROCEDURE:  The patient was brought to the operating room.    Peripheral venous line was placed.  General anesthesia was induced.  The   patient intubated.  No Moran catheter was placed.  The patient laid prone on   the OSI table using the 6 bolsters.  Pressure points were carefully padded.    Back was doubly prepped with ChloraPrep by myself and draped.  Planned   incision was marked in midline of spinous process of 3 and 4.  Skin was   infiltrated with local and incised with scalpel using  electrocautery   dissection deep in the midline down to and through deep fashion using a   subperiosteal dissection.  Paravertebral muscles dissected off spinous   processes, lamina of 3 and 4 on the right side.  Unilateral Neo retractor   was placed.  I did a bilateral decompression from unilateral left-sided   approach working underneath the base of the spinous processes.  I drilled the   inferior portion of lamina 3, superior portion of lamina 4, the medial facet   on the left side.  Thickened ligamentum flavum plus thinned out bone was   resected with 2 and 3 mm Kerrisons working across the midline.  I drilled the   medial aspect of the facets on the opposite side on the right.  Thickened   ligamentum flavum removed with 2 and 3 mm Kerrison.  I worked laterally to the   medial aspect of the 4 pedicle.  I could sweep the nerve probe along the   lateral recess around the bilateral 4 pedicles without compromise.  Central   canal was well decompressed.  Bone bleeders controlled with bone wax.  Muscle   bleeders controlled with bipolar electrocautery.  Epidural exploration   revealed no disk issue.  No diskectomy was required.  Medium Hemovac drain was   placed in depth of the wound, brought out through separate stab incision.    Deep fascia was closed with 0 Vicryl, subcutaneous fascia with 0 Vicryl,   subcuticular closed with 3-0 Vicryl and skin edges approximated with   quarter-inch Steri-Strips.  Final sponge, needle counted and counts correct.    Estimated blood loss 50 mL.  The patient tolerated the procedure well without   apparent complications.        ______________________________  MD LADONNA SOLIS/MARISOL    DD:  08/12/2024 19:37  DT:  08/12/2024 19:48    Job#:  [id number]

## 2024-08-13 NOTE — OR NURSING
1927 - Arrives from OR, placed on monitor, report from RN and anesthesia    1955 - c/o pain, medicated per MAR    2015 - Robaxin infusing without difficulty, pt denies complaint at this time.    2035 - Report called to Lyla, hemovac emptied and recorded 15 ml drainage. Updated spouse by phone.

## 2024-08-15 ENCOUNTER — APPOINTMENT (RX ONLY)
Dept: URBAN - METROPOLITAN AREA CLINIC 159 | Facility: CLINIC | Age: 69
Setting detail: DERMATOLOGY
End: 2024-08-15

## 2024-08-15 PROBLEM — C44.529 SQUAMOUS CELL CARCINOMA OF SKIN OF OTHER PART OF TRUNK: Status: ACTIVE | Noted: 2024-08-15

## 2024-08-15 PROCEDURE — 11602 EXC TR-EXT MAL+MARG 1.1-2 CM: CPT

## 2024-08-15 PROCEDURE — 12032 INTMD RPR S/A/T/EXT 2.6-7.5: CPT

## 2024-08-15 PROCEDURE — ? EXCISION

## 2024-08-15 NOTE — PROCEDURE: EXCISION

## 2024-08-17 PROCEDURE — RXMED WILLOW AMBULATORY MEDICATION CHARGE: Performed by: FAMILY MEDICINE

## 2024-08-19 ENCOUNTER — PHARMACY VISIT (OUTPATIENT)
Dept: PHARMACY | Facility: MEDICAL CENTER | Age: 69
End: 2024-08-19
Payer: COMMERCIAL

## 2024-08-21 PROCEDURE — RXMED WILLOW AMBULATORY MEDICATION CHARGE: Performed by: NURSE PRACTITIONER

## 2024-08-22 ENCOUNTER — PHARMACY VISIT (OUTPATIENT)
Dept: PHARMACY | Facility: MEDICAL CENTER | Age: 69
End: 2024-08-22
Payer: COMMERCIAL

## 2024-08-26 ENCOUNTER — TELEPHONE (OUTPATIENT)
Dept: HEALTH INFORMATION MANAGEMENT | Facility: OTHER | Age: 69
End: 2024-08-26
Payer: MEDICARE

## 2024-08-29 ENCOUNTER — APPOINTMENT (RX ONLY)
Dept: URBAN - METROPOLITAN AREA CLINIC 159 | Facility: CLINIC | Age: 69
Setting detail: DERMATOLOGY
End: 2024-08-29

## 2024-08-29 DIAGNOSIS — Z71.89 OTHER SPECIFIED COUNSELING: ICD-10-CM

## 2024-08-29 DIAGNOSIS — L82.1 OTHER SEBORRHEIC KERATOSIS: ICD-10-CM

## 2024-08-29 DIAGNOSIS — D22 MELANOCYTIC NEVI: ICD-10-CM

## 2024-08-29 DIAGNOSIS — D17 BENIGN LIPOMATOUS NEOPLASM: ICD-10-CM

## 2024-08-29 DIAGNOSIS — L81.4 OTHER MELANIN HYPERPIGMENTATION: ICD-10-CM

## 2024-08-29 DIAGNOSIS — D18.0 HEMANGIOMA: ICD-10-CM

## 2024-08-29 DIAGNOSIS — Z85.828 PERSONAL HISTORY OF OTHER MALIGNANT NEOPLASM OF SKIN: ICD-10-CM

## 2024-08-29 DIAGNOSIS — Z48.02 ENCOUNTER FOR REMOVAL OF SUTURES: ICD-10-CM

## 2024-08-29 PROBLEM — D22.61 MELANOCYTIC NEVI OF RIGHT UPPER LIMB, INCLUDING SHOULDER: Status: ACTIVE | Noted: 2024-08-29

## 2024-08-29 PROBLEM — D22.62 MELANOCYTIC NEVI OF LEFT UPPER LIMB, INCLUDING SHOULDER: Status: ACTIVE | Noted: 2024-08-29

## 2024-08-29 PROBLEM — D22.72 MELANOCYTIC NEVI OF LEFT LOWER LIMB, INCLUDING HIP: Status: ACTIVE | Noted: 2024-08-29

## 2024-08-29 PROBLEM — D22.71 MELANOCYTIC NEVI OF RIGHT LOWER LIMB, INCLUDING HIP: Status: ACTIVE | Noted: 2024-08-29

## 2024-08-29 PROBLEM — D17.1 BENIGN LIPOMATOUS NEOPLASM OF SKIN AND SUBCUTANEOUS TISSUE OF TRUNK: Status: ACTIVE | Noted: 2024-08-29

## 2024-08-29 PROBLEM — D22.5 MELANOCYTIC NEVI OF TRUNK: Status: ACTIVE | Noted: 2024-08-29

## 2024-08-29 PROBLEM — D23.72 OTHER BENIGN NEOPLASM OF SKIN OF LEFT LOWER LIMB, INCLUDING HIP: Status: ACTIVE | Noted: 2024-08-29

## 2024-08-29 PROBLEM — D18.01 HEMANGIOMA OF SKIN AND SUBCUTANEOUS TISSUE: Status: ACTIVE | Noted: 2024-08-29

## 2024-08-29 PROCEDURE — 99213 OFFICE O/P EST LOW 20 MIN: CPT

## 2024-08-29 PROCEDURE — ? SUTURE REMOVAL (GLOBAL PERIOD)

## 2024-08-29 PROCEDURE — ? COUNSELING

## 2024-08-29 PROCEDURE — ? DEFER

## 2024-08-29 ASSESSMENT — LOCATION DETAILED DESCRIPTION DERM
LOCATION DETAILED: LEFT DISTAL POSTERIOR UPPER ARM
LOCATION DETAILED: LEFT ANTERIOR PROXIMAL THIGH
LOCATION DETAILED: RIGHT MEDIAL INFERIOR CHEST
LOCATION DETAILED: LEFT PROXIMAL CALF
LOCATION DETAILED: LEFT VENTRAL PROXIMAL FOREARM
LOCATION DETAILED: RIGHT SUPERIOR LATERAL UPPER BACK
LOCATION DETAILED: RIGHT INFERIOR LATERAL UPPER BACK
LOCATION DETAILED: LEFT MEDIAL SUPERIOR CHEST
LOCATION DETAILED: LEFT SUPERIOR LATERAL LOWER BACK
LOCATION DETAILED: RIGHT MID-UPPER BACK
LOCATION DETAILED: EPIGASTRIC SKIN
LOCATION DETAILED: RIGHT PROXIMAL DORSAL FOREARM
LOCATION DETAILED: LEFT INFERIOR LATERAL MIDBACK
LOCATION DETAILED: LEFT DISTAL POSTERIOR THIGH
LOCATION DETAILED: PERIUMBILICAL SKIN
LOCATION DETAILED: STERNAL NOTCH
LOCATION DETAILED: LEFT DISTAL ULNAR DORSAL FOREARM
LOCATION DETAILED: RIGHT PROXIMAL POSTERIOR UPPER ARM
LOCATION DETAILED: RIGHT SUPERIOR MEDIAL UPPER BACK
LOCATION DETAILED: LEFT INFERIOR MEDIAL MIDBACK
LOCATION DETAILED: LEFT DISTAL CALF
LOCATION DETAILED: RIGHT POSTERIOR SHOULDER
LOCATION DETAILED: LEFT LATERAL ABDOMEN
LOCATION DETAILED: RIGHT VENTRAL DISTAL FOREARM
LOCATION DETAILED: LEFT INFERIOR MEDIAL UPPER BACK
LOCATION DETAILED: RIGHT LATERAL ABDOMEN
LOCATION DETAILED: LEFT INFERIOR UPPER BACK
LOCATION DETAILED: LEFT PROXIMAL PRETIBIAL REGION
LOCATION DETAILED: LEFT LATERAL SUPERIOR CHEST
LOCATION DETAILED: LEFT LATERAL UPPER BACK
LOCATION DETAILED: LEFT DISTAL DORSAL FOREARM
LOCATION DETAILED: RIGHT ANTERIOR SHOULDER
LOCATION DETAILED: RIGHT DISTAL POSTERIOR THIGH
LOCATION DETAILED: RIGHT ANTERIOR PROXIMAL UPPER ARM
LOCATION DETAILED: RIGHT PROXIMAL PRETIBIAL REGION
LOCATION DETAILED: RIGHT DISTAL DORSAL FOREARM
LOCATION DETAILED: RIGHT ANTERIOR DISTAL THIGH
LOCATION DETAILED: RIGHT DISTAL CALF
LOCATION DETAILED: RIGHT INFERIOR ANTERIOR NECK
LOCATION DETAILED: LEFT POSTERIOR SHOULDER
LOCATION DETAILED: LEFT VENTRAL DISTAL FOREARM

## 2024-08-29 ASSESSMENT — LOCATION SIMPLE DESCRIPTION DERM
LOCATION SIMPLE: LEFT THIGH
LOCATION SIMPLE: LEFT UPPER ARM
LOCATION SIMPLE: RIGHT PRETIBIAL REGION
LOCATION SIMPLE: CHEST
LOCATION SIMPLE: LEFT POSTERIOR THIGH
LOCATION SIMPLE: RIGHT UPPER BACK
LOCATION SIMPLE: LEFT CALF
LOCATION SIMPLE: LEFT LOWER BACK
LOCATION SIMPLE: RIGHT UPPER ARM
LOCATION SIMPLE: LEFT PRETIBIAL REGION
LOCATION SIMPLE: LEFT FOREARM
LOCATION SIMPLE: RIGHT POSTERIOR THIGH
LOCATION SIMPLE: RIGHT FOREARM
LOCATION SIMPLE: RIGHT THIGH
LOCATION SIMPLE: LEFT UPPER BACK
LOCATION SIMPLE: RIGHT ANTERIOR NECK
LOCATION SIMPLE: LEFT SHOULDER
LOCATION SIMPLE: RIGHT CALF
LOCATION SIMPLE: ABDOMEN
LOCATION SIMPLE: RIGHT SHOULDER

## 2024-08-29 ASSESSMENT — LOCATION ZONE DERM
LOCATION ZONE: TRUNK
LOCATION ZONE: LEG
LOCATION ZONE: NECK
LOCATION ZONE: ARM

## 2024-08-29 NOTE — PROCEDURE: SUTURE REMOVAL (GLOBAL PERIOD)
Detail Level: Detailed
Add 71632 Cpt? (Important Note: In 2017 The Use Of 73095 Is Being Tracked By Cms To Determine Future Global Period Reimbursement For Global Periods): no

## 2024-08-29 NOTE — PROCEDURE: DEFER
Introduction Text (Please End With A Colon): Discussed possibility of treatment, but patient declines biopsy today. Agreed to f/u in 2 months
Size Of Lesion In Cm (Optional): 0
Detail Level: Detailed

## 2024-09-03 PROCEDURE — RXMED WILLOW AMBULATORY MEDICATION CHARGE: Performed by: NURSE PRACTITIONER

## 2024-09-04 ENCOUNTER — PHARMACY VISIT (OUTPATIENT)
Dept: PHARMACY | Facility: MEDICAL CENTER | Age: 69
End: 2024-09-04
Payer: COMMERCIAL

## 2024-10-07 PROCEDURE — RXMED WILLOW AMBULATORY MEDICATION CHARGE: Performed by: FAMILY MEDICINE

## 2024-10-08 ENCOUNTER — PHARMACY VISIT (OUTPATIENT)
Dept: PHARMACY | Facility: MEDICAL CENTER | Age: 69
End: 2024-10-08
Payer: COMMERCIAL

## 2024-10-08 PROCEDURE — RXOTC WILLOW AMBULATORY OTC CHARGE

## 2024-10-08 PROCEDURE — RXMED WILLOW AMBULATORY MEDICATION CHARGE: Performed by: NEUROLOGICAL SURGERY

## 2024-10-08 RX ORDER — OXYCODONE AND ACETAMINOPHEN 5; 325 MG/1; MG/1
TABLET ORAL
Qty: 56 TABLET | Refills: 0 | OUTPATIENT
Start: 2024-10-08

## 2024-10-16 ENCOUNTER — DOCUMENTATION (OUTPATIENT)
Dept: HEALTH INFORMATION MANAGEMENT | Facility: OTHER | Age: 69
End: 2024-10-16
Payer: MEDICARE

## 2024-11-03 SDOH — HEALTH STABILITY: PHYSICAL HEALTH: ON AVERAGE, HOW MANY MINUTES DO YOU ENGAGE IN EXERCISE AT THIS LEVEL?: 20 MIN

## 2024-11-03 SDOH — ECONOMIC STABILITY: FOOD INSECURITY: WITHIN THE PAST 12 MONTHS, YOU WORRIED THAT YOUR FOOD WOULD RUN OUT BEFORE YOU GOT MONEY TO BUY MORE.: NEVER TRUE

## 2024-11-03 SDOH — ECONOMIC STABILITY: INCOME INSECURITY: HOW HARD IS IT FOR YOU TO PAY FOR THE VERY BASICS LIKE FOOD, HOUSING, MEDICAL CARE, AND HEATING?: NOT VERY HARD

## 2024-11-03 SDOH — ECONOMIC STABILITY: HOUSING INSECURITY
IN THE LAST 12 MONTHS, WAS THERE A TIME WHEN YOU DID NOT HAVE A STEADY PLACE TO SLEEP OR SLEPT IN A SHELTER (INCLUDING NOW)?: NO

## 2024-11-03 SDOH — HEALTH STABILITY: PHYSICAL HEALTH: ON AVERAGE, HOW MANY DAYS PER WEEK DO YOU ENGAGE IN MODERATE TO STRENUOUS EXERCISE (LIKE A BRISK WALK)?: 1 DAY

## 2024-11-03 SDOH — ECONOMIC STABILITY: FOOD INSECURITY: WITHIN THE PAST 12 MONTHS, THE FOOD YOU BOUGHT JUST DIDN'T LAST AND YOU DIDN'T HAVE MONEY TO GET MORE.: NEVER TRUE

## 2024-11-03 SDOH — ECONOMIC STABILITY: TRANSPORTATION INSECURITY
IN THE PAST 12 MONTHS, HAS THE LACK OF TRANSPORTATION KEPT YOU FROM MEDICAL APPOINTMENTS OR FROM GETTING MEDICATIONS?: NO

## 2024-11-03 SDOH — ECONOMIC STABILITY: INCOME INSECURITY: IN THE LAST 12 MONTHS, WAS THERE A TIME WHEN YOU WERE NOT ABLE TO PAY THE MORTGAGE OR RENT ON TIME?: NO

## 2024-11-03 SDOH — HEALTH STABILITY: MENTAL HEALTH
STRESS IS WHEN SOMEONE FEELS TENSE, NERVOUS, ANXIOUS, OR CAN'T SLEEP AT NIGHT BECAUSE THEIR MIND IS TROUBLED. HOW STRESSED ARE YOU?: NOT AT ALL

## 2024-11-03 SDOH — ECONOMIC STABILITY: TRANSPORTATION INSECURITY
IN THE PAST 12 MONTHS, HAS LACK OF TRANSPORTATION KEPT YOU FROM MEETINGS, WORK, OR FROM GETTING THINGS NEEDED FOR DAILY LIVING?: NO

## 2024-11-03 SDOH — ECONOMIC STABILITY: TRANSPORTATION INSECURITY
IN THE PAST 12 MONTHS, HAS LACK OF RELIABLE TRANSPORTATION KEPT YOU FROM MEDICAL APPOINTMENTS, MEETINGS, WORK OR FROM GETTING THINGS NEEDED FOR DAILY LIVING?: NO

## 2024-11-03 ASSESSMENT — SOCIAL DETERMINANTS OF HEALTH (SDOH)
HOW OFTEN DO YOU GET TOGETHER WITH FRIENDS OR RELATIVES?: ONCE A WEEK
IN THE PAST 12 MONTHS, HAS THE ELECTRIC, GAS, OIL, OR WATER COMPANY THREATENED TO SHUT OFF SERVICE IN YOUR HOME?: NO
DO YOU BELONG TO ANY CLUBS OR ORGANIZATIONS SUCH AS CHURCH GROUPS UNIONS, FRATERNAL OR ATHLETIC GROUPS, OR SCHOOL GROUPS?: NO
IN A TYPICAL WEEK, HOW MANY TIMES DO YOU TALK ON THE PHONE WITH FAMILY, FRIENDS, OR NEIGHBORS?: MORE THAN THREE TIMES A WEEK
HOW OFTEN DO YOU HAVE SIX OR MORE DRINKS ON ONE OCCASION: NEVER
HOW OFTEN DO YOU ATTENT MEETINGS OF THE CLUB OR ORGANIZATION YOU BELONG TO?: MORE THAN 4 TIMES PER YEAR
HOW OFTEN DO YOU HAVE A DRINK CONTAINING ALCOHOL: NEVER
DO YOU BELONG TO ANY CLUBS OR ORGANIZATIONS SUCH AS CHURCH GROUPS UNIONS, FRATERNAL OR ATHLETIC GROUPS, OR SCHOOL GROUPS?: NO
IN A TYPICAL WEEK, HOW MANY TIMES DO YOU TALK ON THE PHONE WITH FAMILY, FRIENDS, OR NEIGHBORS?: MORE THAN THREE TIMES A WEEK
HOW OFTEN DO YOU ATTEND CHURCH OR RELIGIOUS SERVICES?: NEVER
HOW OFTEN DO YOU GET TOGETHER WITH FRIENDS OR RELATIVES?: ONCE A WEEK
HOW OFTEN DO YOU ATTEND CHURCH OR RELIGIOUS SERVICES?: NEVER
HOW HARD IS IT FOR YOU TO PAY FOR THE VERY BASICS LIKE FOOD, HOUSING, MEDICAL CARE, AND HEATING?: NOT VERY HARD
WITHIN THE PAST 12 MONTHS, YOU WORRIED THAT YOUR FOOD WOULD RUN OUT BEFORE YOU GOT THE MONEY TO BUY MORE: NEVER TRUE
HOW MANY DRINKS CONTAINING ALCOHOL DO YOU HAVE ON A TYPICAL DAY WHEN YOU ARE DRINKING: PATIENT DOES NOT DRINK
HOW OFTEN DO YOU ATTENT MEETINGS OF THE CLUB OR ORGANIZATION YOU BELONG TO?: MORE THAN 4 TIMES PER YEAR

## 2024-11-03 ASSESSMENT — LIFESTYLE VARIABLES
SKIP TO QUESTIONS 9-10: 1
HOW OFTEN DO YOU HAVE A DRINK CONTAINING ALCOHOL: NEVER
HOW OFTEN DO YOU HAVE SIX OR MORE DRINKS ON ONE OCCASION: NEVER
AUDIT-C TOTAL SCORE: 0
HOW MANY STANDARD DRINKS CONTAINING ALCOHOL DO YOU HAVE ON A TYPICAL DAY: PATIENT DOES NOT DRINK

## 2024-11-05 ENCOUNTER — OFFICE VISIT (OUTPATIENT)
Dept: MEDICAL GROUP | Facility: MEDICAL CENTER | Age: 69
End: 2024-11-05
Payer: MEDICARE

## 2024-11-05 VITALS
HEIGHT: 69 IN | WEIGHT: 161 LBS | HEART RATE: 79 BPM | TEMPERATURE: 98.1 F | RESPIRATION RATE: 17 BRPM | OXYGEN SATURATION: 92 % | BODY MASS INDEX: 23.85 KG/M2 | DIASTOLIC BLOOD PRESSURE: 68 MMHG | SYSTOLIC BLOOD PRESSURE: 120 MMHG

## 2024-11-05 DIAGNOSIS — E78.5 DYSLIPIDEMIA: ICD-10-CM

## 2024-11-05 DIAGNOSIS — J44.9 CHRONIC OBSTRUCTIVE PULMONARY DISEASE, UNSPECIFIED COPD TYPE (HCC): ICD-10-CM

## 2024-11-05 DIAGNOSIS — M15.0 PRIMARY OSTEOARTHRITIS INVOLVING MULTIPLE JOINTS: ICD-10-CM

## 2024-11-05 DIAGNOSIS — H93.13 TINNITUS OF BOTH EARS: ICD-10-CM

## 2024-11-05 DIAGNOSIS — R68.84 JAW PAIN: ICD-10-CM

## 2024-11-05 DIAGNOSIS — Z23 NEED FOR VACCINATION: ICD-10-CM

## 2024-11-05 DIAGNOSIS — N52.9 ERECTILE DYSFUNCTION, UNSPECIFIED ERECTILE DYSFUNCTION TYPE: ICD-10-CM

## 2024-11-05 DIAGNOSIS — F17.210 CIGARETTE SMOKER: ICD-10-CM

## 2024-11-05 DIAGNOSIS — Z86.19 HISTORY OF HEPATITIS C: ICD-10-CM

## 2024-11-05 DIAGNOSIS — I10 ESSENTIAL HYPERTENSION: ICD-10-CM

## 2024-11-05 PROBLEM — Z09 HOSPITAL DISCHARGE FOLLOW-UP: Status: RESOLVED | Noted: 2023-06-05 | Resolved: 2024-11-05

## 2024-11-05 PROBLEM — L08.9 SOFT TISSUE INFECTION: Status: RESOLVED | Noted: 2023-05-25 | Resolved: 2024-11-05

## 2024-11-05 PROCEDURE — G0009 ADMIN PNEUMOCOCCAL VACCINE: HCPCS

## 2024-11-05 PROCEDURE — 3074F SYST BP LT 130 MM HG: CPT | Performed by: FAMILY MEDICINE

## 2024-11-05 PROCEDURE — 90677 PCV20 VACCINE IM: CPT

## 2024-11-05 PROCEDURE — 90472 IMMUNIZATION ADMIN EACH ADD: CPT

## 2024-11-05 PROCEDURE — 90715 TDAP VACCINE 7 YRS/> IM: CPT

## 2024-11-05 PROCEDURE — 99214 OFFICE O/P EST MOD 30 MIN: CPT | Mod: 25 | Performed by: FAMILY MEDICINE

## 2024-11-05 PROCEDURE — 3078F DIAST BP <80 MM HG: CPT | Performed by: FAMILY MEDICINE

## 2024-11-05 PROCEDURE — 99406 BEHAV CHNG SMOKING 3-10 MIN: CPT | Mod: 25 | Performed by: FAMILY MEDICINE

## 2024-11-05 RX ORDER — SIMVASTATIN 20 MG
20 TABLET ORAL EVERY EVENING
Qty: 100 TABLET | Refills: 3 | Status: SHIPPED | OUTPATIENT
Start: 2024-11-05

## 2024-11-05 RX ORDER — SILDENAFIL 50 MG/1
50 TABLET, FILM COATED ORAL
Qty: 10 TABLET | Refills: 11 | Status: SHIPPED | OUTPATIENT
Start: 2024-11-05

## 2024-11-05 RX ORDER — LISINOPRIL 5 MG/1
5 TABLET ORAL DAILY
Qty: 100 TABLET | Refills: 3 | Status: SHIPPED | OUTPATIENT
Start: 2024-11-05

## 2024-11-05 ASSESSMENT — FIBROSIS 4 INDEX: FIB4 SCORE: 0.66
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
## 2024-11-07 ENCOUNTER — HOSPITAL ENCOUNTER (OUTPATIENT)
Dept: RADIOLOGY | Facility: MEDICAL CENTER | Age: 69
End: 2024-11-07
Attending: SURGERY
Payer: MEDICARE

## 2024-11-07 ENCOUNTER — TELEPHONE (OUTPATIENT)
Dept: HEALTH INFORMATION MANAGEMENT | Facility: OTHER | Age: 69
End: 2024-11-07

## 2024-11-07 NOTE — TELEPHONE ENCOUNTER
*Called to verify program eligibility/LVM with Direct line for call back/Warm Transfer to Patient Outreach [phone]

## 2024-11-08 ENCOUNTER — HOSPITAL ENCOUNTER (OUTPATIENT)
Dept: LAB | Facility: MEDICAL CENTER | Age: 69
End: 2024-11-08
Attending: FAMILY MEDICINE
Payer: MEDICARE

## 2024-11-08 DIAGNOSIS — E78.5 DYSLIPIDEMIA: ICD-10-CM

## 2024-11-08 DIAGNOSIS — F17.210 CIGARETTE SMOKER: ICD-10-CM

## 2024-11-08 DIAGNOSIS — J44.9 CHRONIC OBSTRUCTIVE PULMONARY DISEASE, UNSPECIFIED COPD TYPE (HCC): ICD-10-CM

## 2024-11-08 DIAGNOSIS — I10 ESSENTIAL HYPERTENSION: ICD-10-CM

## 2024-11-08 DIAGNOSIS — R68.84 JAW PAIN: ICD-10-CM

## 2024-11-08 LAB
ALBUMIN SERPL BCP-MCNC: 4.5 G/DL (ref 3.2–4.9)
ALBUMIN/GLOB SERPL: 1.5 G/DL
ALP SERPL-CCNC: 64 U/L (ref 30–99)
ALT SERPL-CCNC: 6 U/L (ref 2–50)
ANION GAP SERPL CALC-SCNC: 13 MMOL/L (ref 7–16)
AST SERPL-CCNC: 20 U/L (ref 12–45)
BILIRUB SERPL-MCNC: 0.8 MG/DL (ref 0.1–1.5)
BUN SERPL-MCNC: 16 MG/DL (ref 8–22)
CALCIUM ALBUM COR SERPL-MCNC: 9.3 MG/DL (ref 8.5–10.5)
CALCIUM SERPL-MCNC: 9.7 MG/DL (ref 8.5–10.5)
CHLORIDE SERPL-SCNC: 104 MMOL/L (ref 96–112)
CHOLEST SERPL-MCNC: 136 MG/DL (ref 100–199)
CO2 SERPL-SCNC: 23 MMOL/L (ref 20–33)
CREAT SERPL-MCNC: 0.86 MG/DL (ref 0.5–1.4)
CREAT UR-MCNC: 83.76 MG/DL
ERYTHROCYTE [DISTWIDTH] IN BLOOD BY AUTOMATED COUNT: 44.4 FL (ref 35.9–50)
FASTING STATUS PATIENT QL REPORTED: NORMAL
GFR SERPLBLD CREATININE-BSD FMLA CKD-EPI: 95 ML/MIN/1.73 M 2
GLOBULIN SER CALC-MCNC: 3 G/DL (ref 1.9–3.5)
GLUCOSE SERPL-MCNC: 81 MG/DL (ref 65–99)
HCT VFR BLD AUTO: 52.2 % (ref 42–52)
HDLC SERPL-MCNC: 43 MG/DL
HGB BLD-MCNC: 17.3 G/DL (ref 14–18)
LDLC SERPL CALC-MCNC: 79 MG/DL
MCH RBC QN AUTO: 29.6 PG (ref 27–33)
MCHC RBC AUTO-ENTMCNC: 33.1 G/DL (ref 32.3–36.5)
MCV RBC AUTO: 89.2 FL (ref 81.4–97.8)
MICROALBUMIN UR-MCNC: 6 MG/DL
MICROALBUMIN/CREAT UR: 72 MG/G (ref 0–30)
PLATELET # BLD AUTO: 179 K/UL (ref 164–446)
PMV BLD AUTO: 10.6 FL (ref 9–12.9)
POTASSIUM SERPL-SCNC: 4.4 MMOL/L (ref 3.6–5.5)
PROT SERPL-MCNC: 7.5 G/DL (ref 6–8.2)
RBC # BLD AUTO: 5.85 M/UL (ref 4.7–6.1)
SODIUM SERPL-SCNC: 140 MMOL/L (ref 135–145)
TRIGL SERPL-MCNC: 71 MG/DL (ref 0–149)
WBC # BLD AUTO: 8.2 K/UL (ref 4.8–10.8)

## 2024-11-08 PROCEDURE — 80061 LIPID PANEL: CPT

## 2024-11-08 PROCEDURE — 36415 COLL VENOUS BLD VENIPUNCTURE: CPT

## 2024-11-08 PROCEDURE — 82570 ASSAY OF URINE CREATININE: CPT

## 2024-11-08 PROCEDURE — 85027 COMPLETE CBC AUTOMATED: CPT

## 2024-11-08 PROCEDURE — 80053 COMPREHEN METABOLIC PANEL: CPT

## 2024-11-08 PROCEDURE — 82043 UR ALBUMIN QUANTITATIVE: CPT

## 2024-11-11 ENCOUNTER — PATIENT OUTREACH (OUTPATIENT)
Dept: HEALTH INFORMATION MANAGEMENT | Facility: OTHER | Age: 69
End: 2024-11-11
Payer: MEDICARE

## 2024-11-11 DIAGNOSIS — I10 ESSENTIAL HYPERTENSION: ICD-10-CM

## 2024-11-12 ENCOUNTER — TELEPHONE (OUTPATIENT)
Dept: HEALTH INFORMATION MANAGEMENT | Facility: OTHER | Age: 69
End: 2024-11-12

## 2024-11-12 NOTE — PROGRESS NOTES
Community Health Worker Follow-Up    Reason for outreach: This CHW called the pt to introduce the PCM program.    CHW Interventions: This CHW and the pt discussed the PCM program and discussed the benefits of the program for the pt. Pt declined the program and is not interested.    Specific Resources Provided:  Housing/Shelter: n/a  Transportation: n/a  Food: n/a  Financial: n/a  Social Supports: n/a  Other: n/a    Plan: CHW will not follow at this time.

## 2024-11-12 NOTE — TELEPHONE ENCOUNTER
1. Age 50-77 yrs of age?   69  2. Total Yrs Smoking cigarettes?    (# yrs total smoking)   50  3.How many packs per day on average did you smoke?   50 [id number] day =50  4. Current smoker or if quit, has pt quit within last 15 yrs?  current smoker   5. Completed Lung Cancer screen CT with [facility] previously?  no  6. Anything noted on previous CT involving lungs? ([place], [place], Etc.)  no    7. Any signs or symptoms of lung cancer? ( New / change to Cough, Wheezing, S.O.B.,   no     8. Previous history of lung cancer?   no

## 2024-11-18 ENCOUNTER — APPOINTMENT (OUTPATIENT)
Dept: SLEEP MEDICINE | Facility: MEDICAL CENTER | Age: 69
End: 2024-11-18
Attending: FAMILY MEDICINE
Payer: MEDICARE

## 2024-11-18 NOTE — PROGRESS NOTES
"Subjective     Tamir Dinero is a 69 y.o. male who presents with Lung Cancer Screening Program Prescreen and Nicotine Dependence            HPI  Patient seen today for initial lung cancer screening visit. Patient referred by Hebert Rivas.     The patient meets eligibility criteria including age, smoking history (20+ pack years), if former smoker, quit in the last 15 years, and absence of signs or symptoms of lung cancer.    - Age - 69  - Smoking history - Patient has smoked for 52 years at an average of 1 ppd = 52 pack year smoking history.  - Current smoking status - current  - No symptoms of lung cancer and no previous history of lung cancer     No Known Allergies    Current Outpatient Medications on File Prior to Visit   Medication Sig Dispense Refill    lisinopril (PRINIVIL) 5 MG Tab Take 1 tablet by mouth every day. 100 Tablet 3    simvastatin (ZOCOR) 20 MG Tab Take 1 tablet by mouth every evening. 100 Tablet 3    sildenafil citrate (VIAGRA) 50 MG tablet Take 1 Tablet by mouth 1 time a day as needed for Erectile Dysfunction. 10 Tablet 11    ASPIRIN 81 PO        No current facility-administered medications on file prior to visit.       Review of Systems   Constitutional:  Negative for chills, fever and weight loss.   Respiratory:  Negative for cough, hemoptysis, sputum production and wheezing.         Chronic SOB for more than 2 years without recent wheezing.  SOB only occurs with walking   Cardiovascular:  Negative for chest pain and palpitations.              Objective     BP (!) 140/70 (BP Location: Right arm, Patient Position: Sitting, BP Cuff Size: Adult)   Pulse 79   Resp 14   Ht 1.778 m (5' 10\")   Wt 74.8 kg (165 lb)   SpO2 96%   BMI 23.68 kg/m²      Physical Exam  Constitutional:       Appearance: Normal appearance.   Cardiovascular:      Rate and Rhythm: Normal rate and regular rhythm.   Pulmonary:      Effort: Pulmonary effort is normal.      Breath sounds: Normal breath sounds. "   Musculoskeletal:      Comments: Mild right lower leg swelling which Tamir states is improved compared to when he had his operation   Neurological:      Mental Status: He is alert.                   Assessment & Plan        Assessment & Plan  Cigarette smoker    Orders:    CT-LUNG CANCER-SCREENING; Future     We conducted a shared decision-making process using a decision aid. We reviewed benefits and harms of screening, including false positives and potential need for additional diagnostic testing, the possibility of over diagnosis, and total radiation exposure.    We discussed the importance of adhering to annual LDCT screening. We also discussed the impact of comorbities on the patient's the ability or willingness to undergo diagnostic procedure(s) and treatment.    Counseling on the importance of maintaining cigarette smoking abstinence if former smoker; or the importance of smoking cessation if current smoker and, if appropriate, furnishing of information about tobacco cessation interventions.    Based on our discussion, we have decided to begin annual lung cancer screening starting now.    Tamir is interested in participating in research regarding lung cancer screening. His contact information was given to the research team.     No follow-up needed

## 2024-11-19 ENCOUNTER — OFFICE VISIT (OUTPATIENT)
Dept: SLEEP MEDICINE | Facility: MEDICAL CENTER | Age: 69
End: 2024-11-19
Attending: FAMILY MEDICINE
Payer: MEDICARE

## 2024-11-19 VITALS
OXYGEN SATURATION: 96 % | HEIGHT: 70 IN | SYSTOLIC BLOOD PRESSURE: 140 MMHG | RESPIRATION RATE: 14 BRPM | DIASTOLIC BLOOD PRESSURE: 70 MMHG | WEIGHT: 165 LBS | BODY MASS INDEX: 23.62 KG/M2 | HEART RATE: 79 BPM

## 2024-11-19 DIAGNOSIS — F17.210 CIGARETTE SMOKER: ICD-10-CM

## 2024-11-19 PROCEDURE — 3077F SYST BP >= 140 MM HG: CPT | Performed by: FAMILY MEDICINE

## 2024-11-19 PROCEDURE — G0296 VISIT TO DETERM LDCT ELIG: HCPCS | Performed by: FAMILY MEDICINE

## 2024-11-19 PROCEDURE — 3078F DIAST BP <80 MM HG: CPT | Performed by: FAMILY MEDICINE

## 2024-11-19 ASSESSMENT — FIBROSIS 4 INDEX: FIB4 SCORE: 3.06

## 2024-11-19 ASSESSMENT — ENCOUNTER SYMPTOMS
SPUTUM PRODUCTION: 0
FEVER: 0
PALPITATIONS: 0
WEIGHT LOSS: 0
COUGH: 0
HEMOPTYSIS: 0
WHEEZING: 0
CHILLS: 0

## 2024-11-19 NOTE — Clinical Note
Ems reports glucose of 91     Thank you for referring Tamir to the Lung Cancer Screening program.  I enrolled him today. I will update you re: abnormal findings. -Dr. Macrina Guzman

## 2024-11-25 ENCOUNTER — HOSPITAL ENCOUNTER (OUTPATIENT)
Dept: RADIOLOGY | Facility: MEDICAL CENTER | Age: 69
End: 2024-11-25
Attending: SURGERY
Payer: MEDICARE

## 2024-11-25 DIAGNOSIS — Z95.828 S/P AORTOBIFEMORAL BYPASS SURGERY: ICD-10-CM

## 2024-11-25 DIAGNOSIS — I73.9 PERIPHERAL ARTERIAL DISEASE (HCC): ICD-10-CM

## 2024-11-25 PROCEDURE — 93925 LOWER EXTREMITY STUDY: CPT

## 2024-11-25 PROCEDURE — 93922 UPR/L XTREMITY ART 2 LEVELS: CPT

## 2024-11-27 PROCEDURE — RXMED WILLOW AMBULATORY MEDICATION CHARGE: Performed by: FAMILY MEDICINE

## 2024-11-27 PROCEDURE — RXMED WILLOW AMBULATORY MEDICATION CHARGE: Performed by: NURSE PRACTITIONER

## 2024-12-01 ENCOUNTER — PHARMACY VISIT (OUTPATIENT)
Dept: PHARMACY | Facility: MEDICAL CENTER | Age: 69
End: 2024-12-01
Payer: COMMERCIAL

## 2024-12-03 ENCOUNTER — RESEARCH ENCOUNTER (OUTPATIENT)
Dept: SLEEP MEDICINE | Facility: MEDICAL CENTER | Age: 69
End: 2024-12-03
Attending: FAMILY MEDICINE

## 2024-12-04 ENCOUNTER — TELEPHONE (OUTPATIENT)
Dept: SLEEP MEDICINE | Facility: MEDICAL CENTER | Age: 69
End: 2024-12-04

## 2024-12-04 ENCOUNTER — HOSPITAL ENCOUNTER (OUTPATIENT)
Dept: RADIOLOGY | Facility: MEDICAL CENTER | Age: 69
End: 2024-12-04
Attending: FAMILY MEDICINE
Payer: MEDICARE

## 2024-12-04 DIAGNOSIS — F17.210 CIGARETTE SMOKER: ICD-10-CM

## 2024-12-04 PROCEDURE — 71271 CT THORAX LUNG CANCER SCR C-: CPT

## 2024-12-05 NOTE — TELEPHONE ENCOUNTER
Phoned patient with results of LDCT exam performed on 12/4/24.    Notified him that the results showed very small nodule(s) at 4mm right upper lobe that had a benign (or non cancer) appearance.    To make sure these nodule(s) are benign, and remain unchanged, we recommend a follow-up low-dose chest CT in 12 months, which will be ordered per PCP/referring provider.    Patient informed of incidental findings of coronary artery calcifications, atherosclerosis of the aorta (added to problem list for HCC capture), thoracic degenerative changes, mild emphysematous changes and **1.6cm left adrenal nodule** with recommendation to follow-up up with PCP/referring provider.  Patient agrees to all recommendations.    His PCP, Dr. Rivas, was notified of results and incidental findings per this correspondence.    Health maintenance updated and patient sent lung cancer screening result letter.        CT-LUNG CANCER-SCREENING    Result Date: 12/4/2024 12/4/2024 7:15 AM HISTORY/REASON FOR EXAM:  Lung Cancer Screening TECHNIQUE/EXAM DESCRIPTION AND NUMBER OF VIEWS: Lung cancer screening without contrast. Low dose noncontrast helical images were obtained of the chest from the lung apices through the costophrenic sulci utilizing thin collimation and intervals with reconstructed images sent to PACS in axial, coronal and sagittal planes. Low dose optimization technique was utilized for this CT exam including automated exposure control and adjustment of the mA and/or kV according to patient size. COMPARISON: None. FINDINGS: Lung nodule: 4 mm right upper lobe pulmonary nodule Lungs:  Bilateral lungs are otherwise clear. No airspace opacity. Emphysema: Mild Fibrosis: None Airway: Patent Pleura: No pleural effusion or pneumothorax. Thoracic aorta and great vessels:  No aneurysm. Atherosclerotic changes. Heart and pericardium:   There is coronary artery calcification. No pericardial effusion. Lymph nodes: No enlarged mediastinal or hilar  lymph nodes. Thoracic spine:  No fracture or malalignment. No compression deformity. Degenerative changes noted. Chest wall:   Unremarkable. Visualized abdomen: 1.6 cm left adrenal nodule. Hounsfield units -16. ___________________________________     1. 4 mm right upper lobe pulmonary nodule.  Category 2 benign appearance or behavior.  Nodules with a very low likelihood of becoming a clinically active cancer due to size or lack of growth. Continued annual screening with low dose CT in 12 months: 2.  Mild emphysema. 3. Atherosclerotic changes. Coronary artery calcifications. 4. 1.6 cm left adrenal adenoma. Lung RADS: 2 - Benign Appearance or Behavior Nodules with a very low likelihood of becoming a clinically active cancer due to size or lack of growth Findings: solid nodule(s): less than 6 mm or new less than 4 mm part solid nodule(s): less than 6 mm total diameter on baseline screening non solid nodule(s) (GGN): less than 30 mm OR greater than or equal to 30 mm and unchanged or slowly growing category 3 or 4 nodules unchanged for greater than or equal to 3 months perifissural nodule(s) less than 10 mm Management: Continue annual screening with LDCT in 12 months

## 2024-12-11 ENCOUNTER — APPOINTMENT (OUTPATIENT)
Dept: MEDICAL GROUP | Facility: MEDICAL CENTER | Age: 69
End: 2024-12-11
Payer: MEDICARE

## 2025-01-13 PROCEDURE — RXMED WILLOW AMBULATORY MEDICATION CHARGE: Performed by: FAMILY MEDICINE

## 2025-01-14 ENCOUNTER — PHARMACY VISIT (OUTPATIENT)
Dept: PHARMACY | Facility: MEDICAL CENTER | Age: 70
End: 2025-01-14
Payer: COMMERCIAL

## 2025-01-16 ENCOUNTER — PATIENT MESSAGE (OUTPATIENT)
Dept: MEDICAL GROUP | Facility: MEDICAL CENTER | Age: 70
End: 2025-01-16
Payer: MEDICARE

## 2025-01-16 DIAGNOSIS — J44.1 COPD WITH ACUTE EXACERBATION (HCC): ICD-10-CM

## 2025-01-17 PROCEDURE — RXMED WILLOW AMBULATORY MEDICATION CHARGE: Performed by: FAMILY MEDICINE

## 2025-01-17 RX ORDER — ALBUTEROL SULFATE 90 UG/1
2 INHALANT RESPIRATORY (INHALATION) EVERY 4 HOURS PRN
Qty: 18 G | Refills: 2 | Status: SHIPPED | OUTPATIENT
Start: 2025-01-17

## 2025-01-17 NOTE — PATIENT COMMUNICATION
Received request via: Patient    Was the patient seen in the last year in this department? Yes    Does the patient have an active prescription (recently filled or refills available) for medication(s) requested? No    Pharmacy Name:   [facility]  [address] [place] [id number]  Phone: [phone] Fax: [phone]    Does the patient have jail Plus and need 100-day supply? (This applies to ALL medications) Yes, quantity updated to 100 days    Discharged

## 2025-01-20 ENCOUNTER — PHARMACY VISIT (OUTPATIENT)
Dept: PHARMACY | Facility: MEDICAL CENTER | Age: 70
End: 2025-01-20
Payer: COMMERCIAL

## 2025-01-25 DIAGNOSIS — N52.9 ERECTILE DYSFUNCTION, UNSPECIFIED ERECTILE DYSFUNCTION TYPE: ICD-10-CM

## 2025-01-27 RX ORDER — SILDENAFIL 50 MG/1
50 TABLET, FILM COATED ORAL
Qty: 10 TABLET | Refills: 11 | Status: SHIPPED | OUTPATIENT
Start: 2025-01-27

## 2025-02-17 ENCOUNTER — PATIENT MESSAGE (OUTPATIENT)
Dept: HEALTH INFORMATION MANAGEMENT | Facility: OTHER | Age: 70
End: 2025-02-17

## 2025-02-24 ENCOUNTER — APPOINTMENT (OUTPATIENT)
Dept: URBAN - METROPOLITAN AREA CLINIC 159 | Facility: CLINIC | Age: 70
Setting detail: DERMATOLOGY
End: 2025-02-24

## 2025-02-24 DIAGNOSIS — L82.1 OTHER SEBORRHEIC KERATOSIS: ICD-10-CM

## 2025-02-24 DIAGNOSIS — D22 MELANOCYTIC NEVI: ICD-10-CM

## 2025-02-24 DIAGNOSIS — L82.0 INFLAMED SEBORRHEIC KERATOSIS: ICD-10-CM

## 2025-02-24 DIAGNOSIS — D18.0 HEMANGIOMA: ICD-10-CM

## 2025-02-24 DIAGNOSIS — Z85.828 PERSONAL HISTORY OF OTHER MALIGNANT NEOPLASM OF SKIN: ICD-10-CM

## 2025-02-24 DIAGNOSIS — L81.4 OTHER MELANIN HYPERPIGMENTATION: ICD-10-CM

## 2025-02-24 PROBLEM — D22.5 MELANOCYTIC NEVI OF TRUNK: Status: ACTIVE | Noted: 2025-02-24

## 2025-02-24 PROBLEM — D18.01 HEMANGIOMA OF SKIN AND SUBCUTANEOUS TISSUE: Status: ACTIVE | Noted: 2025-02-24

## 2025-02-24 PROBLEM — D22.61 MELANOCYTIC NEVI OF RIGHT UPPER LIMB, INCLUDING SHOULDER: Status: ACTIVE | Noted: 2025-02-24

## 2025-02-24 PROBLEM — D22.72 MELANOCYTIC NEVI OF LEFT LOWER LIMB, INCLUDING HIP: Status: ACTIVE | Noted: 2025-02-24

## 2025-02-24 PROBLEM — D22.39 MELANOCYTIC NEVI OF OTHER PARTS OF FACE: Status: ACTIVE | Noted: 2025-02-24

## 2025-02-24 PROCEDURE — 99213 OFFICE O/P EST LOW 20 MIN: CPT

## 2025-02-24 PROCEDURE — ? COUNSELING

## 2025-02-24 ASSESSMENT — LOCATION DETAILED DESCRIPTION DERM
LOCATION DETAILED: LEFT PROXIMAL CALF
LOCATION DETAILED: RIGHT SUPERIOR ANTERIOR NECK
LOCATION DETAILED: LEFT CLAVICULAR NECK
LOCATION DETAILED: RIGHT SUPERIOR LATERAL FOREHEAD
LOCATION DETAILED: RIGHT FOREHEAD
LOCATION DETAILED: RIGHT ANTERIOR DISTAL THIGH
LOCATION DETAILED: SUPERIOR THORACIC SPINE
LOCATION DETAILED: RIGHT PROXIMAL RADIAL DORSAL FOREARM
LOCATION DETAILED: RIGHT ANTERIOR PROXIMAL THIGH
LOCATION DETAILED: RIGHT ANTERIOR SHOULDER
LOCATION DETAILED: RIGHT INFERIOR LATERAL UPPER BACK
LOCATION DETAILED: PERIUMBILICAL SKIN
LOCATION DETAILED: LEFT ANTERIOR PROXIMAL THIGH
LOCATION DETAILED: RIGHT RIB CAGE

## 2025-02-24 ASSESSMENT — LOCATION SIMPLE DESCRIPTION DERM
LOCATION SIMPLE: RIGHT SHOULDER
LOCATION SIMPLE: RIGHT ANTERIOR NECK
LOCATION SIMPLE: RIGHT THIGH
LOCATION SIMPLE: RIGHT FOREARM
LOCATION SIMPLE: UPPER BACK
LOCATION SIMPLE: LEFT THIGH
LOCATION SIMPLE: LEFT ANTERIOR NECK
LOCATION SIMPLE: RIGHT FOREHEAD
LOCATION SIMPLE: ABDOMEN
LOCATION SIMPLE: RIGHT UPPER BACK
LOCATION SIMPLE: LEFT CALF

## 2025-02-24 ASSESSMENT — LOCATION ZONE DERM
LOCATION ZONE: ARM
LOCATION ZONE: NECK
LOCATION ZONE: LEG
LOCATION ZONE: TRUNK
LOCATION ZONE: FACE

## 2025-03-13 NOTE — RESEARCH NOTE
"[facility]: [id number]_PROACT LUNG STUDY    PI: Nikolai Rodarte MD  Study Code: PROACT LUNG [id number]  IRB #: [id number]  NCT #: [id number]  Status: Consented/Enrolled  Study ID #: [id number]    Date of visit: 12/3/2024  Patient name: Dale Dinero    Was informed consent obtained before blood sample collection: YES  Master Informed Consent Version: 1.0  Protocol Version: 1.0     Did the participant consent to the blood samples being used for future research that is not part of this study? Yes    Did the participant consent to the medical information being used for future research that is not part of this study? Yes    Did the participant meet all eligibility criteria? YES  If \"No\", then please select the category of the criterion not met? N/A    Inclusion criterion violated: N/A    Exclusion criterion violated: N/A    Consent Note:  Participation in the [facility] PROACT Lung clinical trial was discussed with patient today. All aspects of the study purpose and procedures were explained.  Subject was given ample time to review the consent and all questions were answered to their satisfaction. Patient is aware that the clinical trial is voluntary and they may withdraw consent at any time without affecting the level of care they receive. Participant signed consent without coercion and undue influence and was given a copy of the signed consent. No study-related procedures took place prior to consenting and all assessments were conducted per protocol.    Demographics  YOB: 1955  Age at enrollment: 69 y.o.  Sex at birth: male  Ethnicity: Not  or   Race: White  Height: 70 in.  Weight: 164 lb.  Zip Code: [facility]  The highest level of education completed by participant: high school graduate    Blood Sampling  Was the blood collection procedure started? Yes    Were there blood samples collected? YES     Accession number: [id number]    Date of blood collection: 12/3/2024  Start time (24hr): " Preliminary read of x-ray negative.  Continue to apply ice and elevate to decrease swelling and pain.  Can alternate Tylenol and ibuprofen as needed for pain   1050  End time (24hr): 1055    Was this a mobile or site phlebotomist? Site Phlebotomist    Adverse Events  Was there any adverse events in the first 24 hours following study blood collection? NO    Concomitant Medications  Has the participant taken any medications in the 30 days preceding the blood draw: YES   If yes, please list name of medication, indication and if still on going:   Current Outpatient Medications on File Prior to Visit   Medication Sig Dispense Refill    methylPREDNISolone (MEDROL) 4 MG Tablet Therapy Pack Take as directed per package insructions 21 Tablet 0    lisinopril (PRINIVIL) 5 MG Tab Take 1 tablet by mouth every day. 100 Tablet 3    simvastatin (ZOCOR) 20 MG Tab Take 1 tablet by mouth every evening. 100 Tablet 3    sildenafil citrate (VIAGRA) 50 MG tablet Take 1 Tablet by mouth 1 time a day as needed for Erectile Dysfunction. 10 Tablet 11    ASPIRIN 81 PO        No current facility-administered medications on file prior to visit.     Respiratory Illnesses and Diseases - If yes, Enter the most recent date of diagnosis in case of multiple diagnoses for an acute condition  Acute respiratory distress syndrome: NO  Pneumonia: NO  Alpha-1 antitrypsin deficiency: NO  Asthma: NO  Chronic obstructive pulmonary disease (COPD): YES [id number]  Chronic bronchitis: NO  Emphysema: NO  Bronchiectasis: NO  Pulmonary fibrosis: NO  Sarcoidosis: NO  Silicosis: NO  Asbestosis: NO  Tuberculosis: NO    Chronic Liver and Kidney Diseases - If yes, include date of diagnosis  Chronic Hepatitis B: NO  Chronic Hepatitis C: NO  Cirrhosis: NO  Chronic kidney disease: NO    Other Medical History - If yes, include date of diagnosis  HIV: NO  COVID-19: NO  Type 1 Diabetes: NO  Type 2 Diabetes: NO  Hypertension: YES  Hyperlipidemia: NO  Arrhythmia: NO  Stroke: NO  Myocardial Infarction: NO  Heart Failure (chronic or acute): NO  Congenital heart disease: NO  Cardiomyopathy: NO    History of Cancer  Does the participant have  a history of lung cancer that was diagnosed more than five years prior to enrollment: No   When was the last diagnosis of lung cancer? N/A    Does the participant have a history of other cancer that was diagnosed more than five years prior to enrollment: No   If yes, please specify other cancer type and date of diagnosis: N/A    Does the participant have a family history of lung cancer in first-degree relative (mother, father, sister, brother, or son): no  If yes, please specify how many first degree relatives of the participant have been diagnosed with lung cancer?  N/A    Does the participant have a family history of lung cancer, other than first-degree relative (grandparent, grandchild, aunt or uncle): no  If yes, please specify how many first-degree relatives of the participant have been diagnosed with lung cancer? N/A    Smoking History  Cigarette Start Date: [id number]    Which of the following describes the participant's cigarette smoking status? Current Smoker    At what age did the participant start smoking at least once per day? 17    At what age did the participant permanently quit smoking? 32    Was the participant an intermittent smoker (stopped smoking for at least 6 months)? NO  If 'Yes', how many years in total did the participant not smoke while an intermittent smoker?     During those years, on average, how many cigarettes did the participant usually smoke per day? NA    Has the participant ever used any of the following tobacco-related products (cigars, e-cigarettes, smokeless tobacco [including chewing tobacco and snuff], Pipes [including regular pipes; water pipes; or hookahs]: NO  If yes, please specify which one: N/A    Has the participant ever lived with a smoker?  YES     Has the participant ever worked in a room or closed space where people were often smoking?  NO    Alcohol Use  How often does the participant have a drink containing alcohol? (1 drink = 1 glass of wine/beer or 1 shot of  liquor): Does not drink    How many drinks containing alcohol does the participant have on a typical day?    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 10 YEARS    Does the participant usually wear a facemask or other respiratory protective equipment for their lungs while working? NO    Has the participant experienced any occupational exposure to agents that are recognized as lung-targeting carcinogens? NO    Environmental Exposure  Has the participant been exposed to environmental hazards such as chemicals, radon, dust, or air pollutants? NO  If yes, include exposure type (radon) and duration of exposure: N/A    Previous Chest Imaging Reports Upload  Has the participant undergone any form of chest imaging greater than 12 months prior to enrollment? NO    Has the participant undergone any screening LDCT greater than 12 months prior to enrollment? NO    Has the participant undergone any Routine Chest CT greater than 12 months prior to enrollment? NO

## 2025-03-14 ENCOUNTER — PHARMACY VISIT (OUTPATIENT)
Dept: PHARMACY | Facility: MEDICAL CENTER | Age: 70
End: 2025-03-14
Payer: COMMERCIAL

## 2025-03-14 PROCEDURE — RXMED WILLOW AMBULATORY MEDICATION CHARGE: Performed by: FAMILY MEDICINE

## 2025-04-24 ENCOUNTER — APPOINTMENT (OUTPATIENT)
Dept: MEDICAL GROUP | Facility: MEDICAL CENTER | Age: 70
End: 2025-04-24
Payer: MEDICARE

## 2025-04-24 VITALS
BODY MASS INDEX: 25.45 KG/M2 | WEIGHT: 171.8 LBS | DIASTOLIC BLOOD PRESSURE: 78 MMHG | RESPIRATION RATE: 16 BRPM | SYSTOLIC BLOOD PRESSURE: 154 MMHG | HEIGHT: 69 IN | HEART RATE: 77 BPM | OXYGEN SATURATION: 96 % | TEMPERATURE: 97.2 F

## 2025-04-24 DIAGNOSIS — R80.9 MICROALBUMINURIA: ICD-10-CM

## 2025-04-24 DIAGNOSIS — E78.5 DYSLIPIDEMIA: ICD-10-CM

## 2025-04-24 DIAGNOSIS — N52.9 ERECTILE DYSFUNCTION, UNSPECIFIED ERECTILE DYSFUNCTION TYPE: ICD-10-CM

## 2025-04-24 DIAGNOSIS — F17.210 CIGARETTE SMOKER: ICD-10-CM

## 2025-04-24 DIAGNOSIS — R91.1 PULMONARY NODULE LESS THAN 6 MM IN DIAMETER WITH LOW RISK FOR MALIGNANT NEOPLASM: ICD-10-CM

## 2025-04-24 DIAGNOSIS — J41.0 SMOKERS' COUGH (HCC): ICD-10-CM

## 2025-04-24 DIAGNOSIS — D35.02 ADRENAL ADENOMA, LEFT: ICD-10-CM

## 2025-04-24 DIAGNOSIS — J44.9 CHRONIC OBSTRUCTIVE PULMONARY DISEASE, UNSPECIFIED COPD TYPE (HCC): ICD-10-CM

## 2025-04-24 DIAGNOSIS — Z91.89 PULMONARY NODULE LESS THAN 6 MM IN DIAMETER WITH LOW RISK FOR MALIGNANT NEOPLASM: ICD-10-CM

## 2025-04-24 PROCEDURE — 3078F DIAST BP <80 MM HG: CPT | Performed by: FAMILY MEDICINE

## 2025-04-24 PROCEDURE — 3077F SYST BP >= 140 MM HG: CPT | Performed by: FAMILY MEDICINE

## 2025-04-24 PROCEDURE — 99214 OFFICE O/P EST MOD 30 MIN: CPT | Performed by: FAMILY MEDICINE

## 2025-04-24 RX ORDER — BUPROPION HYDROCHLORIDE 75 MG/1
75 TABLET ORAL 2 TIMES DAILY
Qty: 200 TABLET | Refills: 3 | Status: SHIPPED | OUTPATIENT
Start: 2025-04-24

## 2025-04-24 RX ORDER — TADALAFIL 10 MG/1
10 TABLET ORAL
Qty: 10 TABLET | Refills: 11 | Status: SHIPPED | OUTPATIENT
Start: 2025-04-24

## 2025-04-24 ASSESSMENT — FIBROSIS 4 INDEX: FIB4 SCORE: 3.1

## 2025-04-24 ASSESSMENT — PATIENT HEALTH QUESTIONNAIRE - PHQ9: CLINICAL INTERPRETATION OF PHQ2 SCORE: 0

## 2025-04-24 NOTE — PROGRESS NOTES
"Verbal consent was acquired by the patient to use [employer] ambient listening note generation during this visit    Subjective:     CC: \"follow up on lung imaging\"    History of Present Illness  The patient presents for evaluation of erectile dysfunction, emphysema, and smoking cessation.    Erectile Dysfunction  The chief complaint is erectile dysfunction. He reports that his current medication, Viagra 50 mg, has been ineffective in managing his condition. He has been on this treatment for approximately 7 years. Attempts to increase the dosage to 100 mg resulted in adverse effects such as headaches and indigestion. Concerns about potential tolerance development to the medication are expressed.  - Onset: Approximately 7 years ago.  - Character: Ineffective management with Viagra 50 mg; adverse effects (headaches and indigestion) with 100 mg.  - Alleviating/Aggravating Factors: Ineffective with 50 mg; adverse effects with 100 mg.  - Severity: Concerns about potential tolerance development.    Lung Screening and Liver Findings  Six months ago, a lung screening was recommended during his last clinic visit. The lung screening results were normal, with no significant findings. However, an incidental finding related to his liver was noted, which he attributes to previous liver biopsies. He is scheduled for a follow-up in 6 to 7 months. A history of benign colon polyps, which were removed during a colonoscopy, is reported.    Emphysema and Smoking Cessation  Respiratory function is satisfactory despite an emphysema diagnosis. Frequent coughing episodes are experienced, attributed to his smoking habit. He continues to smoke a pack of cigarettes daily and has made unsuccessful attempts to quit, including a trial of Chantix, which was discontinued due to nightmares. Nicotine lozenges are used as part of his smoking cessation efforts. An albuterol inhaler is used as needed, particularly when exposed to dust, which exacerbates " "coughing. The inhaler is used approximately 2 to 3 times per month.  - Onset: Ongoing smoking habit.  - Character: Frequent coughing episodes.  - Alleviating/Aggravating Factors: Exacerbated by dust exposure; Chantix discontinued due to nightmares; uses nicotine lozenges and albuterol inhaler.  - Timing: Uses albuterol inhaler approximately 2 to 3 times per month.  - Severity: Continues to smoke a pack of cigarettes daily; respiratory function satisfactory despite emphysema.    A history of prostate issues is noted, with a current 3-year monitoring plan in place. Blood pressure is not monitored at home, although he owns a blood pressure machine.    PAST SURGICAL HISTORY:  - Liver biopsies  - Colonoscopy with removal of benign polyps    SOCIAL HISTORY  The patient admits to smoking a pack a day.          Objective:     Exam:  BP (!) 154/78   Pulse 77   Temp 36.2 °C (97.2 °F) (Temporal)   Resp 16   Ht 1.74 m (5' 8.5\")   Wt 77.9 kg (171 lb 12.8 oz)   SpO2 96%   BMI 25.74 kg/m²  Body mass index is 25.74 kg/m².    Physical Exam  General Appearance: Normal.  Vital signs: Within normal limits.  HEENT: Within normal limits.  Respiratory: Clear to auscultation, no wheezing, rales or rhonchi.  Cardiovascular: Regular rate and rhythm, no murmurs, rubs, or gallops.  Skin: Warm and dry, no rash.  Neurological: Normal.  Physical Exam        Results  Labs   - GFR: 2024, 95    Imaging   - CT scan of the lungs: 2024, 4 mm right upper lobe pulmonary nodule category two benign appearance of behavior, mild emphysema, atherosclerotic changes, coronary artery calcification, and a 1.6 cm left adrenal adenoma    Diagnostic Testing   - [id number], QTc was 449      Assessment & Plan:       1. Erectile dysfunction, unspecified erectile dysfunction type  - tadalafil (CIALIS) 10 MG tablet; Take 1 Tablet by mouth 1 time a day as needed for Erectile Dysfunction.  Dispense: 10 Tablet; Refill: 11  - Comp Metabolic Panel; Future  - " Lipid Profile; Future  - TSH WITH REFLEX TO FT4; Future    2. Chronic obstructive pulmonary disease, unspecified COPD type (HCC)  Chronic, stable. Continue with current defined treatment plan: continue albuterol PRN, good job willing to continue working on smoking cessation. Follow-up at least annually.  - CBC WITH DIFFERENTIAL; Future    3. Cigarette smoker  - buPROPion (WELLBUTRIN) 75 MG Tab; Take 1 Tablet by mouth 2 times a day.  Dispense: 200 Tablet; Refill: 3  - CBC WITH DIFFERENTIAL; Future    4. Adrenal adenoma, left  - Comp Metabolic Panel; Future    5. Smokers' cough (HCC)  Chronic, stable. Continue with current defined treatment plan: May worsen for a bit with smoking cessation. Follow-up at least annually.  - CBC WITH DIFFERENTIAL; Future    6. Pulmonary nodule less than 6 mm in diameter with low risk for malignant neoplasm  - CBC WITH DIFFERENTIAL; Future    7. Dyslipidemia  - Comp Metabolic Panel; Future  - Lipid Profile; Future    8. Microalbuminuria  - Comp Metabolic Panel; Future  - MICROALBUMIN CREAT RATIO URINE; Future      Assessment & Plan  1. Erectile Dysfunction: Chronic.  - Prescription for Cialis (tadalafil) 10 mg provided, with instructions to increase dosage to 20 mg if initial dose is ineffective.  - Prescription will be sent to [facility].    2. Emphysema: Chronic.  - Continues to smoke a pack a day and experiences a persistent cough.  - Uses albuterol inhaler occasionally when exposed to dust.  - Advised to continue using albuterol inhaler as needed and consider smoking cessation aids.    3. Smoking Cessation.  - Tried Chantix but experienced nightmares.  - Prescription for Wellbutrin 75 mg twice daily provided to assist with smoking cessation.  - Potential side effects, including sweating and loose stools, discussed; advised to discontinue use if side effects become intolerable.  - Prescription will be sent to [facility].    4. Adrenal Adenoma: Stable.  - 1.6 cm left adrenal  adenoma noted on CT scan; likely benign given size and lack of significant growth since 2015.  - Continued monitoring with annual low-dose CT scans recommended.  - Further evaluation will be considered if there is any significant change or growth in the adenoma.    5. Health Maintenance.  - Last labs in 11/2024 showed GFR of 95, indicating good kidney function.  - Last EKG in 07/2024 showed QTc of 449, within normal limits.  - Annual labs will be scheduled for the fall.    Follow-up  - Recheck blood pressure before leaving the office.         Return if symptoms worsen or fail to improve.      This note was created using voice recognition software (Dragon). The accuracy of the dictation is limited by the abilities of the software. I have reviewed the note prior to signing, however some errors in grammar and context are still possible. If you have any questions related to this note please do not hesitate to contact our office.

## 2025-04-26 PROCEDURE — RXMED WILLOW AMBULATORY MEDICATION CHARGE: Performed by: FAMILY MEDICINE

## 2025-05-02 ENCOUNTER — PHARMACY VISIT (OUTPATIENT)
Dept: PHARMACY | Facility: MEDICAL CENTER | Age: 70
End: 2025-05-02
Payer: COMMERCIAL

## 2025-05-12 PROCEDURE — RXMED WILLOW AMBULATORY MEDICATION CHARGE: Performed by: FAMILY MEDICINE

## 2025-05-13 ENCOUNTER — PHARMACY VISIT (OUTPATIENT)
Dept: PHARMACY | Facility: MEDICAL CENTER | Age: 70
End: 2025-05-13
Payer: COMMERCIAL

## 2025-05-27 ENCOUNTER — OFFICE VISIT (OUTPATIENT)
Dept: MEDICAL GROUP | Facility: MEDICAL CENTER | Age: 70
End: 2025-05-27
Payer: MEDICARE

## 2025-05-27 VITALS
SYSTOLIC BLOOD PRESSURE: 152 MMHG | RESPIRATION RATE: 16 BRPM | BODY MASS INDEX: 25.18 KG/M2 | DIASTOLIC BLOOD PRESSURE: 76 MMHG | TEMPERATURE: 97.4 F | OXYGEN SATURATION: 93 % | HEIGHT: 69 IN | HEART RATE: 76 BPM | WEIGHT: 170 LBS

## 2025-05-27 DIAGNOSIS — F17.210 CIGARETTE SMOKER: ICD-10-CM

## 2025-05-27 DIAGNOSIS — I10 ESSENTIAL HYPERTENSION: Primary | ICD-10-CM

## 2025-05-27 PROCEDURE — RXMED WILLOW AMBULATORY MEDICATION CHARGE: Performed by: FAMILY MEDICINE

## 2025-05-27 RX ORDER — LISINOPRIL 20 MG/1
20 TABLET ORAL
Qty: 100 TABLET | Refills: 3 | Status: SHIPPED | OUTPATIENT
Start: 2025-05-27

## 2025-05-27 ASSESSMENT — FIBROSIS 4 INDEX: FIB4 SCORE: 3.1

## 2025-05-27 NOTE — PROGRESS NOTES
"Verbal consent was acquired by the patient to use [employer] ambient listening note generation during this visit    Subjective:     CC: \"blood pressure and smoking\"    History of Present Illness  The patient presents for evaluation of hypertension and smoking cessation.    Hypertension  He reported an elevated blood pressure reading yesterday, which he found concerning. His last consultation was on 04/24/2025, during which his blood pressure was recorded at 154/78 mmHg. He was advised to monitor his blood pressure and seek medical attention if it remained high. He is currently on a regimen of lisinopril 5 mg, administered in the morning. During a previous visit in 11/2024, his blood pressure was 120/68 mmHg, indicating a recent increase in his readings.  - Onset: Elevated blood pressure reading reported yesterday.  - Duration: Recent increase in readings since 11/2024.  - Character: Blood pressure recorded at 154/78 mmHg on 04/24/2025; previously 120/68 mmHg in 11/2024.  - Alleviating/Aggravating Factors: Monitoring advised; currently on lisinopril 5 mg in the morning.  - Severity: Concerning elevated blood pressure reading.    Smoking Cessation  He has resumed smoking and has not made significant progress towards smoking cessation. He was prescribed Wellbutrin as a smoking cessation aid but discontinued its use after two days due to a perceived lack of relaxation and a sensation of lung burning. He also expressed a lack of commitment to quit smoking.  - Onset: Resumed smoking recently.  - Duration: Discontinued Wellbutrin after two days.  - Character: Perceived lack of relaxation and sensation of lung burning with Wellbutrin.  - Alleviating/Aggravating Factors: Discontinued Wellbutrin due to side effects; lack of commitment to quit smoking.    SOCIAL HISTORY  The patient admits to smoking.          Objective:     Exam:  BP (!) 152/76   Pulse 76   Temp 36.3 °C (97.4 °F) (Temporal)   Resp 16   Ht 1.74 m (5' " "8.5\")   Wt 77.1 kg (170 lb)   SpO2 93%   BMI 25.47 kg/m²  Body mass index is 25.47 kg/m².    Physical Exam  General Appearance: Normal.  Vital signs: Within normal limits.  HEENT: Within normal limits.  Respiratory: Clear to auscultation, no wheezing, rales or rhonchi.  Cardiovascular: Regular rate and rhythm, no murmurs, rubs, or gallops.  Skin: Warm and dry, no rash.  Neurological: Normal.  Physical Exam      Results        Assessment & Plan:       1. Essential hypertension  - lisinopril (PRINIVIL) 20 MG Tab; Take 1 Tablet by mouth at bedtime. Indications: High Blood Pressure  Dispense: 100 Tablet; Refill: 3    2. Cigarette smoker      Assessment & Plan  1. Hypertension: Chronic.  - Blood pressure readings have been consistently elevated, both at home and in the clinic, with systolic values exceeding 150.  - Dosage of lisinopril will be increased to 20 mg, to be taken at bedtime.  - Monitor blood pressure closely over the next 4 to 6 weeks.  - Importance of smoking cessation in managing hypertension was emphasized.  - Potential side effects of overshooting the target blood pressure, such as lightheadedness and dizziness upon standing, were discussed.    2. Nicotine dependence: Chronic.  - Experiencing a burning sensation in the lungs, attributed to the use of Wellbutrin.  - Benefits of smoking cessation, including improved lung function and reduced risk of heart attack and lung cancer, were discussed.  - Continue using Wellbutrin 75 mg twice daily, once in the morning and once at night.  - Allow more time for the medication to take effect.  - If adverse effects occur or if the medication is not effective, adjustments to the dosage or alternative treatments will be considered.    Follow-up  - Follow up in the fall or sooner if blood pressure remains elevated.         Return in about 6 months (around 11/27/2025), or if symptoms worsen or fail to improve, for Lab F/U, Medication F/U.      This note was created " using voice recognition software (Dragon). The accuracy of the dictation is limited by the abilities of the software. I have reviewed the note prior to signing, however some errors in grammar and context are still possible. If you have any questions related to this note please do not hesitate to contact our office.

## 2025-05-28 ENCOUNTER — APPOINTMENT (OUTPATIENT)
Dept: RADIOLOGY | Facility: MEDICAL CENTER | Age: 70
End: 2025-05-28
Attending: EMERGENCY MEDICINE
Payer: MEDICARE

## 2025-05-28 ENCOUNTER — HOSPITAL ENCOUNTER (INPATIENT)
Facility: MEDICAL CENTER | Age: 70
LOS: 2 days | End: 2025-05-30
Attending: EMERGENCY MEDICINE | Admitting: INTERNAL MEDICINE
Payer: MEDICARE

## 2025-05-28 ENCOUNTER — PHARMACY VISIT (OUTPATIENT)
Dept: PHARMACY | Facility: MEDICAL CENTER | Age: 70
End: 2025-05-28
Payer: COMMERCIAL

## 2025-05-28 ENCOUNTER — APPOINTMENT (OUTPATIENT)
Dept: RADIOLOGY | Facility: MEDICAL CENTER | Age: 70
End: 2025-05-28
Attending: RADIOLOGY
Payer: MEDICARE

## 2025-05-28 ENCOUNTER — APPOINTMENT (OUTPATIENT)
Dept: RADIOLOGY | Facility: MEDICAL CENTER | Age: 70
End: 2025-05-28
Attending: NURSE PRACTITIONER
Payer: MEDICARE

## 2025-05-28 DIAGNOSIS — R41.82 ALTERED MENTAL STATUS, UNSPECIFIED ALTERED MENTAL STATUS TYPE: ICD-10-CM

## 2025-05-28 DIAGNOSIS — I63.9 ISCHEMIC STROKE DIAGNOSED DURING CURRENT ADMISSION (HCC): ICD-10-CM

## 2025-05-28 DIAGNOSIS — R29.898 LEFT HAND WEAKNESS: ICD-10-CM

## 2025-05-28 DIAGNOSIS — R29.898 IMPAIRED DEXTERITY: ICD-10-CM

## 2025-05-28 DIAGNOSIS — R29.90 STROKE-LIKE SYMPTOMS: ICD-10-CM

## 2025-05-28 DIAGNOSIS — I63.20 CEREBROVASCULAR ACCIDENT (CVA) DUE TO OCCLUSION OF PRECEREBRAL ARTERY (HCC): ICD-10-CM

## 2025-05-28 DIAGNOSIS — Z72.0 TOBACCO ABUSE: ICD-10-CM

## 2025-05-28 DIAGNOSIS — I63.9 ACUTE CEREBROVASCULAR ACCIDENT (CVA) (HCC): Primary | ICD-10-CM

## 2025-05-28 DIAGNOSIS — I10 HYPERTENSION, UNSPECIFIED TYPE: ICD-10-CM

## 2025-05-28 DIAGNOSIS — I25.10 CORONARY ARTERY DISEASE INVOLVING NATIVE CORONARY ARTERY OF NATIVE HEART WITHOUT ANGINA PECTORIS: ICD-10-CM

## 2025-05-28 DIAGNOSIS — Z71.6 TOBACCO ABUSE COUNSELING: ICD-10-CM

## 2025-05-28 LAB
ABO GROUP BLD: NORMAL
ALBUMIN SERPL BCP-MCNC: 4.3 G/DL (ref 3.2–4.9)
ALBUMIN/GLOB SERPL: 1.4 G/DL
ALP SERPL-CCNC: 58 U/L (ref 30–99)
ALT SERPL-CCNC: <5 U/L (ref 2–50)
ANION GAP SERPL CALC-SCNC: 12 MMOL/L (ref 7–16)
APTT PPP: 25.7 SEC (ref 24.7–36)
AST SERPL-CCNC: 20 U/L (ref 12–45)
BASOPHILS # BLD AUTO: 0.6 % (ref 0–1.8)
BASOPHILS # BLD: 0.05 K/UL (ref 0–0.12)
BILIRUB SERPL-MCNC: 0.5 MG/DL (ref 0.1–1.5)
BLD GP AB SCN SERPL QL: NORMAL
BUN SERPL-MCNC: 11 MG/DL (ref 8–22)
CALCIUM ALBUM COR SERPL-MCNC: 9.2 MG/DL (ref 8.5–10.5)
CALCIUM SERPL-MCNC: 9.4 MG/DL (ref 8.5–10.5)
CHLORIDE SERPL-SCNC: 106 MMOL/L (ref 96–112)
CO2 SERPL-SCNC: 21 MMOL/L (ref 20–33)
CREAT SERPL-MCNC: 0.86 MG/DL (ref 0.5–1.4)
EOSINOPHIL # BLD AUTO: 0.12 K/UL (ref 0–0.51)
EOSINOPHIL NFR BLD: 1.4 % (ref 0–6.9)
ERYTHROCYTE [DISTWIDTH] IN BLOOD BY AUTOMATED COUNT: 42.7 FL (ref 35.9–50)
EST. AVERAGE GLUCOSE BLD GHB EST-MCNC: 114 MG/DL
GFR SERPLBLD CREATININE-BSD FMLA CKD-EPI: 94 ML/MIN/1.73 M 2
GLOBULIN SER CALC-MCNC: 3 G/DL (ref 1.9–3.5)
GLUCOSE BLD STRIP.AUTO-MCNC: 112 MG/DL (ref 65–99)
GLUCOSE SERPL-MCNC: 108 MG/DL (ref 65–99)
HBA1C MFR BLD: 5.6 % (ref 4–5.6)
HCT VFR BLD AUTO: 50.9 % (ref 42–52)
HGB BLD-MCNC: 17.1 G/DL (ref 14–18)
HOLDING TUBE BB 8507: NORMAL
IMM GRANULOCYTES # BLD AUTO: 0.02 K/UL (ref 0–0.11)
IMM GRANULOCYTES NFR BLD AUTO: 0.2 % (ref 0–0.9)
INR PPP: 0.95 (ref 0.87–1.13)
LYMPHOCYTES # BLD AUTO: 2.52 K/UL (ref 1–4.8)
LYMPHOCYTES NFR BLD: 28.7 % (ref 22–41)
MCH RBC QN AUTO: 29.5 PG (ref 27–33)
MCHC RBC AUTO-ENTMCNC: 33.6 G/DL (ref 32.3–36.5)
MCV RBC AUTO: 87.9 FL (ref 81.4–97.8)
MONOCYTES # BLD AUTO: 0.73 K/UL (ref 0–0.85)
MONOCYTES NFR BLD AUTO: 8.3 % (ref 0–13.4)
NEUTROPHILS # BLD AUTO: 5.35 K/UL (ref 1.82–7.42)
NEUTROPHILS NFR BLD: 60.8 % (ref 44–72)
NRBC # BLD AUTO: 0 K/UL
NRBC BLD-RTO: 0 /100 WBC (ref 0–0.2)
PLATELET # BLD AUTO: 200 K/UL (ref 164–446)
PMV BLD AUTO: 10.2 FL (ref 9–12.9)
POTASSIUM SERPL-SCNC: 4 MMOL/L (ref 3.6–5.5)
PROT SERPL-MCNC: 7.3 G/DL (ref 6–8.2)
PROTHROMBIN TIME: 12.7 SEC (ref 12–14.6)
RBC # BLD AUTO: 5.79 M/UL (ref 4.7–6.1)
RH BLD: NORMAL
SODIUM SERPL-SCNC: 139 MMOL/L (ref 135–145)
TROPONIN T SERPL-MCNC: 11 NG/L (ref 6–19)
WBC # BLD AUTO: 8.8 K/UL (ref 4.8–10.8)

## 2025-05-28 PROCEDURE — 94760 N-INVAS EAR/PLS OXIMETRY 1: CPT

## 2025-05-28 PROCEDURE — 86900 BLOOD TYPING SEROLOGIC ABO: CPT

## 2025-05-28 PROCEDURE — 3E03317 INTRODUCTION OF OTHER THROMBOLYTIC INTO PERIPHERAL VEIN, PERCUTANEOUS APPROACH: ICD-10-PCS | Performed by: NURSE PRACTITIONER

## 2025-05-28 PROCEDURE — 99291 CRITICAL CARE FIRST HOUR: CPT | Performed by: INTERNAL MEDICINE

## 2025-05-28 PROCEDURE — 700101 HCHG RX REV CODE 250: Performed by: RADIOLOGY

## 2025-05-28 PROCEDURE — 770022 HCHG ROOM/CARE - ICU (200)

## 2025-05-28 PROCEDURE — C1760 CLOSURE DEV, VASC: HCPCS

## 2025-05-28 PROCEDURE — 700117 HCHG RX CONTRAST REV CODE 255: Performed by: RADIOLOGY

## 2025-05-28 PROCEDURE — 70450 CT HEAD/BRAIN W/O DYE: CPT

## 2025-05-28 PROCEDURE — 700111 HCHG RX REV CODE 636 W/ 250 OVERRIDE (IP)

## 2025-05-28 PROCEDURE — 96365 THER/PROPH/DIAG IV INF INIT: CPT

## 2025-05-28 PROCEDURE — 85730 THROMBOPLASTIN TIME PARTIAL: CPT

## 2025-05-28 PROCEDURE — 700111 HCHG RX REV CODE 636 W/ 250 OVERRIDE (IP): Mod: JW,TB | Performed by: NURSE PRACTITIONER

## 2025-05-28 PROCEDURE — 70498 CT ANGIOGRAPHY NECK: CPT

## 2025-05-28 PROCEDURE — 700105 HCHG RX REV CODE 258: Performed by: INTERNAL MEDICINE

## 2025-05-28 PROCEDURE — 80053 COMPREHEN METABOLIC PANEL: CPT

## 2025-05-28 PROCEDURE — 85025 COMPLETE CBC W/AUTO DIFF WBC: CPT

## 2025-05-28 PROCEDURE — 700111 HCHG RX REV CODE 636 W/ 250 OVERRIDE (IP): Performed by: EMERGENCY MEDICINE

## 2025-05-28 PROCEDURE — 86850 RBC ANTIBODY SCREEN: CPT

## 2025-05-28 PROCEDURE — 71045 X-RAY EXAM CHEST 1 VIEW: CPT

## 2025-05-28 PROCEDURE — 700117 HCHG RX CONTRAST REV CODE 255: Performed by: EMERGENCY MEDICINE

## 2025-05-28 PROCEDURE — 700101 HCHG RX REV CODE 250: Performed by: INTERNAL MEDICINE

## 2025-05-28 PROCEDURE — 36415 COLL VENOUS BLD VENIPUNCTURE: CPT

## 2025-05-28 PROCEDURE — 84484 ASSAY OF TROPONIN QUANT: CPT

## 2025-05-28 PROCEDURE — 0042T CT-CEREBRAL PERFUSION ANALYSIS: CPT

## 2025-05-28 PROCEDURE — 86901 BLOOD TYPING SEROLOGIC RH(D): CPT

## 2025-05-28 PROCEDURE — 700111 HCHG RX REV CODE 636 W/ 250 OVERRIDE (IP): Performed by: INTERNAL MEDICINE

## 2025-05-28 PROCEDURE — 03CG3ZZ EXTIRPATION OF MATTER FROM INTRACRANIAL ARTERY, PERCUTANEOUS APPROACH: ICD-10-PCS | Performed by: RADIOLOGY

## 2025-05-28 PROCEDURE — 700105 HCHG RX REV CODE 258: Performed by: RADIOLOGY

## 2025-05-28 PROCEDURE — 96375 TX/PRO/DX INJ NEW DRUG ADDON: CPT

## 2025-05-28 PROCEDURE — 70496 CT ANGIOGRAPHY HEAD: CPT

## 2025-05-28 PROCEDURE — 85610 PROTHROMBIN TIME: CPT

## 2025-05-28 PROCEDURE — 37195 THROMBOLYTIC THERAPY STROKE: CPT

## 2025-05-28 PROCEDURE — 83036 HEMOGLOBIN GLYCOSYLATED A1C: CPT

## 2025-05-28 PROCEDURE — 99291 CRITICAL CARE FIRST HOUR: CPT

## 2025-05-28 PROCEDURE — 700111 HCHG RX REV CODE 636 W/ 250 OVERRIDE (IP): Mod: JZ | Performed by: RADIOLOGY

## 2025-05-28 PROCEDURE — 99223 1ST HOSP IP/OBS HIGH 75: CPT | Mod: AI | Performed by: NURSE PRACTITIONER

## 2025-05-28 PROCEDURE — 82962 GLUCOSE BLOOD TEST: CPT

## 2025-05-28 RX ORDER — ONDANSETRON 4 MG/1
4 TABLET, ORALLY DISINTEGRATING ORAL EVERY 4 HOURS PRN
Status: DISCONTINUED | OUTPATIENT
Start: 2025-05-28 | End: 2025-05-30 | Stop reason: HOSPADM

## 2025-05-28 RX ORDER — ATORVASTATIN CALCIUM 40 MG/1
80 TABLET, FILM COATED ORAL EVERY EVENING
Status: DISCONTINUED | OUTPATIENT
Start: 2025-05-28 | End: 2025-05-29

## 2025-05-28 RX ORDER — LABETALOL HYDROCHLORIDE 5 MG/ML
5 INJECTION, SOLUTION INTRAVENOUS ONCE
Status: COMPLETED | OUTPATIENT
Start: 2025-05-28 | End: 2025-05-28

## 2025-05-28 RX ORDER — ENALAPRILAT 1.25 MG/ML
1.25 INJECTION INTRAVENOUS EVERY 8 HOURS
Status: DISCONTINUED | OUTPATIENT
Start: 2025-05-28 | End: 2025-05-30 | Stop reason: HOSPADM

## 2025-05-28 RX ORDER — ONDANSETRON 2 MG/ML
4 INJECTION INTRAMUSCULAR; INTRAVENOUS EVERY 4 HOURS PRN
Status: DISCONTINUED | OUTPATIENT
Start: 2025-05-28 | End: 2025-05-30 | Stop reason: HOSPADM

## 2025-05-28 RX ORDER — LABETALOL HYDROCHLORIDE 5 MG/ML
5-10 INJECTION, SOLUTION INTRAVENOUS EVERY 4 HOURS PRN
Status: DISCONTINUED | OUTPATIENT
Start: 2025-05-28 | End: 2025-05-29

## 2025-05-28 RX ORDER — LABETALOL HYDROCHLORIDE 5 MG/ML
10 INJECTION, SOLUTION INTRAVENOUS
Status: DISCONTINUED | OUTPATIENT
Start: 2025-05-28 | End: 2025-05-28

## 2025-05-28 RX ORDER — LISINOPRIL 5 MG/1
5 TABLET ORAL EVERY EVENING
Status: ON HOLD | COMMUNITY
End: 2025-05-30

## 2025-05-28 RX ORDER — HYDRALAZINE HYDROCHLORIDE 20 MG/ML
10 INJECTION INTRAMUSCULAR; INTRAVENOUS
Status: DISCONTINUED | OUTPATIENT
Start: 2025-05-28 | End: 2025-05-29

## 2025-05-28 RX ORDER — HYDRALAZINE HYDROCHLORIDE 20 MG/ML
10 INJECTION INTRAMUSCULAR; INTRAVENOUS ONCE
Status: DISCONTINUED | OUTPATIENT
Start: 2025-05-28 | End: 2025-05-28

## 2025-05-28 RX ADMIN — SODIUM CHLORIDE 7.5 MG/HR: 9 INJECTION, SOLUTION INTRAVENOUS at 23:35

## 2025-05-28 RX ADMIN — TENECTEPLASE 19 MG: KIT at 15:40

## 2025-05-28 RX ADMIN — SODIUM CHLORIDE 15 MG/HR: 9 INJECTION, SOLUTION INTRAVENOUS at 18:44

## 2025-05-28 RX ADMIN — IOHEXOL 50 ML: 300 INJECTION, SOLUTION INTRAVENOUS at 17:00

## 2025-05-28 RX ADMIN — ENALAPRILAT 1.25 MG: 1.25 INJECTION INTRAVENOUS at 21:37

## 2025-05-28 RX ADMIN — LABETALOL HYDROCHLORIDE 5 MG: 5 INJECTION, SOLUTION INTRAVENOUS at 15:36

## 2025-05-28 RX ADMIN — FENTANYL CITRATE 50 MCG: 50 INJECTION, SOLUTION INTRAMUSCULAR; INTRAVENOUS at 15:55

## 2025-05-28 RX ADMIN — LABETALOL HYDROCHLORIDE 5 MG: 5 INJECTION, SOLUTION INTRAVENOUS at 15:26

## 2025-05-28 RX ADMIN — IOHEXOL 120 ML: 300 INJECTION, SOLUTION INTRAVENOUS at 15:45

## 2025-05-28 RX ADMIN — SODIUM CHLORIDE 12.5 MG/HR: 9 INJECTION, SOLUTION INTRAVENOUS at 20:41

## 2025-05-28 RX ADMIN — SODIUM CHLORIDE 5 MG/HR: 9 INJECTION, SOLUTION INTRAVENOUS at 16:19

## 2025-05-28 RX ADMIN — SODIUM CHLORIDE 5 MG/HR: 9 INJECTION, SOLUTION INTRAVENOUS at 15:50

## 2025-05-28 RX ADMIN — FENTANYL CITRATE 50 MCG: 50 INJECTION, SOLUTION INTRAMUSCULAR; INTRAVENOUS at 15:51

## 2025-05-28 RX ADMIN — LABETALOL HYDROCHLORIDE 5 MG: 5 INJECTION, SOLUTION INTRAVENOUS at 15:32

## 2025-05-28 ASSESSMENT — ENCOUNTER SYMPTOMS
SORE THROAT: 0
EYES NEGATIVE: 1
FEVER: 0
NERVOUS/ANXIOUS: 0
BACK PAIN: 0
SPEECH CHANGE: 1
NAUSEA: 0
FOCAL WEAKNESS: 1
HEADACHES: 0
BRUISES/BLEEDS EASILY: 0
PALPITATIONS: 0
ABDOMINAL PAIN: 0
VOMITING: 0
SHORTNESS OF BREATH: 0

## 2025-05-28 ASSESSMENT — FIBROSIS 4 INDEX
FIB4 SCORE: 3.21
FIB4 SCORE: 3.1

## 2025-05-28 ASSESSMENT — LIFESTYLE VARIABLES: SUBSTANCE_ABUSE: 0

## 2025-05-28 ASSESSMENT — PAIN DESCRIPTION - PAIN TYPE
TYPE: ACUTE PAIN

## 2025-05-28 NOTE — ED PROVIDER NOTES
ED Provider Note    Scribed for Michel Montesinos by Ritu Sheriff. 5/28/2025  2:47 PM    Primary care provider: Lowell Rivas D.O.  Means of arrival: EMS  History obtained from: Patient and wife  History limited by: None    CHIEF COMPLAINT  Chief Complaint   Patient presents with    Possible Stroke     EXTERNAL RECORDS REVIEWED  Outpatient Notes The patient was seen for a follow-up visit of his hypertension yesterday.     HPI/ROS  LIMITATION TO HISTORY   Select: : None  OUTSIDE HISTORIAN(S):  Significant other Wife provides additional history     HPI  Dale Dinero is a 70 y.o. male who presents to the Emergency Department as a code stroke for left sided weakness onset approximately 1.5 hours ago. The patient's wife reports that he started to have left hand weakness and loss of  strength 2 days ago. Then today around 1:15 PM the patient started to have slurred speech, left sided facial droop, and confusion prompting her to call for EMS. She explains that the patient saw his PCP yesterday but forgot to tell them about his weakness. Currently in the ED the patient's wife reports that he is slow to respond to questions and appears more fatigued than usual. She adds that he has had Bell's Palsy in the past. The patient has a history of hypertension, high cholesterol, and smoking. Denies blood thinners. There are no known alleviating or exacerbating factors. The patient does not have any known allergies to medications.     REVIEW OF SYSTEMS  As above, all other systems reviewed and are negative.   See HPI for further details.     PAST MEDICAL HISTORY   has a past medical history of Anesthesia, Awareness under anesthesia, Basal cell carcinoma (BCC) in situ of skin, Blood clotting disorder (HCC), CAD (coronary artery disease), Cancer (HCC), COPD (chronic obstructive pulmonary disease) (HCC), COPD (chronic obstructive pulmonary disease) (HCC), Emphysema of lung (HCC), Heart burn, Hep C w/o coma, chronic  "(HCC), Hepatitis C, High cholesterol, Hospital discharge follow-up (06/05/2023), Hypertension, Indigestion, Renal disorder, Snoring, Soft tissue infection (05/25/2023), Stenosis of aorta, Stenosis of femoral artery (HCC), and Wound dehiscence (05/19/2023).  SURGICAL HISTORY   has a past surgical history that includes meniscus repair (Left); laminotomy; repr aneurysm/grft ins,common femoral (Right, 05/03/2023); aortogram (Right, 05/03/2023); thrombectomy (Right, 05/03/2023); debridement (Right, 05/19/2023); other neurological surg (2014); other orthopedic surgery (2014); other (2014 2023); tonsillectomy; lumbar laminectomy diskectomy (08/12/2024); and inguinal hernia repair bilateral.  SOCIAL HISTORY  Social History[1]   Social History     Substance and Sexual Activity   Drug Use Never     FAMILY HISTORY  Family History   Problem Relation Age of Onset    Other Mother         accident    Colorectal Cancer Father     Cancer Father         Colan cancer    Diabetes Father     Cancer Sister         basal cell carcinoma    Cancer Brother         lymphoma     CURRENT MEDICATIONS  Home Medications    **Home medications have not yet been reviewed for this encounter**       Audit from Redirected Encounters    **Home medications have not yet been reviewed for this encounter**       ALLERGIES  Allergies[2]    PHYSICAL EXAM    VITAL SIGNS:   Vitals:    05/28/25 1414 05/28/25 1434   BP: (!) 176/96    Pulse: 80    Resp: 16    Temp: 36.7 °C (98.1 °F)    TempSrc: Temporal    SpO2: 94%    Weight:  77 kg (169 lb 12.1 oz)   Height:  1.727 m (5' 8\")     Vitals: My interpretation: Hypertension, not tachycardic, afebrile, not hypoxic    Reinterpretation of vitals: Unchanged    Cardiac Monitor Interpretation: The cardiac monitor revealed normal Sinus Rhythm as interpreted by me. The cardiac monitor was ordered secondary to the patient's history of possible stroke and to monitor for dysrhythmia and/or tachycardia.    PE:   Gen: sitting " comfortably, speaking clearly, appears in no acute distress   ENT: Mucous membranes moist, posterior pharynx clear, uvula midline, nares patent bilaterally   Neck: Supple, FROM  Pulmonary: Lungs are clear to auscultation bilaterally. No tachypnea  CV:  RRR, no murmur appreciated, pulses 2+ in both upper and lower extremities  Abdomen: soft, NT/ND; no rebound/guarding  : no CVA or suprapubic tenderness   Neuro: A&Ox4 (person, place, time, situation), speech fluent, gait steady, decreased strength in the left hand on  and difficulty with dexterity, thumb to pinky impaired.  No facial droop.  Skin: No rash or lesions.  No pallor or jaundice.  No cyanosis.  Warm and dry.     DIAGNOSTIC STUDIES / PROCEDURES    LABS  Results for orders placed or performed during the hospital encounter of 05/28/25   CBC WITH DIFFERENTIAL    Collection Time: 05/28/25  2:46 PM   Result Value Ref Range    WBC 8.8 4.8 - 10.8 K/uL    RBC 5.79 4.70 - 6.10 M/uL    Hemoglobin 17.1 14.0 - 18.0 g/dL    Hematocrit 50.9 42.0 - 52.0 %    MCV 87.9 81.4 - 97.8 fL    MCH 29.5 27.0 - 33.0 pg    MCHC 33.6 32.3 - 36.5 g/dL    RDW 42.7 35.9 - 50.0 fL    Platelet Count 200 164 - 446 K/uL    MPV 10.2 9.0 - 12.9 fL    Neutrophils-Polys 60.80 44.00 - 72.00 %    Lymphocytes 28.70 22.00 - 41.00 %    Monocytes 8.30 0.00 - 13.40 %    Eosinophils 1.40 0.00 - 6.90 %    Basophils 0.60 0.00 - 1.80 %    Immature Granulocytes 0.20 0.00 - 0.90 %    Nucleated RBC 0.00 0.00 - 0.20 /100 WBC    Neutrophils (Absolute) 5.35 1.82 - 7.42 K/uL    Lymphs (Absolute) 2.52 1.00 - 4.80 K/uL    Monos (Absolute) 0.73 0.00 - 0.85 K/uL    Eos (Absolute) 0.12 0.00 - 0.51 K/uL    Baso (Absolute) 0.05 0.00 - 0.12 K/uL    Immature Granulocytes (abs) 0.02 0.00 - 0.11 K/uL    NRBC (Absolute) 0.00 K/uL   COMP METABOLIC PANEL    Collection Time: 05/28/25  2:46 PM   Result Value Ref Range    Sodium 139 135 - 145 mmol/L    Potassium 4.0 3.6 - 5.5 mmol/L    Chloride 106 96 - 112 mmol/L    Co2 21  20 - 33 mmol/L    Anion Gap 12.0 7.0 - 16.0    Glucose 108 (H) 65 - 99 mg/dL    Bun 11 8 - 22 mg/dL    Creatinine 0.86 0.50 - 1.40 mg/dL    Calcium 9.4 8.5 - 10.5 mg/dL    Correct Calcium 9.2 8.5 - 10.5 mg/dL    AST(SGOT) 20 12 - 45 U/L    ALT(SGPT) <5 2 - 50 U/L    Alkaline Phosphatase 58 30 - 99 U/L    Total Bilirubin 0.5 0.1 - 1.5 mg/dL    Albumin 4.3 3.2 - 4.9 g/dL    Total Protein 7.3 6.0 - 8.2 g/dL    Globulin 3.0 1.9 - 3.5 g/dL    A-G Ratio 1.4 g/dL   PROTHROMBIN TIME    Collection Time: 05/28/25  2:46 PM   Result Value Ref Range    PT 12.7 12.0 - 14.6 sec    INR 0.95 0.87 - 1.13   APTT    Collection Time: 05/28/25  2:46 PM   Result Value Ref Range    APTT 25.7 24.7 - 36.0 sec   TROPONIN    Collection Time: 05/28/25  2:46 PM   Result Value Ref Range    Troponin T 11 6 - 19 ng/L   ESTIMATED GFR    Collection Time: 05/28/25  2:46 PM   Result Value Ref Range    GFR (CKD-EPI) 94 >60 mL/min/1.73 m 2      All labs reviewed by me. Labs were compared to prior labs if they were available. Significant for No leukocytosis, no anemia, normal electrolytes, normal glucose, normal renal function, normal liver enzymes, normal bilirubin, PT and INR normal, troponin negative.    RADIOLOGY  I have independently interpreted the diagnostic imaging associated with this visit and am waiting the final reading from the radiologist.   My preliminary interpretation is a follows: No obvious intracranial hemorrhage on my depend interpretation  Radiologist interpretation is as follows:  DX-CHEST-PORTABLE (1 VIEW)    (Results Pending)   CT-HEAD W/O    (Results Pending)   CT-CEREBRAL PERFUSION ANALYSIS    (Results Pending)   CT-CTA HEAD WITH & W/O-POST PROCESS    (Results Pending)   CT-CTA NECK WITH & W/O-POST PROCESSING    (Results Pending)   IR-THROMBO MECHANICAL ARTERY,INIT    (Results Pending)     COURSE & MEDICAL DECISION MAKING  Nursing notes, VS, PMSFHx, labs, imaging, EKG reviewed in chart.    Heart Score: Low     ED Observation  Status? No; Patient does not meet criteria for ED Observation.     Ddx: Stroke, TIA, PE, pneumonia, MI    MDM: 2:47 PM Dale Dinero is a 70 y.o. male who presented with evaluation for some altered mental status changes with confusion about an hour and a half ago.  Patient left the doors open, could not drive his car, and seemed generally confused.  Wife states that about 2 days ago she noticed that he had some left hand weakness and loss of  strength.  He smokes cigarettes, has hypertension, hyperlipidemia.  He arrives as a stroke assessment per protocol denies time at the triage desk.  His wife helps provide collateral formation.  Patient is alert and oriented x 4, his stroke scale is 0, GCS of 15.  Seems to be acting fairly normal to me but wife feels like he is acting abnormally.  No focal deficits but patient does have some changes with left  strength and has difficulty touching his thumb to the tip of each of his fingers in the left hand.  He will be sent for CT imaging of the head per stroke protocol.  No leukocytosis, no anemia, normal electrolytes, normal glucose, normal renal function, normal liver enzymes, normal bilirubin, PT and INR normal, troponin negative.  CT imaging unfortunately shows no acute stroke reviewed by neurology.  They counseled the patient and feel he is appropriate for tPA which was administered.  Patient admitted to the intensivist for ICU post tPA protocol.  Critically ill but stable at time admission.  Patient will also go to IR for intervention.    CRITICAL CARE TIME 34 minutes: Acute stroke, requiring mission to the ICU, tPA administration, neurology consultation, frequent reevaluations  There was a very real possibility of deterioration of the patient's condition.  This patient required the highest level of care.  I provided critical care services which included: review of the medical record, treatment orders, ordering and reviewing test results, frequent  reevaluation of the patient's condition and response to treatment, as well as discussing the case with appropriate personnel and various consultants. The critical care time associated with the care of this patient is exclusive of any procedures or specific interventions.    ADDITIONAL PROBLEM LIST AND DISPOSITION    I have discussed management of the patient with the following physicians and JULIA's: Intensivist, neurologist    Discussion of management with other [facility] or appropriate source(s): Pharmacy regarding ministration of tPA     Barriers to care at this time, including but not limited to: None.     Decision tools and prescription drugs considered including, but not limited to: NIH stroke scale 0.    FINAL IMPRESSION  1. Cerebrovascular accident (CVA) due to occlusion of precerebral artery (HCC) Acute   2. Hypertension, unspecified type Acute   3. Impaired dexterity Acute   4. Left hand weakness Acute   5. Stroke-like symptoms Acute   6. Tobacco abuse Acute   7. Tobacco abuse counseling Acute   8. Altered mental status, unspecified altered mental status type Acute      Ritu SWEET (Scribe), am scribing for, and in the presence of, Michel Montesinos.    Electronically signed by: Ritu Sheriff (Scribe), 5/28/2025    IMichel personally performed the services described in this documentation, as scribed by Ritu Sheriff in my presence, and it is both accurate and complete.    The note accurately reflects work and decisions made by me.  Michel Montesinos  5/28/2025  3:13 PM         [1]   Social History  Tobacco Use    Smoking status: Every Day     Current packs/day: 1.00     Average packs/day: 1 pack/day for 53.2 years (53.2 ttl pk-yrs)     Types: Cigarettes     Start date: 3/8/1972    Smokeless tobacco: Never    Tobacco comments:     Working on quiting   Vaping Use    Vaping status: Never Used   Substance Use Topics    Alcohol use: No     Comment: Patient stopped drinking 5/28/2018 in  sobriety    Drug use: Never   [2] No Known Allergies

## 2025-05-28 NOTE — ED NOTES
Pharmacy Medication Reconciliation      ~Medication reconciliation updated and complete per patient family at bedside   ~Allergies have been verified   ~No oral ABX within the last 30 days  ~Is dispense history available in EPIC: yes   ~Patient home pharmacy :  [facility]    ~Patient family reports that patient was advised by physician to increase Lisinopril to 20 mg at bedtime yesterday 5/27/2025. Patient family reports patient has not started this dose       ~Anticoagulants (rivaroxaban, apixaban, edoxaban, dabigatran, warfarin, enoxaparin) taken in the last 14 days? No

## 2025-05-28 NOTE — DISCHARGE PLANNING
"TCN following. HTH/SCP chart review completed. Note pt currently in ED 2' to possible stroke.  Per Emily Duenas R.N. on 5/28/25 at 2:38 PM, \"BIBA from EMS.  Per patient wife states he started to slurr his speech and became confused on 1315. Patient also fell down and hurt his left knee around the same time. Patient present with left side facial droop and slight slurred speech. Left arm weakness that has been for a few day\".      Per chart review, patient has a [facility] PCP.  Note pt does not have primary/follow up visits scheduled.  Ambulatory status unknown at this time.  At this time discharge plan highly dependent on medical course (either directly from ED or after admission to [facility] if warranted).     If patient does not warrant admission/ inpatient status to [facility] and is unable to functionally discharge home, please reach out to TCN for assist with SCP auth to discharge directly from ED to skilled. Discussed with ED BARRON Mckeon.      If patient admits to [facility], please reach out to TCN via [facility]AL[facility] if post acute transitional care needs are warranted for dc planning.    "

## 2025-05-28 NOTE — OR SURGEON
Immediate Post- Operative Note        PostOp Diagnosis: Rt MCA M1 occlusion, acute stroke    Procedure(s): Cerebral Angiogram and mechanical thrombectomy with Penumbra system.    Findings: Rt MCA Occlusion TICI 3 recanalization achieved.    Access: Rt CFA 8 fr sheath closed with closure device.    Estimated Blood Loss:~ 50 ml    Complications: None.    Discussed with Neurohospitalist.    Post procedure:    Patient to ICU  Maintain SBP strictly between 110-140 mm Hg       Kyra Resendiz M.D.

## 2025-05-28 NOTE — PROGRESS NOTES
Cerebral angiogram with mechanical thrombectomy for Right M1 occlusion by Dr. Resendiz.  Emergent consent. Pre-procedure left pedal pulse 2+ and right 1+. Left Femoral Artery access site. Pt placed on monitor, prepped and draped in a sterile fashion. Vital signs were taken every 5 minutes and remained within parameters (see doc flow sheets). Suction system was used to retrieve clot. Hemostasis achieved using Angioseal deployed at 1620. Report given to Lisa LOYD. Pt was transported to ICU with RN and monitor to R109. Stroke worksheet review during warm handoff with Lisa LOYD. ICU responsible for serial checks starting at 1645. See Stroke Procedure flowsheet for VS, neuro, access, extremity serial assessments.    LKW:1315  NIH: 3  TNK 19mg given at 1540  Time in IR:1543  Access:1553  1st angio:1559  1st pass:1601  TICI 2C: 1605  2nd Pass: 1612  TICI 3: 1615  Closure (end time):1620  5min manual pressure    Goal SBP per MD: 100-140    TICI score 3  @ 1615    Post procedure pedal pulses 2+ left, 1+ right  [facility]  REF: [id number]  LOT: [id number]  EXP: 12/12/2025

## 2025-05-28 NOTE — ED NOTES
Pt /85 on zoll.  TNK given.  Pt transported to IR on zoll with IR team.  Pt's wife walked over the cath lab family room.  IR charge RN notified of wife in waiting room.

## 2025-05-28 NOTE — CONSULTS
Neurology STROKE CODE H&P  Vascular Neurology Service, [facility]    Referring Physician: Michel Montesinos    STROKE CODE:   Chief Complaint   Patient presents with    Possible Stroke       To obtain the most accurate data regarding the time called, and time patient seen, refer to the stroke run-sheet and chart.  For time of CT, refer to the radiology report. See A&P below for TPA Decision and door to needle time if and when applicable.    HPI: Dale Dinero is a 70 y.o. male with a past medical history of CAD, aortic and femoral artery stenoses, HCV, COPD, basal cell carcinoma, HLD who presented on 05/28/2025 with left hand weakness, dysarthria and left facial droop that started at ~1315. Patient reports possible left hand dexterity weakness x 2 days but abrupt onset of dysarthria and facial droop started today. Noncontrast CT head was negative for acute findings. CTA head and neck revealed a right M1 occlusion. CT perfusion demonstrates a large ischemic penumbra of 116cc in the right MCA territory. Neurology has been consulted for further evaluation of the above.     Review of systems: In addition to what is detailed in the HPI above, all other systems reviewed and are negative.    Past Medical History:    has a past medical history of Anesthesia, Awareness under anesthesia, Basal cell carcinoma (BCC) in situ of skin, Blood clotting disorder (HCC), CAD (coronary artery disease), Cancer (HCC), COPD (chronic obstructive pulmonary disease) (HCC), COPD (chronic obstructive pulmonary disease) (HCC), Emphysema of lung (HCC), Heart burn, Hep C w/o coma, chronic (HCC), Hepatitis C, High cholesterol, Hospital discharge follow-up (06/05/2023), Hypertension, Indigestion, Renal disorder, Snoring, Soft tissue infection (05/25/2023), Stenosis of aorta, Stenosis of femoral artery (HCC), and Wound dehiscence (05/19/2023).    He has no past medical history of Acute nasopharyngitis, Anginal syndrome  (HCC), Arrhythmia, Arthritis, Asthma, Bowel habit changes, Breath shortness, Bronchitis, Carcinoma in situ of respiratory system, Cataract, Congestive heart failure (HCC), Continuous ambulatory peritoneal dialysis status (HCC), Coughing blood, Dental disorder, Diabetes (HCC), Dialysis patient (HCC), Disorder of thyroid, Glaucoma, Gynecological disorder, Heart murmur, Heart valve disease, Hemorrhagic disorder (HCC), Hepatitis A, Hepatitis B, Hiatus hernia syndrome, Jaundice, Myocardial infarct (HCC), Pacemaker, Pneumonia, Pregnant, Psychiatric problem, Rheumatic fever, Seizure (HCC), Sleep apnea, Stroke (HCC), Tuberculosis, Urinary bladder disorder, or Urinary incontinence.    FHx:  family history includes Cancer in his brother, father, and sister; Colorectal Cancer in his father; Diabetes in his father; Other in his mother.    SHx:   reports that he has been smoking cigarettes. He started smoking about 53 years ago. He has a 53.2 pack-year smoking history. He has never used smokeless tobacco. He reports that he does not drink alcohol and does not use drugs.    Allergies:  Allergies[1]    Medications:  Current Medications[2]    Physical Examination:    Vitals:    05/28/25 1530 05/28/25 1532 05/28/25 1535 05/28/25 1540   BP: (!) 190/91 (!) 190/95 (!) 191/100 (!) 177/85   Pulse: 69 71 69    Resp: 19 (!) 24 (!) 25    Temp:       TempSrc:       SpO2: 91% 93% 94%    Weight:       Height:           General: Patient is awake and in no acute distress  Eye: Examination of optic disks not indicated at this time given acuity of consult  Neck: There is normal range of motion  CV: Regular rate   Extremities:  Clear, dry, intact, without peripheral edema    NEUROLOGICAL EXAM:     Mental status: Awake, alert and fully oriented  Speech and language: Speech is dysarthric. The patient is able to name and repeat, and follow commands  Cranial nerve exam: Pupils are equal, round and reactive to light bilaterally. Left hemianopsia. There  is no nystagmus. Extraocular muscles are intact. Left facial droop. Sensation in the face is intact to light touch. Palate elevates symmetrically. Tongue is midline.  Motor exam: There is sustained antigravity with no downward drift in bilateral arms and legs.  There is no pronator drift.  Tone is normal. No abnormal movements were seen on exam.  Sensory exam:  Diminished sensation in the left arm   Deep tendon reflexes:  2+ throughout. Toes down-going bilaterally.  Coordination: No ataxia on bilateral finger-to-nose testing.  Gait: Deferred due to patient acuity    NIHSS: National Institutes of Health Stroke Scale    [0] 1a:Level of Consciousness    0-alert 1-drowsy   2-stupor   3-coma  [0] 1b:LOC Questions                  0-both  1-one      2-neither  [0] 1c:LOC Commands                   0-both  1-one      2-neither  [0] 2: Best Gaze                     0-nl    1-partial  2-forced  [1] 3: Visual Fields                   0-nl    1-partial  2-complete 3-bilat  [1] 4: Facial Paresis                0-nl    1-minor    2-partial  3-full  MOTOR                       0-nl  [0] 5: Right Arm           1-drift  [1] 6: Left Arm             2-some effort vs gravity  [0] 7: Right Leg           3-no effort vs gravity  [0] 8: Left Leg             4-no movement                             x-untestable  [0] 9: Limb Ataxia                    0-abs   1-1_limb   2-2+_limbs       x-untestable  [1] 10:Sensory                        0-nl    1-partial  2-dense  [0] 11:Best Language/Aphasia         0-nl    1-mild/mod 2-severe   3-mute  [1] 12:Dysarthria                     0-nl    1-mild/mod 2-severe       x-untestable  [0] 13:Neglect/Inattention            0-none  1-partial  2-complete  [5] TOTAL    NIHSS Date/Time: 05/28/2025 @ 1510    Baseline Modified Jarvis Scale (MRS): 0 = No symptoms    Objective Data:    Labs:  Lab Results   Component Value Date/Time    PROTHROMBTM 12.7 05/28/2025 02:46 PM    INR 0.95 05/28/2025 02:46 PM      Lab  Results   Component Value Date/Time    WBC 8.8 05/28/2025 02:46 PM    RBC 5.79 05/28/2025 02:46 PM    HEMOGLOBIN 17.1 05/28/2025 02:46 PM    HEMATOCRIT 50.9 05/28/2025 02:46 PM    MCV 87.9 05/28/2025 02:46 PM    MCH 29.5 05/28/2025 02:46 PM    MCHC 33.6 05/28/2025 02:46 PM    MPV 10.2 05/28/2025 02:46 PM    NEUTSPOLYS 60.80 05/28/2025 02:46 PM    LYMPHOCYTES 28.70 05/28/2025 02:46 PM    MONOCYTES 8.30 05/28/2025 02:46 PM    EOSINOPHILS 1.40 05/28/2025 02:46 PM    BASOPHILS 0.60 05/28/2025 02:46 PM      Lab Results   Component Value Date/Time    SODIUM 139 05/28/2025 02:46 PM    POTASSIUM 4.0 05/28/2025 02:46 PM    CHLORIDE 106 05/28/2025 02:46 PM    CO2 21 05/28/2025 02:46 PM    GLUCOSE 108 (H) 05/28/2025 02:46 PM    BUN 11 05/28/2025 02:46 PM    CREATININE 0.86 05/28/2025 02:46 PM      Lab Results   Component Value Date/Time    CHOLSTRLTOT 136 11/08/2024 06:51 AM    LDL 79 11/08/2024 06:51 AM    HDL 43 11/08/2024 06:51 AM    TRIGLYCERIDE 71 11/08/2024 06:51 AM       Lab Results   Component Value Date/Time    ALKPHOSPHAT 58 05/28/2025 02:46 PM    ASTSGOT 20 05/28/2025 02:46 PM    ALTSGPT <5 05/28/2025 02:46 PM    TBILIRUBIN 0.5 05/28/2025 02:46 PM        Imaging/Testing:    I interpreted and/or reviewed the patient's neuroimaging    CT-CTA NECK WITH & W/O-POST PROCESSING   Final Result      1.  A focal moderate atherosclerotic stenosis in the proximal left vertebral artery.   2.  Occluded right distal M1 segment.   3.  Atherosclerotic calcification in the carotid bulb without significant stenosis at the origins.      CT-CTA HEAD WITH & W/O-POST PROCESS   Final Result   Addendum (preliminary) 1 of 1   Addendum: Case discussed with  at 3:30 PM 5/28/2025      Final      Occlusion of the mid to distal right M1 segment.      CT-CEREBRAL PERFUSION ANALYSIS   Final Result      1. Cerebral blood flow less than 30% possibly representing completed infarct = 2 mL.   2. T Max more than 6 seconds possibly representing  "combination of completed infarct and ischemia = 116 mL.      3. Mismatched volume possibly representing ischemic brain/penumbra= 114 mL      4.  Please note that this cerebral perfusion study and report is Quantitative and targets supratentorial (cerebral) perfusion for evaluation of large vessel territory acute ischemia/infarction. For example, lacunar infarcts, and brainstem/posterior fossa    ischemia/infarction are not evaluated on this study.  Data acquisition is subject to artifacts which can yield non-anatomically plausible perfusion maps which may be due to motion, bolus timing, signal to noise ratio, or other technical factors.    Perfusion map abnormalities which show non-anatomic distributions are likely artifact.   This study is not \"stand-alone\" and should only be utilized for diagnosis, management/treatment in correlation with CT, CTA, and/or MRI and clinical factors.         CT-HEAD W/O   Final Result      1.  Cerebral atrophy.      2.  Otherwise, Head CT without contrast within normal limits. No evidence of acute cerebral infarction, hemorrhage or mass lesion.               DX-CHEST-PORTABLE (1 VIEW)    (Results Pending)   IR-THROMBO MECHANICAL ARTERY,INIT    (Results Pending)       Assessment and Plan:    70 y.o. M with considerable vascular risk factors presenting with left arm weakness, left facial droop and dysarthria. NCCTH negative for acute intracranial abnormalities. CTA head and neck revealed a right MCA distal M1 occlusion with corresponding 116cc ischemic penumbra appreciated on CT perfusion. Last known well reported as 1315. TNKase administered at 1540, delay in administration due to repeat elevated blood pressures requiring multiple PRN pushes. Case was discussed with Neuro IR who took the patient to the IR Suite for endovascular clot retrieval with successful TICI 3 recanalization achieved. The patient will need to be admitted to RICU post operatively for post thrombolytic/thrombectomy " neuro checks and blood pressure management. Obtain MRI brain expeditiously.     Problem list:  Right M1 occlusion  HTN  HLD    Recommendations:  - to cath lab for endovascular clot retrieval of the right M1 occlusion  - admit to RICU post-operatively  - neurochecks/NIHSS per post-thrombectomy/thrombolyitc protocol  - expedite MRI brain without contrast  - repeat CTH only if there is clinical deterioration  - No antiplatelet/anticoagulation pending MRI brain results  - BP goal in setting of TICI 3: maintain systolic -140. Antihypertensives per ICU team  - stroke labs:  HgbA1c and lipid panel  - resume home simvastatin 20mg daily for goal LDL < 70 when possibe, titrate dose to LDL results  - DM management for goal HgbA1c < 7, acutely aim for FSBS   - PT/OT/SLP ok, even within first 24 hours post-operatively  - complete embolic workup with TTE and long-term ziopatch monitoring at discharge  - hold DVT chemoppx until MRI completed  - stroke bridge clinic follow up    Case reviewed and plan created with Dr. Blank Amezquita, Vascular Neurology, and Dr. Michel Montesinos, Emergency Medicine. Please call with any questions.      Devante CARABALLO  Vascular Neurology, [facility]  [phone]           [1] No Known Allergies  [2]   Current Facility-Administered Medications:     niCARdipine (Cardene) 25 mg in  mL Standard Infusion, 0-15 mg/hr, Intravenous, Continuous, Dale Navarro M.D., Last Rate: 50 mL/hr at 05/28/25 1558, 5 mg/hr at 05/28/25 1558    FENTANYL CITRATE (PF) 0.05 MG/ML INJ SOLN (WRAPPED), , , ,     fentaNYL (Sublimaze) injection, , Intravenous, Intra-Op Once PRN, Kyra Resendiz M.D., 50 mcg at 05/28/25 1555    Current Outpatient Medications:     lisinopril (PRINIVIL) 5 MG Tab, Take 5 mg by mouth every evening., Disp: , Rfl:     lisinopril (PRINIVIL) 20 MG Tab, Take 1 Tablet by mouth at bedtime. Indications: High Blood Pressure, Disp: 100 Tablet, Rfl: 3     simvastatin (ZOCOR) 20 MG Tab, Take 1 tablet by mouth every evening., Disp: 100 Tablet, Rfl: 3    aspirin 81 MG EC tablet, Take 81 mg by mouth every morning., Disp: , Rfl:     tadalafil (CIALIS) 10 MG tablet, Take 1 Tablet by mouth 1 time a day as needed for Erectile Dysfunction., Disp: 10 Tablet, Rfl: 11    buPROPion (WELLBUTRIN) 75 MG Tab, Take 1 Tablet by mouth 2 times a day., Disp: 200 Tablet, Rfl: 3    albuterol 108 (90 Base) MCG/ACT Aero Soln inhalation aerosol, Inhale 2 Puffs every four hours as needed for Shortness of Breath., Disp: 18 g, Rfl: 2

## 2025-05-28 NOTE — ASSESSMENT & PLAN NOTE
Acute ischemic CVA (NIHSS 5), with L deficits s/p TNK 5/28/2025 @ 1540, thrombectomy @ 1624 with TICI 3  Neurology (Dr. Amezquita) and neuro IR (Dr. Resendiz) consulted  Neuro checks per protocol  MRI, Echo pending  Seizure, aspiration, and fall precautions   NPO pending dysphagia screen  SLP/PT/OT, physiatry evaluation  Antiplatelet therapy: ASA 24 hours post thrombectomy (pending MRI brain results), High intensity statin  Strict BP control with goal SBP <140 mmHg, DBP <105; hydralazine, labetalol

## 2025-05-28 NOTE — PROGRESS NOTES
4 Eyes Skin Assessment Completed by EVERT dalal and EVERT moore.    Skin assessment is primarily focused on high risk bony prominences. Pay special attention to skin beneath and around medical devices, high risk bony prominences, skin to skin areas and areas where the patient lacks sensation to feel pain and areas where the patient previously had breakdown.     Head (Occipital):  WDL   Ears (Under Medical Devices): Red, Purple/maroon, and Blanching   Nose (Under Medical Devices): WDL   Mouth:  WDL   Neck: WDL   Breast/Chest:  WDL   Shoulder Blades:  WDL   Spine:   Incision   (R) Arm/Elbow/Hand: Red and Blanching elbow   (L) Arm/Elbow/Hand: Red and Blanching   Abdomen: Scar   Pannus/Groin:  Scar   Sacrum/Coccyx:   Red and Blanching   (R) Ischial Tuberosity (Sit Bones):  Red and Blanching   (L) Ischial Tuberosity (Sit Bones):  Red and Blanching   (R) Leg:  Laceration and Scab to shin   (L) Leg:  Laceration, Abrasion, and Scab to knee   (R) Heel:  WDL   (R) Foot/Toe: WDL   (L) Heel: WDL   (L) Foot/Toe:  WDL       DEVICES IN USE:   Respiratory Devices:  Nasal cannula  Feeding Devices:  N/A   Lines & BP Monitoring Devices:  Peripheral IV, BP cuff, and Pulse ox    Orthopedic Devices:  N/A  Miscellaneous Devices:  Telemetry monitor and SCDs    PROTOCOL INTERVENTIONS:   ICU Low Airloss Bed:  Already in place  Offloading Dressing - Sacrum:  Already in place  Float Heels with Pillows:  Already in place  Q2 Turns with Pillows:  Already in place  Nasal Cannula with Gray Foams:  Already in place    WOUND PHOTOS:   Completed and in EPIC     WOUND CONSULT:   N/A, no advanced wound care needs identified

## 2025-05-28 NOTE — CONSULTS
Critical Care Consultation    Date of consult: 5/28/2025    Referring Physician  Michel Montesinos M.D.    Reason for Consultation  CVA    History of Presenting Illness  70 y.o. male remaining with PMHx CAD, COPD, hepatitis C, heartburn, dyslipidemia and hypertension with pending increased dose of medication who presented 5/28/2025 with left handed weakness and loss of  strength for a couple of days got much worse around 1:15 PM including left facial droop, odd behavior and confusion with some slurred speech and then a fall when he dropped something.  Currently in ER he is hypertensive pending TNK for right M1 occlusion.  Patient was seen by Dr. Amezquita who recommended lytics followed by IR and he is going there soon.  Dr. Olivares I am told is reaching out to Dr. Mercedes with neurosurgery as well.  I arrived in the ER and patient's blood pressure was 217 systolic and they brought it down to the 170-180 range with 3 doses of labetalol and then TNK was given approximately 3:40 PM and he rolled to IR.  Patient was alert and oriented x 4 and moving all extremities without significant focal abnormalities for me.  Successful thrombectomy but on arrival to ICU patient with some recurrence of the right M1 symptoms.  Little more somnolent but did receive some IV narcotics during his case.  Initially significant left-sided weakness but left lower extremity is now back to what he was in the ER 5/5 and he started to move the left upper extremity.  Facial droop is back but it is subtle and small.  He is oriented x 4 at this time and follows all commands.  Small sylvian fissure bleed per IR on review of stat CT with.    Code Status  Full Code    Review of Systems  Review of Systems   Constitutional:  Negative for fever.   HENT:  Negative for sore throat.    Eyes: Negative.    Respiratory:  Negative for shortness of breath.    Cardiovascular:  Negative for chest pain and palpitations.   Gastrointestinal:  Negative for  [facility] agrees to cardiac clearance request.   Patient notified  Fax sent to provider.   abdominal pain, nausea and vomiting.   Genitourinary:  Negative for dysuria.   Musculoskeletal:  Negative for back pain.   Neurological:  Positive for speech change (Improved) and focal weakness (improved). Negative for headaches.   Endo/Heme/Allergies:  Does not bruise/bleed easily.   Psychiatric/Behavioral:  Negative for substance abuse. The patient is not nervous/anxious.        Past Medical History   has a past medical history of Acute cerebrovascular accident (CVA) ([facility]) (5/28/2025), Anesthesia, Awareness under anesthesia, Basal cell carcinoma (BCC) in situ of skin, Blood clotting disorder ([facility]), CAD (coronary artery disease), Cancer ([facility]), COPD (chronic obstructive pulmonary disease) ([facility]), COPD (chronic obstructive pulmonary disease) ([facility]), Emphysema of lung ([facility]), Heart burn, Hep C w/o coma, chronic ([facility]), Hepatitis C, High cholesterol, Hospital discharge follow-up (06/05/2023), Hypertension, Indigestion, Renal disorder, Snoring, Soft tissue infection (05/25/2023), Stenosis of aorta, Stenosis of femoral artery ([facility]), and Wound dehiscence (05/19/2023).    He has no past medical history of Acute nasopharyngitis, Anginal syndrome ([facility]), Arrhythmia, Arthritis, Asthma, Bowel habit changes, Breath shortness, Bronchitis, Carcinoma in situ of respiratory system, Cataract, Congestive heart failure ([facility]), Continuous ambulatory peritoneal dialysis status ([facility]), Coughing blood, Dental disorder, Diabetes ([facility]), Dialysis patient ([facility]), Disorder of thyroid, Glaucoma, Gynecological disorder, Heart murmur, Heart valve disease, Hemorrhagic disorder ([facility]), Hepatitis A, Hepatitis B, Hiatus hernia syndrome, Jaundice, Myocardial infarct ([facility]), Pacemaker, Pneumonia, Pregnant, Psychiatric problem, Rheumatic fever, Seizure (HCC), Sleep apnea, Tuberculosis, Urinary bladder disorder, or Urinary incontinence.    Surgical History   has a past surgical history that includes meniscus repair (Left); laminotomy; pr repr aneurysm/grft  ins,common femoral (Right, 05/03/2023); aortogram (Right, 05/03/2023); thrombectomy (Right, 05/03/2023); debridement (Right, 05/19/2023); other neurological surg (2014); other orthopedic surgery (2014); other (2014 2023); tonsillectomy; lumbar laminectomy diskectomy (08/12/2024); and inguinal hernia repair bilateral.    Family History  family history includes Cancer in his brother, father, and sister; Colorectal Cancer in his father; Diabetes in his father; Other in his mother.    Social History   reports that he has been smoking cigarettes. He started smoking about 53 years ago. He has a 53.2 pack-year smoking history. He has never used smokeless tobacco. He reports that he does not drink alcohol and does not use drugs.    Medications  Home Medications       Reviewed by Emy Haney (Pharmacy Tech) on 05/28/25 at 1546  Med List Status: Complete     Medication Last Dose Status   albuterol 108 (90 Base) MCG/ACT Aero Soln inhalation aerosol Unknown Active   aspirin 81 MG EC tablet 5/27/2025 Active   buPROPion (WELLBUTRIN) 75 MG Tab Unknown Active   lisinopril (PRINIVIL) 20 MG Tab New Rx Active   lisinopril (PRINIVIL) 5 MG Tab 5/27/2025 Active   simvastatin (ZOCOR) 20 MG Tab 5/27/2025 Active   tadalafil (CIALIS) 10 MG tablet Unknown Active                  Audit from Redirected Encounters    **Home medications have not yet been reviewed for this encounter**       Current Medications[1]    Allergies  Allergies[2]    Vital Signs last 24 hours  Temp:  [35.9 °C (96.6 °F)-36.7 °C (98.1 °F)] 35.9 °C (96.6 °F)  Pulse:  [66-83] 80  Resp:  [12-26] 26  BP: (129-218)/() 129/70  SpO2:  [89 %-97 %] 94 %    Physical Exam  Physical Exam  Vitals reviewed.   Constitutional:       Appearance: He is ill-appearing.   HENT:      Head: Normocephalic and atraumatic.      Mouth/Throat:      Mouth: Mucous membranes are moist.   Eyes:      General: No scleral icterus.     Extraocular Movements: Extraocular movements intact.       Pupils: Pupils are equal, round, and reactive to light.   Neck:      Vascular: No carotid bruit.   Cardiovascular:      Rate and Rhythm: Normal rate and regular rhythm.      Pulses: Normal pulses.      Heart sounds: No murmur heard.  Pulmonary:      Effort: Pulmonary effort is normal. No respiratory distress.      Breath sounds: No wheezing, rhonchi or rales.   Abdominal:      General: Bowel sounds are normal. There is no distension.      Palpations: Abdomen is soft.      Tenderness: There is no abdominal tenderness. There is no guarding.   Musculoskeletal:      Cervical back: Neck supple. No rigidity.      Right lower leg: No edema.      Left lower leg: No edema.   Skin:     General: Skin is warm and dry.      Capillary Refill: Capillary refill takes less than 2 seconds.   Neurological:      General: No focal deficit present.      Mental Status: He is alert.   Psychiatric:         Mood and Affect: Mood normal.         Behavior: Behavior normal.         Thought Content: Thought content normal.         Fluids    Intake/Output Summary (Last 24 hours) at 5/28/2025 1933  Last data filed at 5/28/2025 1800  Gross per 24 hour   Intake 140.2 ml   Output 350 ml   Net -209.8 ml       Laboratory  Recent Results (from the past 48 hours)   POCT glucose device results    Collection Time: 05/28/25  2:37 PM   Result Value Ref Range    POC Glucose, Blood 112 (H) 65 - 99 mg/dL   CBC WITH DIFFERENTIAL    Collection Time: 05/28/25  2:46 PM   Result Value Ref Range    WBC 8.8 4.8 - 10.8 K/uL    RBC 5.79 4.70 - 6.10 M/uL    Hemoglobin 17.1 14.0 - 18.0 g/dL    Hematocrit 50.9 42.0 - 52.0 %    MCV 87.9 81.4 - 97.8 fL    MCH 29.5 27.0 - 33.0 pg    MCHC 33.6 32.3 - 36.5 g/dL    RDW 42.7 35.9 - 50.0 fL    Platelet Count 200 164 - 446 K/uL    MPV 10.2 9.0 - 12.9 fL    Neutrophils-Polys 60.80 44.00 - 72.00 %    Lymphocytes 28.70 22.00 - 41.00 %    Monocytes 8.30 0.00 - 13.40 %    Eosinophils 1.40 0.00 - 6.90 %    Basophils 0.60 0.00 - 1.80 %     Immature Granulocytes 0.20 0.00 - 0.90 %    Nucleated RBC 0.00 0.00 - 0.20 /100 WBC    Neutrophils (Absolute) 5.35 1.82 - 7.42 K/uL    Lymphs (Absolute) 2.52 1.00 - 4.80 K/uL    Monos (Absolute) 0.73 0.00 - 0.85 K/uL    Eos (Absolute) 0.12 0.00 - 0.51 K/uL    Baso (Absolute) 0.05 0.00 - 0.12 K/uL    Immature Granulocytes (abs) 0.02 0.00 - 0.11 K/uL    NRBC (Absolute) 0.00 K/uL   COMP METABOLIC PANEL    Collection Time: 05/28/25  2:46 PM   Result Value Ref Range    Sodium 139 135 - 145 mmol/L    Potassium 4.0 3.6 - 5.5 mmol/L    Chloride 106 96 - 112 mmol/L    Co2 21 20 - 33 mmol/L    Anion Gap 12.0 7.0 - 16.0    Glucose 108 (H) 65 - 99 mg/dL    Bun 11 8 - 22 mg/dL    Creatinine 0.86 0.50 - 1.40 mg/dL    Calcium 9.4 8.5 - 10.5 mg/dL    Correct Calcium 9.2 8.5 - 10.5 mg/dL    AST(SGOT) 20 12 - 45 U/L    ALT(SGPT) <5 2 - 50 U/L    Alkaline Phosphatase 58 30 - 99 U/L    Total Bilirubin 0.5 0.1 - 1.5 mg/dL    Albumin 4.3 3.2 - 4.9 g/dL    Total Protein 7.3 6.0 - 8.2 g/dL    Globulin 3.0 1.9 - 3.5 g/dL    A-G Ratio 1.4 g/dL   PROTHROMBIN TIME    Collection Time: 05/28/25  2:46 PM   Result Value Ref Range    PT 12.7 12.0 - 14.6 sec    INR 0.95 0.87 - 1.13   APTT    Collection Time: 05/28/25  2:46 PM   Result Value Ref Range    APTT 25.7 24.7 - 36.0 sec   COD (ADULT)    Collection Time: 05/28/25  2:46 PM   Result Value Ref Range    ABO Grouping Only A     Rh Grouping Only NEG     Antibody Screen-Cod NEG    TROPONIN    Collection Time: 05/28/25  2:46 PM   Result Value Ref Range    Troponin T 11 6 - 19 ng/L   ESTIMATED GFR    Collection Time: 05/28/25  2:46 PM   Result Value Ref Range    GFR (CKD-EPI) 94 >60 mL/min/1.73 m 2   Hemoglobin A1C    Collection Time: 05/28/25  2:46 PM   Result Value Ref Range    Glycohemoglobin 5.6 4.0 - 5.6 %    Est Avg Glucose 114 mg/dL   HOLD BLOOD BANK SPECIMEN (NOT TESTED)    Collection Time: 05/28/25  3:19 PM   Result Value Ref Range    Holding Tube - Bb DONE        Imaging  DX-CHEST-PORTABLE  "(1 VIEW)   Final Result      No acute cardiopulmonary abnormality.      CT-HEAD W/O   Final Result   Addendum (preliminary) 1 of 1   These findings were discussed with Dr. Navarro on 5/28/2025 5:42 PM.      Final      1.  Small amount of subarachnoid hemorrhage in the right Sylvian fissure.   2.  Small acute right MCA infarct.         These findings were discussed with YARELIS VELA on 5/28/2025 5:37 PM.            CT-CTA NECK WITH & W/O-POST PROCESSING   Final Result      1.  A focal moderate atherosclerotic stenosis in the proximal left vertebral artery.   2.  Occluded right distal M1 segment.   3.  Atherosclerotic calcification in the carotid bulb without significant stenosis at the origins.      CT-CTA HEAD WITH & W/O-POST PROCESS   Final Result   Addendum (preliminary) 1 of 1   Addendum: Case discussed with  at 3:30 PM 5/28/2025      Final      Occlusion of the mid to distal right M1 segment.      CT-CEREBRAL PERFUSION ANALYSIS   Final Result      1. Cerebral blood flow less than 30% possibly representing completed infarct = 2 mL.   2. T Max more than 6 seconds possibly representing combination of completed infarct and ischemia = 116 mL.      3. Mismatched volume possibly representing ischemic brain/penumbra= 114 mL      4.  Please note that this cerebral perfusion study and report is Quantitative and targets supratentorial (cerebral) perfusion for evaluation of large vessel territory acute ischemia/infarction. For example, lacunar infarcts, and brainstem/posterior fossa    ischemia/infarction are not evaluated on this study.  Data acquisition is subject to artifacts which can yield non-anatomically plausible perfusion maps which may be due to motion, bolus timing, signal to noise ratio, or other technical factors.    Perfusion map abnormalities which show non-anatomic distributions are likely artifact.   This study is not \"stand-alone\" and should only be utilized for diagnosis, " management/treatment in correlation with CT, CTA, and/or MRI and clinical factors.         CT-HEAD W/O   Final Result      1.  Cerebral atrophy.      2.  Otherwise, Head CT without contrast within normal limits. No evidence of acute cerebral infarction, hemorrhage or mass lesion.               IR-THROMBO MECHANICAL ARTERY,INIT    (Results Pending)       Assessment/Plan  * Acute cerebrovascular accident (CVA) ([facility])- (present on admission)  Assessment & Plan  Left hand weakness onset 2 days ago  Acute worsening with right MCA syndrome symptoms 1:15 PM today  Quite hypertensive blood pressure in the 220 range requiring labetalol to normalize him enough to give TNK at 3:40 PM  Going to IR for R M1 distal occlusion and thrombectomy  Tici 3 flow after thrombectomy, systolic BP goal 110-140 per Dr. Resendiz    On arrival to the ICU a little more somnolent but did receive narcotics during the case  Recurrence of left facial droop and left-sided weakness that is actually now improving over time since arrival  Follow-up CT head with small sylvian fissure bleed query related to thrombectomy attempts of M3 clot per IR physician    Every hour neurochecks  Follow-up CT for neurochanges  Bed rest strict for now  Strict n.p.o. with SLP eval tomorrow  Maintain normoglycemia and normothermia  Maintain eunatremia sodium 135-145  Cardiac monitoring, Zio patch as an outpatient as clinically prudent  MRI in a.m. follow-up bleed and CVA  High-dose statin  Hold heparin's and antiplatelets  SCDs only for now  Echocardiogram  Lipid panel  Glycohemoglobin  PT/OT eval and treat when clinically appropriate    Dyslipidemia- (present on admission)  Assessment & Plan  High dose statin post CVA    Essential hypertension- (present on admission)  Assessment & Plan  Status post TNK  BP goal less than 180  As needed IV labetalol and IV hydralazine  Actively titrating IV nicardipine infusion  On home lisinopril, dose was to be increased from 5-20 per  patient  N.p.o. now so will start IV enalaprilat 1.25 IV every 8  Monitor BMP  May change BP goal post IR thrombectomy waiting for outcome from IR physician    CAD (coronary artery disease)- (present on admission)  Assessment & Plan  History of    COPD (chronic obstructive pulmonary disease) (HCC)- (present on admission)  Assessment & Plan  RT protocols  DuoNeb as needed  Not exacerbating at this time        Discussed patient condition and risk of morbidity and/or mortality with Family, RN, Pharmacy, Patient, neurology, and interventional radiology, ERP and charge RN.    The patient remains critically ill.  Critical care time = 55 minutes in directly providing and coordinating critical care and extensive data review.  No time overlap and excludes procedures.             [1]   Current Facility-Administered Medications   Medication Dose Route Frequency Provider Last Rate Last Admin    FENTANYL CITRATE (PF) 0.05 MG/ML INJ SOLN (WRAPPED)             fentaNYL (Sublimaze) injection   Intravenous Intra-Op Once PRN Kyra Resendiz M.D.   50 mcg at 05/28/25 1555    hydrALAZINE (Apresoline) injection 10 mg  10 mg Intravenous Q2HRS PRN Kyra Resendiz M.D.        niCARdipine (Cardene) 25 mg in  mL Standard Infusion  0-15 mg/hr Intravenous Continuous Kyra Resendiz M.D. 150 mL/hr at 05/28/25 1844 15 mg/hr at 05/28/25 1844    atorvastatin (Lipitor) tablet 80 mg  80 mg Oral Q EVENING Dale Navarro M.D.        ondansetron (Zofran) syringe/vial injection 4 mg  4 mg Intravenous Q4HRS PRN Dale Navarro M.D.        Or    ondansetron (Zofran ODT) dispertab 4 mg  4 mg Oral Q4HRS PRN Dale Navarro M.D.        labetalol (Normodyne/Trandate) injection 5-10 mg  5-10 mg Intravenous Q4HRS PRN Dale Navarro M.D.        enalaprilat injection 1.25 mg 1 mL  1.25 mg Intravenous Q8HRS Dale Navarro M.D.       [2] No Known Allergies

## 2025-05-28 NOTE — ED NOTES
Pt BP remains hypertensive with systolic in 200s.  ERP updated. Verbal orders to give labetolol 5mg IV for BP improvement.

## 2025-05-28 NOTE — ED TRIAGE NOTES
Chief Complaint   Patient presents with    Possible Stroke     BIBA from EMS> Per patient wife states he started to slurr his speech and became confused on 1315. Patient also fell down and hurt his left knee around the same time. Patient present with left side facial droop and slight slurred speech. Left arm weakness that has been for a few days.   Wife at bedside.   IV in place.   [facility] assessment called   Bs: 112

## 2025-05-29 ENCOUNTER — APPOINTMENT (OUTPATIENT)
Dept: RADIOLOGY | Facility: MEDICAL CENTER | Age: 70
End: 2025-05-29
Attending: INTERNAL MEDICINE
Payer: MEDICARE

## 2025-05-29 ENCOUNTER — APPOINTMENT (OUTPATIENT)
Dept: RADIOLOGY | Facility: MEDICAL CENTER | Age: 70
End: 2025-05-29
Attending: RADIOLOGY
Payer: MEDICARE

## 2025-05-29 LAB
ANION GAP SERPL CALC-SCNC: 12 MMOL/L (ref 7–16)
BASOPHILS # BLD AUTO: 0.4 % (ref 0–1.8)
BASOPHILS # BLD: 0.05 K/UL (ref 0–0.12)
BUN SERPL-MCNC: 11 MG/DL (ref 8–22)
CALCIUM SERPL-MCNC: 8.7 MG/DL (ref 8.5–10.5)
CHLORIDE SERPL-SCNC: 108 MMOL/L (ref 96–112)
CHOLEST SERPL-MCNC: 108 MG/DL (ref 100–199)
CO2 SERPL-SCNC: 20 MMOL/L (ref 20–33)
CREAT SERPL-MCNC: 0.87 MG/DL (ref 0.5–1.4)
EKG IMPRESSION: NORMAL
EOSINOPHIL # BLD AUTO: 0.04 K/UL (ref 0–0.51)
EOSINOPHIL NFR BLD: 0.3 % (ref 0–6.9)
ERYTHROCYTE [DISTWIDTH] IN BLOOD BY AUTOMATED COUNT: 43.7 FL (ref 35.9–50)
GFR SERPLBLD CREATININE-BSD FMLA CKD-EPI: 94 ML/MIN/1.73 M 2
GLUCOSE SERPL-MCNC: 101 MG/DL (ref 65–99)
HCT VFR BLD AUTO: 49.4 % (ref 42–52)
HDLC SERPL-MCNC: 40 MG/DL
HGB BLD-MCNC: 16.6 G/DL (ref 14–18)
IMM GRANULOCYTES # BLD AUTO: 0.05 K/UL (ref 0–0.11)
IMM GRANULOCYTES NFR BLD AUTO: 0.4 % (ref 0–0.9)
LDLC SERPL CALC-MCNC: 55 MG/DL
LYMPHOCYTES # BLD AUTO: 2.2 K/UL (ref 1–4.8)
LYMPHOCYTES NFR BLD: 19.2 % (ref 22–41)
MCH RBC QN AUTO: 29.8 PG (ref 27–33)
MCHC RBC AUTO-ENTMCNC: 33.6 G/DL (ref 32.3–36.5)
MCV RBC AUTO: 88.7 FL (ref 81.4–97.8)
MONOCYTES # BLD AUTO: 0.98 K/UL (ref 0–0.85)
MONOCYTES NFR BLD AUTO: 8.5 % (ref 0–13.4)
NEUTROPHILS # BLD AUTO: 8.15 K/UL (ref 1.82–7.42)
NEUTROPHILS NFR BLD: 71.2 % (ref 44–72)
NRBC # BLD AUTO: 0 K/UL
NRBC BLD-RTO: 0 /100 WBC (ref 0–0.2)
PLATELET # BLD AUTO: 181 K/UL (ref 164–446)
PMV BLD AUTO: 9.5 FL (ref 9–12.9)
POTASSIUM SERPL-SCNC: 4.1 MMOL/L (ref 3.6–5.5)
RBC # BLD AUTO: 5.57 M/UL (ref 4.7–6.1)
SODIUM SERPL-SCNC: 140 MMOL/L (ref 135–145)
TRIGL SERPL-MCNC: 65 MG/DL (ref 0–149)
WBC # BLD AUTO: 11.5 K/UL (ref 4.8–10.8)

## 2025-05-29 PROCEDURE — 99406 BEHAV CHNG SMOKING 3-10 MIN: CPT | Performed by: HOSPITALIST

## 2025-05-29 PROCEDURE — 85025 COMPLETE CBC W/AUTO DIFF WBC: CPT

## 2025-05-29 PROCEDURE — 93005 ELECTROCARDIOGRAM TRACING: CPT | Mod: TC | Performed by: HOSPITALIST

## 2025-05-29 PROCEDURE — 92610 EVALUATE SWALLOWING FUNCTION: CPT

## 2025-05-29 PROCEDURE — 93010 ELECTROCARDIOGRAM REPORT: CPT | Performed by: STUDENT IN AN ORGANIZED HEALTH CARE EDUCATION/TRAINING PROGRAM

## 2025-05-29 PROCEDURE — 700111 HCHG RX REV CODE 636 W/ 250 OVERRIDE (IP): Mod: JZ | Performed by: RADIOLOGY

## 2025-05-29 PROCEDURE — 99233 SBSQ HOSP IP/OBS HIGH 50: CPT | Performed by: INTERNAL MEDICINE

## 2025-05-29 PROCEDURE — 700102 HCHG RX REV CODE 250 W/ 637 OVERRIDE(OP): Performed by: NURSE PRACTITIONER

## 2025-05-29 PROCEDURE — 80061 LIPID PANEL: CPT

## 2025-05-29 PROCEDURE — 92523 SPEECH SOUND LANG COMPREHEN: CPT

## 2025-05-29 PROCEDURE — 99223 1ST HOSP IP/OBS HIGH 75: CPT | Mod: 25 | Performed by: HOSPITALIST

## 2025-05-29 PROCEDURE — A9270 NON-COVERED ITEM OR SERVICE: HCPCS | Performed by: INTERNAL MEDICINE

## 2025-05-29 PROCEDURE — 700111 HCHG RX REV CODE 636 W/ 250 OVERRIDE (IP): Performed by: INTERNAL MEDICINE

## 2025-05-29 PROCEDURE — 99232 SBSQ HOSP IP/OBS MODERATE 35: CPT | Performed by: NURSE PRACTITIONER

## 2025-05-29 PROCEDURE — 700102 HCHG RX REV CODE 250 W/ 637 OVERRIDE(OP): Performed by: INTERNAL MEDICINE

## 2025-05-29 PROCEDURE — A9270 NON-COVERED ITEM OR SERVICE: HCPCS | Performed by: NURSE PRACTITIONER

## 2025-05-29 PROCEDURE — 80048 BASIC METABOLIC PNL TOTAL CA: CPT

## 2025-05-29 PROCEDURE — 97167 OT EVAL HIGH COMPLEX 60 MIN: CPT

## 2025-05-29 PROCEDURE — 770006 HCHG ROOM/CARE - MED/SURG/GYN SEMI*

## 2025-05-29 PROCEDURE — 97162 PT EVAL MOD COMPLEX 30 MIN: CPT

## 2025-05-29 PROCEDURE — 70551 MRI BRAIN STEM W/O DYE: CPT

## 2025-05-29 RX ORDER — LISINOPRIL 20 MG/1
20 TABLET ORAL
Status: DISCONTINUED | OUTPATIENT
Start: 2025-05-29 | End: 2025-05-29

## 2025-05-29 RX ORDER — HYDRALAZINE HYDROCHLORIDE 20 MG/ML
10-20 INJECTION INTRAMUSCULAR; INTRAVENOUS
Status: DISCONTINUED | OUTPATIENT
Start: 2025-05-29 | End: 2025-05-30 | Stop reason: HOSPADM

## 2025-05-29 RX ORDER — BUPROPION HYDROCHLORIDE 75 MG/1
75 TABLET ORAL 2 TIMES DAILY
Status: DISCONTINUED | OUTPATIENT
Start: 2025-05-29 | End: 2025-05-30 | Stop reason: HOSPADM

## 2025-05-29 RX ORDER — POLYETHYLENE GLYCOL 3350 17 G/17G
1 POWDER, FOR SOLUTION ORAL
Status: DISCONTINUED | OUTPATIENT
Start: 2025-05-29 | End: 2025-05-30 | Stop reason: HOSPADM

## 2025-05-29 RX ORDER — ENALAPRILAT 1.25 MG/ML
1.25 INJECTION INTRAVENOUS EVERY 6 HOURS PRN
Status: DISCONTINUED | OUTPATIENT
Start: 2025-05-29 | End: 2025-05-30 | Stop reason: HOSPADM

## 2025-05-29 RX ORDER — LISINOPRIL 20 MG/1
20 TABLET ORAL
Status: DISCONTINUED | OUTPATIENT
Start: 2025-05-29 | End: 2025-05-30 | Stop reason: HOSPADM

## 2025-05-29 RX ORDER — LABETALOL HYDROCHLORIDE 5 MG/ML
10-20 INJECTION, SOLUTION INTRAVENOUS EVERY 4 HOURS PRN
Status: DISCONTINUED | OUTPATIENT
Start: 2025-05-29 | End: 2025-05-30 | Stop reason: HOSPADM

## 2025-05-29 RX ORDER — ASPIRIN 81 MG/1
81 TABLET ORAL DAILY
Status: DISCONTINUED | OUTPATIENT
Start: 2025-05-29 | End: 2025-05-30 | Stop reason: HOSPADM

## 2025-05-29 RX ORDER — AMOXICILLIN 250 MG
2 CAPSULE ORAL EVERY EVENING
Status: DISCONTINUED | OUTPATIENT
Start: 2025-05-29 | End: 2025-05-30 | Stop reason: HOSPADM

## 2025-05-29 RX ORDER — ACETAMINOPHEN 325 MG/1
650 TABLET ORAL EVERY 4 HOURS PRN
Status: DISCONTINUED | OUTPATIENT
Start: 2025-05-29 | End: 2025-05-30 | Stop reason: HOSPADM

## 2025-05-29 RX ORDER — ATORVASTATIN CALCIUM 40 MG/1
40 TABLET, FILM COATED ORAL EVERY EVENING
Status: DISCONTINUED | OUTPATIENT
Start: 2025-05-29 | End: 2025-05-30 | Stop reason: HOSPADM

## 2025-05-29 RX ADMIN — ASPIRIN 81 MG: 81 TABLET, COATED ORAL at 16:19

## 2025-05-29 RX ADMIN — DOCUSATE SODIUM 50 MG AND SENNOSIDES 8.6 MG 2 TABLET: 8.6; 5 TABLET, FILM COATED ORAL at 17:05

## 2025-05-29 RX ADMIN — BUPROPION HYDROCHLORIDE 75 MG: 75 TABLET, FILM COATED ORAL at 17:05

## 2025-05-29 RX ADMIN — LISINOPRIL 20 MG: 20 TABLET ORAL at 21:29

## 2025-05-29 RX ADMIN — LABETALOL HYDROCHLORIDE 10 MG: 5 INJECTION, SOLUTION INTRAVENOUS at 08:03

## 2025-05-29 RX ADMIN — ENALAPRILAT 1.25 MG: 1.25 INJECTION INTRAVENOUS at 05:12

## 2025-05-29 RX ADMIN — ENALAPRILAT 1.25 MG: 1.25 INJECTION INTRAVENOUS at 13:36

## 2025-05-29 RX ADMIN — LABETALOL HYDROCHLORIDE 20 MG: 5 INJECTION, SOLUTION INTRAVENOUS at 17:07

## 2025-05-29 RX ADMIN — ENALAPRILAT 1.25 MG: 1.25 INJECTION INTRAVENOUS at 21:29

## 2025-05-29 RX ADMIN — LISINOPRIL 20 MG: 20 TABLET ORAL at 11:51

## 2025-05-29 RX ADMIN — HYDRALAZINE HYDROCHLORIDE 10 MG: 20 INJECTION INTRAMUSCULAR; INTRAVENOUS at 07:17

## 2025-05-29 RX ADMIN — LABETALOL HYDROCHLORIDE 5 MG: 5 INJECTION, SOLUTION INTRAVENOUS at 03:49

## 2025-05-29 RX ADMIN — ATORVASTATIN CALCIUM 40 MG: 40 TABLET, FILM COATED ORAL at 17:05

## 2025-05-29 RX ADMIN — BUPROPION HYDROCHLORIDE 75 MG: 75 TABLET, FILM COATED ORAL at 09:34

## 2025-05-29 RX ADMIN — ACETAMINOPHEN 650 MG: 325 TABLET ORAL at 09:34

## 2025-05-29 ASSESSMENT — GAIT ASSESSMENTS
GAIT LEVEL OF ASSIST: SUPERVISED
DISTANCE (FEET): 150

## 2025-05-29 ASSESSMENT — LIFESTYLE VARIABLES
DOES PATIENT WANT TO STOP DRINKING: NO
DOES PATIENT WANT TO STOP DRINKING: NO
EVER HAD A DRINK FIRST THING IN THE MORNING TO STEADY YOUR NERVES TO GET RID OF A HANGOVER: NO
HOW MANY TIMES IN THE PAST YEAR HAVE YOU HAD 5 OR MORE DRINKS IN A DAY: 0
ALCOHOL_USE: NO
TOTAL SCORE: 0
ON A TYPICAL DAY WHEN YOU DRINK ALCOHOL HOW MANY DRINKS DO YOU HAVE: 0
AVERAGE NUMBER OF DAYS PER WEEK YOU HAVE A DRINK CONTAINING ALCOHOL: 0
HAVE PEOPLE ANNOYED YOU BY CRITICIZING YOUR DRINKING: NO
ON A TYPICAL DAY WHEN YOU DRINK ALCOHOL HOW MANY DRINKS DO YOU HAVE: 0
HOW MANY TIMES IN THE PAST YEAR HAVE YOU HAD 5 OR MORE DRINKS IN A DAY: 0
ALCOHOL_USE: NO
CONSUMPTION TOTAL: NEGATIVE
TOTAL SCORE: 0
EVER HAD A DRINK FIRST THING IN THE MORNING TO STEADY YOUR NERVES TO GET RID OF A HANGOVER: NO
EVER FELT BAD OR GUILTY ABOUT YOUR DRINKING: NO
CONSUMPTION TOTAL: INCOMPLETE
HAVE YOU EVER FELT YOU SHOULD CUT DOWN ON YOUR DRINKING: NO
HAVE YOU EVER FELT YOU SHOULD CUT DOWN ON YOUR DRINKING: NO
TOTAL SCORE: 0
EVER FELT BAD OR GUILTY ABOUT YOUR DRINKING: NO
AVERAGE NUMBER OF DAYS PER WEEK YOU HAVE A DRINK CONTAINING ALCOHOL: 0

## 2025-05-29 ASSESSMENT — ENCOUNTER SYMPTOMS
NERVOUS/ANXIOUS: 0
SPUTUM PRODUCTION: 0
SHORTNESS OF BREATH: 0
COUGH: 0
HEARTBURN: 0
BACK PAIN: 0
BLURRED VISION: 0
SENSORY CHANGE: 1
CHILLS: 0
DIZZINESS: 0
FEVER: 0
HEADACHES: 0
DIARRHEA: 0
PALPITATIONS: 0
LOSS OF CONSCIOUSNESS: 0
SORE THROAT: 0
DEPRESSION: 0
FOCAL WEAKNESS: 1
VOMITING: 0
NAUSEA: 0
MYALGIAS: 0
SPEECH CHANGE: 1
ABDOMINAL PAIN: 0
BRUISES/BLEEDS EASILY: 0
HEADACHES: 1

## 2025-05-29 ASSESSMENT — COGNITIVE AND FUNCTIONAL STATUS - GENERAL
HELP NEEDED FOR BATHING: A LITTLE
MOBILITY SCORE: 24
DAILY ACTIVITIY SCORE: 21
HELP NEEDED FOR BATHING: A LITTLE
SUGGESTED CMS G CODE MODIFIER MOBILITY: CH
DRESSING REGULAR UPPER BODY CLOTHING: A LITTLE
TOILETING: A LITTLE
DRESSING REGULAR UPPER BODY CLOTHING: A LITTLE
MOBILITY SCORE: 24
SUGGESTED CMS G CODE MODIFIER MOBILITY: CH
DRESSING REGULAR LOWER BODY CLOTHING: A LITTLE
DAILY ACTIVITIY SCORE: 20
SUGGESTED CMS G CODE MODIFIER DAILY ACTIVITY: CJ
DRESSING REGULAR LOWER BODY CLOTHING: A LITTLE
SUGGESTED CMS G CODE MODIFIER DAILY ACTIVITY: CJ

## 2025-05-29 ASSESSMENT — SOCIAL DETERMINANTS OF HEALTH (SDOH)
WITHIN THE PAST 12 MONTHS, YOU WORRIED THAT YOUR FOOD WOULD RUN OUT BEFORE YOU GOT THE MONEY TO BUY MORE: NEVER TRUE
IN THE PAST 12 MONTHS, HAS THE ELECTRIC, GAS, OIL, OR WATER COMPANY THREATENED TO SHUT OFF SERVICE IN YOUR HOME?: NO
WITHIN THE PAST 12 MONTHS, THE FOOD YOU BOUGHT JUST DIDN'T LAST AND YOU DIDN'T HAVE MONEY TO GET MORE: NEVER TRUE

## 2025-05-29 ASSESSMENT — PAIN DESCRIPTION - PAIN TYPE
TYPE: ACUTE PAIN

## 2025-05-29 ASSESSMENT — ACTIVITIES OF DAILY LIVING (ADL): TOILETING: INDEPENDENT

## 2025-05-29 NOTE — ASSESSMENT & PLAN NOTE
Patient is maintained on lisinopril 20 mg at home which we will continue  Monitor and titrate as appropriate

## 2025-05-29 NOTE — DISCHARGE PLANNING
[facility] Transitional [facility] Coordination    Referral from: Dr. Navarro    Insurance Provider on Facesheet: [employer]    Potential Rehab Diagnosis: CVA    Chart review indicates patient may have on going medical management and may have therapy needs to possibly meet inpatient rehab facility criteria with the goal of returning to community.    D/C support will need to be verified: Spouse    Physiatry consultation pended per protocol.  W/U & TX pending.     Thank you for the referral.

## 2025-05-29 NOTE — PROGRESS NOTES
Neurology Progress Note  Vascular Neurology Service, [facility]    Referring Physician: Jeremy M Gonda, M.D.    Chief Complaint   Patient presents with    Possible Stroke       HPI: Refer to initial documented Neurology H&P, as detailed in the patient's chart.    Interval History 05/29/2025: No acute events overnight, s/p TNK and TICI 3 recanalization of the right MCA. Immediately post operatively there was concern for worsening left facial droop and left arm weakness which prompted a repeat NCCTH. This revealed a small amount of SAH in the right Sylvian fissure and evolution of the right MCA infarct. MRI brain is still pending at this time.    Review of systems: In addition to what is detailed in the HPI and/or updated in the interval history, all other systems reviewed and are negative.    Past Medical History:    has a past medical history of Acute cerebrovascular accident (CVA) ([facility]) (5/28/2025), Anesthesia, Awareness under anesthesia, Basal cell carcinoma (BCC) in situ of skin, Blood clotting disorder ([facility]), CAD (coronary artery disease), Cancer (HCC), COPD (chronic obstructive pulmonary disease) ([facility]), COPD (chronic obstructive pulmonary disease) (HCC), Emphysema of lung (HCC), Heart burn, Hep C w/o coma, chronic ([facility]), Hepatitis C, High cholesterol, Hospital discharge follow-up (06/05/2023), Hypertension, Indigestion, Renal disorder, Snoring, Soft tissue infection (05/25/2023), Stenosis of aorta, Stenosis of femoral artery ([facility]), and Wound dehiscence (05/19/2023).    He has no past medical history of Acute nasopharyngitis, Anginal syndrome ([facility]), Arrhythmia, Arthritis, Asthma, Bowel habit changes, Breath shortness, Bronchitis, Carcinoma in situ of respiratory system, Cataract, Congestive heart failure ([facility]), Continuous ambulatory peritoneal dialysis status ([facility]), Coughing blood, Dental disorder, Diabetes ([facility]), Dialysis patient ([facility]), Disorder of thyroid, Glaucoma, Gynecological  disorder, Heart murmur, Heart valve disease, Hemorrhagic disorder (HCC), Hepatitis A, Hepatitis B, Hiatus hernia syndrome, Jaundice, Myocardial infarct (HCC), Pacemaker, Pneumonia, Pregnant, Psychiatric problem, Rheumatic fever, Seizure (HCC), Sleep apnea, Tuberculosis, Urinary bladder disorder, or Urinary incontinence.    FHx:  family history includes Cancer in his brother, father, and sister; Colorectal Cancer in his father; Diabetes in his father; Other in his mother.    SHx:   reports that he has been smoking cigarettes. He started smoking about 53 years ago. He has a 53.2 pack-year smoking history. He has never used smokeless tobacco. He reports that he does not drink alcohol and does not use drugs.    Medications:  Current Medications[1]    Physical Examination:     Vitals:    05/29/25 0512 05/29/25 0530 05/29/25 0600 05/29/25 0700   BP: (!) 145/71 131/75 (!) 140/76 (!) 148/73   Pulse: 71 74 67 69   Resp: 17 18 17 17   Temp:   36.4 °C (97.5 °F)    TempSrc:   Temporal    SpO2: 94% 96% 94% 95%   Weight:       Height:           General: Patient is awake and in no acute distress  Eyes: examination of optic disks not indicated at this time  CV: RRR    NEUROLOGICAL EXAM:     Mental status: Awake, alert and fully oriented, follows commands  Speech and language: speech is dysarthric. The patient is able to name and repeat.  Cranial nerve exam: Pupils are equal, round and reactive to light bilaterally. Visual fields are full. Extraocular muscles are intact. Sensation in the face is intact to light touch. Left facial droop. Shoulder shrug is full. Tongue is midline.  Motor exam: Strength is 5/5 in RUE and RLE, 5/5 in LLE, 4/5 in LUE  Sensory exam: No sensory deficits identified   Deep tendon reflexes: 2+ and symmetric. Toes down-going bilaterally.  Coordination: no ataxia   Gait: deferred given patient preference    Objective Data:    Labs:  Lab Results   Component Value Date/Time    PROTHROMBTM 12.7 05/28/2025 02:46 PM     INR 0.95 05/28/2025 02:46 PM      Lab Results   Component Value Date/Time    WBC 11.5 (H) 05/29/2025 01:50 AM    RBC 5.57 05/29/2025 01:50 AM    HEMOGLOBIN 16.6 05/29/2025 01:50 AM    HEMATOCRIT 49.4 05/29/2025 01:50 AM    MCV 88.7 05/29/2025 01:50 AM    MCH 29.8 05/29/2025 01:50 AM    MCHC 33.6 05/29/2025 01:50 AM    MPV 9.5 05/29/2025 01:50 AM    NEUTSPOLYS 71.20 05/29/2025 01:50 AM    LYMPHOCYTES 19.20 (L) 05/29/2025 01:50 AM    MONOCYTES 8.50 05/29/2025 01:50 AM    EOSINOPHILS 0.30 05/29/2025 01:50 AM    BASOPHILS 0.40 05/29/2025 01:50 AM      Lab Results   Component Value Date/Time    SODIUM 140 05/29/2025 01:50 AM    POTASSIUM 4.1 05/29/2025 01:50 AM    CHLORIDE 108 05/29/2025 01:50 AM    CO2 20 05/29/2025 01:50 AM    GLUCOSE 101 (H) 05/29/2025 01:50 AM    BUN 11 05/29/2025 01:50 AM    CREATININE 0.87 05/29/2025 01:50 AM      Lab Results   Component Value Date/Time    CHOLSTRLTOT 108 05/29/2025 01:50 AM    LDL 55 05/29/2025 01:50 AM    HDL 40 05/29/2025 01:50 AM    TRIGLYCERIDE 65 05/29/2025 01:50 AM       Lab Results   Component Value Date/Time    ALKPHOSPHAT 58 05/28/2025 02:46 PM    ASTSGOT 20 05/28/2025 02:46 PM    ALTSGPT <5 05/28/2025 02:46 PM    TBILIRUBIN 0.5 05/28/2025 02:46 PM        Imaging/Testing:    I interpreted and/or reviewed the patient's neuroimaging    DX-CHEST-PORTABLE (1 VIEW)   Final Result      No acute cardiopulmonary abnormality.      CT-HEAD W/O   Final Result   Addendum (preliminary) 1 of 1   These findings were discussed with Dr. Navarro on 5/28/2025 5:42 PM.      Final      1.  Small amount of subarachnoid hemorrhage in the right Sylvian fissure.   2.  Small acute right MCA infarct.         These findings were discussed with YARELIS VELA on 5/28/2025 5:37 PM.            CT-CTA NECK WITH & W/O-POST PROCESSING   Final Result      1.  A focal moderate atherosclerotic stenosis in the proximal left vertebral artery.   2.  Occluded right distal M1 segment.   3.   "Atherosclerotic calcification in the carotid bulb without significant stenosis at the origins.      CT-CTA HEAD WITH & W/O-POST PROCESS   Final Result   Addendum (preliminary) 1 of 1   Addendum: Case discussed with  at 3:30 PM 5/28/2025      Final      Occlusion of the mid to distal right M1 segment.      CT-CEREBRAL PERFUSION ANALYSIS   Final Result      1. Cerebral blood flow less than 30% possibly representing completed infarct = 2 mL.   2. T Max more than 6 seconds possibly representing combination of completed infarct and ischemia = 116 mL.      3. Mismatched volume possibly representing ischemic brain/penumbra= 114 mL      4.  Please note that this cerebral perfusion study and report is Quantitative and targets supratentorial (cerebral) perfusion for evaluation of large vessel territory acute ischemia/infarction. For example, lacunar infarcts, and brainstem/posterior fossa    ischemia/infarction are not evaluated on this study.  Data acquisition is subject to artifacts which can yield non-anatomically plausible perfusion maps which may be due to motion, bolus timing, signal to noise ratio, or other technical factors.    Perfusion map abnormalities which show non-anatomic distributions are likely artifact.   This study is not \"stand-alone\" and should only be utilized for diagnosis, management/treatment in correlation with CT, CTA, and/or MRI and clinical factors.         CT-HEAD W/O   Final Result      1.  Cerebral atrophy.      2.  Otherwise, Head CT without contrast within normal limits. No evidence of acute cerebral infarction, hemorrhage or mass lesion.               IR-THROMBO MECHANICAL ARTERY,INIT    (Results Pending)   MR-BRAIN-W/O    (Results Pending)   CT-HEAD W/O    (Results Pending)   MR-BRAIN-W/O    (Results Pending)       Assessment and Plan:    70 y.o. M with considerable vascular risk factors presenting with left arm weakness, left facial droop and dysarthria. NCCTH negative for acute " intracranial abnormalities. CTA head and neck revealed a right MCA distal M1 occlusion with corresponding 116cc ischemic penumbra appreciated on CT perfusion. TNK administered. Case was discussed with Neuro IR who took the patient to the IR Suite for endovascular clot retrieval with successful TICI 3 recanalization achieved. The patient was admitted to RICU post operatively for post thrombolytic/thrombectomy neuro checks and blood pressure management. Immediately post IR the patient had worsening left facial droop and left arm weakness for which a repeat NCCTH was obtained and revealed a small amount of SAH in the right Sylvian Fissure, as well as evolution of the right MCA infarct.  MRI brain still pending.      Problem list:  Right M1 occlusion s/p TICI 3 recanalization  SAH  HTN  HLD     Recommendations:  - neurochecks/NIHSS per post-thrombectomy/thrombolyitc protocol   - can transition to Q4h neuro exams and transfer to floor once outside of the 24 hour window  - expedite MRI brain without contrast  - No antiplatelet/anticoagulation pending MRI brain results  - BP goal in setting of TICI 3: maintain systolic -140. Antihypertensives per ICU team  - stroke labs:  HgbA1c 5.6 and LDL 55  - atorvastatin 40mg daily for goal LDL < 70 when possibe, titrate dose to LDL results  - DM management for goal HgbA1c < 7, acutely aim for FSBS   - PT/OT/SLP ok, even within first 24 hours post-operatively  - complete embolic workup with TTE and long-term ziopatch monitoring at discharge  - hold DVT chemoppx until MRI completed  - stroke bridge clinic follow up      The evaluation of the patient, and recommended management, was discussed with Dr. Blank Amezquita, Vascular Neurology. I have performed a physical exam and reviewed and updated ROS and Plan today (05/29/2025).       RANDAL Nguyen.  Vascular Neurology, Acute Care Services         [1]   Current Facility-Administered Medications:     fentaNYL  (Sublimaze) injection, , Intravenous, Intra-Op Once PRN, Kyra Resendiz M.D., 50 mcg at 05/28/25 1555    hydrALAZINE (Apresoline) injection 10 mg, 10 mg, Intravenous, Q2HRS PRN, Kyra Resendiz M.D., 10 mg at 05/29/25 0717    niCARdipine (Cardene) 25 mg in  mL Standard Infusion, 0-15 mg/hr, Intravenous, Continuous, Kyra Resendiz M.D., Stopped at 05/29/25 0336    atorvastatin (Lipitor) tablet 80 mg, 80 mg, Oral, Q EVENING, Dale Navarro M.D.    ondansetron (Zofran) syringe/vial injection 4 mg, 4 mg, Intravenous, Q4HRS PRN **OR** ondansetron (Zofran ODT) dispertab 4 mg, 4 mg, Oral, Q4HRS PRN, Dale Navarro M.D.    labetalol (Normodyne/Trandate) injection 5-10 mg, 5-10 mg, Intravenous, Q4HRS PRN, Dale Navarro M.D., 5 mg at 05/29/25 0349    enalaprilat injection 1.25 mg 1 mL, 1.25 mg, Intravenous, Q8HRS, Dale Navarro M.D., 1.25 mg at 05/29/25 0576

## 2025-05-29 NOTE — DISCHARGE PLANNING
Case Management Discharge Planning    Admission Date: 5/28/2025  GMLOS: 7.3  ALOS: 1    6-Clicks ADL Score: 21  6-Clicks Mobility Score: 24    Anticipated Discharge Dispo: Discharge Disposition: Disch to IP rehab facility or distinct part unit (62)    DME Needed: No    Action(s) Taken:   Chart review was completed. Patient was discussed during IDT rounds.    Pt is POD 1 thrombectomy. PMR was consulted per protocol. Pending therapy evaluations s/p procedure.     Discharge assessment was completed (see below).     Escalations Completed: None    Medically Clear: No    Next Steps:  CM RN to follow up with medical team to discuss discharge barriers or needs.     Barriers to Discharge: Medical clearance and Pending PT Evaluation    Is the patient up for discharge tomorrow: No    Care Transition Team Assessment    CM RN met with pt and his spouse Yoko Dinero at the bedside to complete discharge assessment. Pt appeared A/Ox4, answered all questions appropriately, and verbalized agreement to this assessment.       Case management role discussed; understanding of case management role verbalized.   Demographics on face sheet verified.   Please see H&P for pertinent PMH and reason for admission.   Housing: Lives with his spouse in a single story house located in [place], [place]. Reports there is 1 step to the entrance. The address listed on the face sheet was verified to be correct mailing and discharge address.  IADLS/ADLS: Independent with IADLS/ADLS and ambulates with no use of DME at baseline.   DME: None owned/used   O2: RA at baseline   Discharge support: Pt has Yoko for support in the community and she can provide transportation home at time of discharge.   PCP: Lowell Rivas D.O.   Preferred pharmacy: [facility] on [address]   Insurance: HTH/SCP   AD: None on fiZenCard. AD packet was provided.   Discharge planning: Rehab vs HH vs Home      Information Source  Orientation Level: Oriented X4  Information Given By: Patient,  Spouse  Informant's Name: Yoko Dinero  Who is responsible for making decisions for patient? : Patient    Readmission Evaluation  Is this a readmission?: No    Elopement Risk  Legal Hold: No  Ambulatory or Self Mobile in Wheelchair: No-Not an Elopement Risk  Elopement Risk: Not at Risk for Elopement    Interdisciplinary Discharge Planning  Lives with - Patient's Self Care Capacity: Spouse  Patient or legal guardian wants to designate a caregiver: No  Support Systems: None  Housing / Facility: [address]    Discharge Preparedness  What is your plan after discharge?: Uncertain - pending medical team collaboration, Home with help  What are your discharge supports?: Spouse  Prior Functional Level: Ambulatory, Independent with Activities of Daily Living, Drives Self, Independent with Medication Management  Difficulity with ADLs: None  Difficulity with IADLs: None    Functional Assesment  Prior Functional Level: Ambulatory, Independent with Activities of Daily Living, Drives Self, Independent with Medication Management    Finances  Financial Barriers to Discharge: No  Prescription Coverage: Yes    Vision / Hearing Impairment  Vision Impairment : Yes  Right Eye Vision: Impaired, Wears Glasses  Left Eye Vision: Impaired, Wears Glasses  Hearing Impairment : No    Advance Directive  Advance Directive?: None    Domestic Abuse  Have you ever been the victim of abuse or violence?: No  Possible Abuse/Neglect Reported to:: Not Applicable    Psychological Assessment  History of Substance Abuse: None  History of Psychiatric Problems: No  Non-compliant with Treatment: No  Newly Diagnosed Illness: No    Discharge Risks or Barriers  Discharge risks or barriers?: Complex medical needs  Patient risk factors: Complex medical needs    Anticipated Discharge Information  Discharge Disposition: Discharged to home/self care (01)

## 2025-05-29 NOTE — PROGRESS NOTES
Radiology Progress Note   Author: LEONEL Espinoza Date & Time created: 5/29/2025  11:09 AM   Date of admission  5/28/2025  Note to reader: this note follows the APSO format rather than the historical SOAP format. Assessment and plan located at the top of the note for ease of use.    Chief Complaint  70 y.o. male admitted 5/28/2025 with   Chief Complaint   Patient presents with    Possible Stroke         HPI  Tamir Dinero is a 70-year-old male who presented to [facility] on [facility]/28/2025 with complaints of left arm weakness, left-sided facial droop, and dysarthria (onset approximately 1315).  CT head revealed no significant abnormalities however, a CTA of the neck and head identified a right distal M1 middle cerebral artery occlusion, with associated ischemic penumbra noted on CT perfusion.  He was administered TNK at 1540 and transported to the HARD[facility] suite, where Dr. Wagner Resendiz ([facility]) performed successful endovascular clot retrieval (right MCA M1 occlusion), achieving a TICI 3 recannulization.  Postop he appeared to have worsening left-sided weakness therefore CT head was performed which showed small amount of subarachnoid hemorrhage in the right sylvian fissure with evolving right MCA infarct.  He was transported ICU for critical care management.    interval History:   05/29/2025-no acute events overnight. I reviewed today's labs: WBC 11.5; H&H 16.6/49.4, Cr 0.87; Coags POC INR 0.95, lipid panel total cholesterol 108, triglycerides 65, HDL 40, LDL 55.-micro-no micro.  I reviewed today's imaging CXR-stable without any effusion, consolidation.  I discussed plan of care with Dr. Wagner Resendiz and the critical care team including Dr. Gonda.  I reviewed IDT notes.  I reviewed postop care and groin care with patient and patient wife at bedside.  All questions answered.    Assessment/Plan     Principal Problem:    Acute cerebrovascular accident (CVA) ([facility]CC)  Active Problems:    COPD (chronic  obstructive pulmonary disease) (HCC)    CAD (coronary artery disease)    Essential hypertension    Dyslipidemia      Plan IR    -Neuro assessment and VS per post thrombolytic protocol. SBP goal dependent upon TICI score -goal to mitigate reperfusion injury or hemorrhagic conversion  If TICI 3: maintain systolic -140 if okay with neurovascular team   - Neuro checks per ICU protocol  -Obtain MRI Brain wo contrast-ordered and pending.   -If unable to obtain MRI within 24 hr then CT head  -No anticoagulation/anti thrombotics x 24 hours post thrombolytic, and post thrombolytic imaging-may start anticoags/antithrombotics if okay with vascular neurology  - Secondary stroke prevention per Neurology recommendations  -Maintain blood glucose at 140-180   -Start Atorvastatin 80 mg PO q HS.   - Post  [facility] access site instructions: no lifting greater than 5 lbs and no baths/swimming/soaking in tub for 5 days. Shower OK. OK to change dressings/band aid as needed.   -DVT PPX: SCDs- until review of imaging and cleared with neurology     - [facility] signing off       -Thank you for allowing Interventional Radiology team to participate in the patients care, if any additional care or requests are needed in the future please do not hesitate to call or place IR order           Review of Systems  Physical Exam   Review of Systems   Constitutional:  Negative for fever.   HENT:  Negative for hearing loss.    Respiratory:  Negative for cough, sputum production and shortness of breath.    Cardiovascular:  Negative for chest pain.   Gastrointestinal:  Negative for abdominal pain, heartburn, nausea and vomiting.   Skin:  Negative for rash.   Neurological:  Positive for speech change and focal weakness. Negative for headaches.      Vitals:    05/29/25 1000   BP: (!) 143/62   Pulse: 75   Resp: (!) 60   Temp:    SpO2: 91%        Physical Exam  Vitals and nursing note reviewed.   HENT:      Head: Normocephalic and atraumatic.      Mouth/Throat:       Mouth: Mucous membranes are dry.      Pharynx: Oropharynx is clear.   Cardiovascular:      Rate and Rhythm: Normal rate and regular rhythm.      Comments: Right cath groin site dressing CDI- Groin soft to palp- without hematoma, active bleeding   Pulmonary:      Effort: Pulmonary effort is normal. No respiratory distress.   Abdominal:      Palpations: Abdomen is soft.   Skin:     General: Skin is warm and dry.      Capillary Refill: Capillary refill takes less than 2 seconds.   Neurological:      Mental Status: He is alert.      Comments: HOWARD 3 mm  Awake alert and oriented times person place time and event  He is dysarthric with speech  He is moving all extremities spontaneously right upper extremity 5/5 left upper and lower extremity weakness approximately 3.5-4/5  Sensation intact   Psychiatric:         Attention and Perception: Attention normal.         Mood and Affect: Mood normal.         Behavior: Behavior normal.             Labs    Recent Labs     05/28/25  1446 05/29/25  0150   WBC 8.8 11.5*   RBC 5.79 5.57   HEMOGLOBIN 17.1 16.6   HEMATOCRIT 50.9 49.4   MCV 87.9 88.7   MCH 29.5 29.8   MCHC 33.6 33.6   RDW 42.7 43.7   PLATELETCT 200 181   MPV 10.2 9.5     Recent Labs     05/28/25  1446 05/29/25  0150   SODIUM 139 140   POTASSIUM 4.0 4.1   CHLORIDE 106 108   CO2 21 20   GLUCOSE 108* 101*   BUN 11 11   CREATININE 0.86 0.87   CALCIUM 9.4 8.7     Recent Labs     05/28/25  1446 05/29/25  0150   ALBUMIN 4.3  --    TBILIRUBIN 0.5  --    ALKPHOSPHAT 58  --    TOTPROTEIN 7.3  --    ALTSGPT <5  --    ASTSGOT 20  --    CREATININE 0.86 0.87     DX-CHEST-PORTABLE (1 VIEW)   Final Result      No acute cardiopulmonary abnormality.      CT-HEAD W/O   Final Result   Addendum (preliminary) 1 of 1   These findings were discussed with Dr. Navarro on 5/28/2025 5:42 PM.      Final      1.  Small amount of subarachnoid hemorrhage in the right Sylvian fissure.   2.  Small acute right MCA infarct.         These findings were  discussed with YARELIS VELA on 5/28/2025 5:37 PM.            IR-THROMBO MECHANICAL ARTERY,INIT   Final Result      1.  Cerebral angiography showed a right M1 occlusion.   2.  Successful mechanical thrombectomy of the right M1 occlusion was performed with a single pass using suction and aspiration techniques.   3.  TICI 3 restoration of flow.   4.  Pseudoaneurysm noted at the interface between the graft and the native vessel in the left common femoral artery.                  CT-CTA NECK WITH & W/O-POST PROCESSING   Final Result      1.  A focal moderate atherosclerotic stenosis in the proximal left vertebral artery.   2.  Occluded right distal M1 segment.   3.  Atherosclerotic calcification in the carotid bulb without significant stenosis at the origins.      CT-CTA HEAD WITH & W/O-POST PROCESS   Final Result   Addendum (preliminary) 1 of 1   Addendum: Case discussed with  at 3:30 PM 5/28/2025      Final      Occlusion of the mid to distal right M1 segment.      CT-CEREBRAL PERFUSION ANALYSIS   Final Result      1. Cerebral blood flow less than 30% possibly representing completed infarct = 2 mL.   2. T Max more than 6 seconds possibly representing combination of completed infarct and ischemia = 116 mL.      3. Mismatched volume possibly representing ischemic brain/penumbra= 114 mL      4.  Please note that this cerebral perfusion study and report is Quantitative and targets supratentorial (cerebral) perfusion for evaluation of large vessel territory acute ischemia/infarction. For example, lacunar infarcts, and brainstem/posterior fossa    ischemia/infarction are not evaluated on this study.  Data acquisition is subject to artifacts which can yield non-anatomically plausible perfusion maps which may be due to motion, bolus timing, signal to noise ratio, or other technical factors.    Perfusion map abnormalities which show non-anatomic distributions are likely artifact.   This study is not  "\"stand-alone\" and should only be utilized for diagnosis, management/treatment in correlation with CT, CTA, and/or MRI and clinical factors.         CT-HEAD W/O   Final Result      1.  Cerebral atrophy.      2.  Otherwise, Head CT without contrast within normal limits. No evidence of acute cerebral infarction, hemorrhage or mass lesion.               MR-BRAIN-W/O    (Results Pending)     INR   Date Value Ref Range Status   05/28/2025 0.95 0.87 - 1.13 Final     Comment:     INR - Non-therapeutic Reference Range: 0.87-1.13  INR - Therapeutic Reference Range: 2.0-4.0       No results found for: \"POCINR\"     Intake/Output Summary (Last 24 hours) at 5/29/2025 1109  Last data filed at 5/29/2025 0600  Gross per 24 hour   Intake 926.47 ml   Output 1450 ml   Net -523.53 ml      I have personally reviewed the above labs and imaging      I have performed a physical exam and reviewed and updated ROS and Plan today (5/29/2025).     50 minutes in directly providing and coordinating care and extensive data review.  No time overlap and excludes procedures.    "

## 2025-05-29 NOTE — CARE PLAN
The patient is Watcher - Medium risk of patient condition declining or worsening    Shift Goals  Clinical Goals: Post thrombolytic/thrombectomy neuro/vital signs/ site checks, -140  Patient Goals: Rest  Family Goals: Updates    Progress made toward(s) clinical / shift goals:    Patient titrated off cardene gtt and received PRN to maintain hemodynamic goal--see MAR. Neuro status stable throughout the shift.     Problem: Knowledge Deficit - Standard  Goal: Patient and family/care givers will demonstrate understanding of plan of care, disease process/condition, diagnostic tests and medications  Outcome: Progressing     Problem: Neuro Status  Goal: Neuro status will remain stable or improve  Outcome: Progressing     Problem: Hemodynamic Monitoring  Goal: Patient's hemodynamics, fluid balance and neurologic status will be stable or improve  Outcome: Progressing     Problem: Respiratory - Stroke Patient  Goal: Patient will achieve/maintain optimum respiratory rate/effort  Outcome: Progressing     Problem: Urinary Elimination  Goal: Establish and maintain regular urinary output  Outcome: Progressing     Problem: Skin Integrity  Goal: Skin integrity is maintained or improved  Outcome: Progressing     Problem: Fall Risk  Goal: Patient will remain free from falls  Outcome: Progressing     Problem: Pain - Standard  Goal: Alleviation of pain or a reduction in pain to the patient’s comfort goal  Outcome: Progressing       Patient is not progressing towards the following goals: n/a

## 2025-05-29 NOTE — CARE PLAN
The patient is Watcher - Medium risk of patient condition declining or worsening    Shift Goals  Clinical Goals: pont TNK/thrombectomy neuro/vitals/site, -140, speech eval, CT  Patient Goals: water  Family Goals: updates    Progress made toward(s) clinical / shift goals:    Problem: Knowledge Deficit - Standard  Goal: Patient and family/care givers will demonstrate understanding of plan of care, disease process/condition, diagnostic tests and medications  Outcome: Progressing     Problem: Optimal Care of the Stroke Patient  Goal: Optimal emergency care for the stroke patient  Outcome: Progressing  Goal: Optimal acute care for the stroke patient  Outcome: Progressing     Problem: Knowledge Deficit - Stroke Education  Goal: Patient's knowledge of stroke and risk factors will improve  Outcome: Progressing     Problem: Psychosocial - Patient Condition  Goal: Patient's ability to verbalize feelings about condition will improve  Outcome: Progressing  Goal: Patient's ability to re-evaluate and adapt role responsibilities will improve  Outcome: Progressing     Problem: Discharge Planning - Stroke  Goal: Ensure Stroke Core Measures are met prior to discharge  Outcome: Progressing  Goal: Patient’s continuum of care needs will be met  Outcome: Progressing     Problem: Neuro Status  Goal: Neuro status will remain stable or improve  Outcome: Progressing     Problem: Hemodynamic Monitoring  Goal: Patient's hemodynamics, fluid balance and neurologic status will be stable or improve  Outcome: Progressing     Problem: Respiratory - Stroke Patient  Goal: Patient will achieve/maintain optimum respiratory rate/effort  Outcome: Progressing     Problem: Dysphagia  Goal: Dysphagia will improve  Outcome: Progressing     Problem: Risk for Aspiration  Goal: Patient's risk for aspiration will be absent or decrease  Outcome: Progressing     Problem: Mobility - Stroke  Goal: Patient's capacity to carry out activities will  improve  Outcome: Progressing     Problem: Self Care  Goal: Patient will have the ability to perform ADLs independently or with assistance (bathe, groom, dress, toilet and feed)  Outcome: Progressing     Problem: Fall Risk  Goal: Patient will remain free from falls  Outcome: Progressing     Problem: Pain - Standard  Goal: Alleviation of pain or a reduction in pain to the patient’s comfort goal  Outcome: Progressing       Patient is not progressing towards the following goals:

## 2025-05-29 NOTE — THERAPY
Occupational Therapy   Initial Evaluation     Patient Name: Dale Dinero  Age:  70 y.o., Sex:  male  Medical Record #: [id number]  Today's Date: 5/29/2025     Precautions  Precautions: Fall Risk, Swallow Precautions  Comments: -140    Assessment    Patient is 70 y.o. male admitted w/ left sided weakness, impaired speech and facial droop.  M1 occlusion s/p thrombectomy (TICI 3). Pt normally independent with functional mobility and Adls living in a [facility] with spouse who is returned and can assist as needed. Pt able to complete all functional mobility and Adls with supervision initially FWW then no AD. Pt with slight LUE delay/discoordination but able to use functionally. Anticipate pt is close to functional baseline, will complete OT order at this time.      Plan    DC Equipment Recommendations: (P) None  Discharge Recommendations: (P) Anticipate that the patient will have no further occupational therapy needs after discharge from the hospital     Objective       05/29/25 0918   Prior Living Situation   Prior Services None   Housing / Facility 1 Story House   Steps Into Home 1   Steps In Home 0   Bathroom Set up Walk In Shower   Equipment Owned None   Lives with - Patient's Self Care Capacity Spouse   Prior Level of ADL Function   Self Feeding Independent   Grooming / Hygiene Independent   Bathing Independent   Dressing Independent   Toileting Independent   Prior Level of IADL Function   Medication Management Independent   Laundry Independent   Kitchen Mobility Independent   Finances Independent   Home Management Independent   Shopping Independent   Prior Level Of Mobility Independent Without Device in Community   Driving / Transportation Driving Independent   Occupation (Pre-Hospital Vocational) Retired Due To Age   History of Falls   History of Falls No   Vitals   O2 (LPM) 2   O2 Delivery Device Silicone Nasal Cannula   Pain 0 - 10 Group   Therapist Pain Assessment Post Activity Pain Same as Prior to  Activity;Nurse Notified   Cognition    Cognition / Consciousness WDL   Level of Consciousness Alert   Active ROM Upper Body   Active ROM Upper Body  WDL   Dominant Hand Right   Strength Upper Body   Upper Body Strength  WDL   Sensation Upper Body   Upper Extremity Sensation  WDL   Upper Body Muscle Tone   Upper Body Muscle Tone  WDL   Coordination Upper Body   Coordination X   Fine Motor Coordination LUE mod impairment   Balance Assessment   Sitting Balance (Static) Fair +   Sitting Balance (Dynamic) Fair +   Standing Balance (Static) Fair   Standing Balance (Dynamic) Fair   Weight Shift Sitting Good   Weight Shift Standing Good   Comments no AD   Bed Mobility    Supine to Sit Supervised   Scooting Supervised   Rolling Supervised   ADL Assessment   Grooming Supervision   Upper Body Dressing Supervision   Lower Body Dressing Supervision   How much help from another person does the patient currently need...   Putting on and taking off regular lower body clothing? 3   Bathing (including washing, rinsing, and drying)? 3   Toileting, which includes using a toilet, bedpan, or urinal? 3   Putting on and taking off regular upper body clothing? 3   Taking care of personal grooming such as brushing teeth? 4   Eating meals? 4   6 Clicks Daily Activity Score 20   Functional Mobility   Sit to Stand Supervised   Bed, Chair, Wheelchair Transfer Supervised   Transfer Method Stand Step   Mobility bed mobility, hallway, up to chair   Comments w/ FWW then no AD   Activity Tolerance   Sitting in Chair left seated in chair   Standing 10 min   Education Group   Education Provided Role of Occupational Therapist;Stroke   Role of Occupational Therapist Patient Response Patient;Family;Acceptance;Explanation   Stroke Patient Response Family;Patient;Acceptance;Explanation   Problem List   Problem List None   Anticipated Discharge Equipment and Recommendations   DC Equipment Recommendations None   Discharge Recommendations Anticipate that the  patient will have no further occupational therapy needs after discharge from the hospital   Interdisciplinary Plan of Care Collaboration   IDT Collaboration with  Nursing   Patient Position at End of Therapy Seated;Chair Alarm On;Call Light within Reach;Tray Table within Reach;Phone within Reach   Collaboration Comments RN updated

## 2025-05-29 NOTE — ASSESSMENT & PLAN NOTE
Patient presented with right M1 large vessel occlusion  Now s/p TNK and mechanical thrombectomy with TICI 3 post  MRI today's demonstrate several small infarcts in the MCA distribution  Neurologically has done quite well and has an intact exam  Telemetry has been sinus  Check EKG  High-dose statin  Antiplatelet therapy  LDL 55  A1c 5.6  Encourage smoking cessation  Neurology following  Transthoracic echo pending  Plan discharge on Zio patch

## 2025-05-29 NOTE — THERAPY
Speech Language Pathology   Clinical Swallow Evaluation     Patient Name: Dale Dinero  AGE:  70 y.o., SEX:  male  Medical Record #: [id number]  Date of Service: 5/29/2025      History of Present Illness  70 y.o. male who presented 5/28/2025 with left handed weakness and loss of  strength for a couple of days s/p TNK for right M1 occlusion.    PMHx CAD, COPD, hepatitis C, heartburn, dyslipidemia and hypertension with pending increased dose of medication, basal cell carcinoma, emphysema of lung    No previous h/o SLP services at [facility]    CMHx Acute CVA    CXR 5/28/25  No acute cardiopulmonary abnormality.    CT-Head w/o 5/28/25  1.  Small amount of subarachnoid hemorrhage in the right Sylvian fissure.  2.  Small acute right MCA infarct.      General Information:  Vitals  O2 (LPM): 2  O2 Delivery Device: Silicone Nasal Cannula  Level of Consciousness: Awake, Drowsy  Patient Behaviors: Drowsy, Flat Affect, Irritable, Withdrawn  Orientation: Oriented x 4  Follows Directives: Yes - simple commands only      Prior Living Situation & Level of Function:  Housing / Facility: [address] [place] [address]  Lives with - Patient's Self Care Capacity: Spouse  Communication: WFL  Swallowing: WFL       Oral Mechanism Evaluation:  Dentition: Fair   Facial Symmetry: Equal  Facial Sensation: Equal     Labial Observations: WFL   Lingual Observations: Midline               Laryngeal Function:  Secretion Management: Adequate  Voice Quality: WFL  Cough: Perceptually WNL         Subjective  RN cleared patient for evaluation. Patient received awake, sitting UIB, spouse at bedside.      Assessment  Current Method of Nutrition: NPO until cleared by speech pathology     Bolus Administration: SLP  O2 (LPM): 2 O2 Delivery Device: Silicone Nasal Cannula  Factor(s) Affecting Performance: Impaired endurance, Impaired command following  Tracheostomy : No          Swallowing Trials:  Swallowing Trials  Ice: WFL  Thin Liquid (TN0): WFL  Liquidised (LQ3):  "WFL  Regular (RG7): WFL      Comments: Patient able to self-feed without difficulty. Adequate oral bolus acceptance/containment. Adequate bite with functional mastication of dry solids. Swallow initiation appeared timely. No cough/throat clear appreciated with PO intake. Vocal quality remained clear. No signs of esophageal dysfunction. No overt s/sx of aspiration appreciated.        Clinical Impressions  Patient presents without overt s/sx of aspiration. Diet modification is not indicated. Given acute CVA and h/o COPD, service will continue to follow, likely x1, to ensure diet tolerance and monitor for changes. HOLD PO with any difficulty and contact SLP. Dysphagia outcomes can be maximized with use of mobility as pt is able and frequent, thorough oral care.            Recommendations  Diet Consistency: Regular solids, thin liquids  Instrumentation: None indicated at this time (will consider with any ongoing concerns for aspiration)  Medication: As tolerated  Supervision: Assist with meal tray set up (1:1 feeding A as needed)  Positioning: Fully upright and midline during oral intake  Risk Management : Slow rate of intake, Physical mobility, as tolerated  Oral Care: BID         SLP Treatment Plan  Treatment Plan: Dysphagia Treatment, Patient/Family/Caregiver Training  SLP Frequency: 3x Per Week  Estimated Duration: Until Therapy Goals Met      Anticipated Discharge Needs  Discharge Recommendations: Recommend home health for continued speech therapy services   Therapy Recommendations Upon DC: Cognitive-Linguistic Training, Patient / Family / Caregiver Education        Patient / Family Goals  Patient / Family Goal #1: \"coffee\"  Short Term Goals  Short Term Goal # 1: Pt will consume rg/tn diet with no overt s/sx of aspiration or decline in pulmonary status      ROMMEL Frances   "

## 2025-05-29 NOTE — PROGRESS NOTES
Patient to R109.    MD Resendiz notified of via volte of neuro status changes - return of symptoms of slurred speech, left weakness and decreased left sensation and lethargy. New orders for STAT Head CT.     Patient to CT with ICU team.

## 2025-05-29 NOTE — PROGRESS NOTES
"Critical Care Progress Note    Date of admission  5/28/2025    Chief Complaint  70 y.o. male admitted 5/28/2025 with acute CVA    Hospital Course  \"70 y.o. male remaining with PMHx CAD, COPD, hepatitis C, heartburn, dyslipidemia and hypertension with pending increased dose of medication who presented 5/28/2025 with left handed weakness and loss of  strength for a couple of days got much worse around 1:15 PM including left facial droop, odd behavior and confusion with some slurred speech and then a fall when he dropped something.  Currently in ER he is hypertensive pending TNK for right M1 occlusion.  Patient was seen by Dr. Amezquita who recommended lytics followed by IR and he is going there soon.  Dr. Olivares I am told is reaching out to Dr. Mercedes with neurosurgery as well.  I arrived in the ER and patient's blood pressure was 217 systolic and they brought it down to the 170-180 range with 3 doses of labetalol and then TNK was given approximately 3:40 PM and he rolled to IR.  Patient was alert and oriented x 4 and moving all extremities without significant focal abnormalities for me.  Successful thrombectomy but on arrival to ICU patient with some recurrence of the right M1 symptoms.  Little more somnolent but did receive some IV narcotics during his case.  Initially significant left-sided weakness but left lower extremity is now back to what he was in the ER 5/5 and he started to move the left upper extremity.  Facial droop is back but it is subtle and small.  He is oriented x 4 at this time and follows all commands.  Small sylvian fissure bleed per IR on review of stat CT with. \" - Dr. Navarro 5/28    Interval Problem Update  Reviewed last 24 hour events:   - post-thrombectomy head CT with small R SAH   - weaned off nicardipine gtt   - AF, increased WBC   - AAox4 4/5 strength on L, sensation intact, c/o HA   - NSR, -140 (multiple prn anti-HTN needed)   - PT/OT/SLP   - echo and MRI brain " pending    Review of Systems  Review of Systems   Constitutional:  Negative for chills, fever and malaise/fatigue.   HENT:  Negative for congestion and sore throat.    Eyes:  Negative for blurred vision.   Respiratory:  Negative for cough, sputum production and shortness of breath.    Cardiovascular:  Negative for chest pain, palpitations and leg swelling.   Gastrointestinal:  Negative for abdominal pain, nausea and vomiting.   Genitourinary:  Negative for dysuria.   Musculoskeletal:  Negative for back pain and myalgias.   Skin:  Negative for rash.   Neurological:  Positive for sensory change, speech change, focal weakness and headaches. Negative for dizziness.   Endo/Heme/Allergies:  Does not bruise/bleed easily.   Psychiatric/Behavioral:  Negative for depression. The patient is not nervous/anxious.    All other systems reviewed and are negative.       Vital Signs for last 24 hours   Temp:  [35.8 °C (96.5 °F)-37.1 °C (98.7 °F)] 36.4 °C (97.5 °F)  Pulse:  [] 67  Resp:  [12-26] 17  BP: (113-218)/() 140/76  SpO2:  [89 %-97 %] 94 %    Respiratory Information for the last 24 hours   3 lpm n/c    Physical Exam   Physical Exam  Vitals and nursing note reviewed.   Constitutional:       General: He is awake. He is not in acute distress.     Appearance: He is well-developed.      Interventions: Nasal cannula in place.   HENT:      Head: Normocephalic.      Nose: Nose normal.      Mouth/Throat:      Mouth: Mucous membranes are moist.      Pharynx: Oropharynx is clear. No oropharyngeal exudate.   Eyes:      General: No scleral icterus.     Conjunctiva/sclera: Conjunctivae normal.      Pupils: Pupils are equal, round, and reactive to light.   Neck:      Vascular: No JVD.   Cardiovascular:      Rate and Rhythm: Normal rate and regular rhythm.      Pulses: Normal pulses.      Heart sounds: Normal heart sounds. No murmur heard.  Pulmonary:      Effort: Pulmonary effort is normal. No respiratory distress.      Breath  sounds: Normal breath sounds. No stridor. No wheezing.   Abdominal:      General: Bowel sounds are normal. There is no distension.      Palpations: Abdomen is soft.      Tenderness: There is no abdominal tenderness. There is no guarding.   Musculoskeletal:         General: No tenderness.      Cervical back: Neck supple. No tenderness.      Right lower leg: No edema.      Left lower leg: No edema.   Skin:     General: Skin is warm and dry.      Capillary Refill: Capillary refill takes less than 2 seconds.      Coloration: Skin is not pale.   Neurological:      General: No focal deficit present.      Mental Status: He is alert and oriented to person, place, and time.      Cranial Nerves: Cranial nerve deficit present.      Sensory: Sensory deficit present.      Motor: Weakness present.      Comments: Persistent weakness on the left, facial droop, sensation intact   Psychiatric:         Mood and Affect: Mood normal.         Behavior: Behavior normal. Behavior is cooperative.         Thought Content: Thought content normal.         Medications  Current Medications[1]    Fluids    Intake/Output Summary (Last 24 hours) at 5/29/2025 0648  Last data filed at 5/29/2025 0600  Gross per 24 hour   Intake 926.47 ml   Output 1450 ml   Net -523.53 ml       Laboratory          Recent Labs     05/28/25 1446 05/29/25  0150   SODIUM 139 140   POTASSIUM 4.0 4.1   CHLORIDE 106 108   CO2 21 20   BUN 11 11   CREATININE 0.86 0.87   CALCIUM 9.4 8.7     Recent Labs     05/28/25  1446 05/29/25  0150   ALTSGPT <5  --    ASTSGOT 20  --    ALKPHOSPHAT 58  --    TBILIRUBIN 0.5  --    GLUCOSE 108* 101*     Recent Labs     05/28/25 1446 05/29/25  0150   WBC 8.8 11.5*   NEUTSPOLYS 60.80 71.20   LYMPHOCYTES 28.70 19.20*   MONOCYTES 8.30 8.50   EOSINOPHILS 1.40 0.30   BASOPHILS 0.60 0.40   ASTSGOT 20  --    ALTSGPT <5  --    ALKPHOSPHAT 58  --    TBILIRUBIN 0.5  --      Recent Labs     05/28/25 1446 05/29/25  0150   RBC 5.79 5.57   HEMOGLOBIN 17.1  16.6   HEMATOCRIT 50.9 49.4   PLATELETCT 200 181   PROTHROMBTM 12.7  --    APTT 25.7  --    INR 0.95  --        Imaging  X-Ray:  I have personally reviewed the images and compared with prior images. and My impression is: No acute cardiopulmonary process, no tubes nor lines  CT:    Reviewed  Echo:   Reviewed  MRI:   Reviewed    Assessment/Plan  * Acute cerebrovascular accident (CVA) ([facility])- (present on admission)  Assessment & Plan  Acute ischemic CVA (NIHSS 5), with L deficits s/p TNK 5/28/2025 @ 1540, thrombectomy @ 1624 with TICI 3  Neurology (Dr. Amezquita) and neuro IR (Dr. Resendiz) consulted  Neuro checks per protocol  MRI, Echo pending  Seizure, aspiration, and fall precautions   NPO pending dysphagia screen  SLP/PT/OT, physiatry evaluation  Antiplatelet therapy: ASA 24 hours post thrombectomy (pending MRI brain results), High intensity statin  Strict BP control with goal SBP <140 mmHg, DBP <105; hydralazine, labetalol    Dyslipidemia- (present on admission)  Assessment & Plan  High dose statin post CVA    Essential hypertension- (present on admission)  Assessment & Plan  Goal -140  As needed labetalol, hydralazine, Vasotec  Resume patient's lisinopril    CAD (coronary artery disease)- (present on admission)  Assessment & Plan  History of  Continue statin    COPD (chronic obstructive pulmonary disease) ([facility])- (present on admission)  Assessment & Plan  Without acute exacerbation  RT protocols  DuoNeb as needed         VTE:  Contraindicated  Ulcer: Not Indicated  Lines: None    I have performed a physical exam and reviewed and updated ROS and Plan today (5/29/2025). In review of yesterday's note (5/28/2025), there are no changes except as documented above.     Discussed patient condition and risk of morbidity and/or mortality with Hospitalist, Family, RN, RT, Pharmacy, , Charge nurse / hot rounds, Patient, neurology, and neuro IR. Critical care services will sign off at this time.  Please call  with any questions or concerns.    Please note that this dictation was created using voice recognition software. I have made every reasonable attempt to correct obvious errors, but there may be errors of grammar and possibly content that I did not discover before finalizing the note.         [1]   Current Facility-Administered Medications   Medication Dose Route Frequency Provider Last Rate Last Admin    fentaNYL (Sublimaze) injection   Intravenous Intra-Op Once PRN Kyra Resendiz M.D.   50 mcg at 05/28/25 1555    hydrALAZINE (Apresoline) injection 10 mg  10 mg Intravenous Q2HRS PRN Kyra Resendiz M.D.        niCARdipine (Cardene) 25 mg in  mL Standard Infusion  0-15 mg/hr Intravenous Continuous Kyra Resendiz M.D.   Stopped at 05/29/25 0336    atorvastatin (Lipitor) tablet 80 mg  80 mg Oral Q EVENING Dale Navarro M.D.        ondansetron (Zofran) syringe/vial injection 4 mg  4 mg Intravenous Q4HRS PRN Dale Navarro M.D.        Or    ondansetron (Zofran ODT) dispertab 4 mg  4 mg Oral Q4HRS PRN Dale Navarro M.D.        labetalol (Normodyne/Trandate) injection 5-10 mg  5-10 mg Intravenous Q4HRS PRN Dale Navarro M.D.   5 mg at 05/29/25 0349    enalaprilat injection 1.25 mg 1 mL  1.25 mg Intravenous Q8HRS Dale Navarro M.D.   1.25 mg at 05/29/25 0512

## 2025-05-29 NOTE — THERAPY
Physical Therapy   Initial Evaluation     Patient Name: Dale Dinero  Age:  70 y.o., Sex:  male  Medical Record #: [id number]  Today's Date: 5/29/2025     Precautions  Precautions: Fall Risk;Swallow Precautions  Comments: -140    Assessment  Patient is 70 y.o. male w/ hx of HTN, tobacco, CAD, HLD, basal cell CA.  Admitted w/ left sided weakness, impaired speech and facial droop.  M1 occlusion s/p thrombectomy.  He lives w/ his wife in a single story house, one step to enter.  He was indep w/ mobility PTA.  Today, he is rec'd alert, in bed.  Wife reports feeling depressed.  He is able to mobilize at spv level, w/o AD, w/o loss of balance and w/o need of physical assist.  He is able to ambulate and accept perturbations w/o loss of balance.  Denies issues w/ stairs/step.  No acute PT needs.  Plan    Physical Therapy Initial Treatment Plan   Duration: Evaluation only    DC Equipment Recommendations: None  Discharge Recommendations: Anticipate that the patient will have no further physical therapy needs after discharge from the hospital          Objective       05/29/25 0914   Prior Living Situation   Housing / Facility 1 Story House   Steps Into Home 1   Steps In Home 0   Equipment Owned None   Lives with - Patient's Self Care Capacity Spouse   Prior Level of Functional Mobility   Bed Mobility Independent   Transfer Status Independent   Ambulation Independent   Assistive Devices Used None   Stairs Independent   Strength Lower Body   Comments grossly wnl   Coordination Upper Body   Comments impaired fine motor LUE   Balance Assessment   Sitting Balance (Static) Fair +   Sitting Balance (Dynamic) Fair +   Standing Balance (Static) Fair   Standing Balance (Dynamic) Fair   Weight Shift Sitting Good   Weight Shift Standing Good   Bed Mobility    Supine to Sit Supervised   Gait Analysis   Gait Level Of Assist Supervised   Assistive Device None   Distance (Feet) 150   Functional Mobility   Sit to Stand Supervised    Bed, Chair, Wheelchair Transfer Supervised   Physical Therapy Initial Treatment Plan    Duration Evaluation only   Anticipated Discharge Equipment and Recommendations   DC Equipment Recommendations None   Discharge Recommendations Anticipate that the patient will have no further physical therapy needs after discharge from the hospital

## 2025-05-29 NOTE — HOSPITAL COURSE
"\"70 y.o. male remaining with PMHx CAD, COPD, hepatitis C, heartburn, dyslipidemia and hypertension with pending increased dose of medication who presented 5/28/2025 with left handed weakness and loss of  strength for a couple of days got much worse around 1:15 PM including left facial droop, odd behavior and confusion with some slurred speech and then a fall when he dropped something.  Currently in ER he is hypertensive pending TNK for right M1 occlusion.  Patient was seen by Dr. Amezquita who recommended lytics followed by IR and he is going there soon.  Dr. Olivares I am told is reaching out to Dr. Mercedes with neurosurgery as well.  I arrived in the ER and patient's blood pressure was 217 systolic and they brought it down to the 170-180 range with 3 doses of labetalol and then TNK was given approximately 3:40 PM and he rolled to IR.  Patient was alert and oriented x 4 and moving all extremities without significant focal abnormalities for me.  Successful thrombectomy but on arrival to ICU patient with some recurrence of the right M1 symptoms.  Little more somnolent but did receive some IV narcotics during his case.  Initially significant left-sided weakness but left lower extremity is now back to what he was in the ER 5/5 and he started to move the left upper extremity.  Facial droop is back but it is subtle and small.  He is oriented x 4 at this time and follows all commands.  Small sylvian fissure bleed per IR on review of stat CT with. \" - Dr. Navarro 5/28  "

## 2025-05-29 NOTE — THERAPY
Speech Language Pathology   Communication Evaluation     Patient Name: Dale Dinero  AGE:  70 y.o., SEX:  male  Medical Record #: [id number]  Date of Service: 5/29/2025      History of Present Illness  70 y.o. male who presented 5/28/2025 with left handed weakness and loss of  strength for a couple of days s/p TNK for right M1 occlusion.    PMHx CAD, COPD, hepatitis C, heartburn, dyslipidemia and hypertension with pending increased dose of medication, basal cell carcinoma, emphysema of lung    No previous h/o SLP services at [facility]    CMHx Acute CVA    CXR 5/28/25  No acute cardiopulmonary abnormality.    CT-Head w/o 5/28/25  1.  Small amount of subarachnoid hemorrhage in the right Sylvian fissure.  2.  Small acute right MCA infarct.        General Information  Vitals  O2 (LPM): 2  O2 Delivery Device: Silicone Nasal Cannula  Level of Consciousness: Awake, Drowsy  Patient Behaviors: Drowsy, Flat Affect, Irritable, Withdrawn  Orientation: Oriented x 4  Follows Directives: Yes - simple commands only      Prior Living Situation & Level of Function  Housing / Facility: [address] [place] [address]  Lives with - Patient's Self Care Capacity: Spouse  Communication: WFL  Swallowing: WFL       Oral Mechanism Evaluation  Dentition: Fair  Facial Symmetry: Equal  Facial Sensation: Equal  Labial Observations: WFL  Lingual Observations: Midline      Laryngeal Function Exam  Secretion Management: Adequate  Voice Quality: WFL  Cough: Perceptually WNL      Subjective  RN cleared patient for evaluation. Patient received awake, sitting UIB, spouse at bedside. Patient reports I with ADLs/IADLs at baseline.      Assessment  The patient was seen this date for a cognitive evaluation. Portions of the COGNISTAT (Neurobehavioral Cognitive Status Assessment) were administered in conjunction with non-standardized assessments. Results are outlined below:        Cognistat  Orientation: Average  Attention: Average  Comprehension: Average  Repetition:  "Average  Naming: Average  Memory: Moderate  Judgement: Average         Comments: Immediate recall of four word memory task achieved on second attempt. With delayed recall of ~5 minutes, patient recalled 0/4 words independently; provided category prompt, accuracy increased to 1/4; provided three word list, accuracy increased; 4/4 words not recalled.        Clinical Impressions  Patient presents with moderate cognitive impairment in memory. Informal measures reveal difficulty with executive functioning and processing time. Suspect deficits are acute 2/2 acute CVA. Recommend ST therapy in acute setting and next level of care. Patient will benefit from direct assistance with IADLs.            NOTE: It is not within the scope of practice of Speech-Language Pathologists to determine patient capacity. Please defer to the physician or psych to complete this assessment.       Recommendations  Supervision Needs Upons Discharge: Direct assistance with IADLs (see below)  IADLs: Medication management, Financial management, Appointment management, Household chores, Cooking         SLP Treatment Plan  Treatment Plan: Dysphagia Treatment, Cognitive Treatment, Patient/Family/Caregiver Training  SLP Frequency: 3x Per Week  Estimated Duration: Until Therapy Goals Met      Anticipated Discharge Needs  Discharge Recommendations: Recommend home health for continued speech therapy services  Therapy Recommendations Upon DC: Cognitive-Linguistic Training, Patient / Family / Caregiver Education      Patient / Family Goals  Patient / Family Goal #1: \"coffee\"  Goal #1 Outcome: Progressing as expected  Short Term Goal # 1: Pt will consume rg/tn diet with no overt s/sx of aspiration or decline in pulmonary status  Short Term Goal # 2: Pt will recall novel information with >90% accuracy given Benjamin  Short Term Goal # 3: Pt will complete medication management/ executive function task with >90% accuracy given Benjamin      Mariella Schmidt, ROMMEL  "

## 2025-05-29 NOTE — DISCHARGE INSTRUCTIONS
- Post  HARDY access site instructions: no lifting greater than 5 lbs and no baths/swimming/soaking in tub for 5 days. Shower OK. OK to change dressings/band aid as needed  Transient Ischemic Attack (TIA)  Discharge Instructions    You experienced a Transient Ischemic Attack.  If an artery that supplies brain tissue is blocked for a short time, the blood flow to that area of the brain slows down or stops, which may cause temporary or transient symptoms of a stroke. A TIA is a serious warning sign that you could have a stroke.      Treatment Received:  Mechanical Thrombectomy    You had a Mechanical Thrombectomy.  A Mechanical Thrombectomy is a procedure to remove blood clots from the brain after an ischemic stroke.  The procedure involves making a small incision in the groin, and threading thin tubes (catheters) through your blood vessels to the clot. A tiny device at the catheter's tip grabs the clot and removes it, restoring blood flow to the brain.    Home Care Instructions:    Catheter insertion site care  If you have a bandage, make sure you:  Wash your hands with soap and water for at least 20 seconds before and after you change your bandage. If you cannot use soap and water, use hand .  Change your bandage as told by your doctor.  Check the site every day for signs of infection. Check for:  More redness, swelling, or pain.  Fluid or blood.  Warmth.  Pus or a bad smell.  Activity  Do not lift anything that is heavier than 5 lb (2.3 kg)  Rest  For the first 2-3 days after your procedure, or as long as told:  Try not to climb stairs.  Do not squat.  Return to your normal activities when your provider says that it is safe.  Get up to take short walks every 1 to 2 hours. Ask for help if you feel weak or unsteady.  Do exercises as told by your doctor.    General instructions  Take over-the-counter and prescription medicines as directed by your provider.  Do not smoke or use any products that contain nicotine  "or tobacco.   Keep all follow-up visits.    Contact your provider if:  You have more redness, swelling, or pain around the site where the catheter was put in.  You have a fever.    Thrombolytic Treatment for Ischemic Stroke    You received thrombolytic treatment for an Ischemic Stroke. Thrombolytics are medicines that can break up blood clots. These medicines help to treat a stroke when given as soon as possible after stroke symptoms start.  The medicine was given to you through an IV tube.  In some cases, the medicine may go to the affected area through a thin tube (catheter). This tube is usually put in at the top of your leg.    Get help right away if:  You have blood in your vomit, pee, or poop.  You have a serious fall or accident, or you hit your head.  You have symptoms of an allergy to the medicine, such as a rash or trouble breathing.  You have other signs of a stroke, such as:  A sudden, very bad headache with no known cause.  Feeling like you may vomit (nausea).  Vomiting.  A seizure    Stroke Risk Factors    You are at increased risk of having another stroke event.  See your Patient Stroke Guide to help reduce your stroke risk. These are your specific risk factors:  Age - Over 55  High blood pressure     Get help right away if you have any signs of a stroke.  \"BE FAST\" is an easy way to remember the main warning signs of a stroke:  B - Balance. Dizziness, sudden trouble walking, or loss of balance.  E - Eyes. Trouble seeing or a change in how you see.  F - Face. Sudden weakness or loss of feeling in the face. The face or eyelid may droop on one side.  A - Arms. Weakness or loss of feeling in an arm. This happens all of a sudden and most often on one side of the body.  S - Speech. Sudden trouble speaking, slurred speech, or trouble understanding what people say.  T - Time. Time to call emergency services. Write down what time symptoms started.                "

## 2025-05-29 NOTE — CONSULTS
Hospital Medicine Consultation    Date of Service  5/29/2025    Referring Physician  J Gonda    Consulting Physician  Luis Carlos Jaffe D.O.    Reason for Consultation  M1 infarct    History of Presenting Illness  70 y.o. male who presented 5/28/2025 with a history of CAD, DM, HCV, COPD, HLD, HTN who presented on 5/28 with L hand weakness for several days getting worse on the day of presentation with L facial droop and altered mentation.  CTH neg, CTA showing R M1 occlusion with large ischemic penumbra on perfusion study.  Treated with TNK on presentation and to IR for thrombectomy with TICI 3 post.  Repeat CTH on 5/29 showing small SAH    On my examination the patient reports he is feeling much better, in fact thinks he may be back to normal.  States his strength in his left hand seems like it is back.  Speech is normal.  Wife notes that his face looks like it always does.  He has been up and walking, and states he has had no issues.    Review of Systems  Review of Systems   Constitutional:  Negative for chills and fever.   Respiratory:  Negative for cough and shortness of breath.    Cardiovascular:  Negative for chest pain, palpitations and leg swelling.   Gastrointestinal:  Negative for abdominal pain, diarrhea, nausea and vomiting.   Genitourinary:  Negative for dysuria and urgency.   Musculoskeletal:  Negative for back pain.   Skin:  Negative for rash.   Neurological:  Negative for dizziness, loss of consciousness and headaches.       Past Medical History   has a past medical history of Acute cerebrovascular accident (CVA) ([facility]) (5/28/2025), Anesthesia, Awareness under anesthesia, Basal cell carcinoma (BCC) in situ of skin, Blood clotting disorder (HCC), CAD (coronary artery disease), Cancer (HCC), COPD (chronic obstructive pulmonary disease) (HCC), COPD (chronic obstructive pulmonary disease) (HCC), Emphysema of lung (HCC), Heart burn, Hep C w/o coma, chronic (HCC), Hepatitis C, High cholesterol,  Hospital discharge follow-up (06/05/2023), Hypertension, Indigestion, Renal disorder, Snoring, Soft tissue infection (05/25/2023), Stenosis of aorta, Stenosis of femoral artery (HCC), and Wound dehiscence (05/19/2023).    He has no past medical history of Acute nasopharyngitis, Anginal syndrome ([facility]), Arrhythmia, Arthritis, Asthma, Bowel habit changes, Breath shortness, Bronchitis, Carcinoma in situ of respiratory system, Cataract, Congestive heart failure ([facility]), Continuous ambulatory peritoneal dialysis status ([facility]), Coughing blood, Dental disorder, Diabetes (HCC), Dialysis patient ([facility]), Disorder of thyroid, Glaucoma, Gynecological disorder, Heart murmur, Heart valve disease, Hemorrhagic disorder ([facility]), Hepatitis A, Hepatitis B, Hiatus hernia syndrome, Jaundice, Myocardial infarct ([facility]), Pacemaker, Pneumonia, Pregnant, Psychiatric problem, Rheumatic fever, Seizure ([facility]), Sleep apnea, Tuberculosis, Urinary bladder disorder, or Urinary incontinence.    Surgical History   has a past surgical history that includes meniscus repair (Left); laminotomy; pr repr aneurysm/grft ins,common femoral (Right, 05/03/2023); aortogram (Right, 05/03/2023); thrombectomy (Right, 05/03/2023); debridement (Right, 05/19/2023); other neurological surg (2014); other orthopedic surgery (2014); other (2014 2023); tonsillectomy; lumbar laminectomy diskectomy (08/12/2024); and inguinal hernia repair bilateral.    Family History  family history includes Cancer in his brother, father, and sister; Colorectal Cancer in his father; Diabetes in his father; Other in his mother.    Social History   reports that he has been smoking cigarettes. He started smoking about 53 years ago. He has a 53.2 pack-year smoking history. He has never used smokeless tobacco. He reports that he does not drink alcohol and does not use drugs.    Medications  Prior to Admission Medications   Prescriptions Last Dose Informant Patient Reported? Taking?   albuterol 108 (90  Base) MCG/ACT Aero Soln inhalation aerosol Unknown Family Member No No   Sig: Inhale 2 Puffs every four hours as needed for Shortness of Breath.   aspirin 81 MG EC tablet 5/27/2025 Morning Family Member Yes Yes   Sig: Take 81 mg by mouth every morning.   buPROPion (WELLBUTRIN) 75 MG Tab Unknown Family Member No No   Sig: Take 1 Tablet by mouth 2 times a day.   lisinopril (PRINIVIL) 20 MG Tab New Rx Family Member No Yes   Sig: Take 1 Tablet by mouth at bedtime. Indications: High Blood Pressure   lisinopril (PRINIVIL) 5 MG Tab 5/27/2025 Evening Family Member Yes Yes   Sig: Take 5 mg by mouth every evening.   simvastatin (ZOCOR) 20 MG Tab 5/27/2025 Evening Family Member No Yes   Sig: Take 1 tablet by mouth every evening.   tadalafil (CIALIS) 10 MG tablet Unknown Family Member No No   Sig: Take 1 Tablet by mouth 1 time a day as needed for Erectile Dysfunction.      Facility-Administered Medications: None       Allergies  Allergies[1]    Physical Exam  Temp:  [35.8 °C (96.5 °F)-37.1 °C (98.7 °F)] 36.6 °C (97.9 °F)  Pulse:  [] 75  Resp:  [12-60] 60  BP: (113-218)/() 143/62  SpO2:  [89 %-97 %] 91 %    Physical Exam    Fluids      Laboratory  Recent Labs     05/28/25  1446 05/29/25  0150   WBC 8.8 11.5*   RBC 5.79 5.57   HEMOGLOBIN 17.1 16.6   HEMATOCRIT 50.9 49.4   MCV 87.9 88.7   MCH 29.5 29.8   MCHC 33.6 33.6   RDW 42.7 43.7   PLATELETCT 200 181   MPV 10.2 9.5     Recent Labs     05/28/25  1446 05/29/25  0150   SODIUM 139 140   POTASSIUM 4.0 4.1   CHLORIDE 106 108   CO2 21 20   GLUCOSE 108* 101*   BUN 11 11   CREATININE 0.86 0.87   CALCIUM 9.4 8.7     Recent Labs     05/28/25  1446   APTT 25.7   INR 0.95          Recent Labs     05/29/25  0150   TRIGLYCERIDE 65   HDL 40   LDL 55        Imaging  MR-BRAIN-W/O   Final Result      1.  A few small areas of acute infarcts in the right cerebral hemisphere in the distribution of right middle cerebral artery. The right M1 and M2 segments are widely patent.   2.   Minimal subarachnoid hemorrhage.   3.  Mild chronic microvascular ischemic disease.      DX-CHEST-PORTABLE (1 VIEW)   Final Result      No acute cardiopulmonary abnormality.      CT-HEAD W/O   Final Result   Addendum (preliminary) 1 of 1   These findings were discussed with Dr. Navarro on 5/28/2025 5:42 PM.      Final      1.  Small amount of subarachnoid hemorrhage in the right Sylvian fissure.   2.  Small acute right MCA infarct.         These findings were discussed with YARELIS VELA on 5/28/2025 5:37 PM.            IR-THROMBO MECHANICAL ARTERY,INIT   Final Result      1.  Cerebral angiography showed a right M1 occlusion.   2.  Successful mechanical thrombectomy of the right M1 occlusion was performed with a single pass using suction and aspiration techniques.   3.  TICI 3 restoration of flow.   4.  Pseudoaneurysm noted at the interface between the graft and the native vessel in the left common femoral artery.                  CT-CTA NECK WITH & W/O-POST PROCESSING   Final Result      1.  A focal moderate atherosclerotic stenosis in the proximal left vertebral artery.   2.  Occluded right distal M1 segment.   3.  Atherosclerotic calcification in the carotid bulb without significant stenosis at the origins.      CT-CTA HEAD WITH & W/O-POST PROCESS   Final Result   Addendum (preliminary) 1 of 1   Addendum: Case discussed with  at 3:30 PM 5/28/2025      Final      Occlusion of the mid to distal right M1 segment.      CT-CEREBRAL PERFUSION ANALYSIS   Final Result      1. Cerebral blood flow less than 30% possibly representing completed infarct = 2 mL.   2. T Max more than 6 seconds possibly representing combination of completed infarct and ischemia = 116 mL.      3. Mismatched volume possibly representing ischemic brain/penumbra= 114 mL      4.  Please note that this cerebral perfusion study and report is Quantitative and targets supratentorial (cerebral) perfusion for evaluation of large vessel  "territory acute ischemia/infarction. For example, lacunar infarcts, and brainstem/posterior fossa    ischemia/infarction are not evaluated on this study.  Data acquisition is subject to artifacts which can yield non-anatomically plausible perfusion maps which may be due to motion, bolus timing, signal to noise ratio, or other technical factors.    Perfusion map abnormalities which show non-anatomic distributions are likely artifact.   This study is not \"stand-alone\" and should only be utilized for diagnosis, management/treatment in correlation with CT, CTA, and/or MRI and clinical factors.         CT-HEAD W/O   Final Result      1.  Cerebral atrophy.      2.  Otherwise, Head CT without contrast within normal limits. No evidence of acute cerebral infarction, hemorrhage or mass lesion.                   Assessment/Plan  * Acute cerebrovascular accident (CVA) (HCC)- (present on admission)  Assessment & Plan  Patient presented with right M1 large vessel occlusion  Now s/p TNK and mechanical thrombectomy with TICI 3 post  MRI today's demonstrate several small infarcts in the MCA distribution  Neurologically has done quite well and has an intact exam  Telemetry has been sinus  Check EKG  High-dose statin  Antiplatelet therapy  LDL 55  A1c 5.6  Encourage smoking cessation  Neurology following  Transthoracic echo pending  Plan discharge on Zio patch    Dyslipidemia- (present on admission)  Assessment & Plan  Statin  LDL 55    Cigarette smoker- (present on admission)  Assessment & Plan  I spoke with the patient at length about his tobacco use.  His wife fortunately does not smoke.  He has been trying to quit, and was recently started on bupropion.  We discussed the benefits of quitting particularly as regards his stroke, options such as bupropion and nicotine replacement therapies.  5 minutes spent counseling the patient as noted above    Essential hypertension- (present on admission)  Assessment & Plan  Patient is " maintained on lisinopril 20 mg at home which we will continue  Monitor and titrate as appropriate    CAD (coronary artery disease)- (present on admission)  Assessment & Plan  Statin  Antiplatelet therapy  Check EKG    COPD (chronic obstructive pulmonary disease) (HCC)- (present on admission)  Assessment & Plan  Patient's lung exam is benign  Continue O2 and RT protocols  Encourage smoking cessation               [1] No Known Allergies

## 2025-05-30 ENCOUNTER — APPOINTMENT (OUTPATIENT)
Dept: CARDIOLOGY | Facility: MEDICAL CENTER | Age: 70
End: 2025-05-30
Attending: NURSE PRACTITIONER
Payer: MEDICARE

## 2025-05-30 ENCOUNTER — NON-PROVIDER VISIT (OUTPATIENT)
Dept: CARDIOLOGY | Facility: MEDICAL CENTER | Age: 70
End: 2025-05-30

## 2025-05-30 ENCOUNTER — PHARMACY VISIT (OUTPATIENT)
Dept: PHARMACY | Facility: MEDICAL CENTER | Age: 70
End: 2025-05-30
Payer: COMMERCIAL

## 2025-05-30 VITALS
OXYGEN SATURATION: 92 % | RESPIRATION RATE: 17 BRPM | BODY MASS INDEX: 26.86 KG/M2 | SYSTOLIC BLOOD PRESSURE: 153 MMHG | TEMPERATURE: 97.9 F | WEIGHT: 177.25 LBS | DIASTOLIC BLOOD PRESSURE: 83 MMHG | HEIGHT: 68 IN | HEART RATE: 64 BPM

## 2025-05-30 DIAGNOSIS — I47.10 SVT (SUPRAVENTRICULAR TACHYCARDIA) (HCC): ICD-10-CM

## 2025-05-30 DIAGNOSIS — I63.9 CEREBROVASCULAR ACCIDENT (CVA), UNSPECIFIED MECHANISM (HCC): Primary | ICD-10-CM

## 2025-05-30 DIAGNOSIS — I49.8 VENTRICULAR TRIGEMINY: ICD-10-CM

## 2025-05-30 DIAGNOSIS — I49.8 VENTRICULAR BIGEMINY: ICD-10-CM

## 2025-05-30 DIAGNOSIS — I49.3 PVC'S (PREMATURE VENTRICULAR CONTRACTIONS): ICD-10-CM

## 2025-05-30 LAB
LV EJECT FRACT  99904: 60
LV EJECT FRACT MOD 2C 99903: 58.76
LV EJECT FRACT MOD 4C 99902: 59.31
LV EJECT FRACT MOD BP 99901: 61.19

## 2025-05-30 PROCEDURE — 99239 HOSP IP/OBS DSCHRG MGMT >30: CPT | Performed by: HOSPITALIST

## 2025-05-30 PROCEDURE — 93306 TTE W/DOPPLER COMPLETE: CPT

## 2025-05-30 PROCEDURE — 93306 TTE W/DOPPLER COMPLETE: CPT | Mod: 26 | Performed by: INTERNAL MEDICINE

## 2025-05-30 PROCEDURE — RXMED WILLOW AMBULATORY MEDICATION CHARGE: Performed by: HOSPITALIST

## 2025-05-30 PROCEDURE — 700102 HCHG RX REV CODE 250 W/ 637 OVERRIDE(OP): Performed by: INTERNAL MEDICINE

## 2025-05-30 PROCEDURE — A9270 NON-COVERED ITEM OR SERVICE: HCPCS | Performed by: INTERNAL MEDICINE

## 2025-05-30 PROCEDURE — 700111 HCHG RX REV CODE 636 W/ 250 OVERRIDE (IP): Performed by: INTERNAL MEDICINE

## 2025-05-30 RX ORDER — ATORVASTATIN CALCIUM 40 MG/1
40 TABLET, FILM COATED ORAL EVERY EVENING
Qty: 100 TABLET | Refills: 3 | Status: SHIPPED | OUTPATIENT
Start: 2025-05-30 | End: 2026-07-04

## 2025-05-30 RX ADMIN — ENALAPRILAT 1.25 MG: 1.25 INJECTION INTRAVENOUS at 04:05

## 2025-05-30 RX ADMIN — ENALAPRILAT 1.25 MG: 1.25 INJECTION INTRAVENOUS at 13:43

## 2025-05-30 RX ADMIN — ASPIRIN 81 MG: 81 TABLET, COATED ORAL at 04:05

## 2025-05-30 RX ADMIN — BUPROPION HYDROCHLORIDE 75 MG: 75 TABLET, FILM COATED ORAL at 04:05

## 2025-05-30 ASSESSMENT — FIBROSIS 4 INDEX: FIB4 SCORE: 3.54

## 2025-05-30 ASSESSMENT — PAIN DESCRIPTION - PAIN TYPE
TYPE: ACUTE PAIN
TYPE: ACUTE PAIN

## 2025-05-30 NOTE — PROGRESS NOTES
Pt sent to discharge lounge. Pt has all og his belongings. Zio patch pplied, stroke bridge clinic appointment made.

## 2025-05-30 NOTE — PROGRESS NOTES
Patient transported with transport and this RN to Neuroscience floor at 1740. All belongings with patient, spouse at bedside. Report given to Ron LOYD at 1745.

## 2025-05-30 NOTE — DISCHARGE SUMMARY
Discharge Summary    CHIEF COMPLAINT ON ADMISSION  Chief Complaint   Patient presents with    Possible Stroke       Reason for Admission  Numbness/Tingling     Admission Date  5/28/2025    CODE STATUS  Full Code    HPI & HOSPITAL COURSE    70 y.o. male who presented 5/28/2025 with a history of CAD, DM, HCV, COPD, HLD, HTN who presented on 5/28 with L hand weakness for several days getting worse on the day of presentation with L facial droop and altered mentation.  CTH neg, CTA showing R M1 occlusion with large ischemic penumbra on perfusion study.  Treated with TNK on presentation and to IR for thrombectomy with TICI 3 post.  Repeat CTH on 5/29 showing small SAH     On my examination the patient reports he is feeling much better, in fact thinks he may be back to normal.  States his strength in his left hand seems like it is back.  Speech is normal.  Wife notes that his face looks like it always does.  He has been up and walking, and states he has had no issues.    Patient is sitting up in chair, fully dressed.  He has no noticeable left sided deficits on exam. He is A&O x 4.  Wife at bedside.   Understands he cannot smoke.    Therefore, he is discharged in good and stable condition to home with close outpatient follow-up.    The patient met 2-midnight criteria for an inpatient stay at the time of discharge.    Discharge Date  5/30/3025    FOLLOW UP ITEMS POST DISCHARGE  Follow up neurology stroke clinic.  Follow up with cardiology for Ziopatch monitoring, ordered.       DISCHARGE DIAGNOSES  Principal Problem:    Ischemic stroke diagnosed during current admission ([facility]) (POA: Yes)  Active Problems:    COPD (chronic obstructive pulmonary disease) ([facility]) (POA: Yes)    CAD (coronary artery disease) (POA: Yes)    Essential hypertension (POA: Yes)    Cigarette smoker (POA: Yes)    Dyslipidemia (POA: Yes)  Resolved Problems:    * No resolved hospital problems. *      FOLLOW UP  No future appointments.  [facility]  NEUROLOGY  [address]  [place] [address]  [phone]  Schedule an appointment as soon as possible for a visit in 2 week(s)  for recheck.      MEDICATIONS ON DISCHARGE     Medication List        START taking these medications        Instructions   atorvastatin 40 MG Tabs  Commonly known as: Lipitor   Take 1 Tablet by mouth every evening.  Dose: 40 mg            CHANGE how you take these medications        Instructions   lisinopril 20 MG Tabs  What changed: Another medication with the same name was removed. Continue taking this medication, and follow the directions you see here.  Commonly known as: Prinivil   Take 1 Tablet by mouth at bedtime. Indications: High Blood Pressure  Dose: 20 mg            CONTINUE taking these medications        Instructions   albuterol 108 (90 Base) MCG/ACT Aers inhalation aerosol   Doctor's comments: Give albuterol that is patient or insurance preference please  Inhale 2 Puffs every four hours as needed for Shortness of Breath.  Dose: 2 Puff     aspirin 81 MG EC tablet   Take 81 mg by mouth every morning.  Dose: 81 mg     buPROPion 75 MG Tabs  Commonly known as: Wellbutrin   Take 1 Tablet by mouth 2 times a day.  Dose: 75 mg     tadalafil 10 MG tablet  Commonly known as: Cialis   Take 1 Tablet by mouth 1 time a day as needed for Erectile Dysfunction.  Dose: 10 mg            STOP taking these medications      simvastatin 20 MG Tabs  Commonly known as: Zocor              Allergies  Allergies[1]    DIET  Orders Placed This Encounter   Procedures    Diet Order Diet: Cardiac     Standing Status:   Standing     Number of Occurrences:   1     Diet::   Cardiac [6]       ACTIVITY  As tolerated.  Weight bearing as tolerated    CONSULTATIONS  Neurology  Neuro IR  intensivist    PROCEDURES  Immediate Post- Operative Note           PostOp Diagnosis: Rt MCA M1 occlusion, acute stroke     Procedure(s): Cerebral Angiogram and mechanical thrombectomy with Penumbra system.     Findings: Rt MCA  Occlusion TICI 3 recanalization achieved.     Access: Rt CFA 8 fr sheath closed with closure device.     Estimated Blood Loss:~ 50 ml     Complications: None.     Discussed with Neurohospitalist.     Post procedure:     Patient to ICU  Maintain SBP strictly between 110-140 mm Hg         Kyra Resendiz M.D.          LABORATORY  Lab Results   Component Value Date    SODIUM 140 05/29/2025    POTASSIUM 4.1 05/29/2025    CHLORIDE 108 05/29/2025    CO2 20 05/29/2025    GLUCOSE 101 (H) 05/29/2025    BUN 11 05/29/2025    CREATININE 0.87 05/29/2025        Lab Results   Component Value Date    WBC 11.5 (H) 05/29/2025    HEMOGLOBIN 16.6 05/29/2025    HEMATOCRIT 49.4 05/29/2025    PLATELETCT 181 05/29/2025        Total time of the discharge process exceeds 45 minutes.       [1] No Known Allergies

## 2025-05-30 NOTE — PROGRESS NOTES
Pt refused bed alarm, despite education provided. Pt understands he is a fall risk. Charge RN aware.

## 2025-05-30 NOTE — DISCHARGE PLANNING
Per MRI  A few small areas of acute infarcts in the right cerebral hemisphere in the distribution of right middle cerebral artery. The right M1 and M2 segments are widely patent. Minimal subarachnoid hemorrhage. Therapy notes reviewed. Likely not needing inpatient therapy intervention. Case reviewed with physiatry no consult at this time. Anticipate OP follow up when medically cleared.

## 2025-05-30 NOTE — PROGRESS NOTES
Discharge orders received.  Patient arrived to the discharge lounge.  PIV removed.  Instructions given, medications reviewed and general discharge education provided to patient.  Follow up appointments discussed.  Patient verbalized understanding of dc instructions and prescriptions.  Patient signed discharge instructions.  Patient verbalized understanding had all belongings with him.  Patient left with meds and family.  Wished patient a speedy recovery.

## 2025-05-30 NOTE — ASSESSMENT & PLAN NOTE
I spoke with the patient at length about his tobacco use.  His wife fortunately does not smoke.  He has been trying to quit, and was recently started on bupropion.  We discussed the benefits of quitting particularly as regards his stroke, options such as bupropion and nicotine replacement therapies.  5 minutes spent counseling the patient as noted above

## 2025-05-30 NOTE — CARE PLAN
The patient is Stable - Low risk of patient condition declining or worsening    Shift Goals  Clinical Goals: stable neuro checks, rest, comfort  Patient Goals: rest  Family Goals: JEREMY    Progress made toward(s) clinical / shift goals: pt remains aox4 with stable neuro checks, bed locked and low, bed alarm in place, call light within reach.   Problem: Optimal Care of the Stroke Patient  Goal: Optimal emergency care for the stroke patient  Outcome: Progressing  Goal: Optimal acute care for the stroke patient  Outcome: Progressing     Problem: Discharge Planning - Stroke  Goal: Ensure Stroke Core Measures are met prior to discharge  Outcome: Progressing  Goal: Patient’s continuum of care needs will be met  Outcome: Progressing     Problem: Neuro Status  Goal: Neuro status will remain stable or improve  Outcome: Progressing     Problem: Hemodynamic Monitoring  Goal: Patient's hemodynamics, fluid balance and neurologic status will be stable or improve  Outcome: Progressing     Problem: Self Care  Goal: Patient will have the ability to perform ADLs independently or with assistance (bathe, groom, dress, toilet and feed)  Outcome: Progressing     Problem: Skin Integrity  Goal: Skin integrity is maintained or improved  Outcome: Progressing     Problem: Fall Risk  Goal: Patient will remain free from falls  Outcome: Progressing       Patient is not progressing towards the following goals:

## 2025-05-30 NOTE — PROGRESS NOTES
Monitor summary: SR/SB 56-80, GA 0.18, QRS 0.09, QT 0.46 with rare PVCs and PACs, per strip from monitor room.

## 2025-05-30 NOTE — PROGRESS NOTES
Brief Stroke Neurology Note    Echocardiogram completed, shows LV EF 60% with normal RV, no evidence of shunt or atriopathy. Patient will need Zio patch on discharge. Follow [facility] as outpatient. Continue with ASA 81mg daily and atorvastatin 40mg qhs, currently at goal of LDL <70.  There are no further acute neurology recommendations at this time. Please call with any questions.    Case reviewed and plan created with Dr. Eric Cintron, Vascular Neurology. Please call with any questions.    ILYA Wayne  Vascular Neurology, Inpatient Neurology  [phone]

## 2025-06-02 ENCOUNTER — OFFICE VISIT (OUTPATIENT)
Dept: CARDIOLOGY | Facility: MEDICAL CENTER | Age: 70
End: 2025-06-02
Attending: HOSPITALIST
Payer: MEDICARE

## 2025-06-02 ENCOUNTER — DOCUMENTATION (OUTPATIENT)
Dept: HEALTH INFORMATION MANAGEMENT | Facility: OTHER | Age: 70
End: 2025-06-02
Payer: MEDICARE

## 2025-06-02 ENCOUNTER — PATIENT OUTREACH (OUTPATIENT)
Dept: MEDICAL GROUP | Facility: MEDICAL CENTER | Age: 70
End: 2025-06-02

## 2025-06-02 VITALS
WEIGHT: 167 LBS | DIASTOLIC BLOOD PRESSURE: 60 MMHG | RESPIRATION RATE: 16 BRPM | SYSTOLIC BLOOD PRESSURE: 128 MMHG | BODY MASS INDEX: 23.91 KG/M2 | HEIGHT: 70 IN | HEART RATE: 67 BPM | OXYGEN SATURATION: 92 %

## 2025-06-02 DIAGNOSIS — I72.4 PSEUDOANEURYSM OF FEMORAL ARTERY (HCC): ICD-10-CM

## 2025-06-02 DIAGNOSIS — Z72.0 TOBACCO USE: ICD-10-CM

## 2025-06-02 DIAGNOSIS — E78.2 MIXED HYPERLIPIDEMIA: ICD-10-CM

## 2025-06-02 DIAGNOSIS — I10 HYPERTENSION, ESSENTIAL: ICD-10-CM

## 2025-06-02 DIAGNOSIS — I63.9 CEREBROVASCULAR ACCIDENT (CVA), UNSPECIFIED MECHANISM (HCC): Primary | ICD-10-CM

## 2025-06-02 PROCEDURE — 3074F SYST BP LT 130 MM HG: CPT | Performed by: INTERNAL MEDICINE

## 2025-06-02 PROCEDURE — 99212 OFFICE O/P EST SF 10 MIN: CPT | Mod: 25 | Performed by: INTERNAL MEDICINE

## 2025-06-02 PROCEDURE — 99204 OFFICE O/P NEW MOD 45 MIN: CPT | Mod: 25 | Performed by: INTERNAL MEDICINE

## 2025-06-02 PROCEDURE — 99406 BEHAV CHNG SMOKING 3-10 MIN: CPT | Performed by: INTERNAL MEDICINE

## 2025-06-02 PROCEDURE — 3078F DIAST BP <80 MM HG: CPT | Performed by: INTERNAL MEDICINE

## 2025-06-02 RX ORDER — NAPROXEN 375 MG/1
375 TABLET ORAL
COMMUNITY

## 2025-06-02 ASSESSMENT — FIBROSIS 4 INDEX: FIB4 SCORE: 3.54

## 2025-06-02 NOTE — PROGRESS NOTES
"INTERVENTIONAL CARDIOLOGY NEW PATIENT CONSULTATION    PCP: Lowell Rivas D.O.    1. Cerebrovascular accident (CVA), unspecified mechanism (HCC)    2. Pseudoaneurysm of femoral artery (HCC)    3. Tobacco use    4. Mixed hyperlipidemia    5. Hypertension, essential        Dale Dinero has cryptogenic stroke.  Will await the findings of the Zio patch.  If no stroke then we will pursue implanted loop recorder.    I counseled him for 5 minutes on smoking cessation.  Encouraged giving cigarettes to the medicine cabinet to bring consumption down to about 3 cigarettes/day and then attempting quit.    Blood pressure and cholesterol well-controlled.  He will remain on lisinopril and atorvastatin.    Follow up: to be determined after testing is complete      History: Dale Dinero is a 70 y.o. male with recent stroke presenting for assessment.  He also has aortobifemoral bypass and prior SFA intervention presenting for cardiac assessment.    May 30 he was discharged from the hospital after stroke from a right M1 stroke treated with thrombectomy.  CTA in the head neck was otherwise unremarkable and echocardiogram was normal.    The patient does not experience angina.    He smokes half pack per day    PE:  /60 (BP Location: Left arm, Patient Position: Sitting, BP Cuff Size: Adult)   Pulse 67   Resp 16   Ht 1.765 m (5' 9.5\")   Wt 75.8 kg (167 lb)   SpO2 92%   BMI 24.31 kg/m²   GEN: NAD  RESP: CTAB  CVS: RRR, No M/R/G  ABD: Soft, NT/ND  EXT: WWP, no edema    Studies interpreted by me: ECG: Normal sinus rhythm  Echo:  Normal echo-normal LVEF    The ASCVD Risk score (Santo DK, et al., 2019) failed to calculate.    Studies  Lab Results   Component Value Date/Time    CHOLSTRLTOT 108 05/29/2025 01:50 AM    LDL 55 05/29/2025 01:50 AM    HDL 40 05/29/2025 01:50 AM    TRIGLYCERIDE 65 05/29/2025 01:50 AM       Lab Results   Component Value Date/Time    SODIUM 140 05/29/2025 01:50 AM    POTASSIUM 4.1 05/29/2025 " 01:50 AM    CHLORIDE 108 05/29/2025 01:50 AM    CO2 20 05/29/2025 01:50 AM    GLUCOSE 101 (H) 05/29/2025 01:50 AM    BUN 11 05/29/2025 01:50 AM    CREATININE 0.87 05/29/2025 01:50 AM      Lab Results   Component Value Date/Time    PROTHROMBTM 12.7 05/28/2025 02:46 PM    INR 0.95 05/28/2025 02:46 PM      Lab Results   Component Value Date/Time    WBC 11.5 (H) 05/29/2025 01:50 AM    RBC 5.57 05/29/2025 01:50 AM    HEMOGLOBIN 16.6 05/29/2025 01:50 AM    HEMATOCRIT 49.4 05/29/2025 01:50 AM    MCV 88.7 05/29/2025 01:50 AM    MCH 29.8 05/29/2025 01:50 AM    MCHC 33.6 05/29/2025 01:50 AM    MPV 9.5 05/29/2025 01:50 AM    NEUTSPOLYS 71.20 05/29/2025 01:50 AM    LYMPHOCYTES 19.20 (L) 05/29/2025 01:50 AM    MONOCYTES 8.50 05/29/2025 01:50 AM    EOSINOPHILS 0.30 05/29/2025 01:50 AM    BASOPHILS 0.40 05/29/2025 01:50 AM        Past Medical History[1]  Past Surgical History[2]  Allergies[3]  Encounter Medications[4]  Social History     Socioeconomic History    Marital status:      Spouse name: Not on file    Number of children: Not on file    Years of education: Not on file    Highest education level: Associate degree: occupational, technical, or vocational program   Occupational History    Not on file   Tobacco Use    Smoking status: Every Day     Current packs/day: 1.00     Average packs/day: 1 pack/day for 53.2 years (53.2 ttl pk-yrs)     Types: Cigarettes     Start date: 3/8/1972    Smokeless tobacco: Never    Tobacco comments:     Working on quiting   Vaping Use    Vaping status: Never Used   Substance and Sexual Activity    Alcohol use: No     Comment: Patient stopped drinking 5/28/2018 in sobriety    Drug use: Never    Sexual activity: Yes     Partners: Female     Birth control/protection: None     Comment: Had other surguries bi lateral aorta to femeral grafts   Other Topics Concern    Not on file   Social History Narrative    Not on file     Social Drivers of Health     Financial Resource Strain: Low Risk   (11/3/2024)    Overall Financial Resource Strain (CARDIA)     Difficulty of Paying Living Expenses: Not very hard   Food Insecurity: No Food Insecurity (5/29/2025)    Hunger Vital Sign     Worried About Running Out of Food in the Last Year: Never true     Ran Out of Food in the Last Year: Never true   Transportation Needs: No Transportation Needs (5/29/2025)    PRAPARE - Transportation     Lack of Transportation (Medical): No     Lack of Transportation (Non-Medical): No   Physical Activity: Insufficiently Active (11/3/2024)    Exercise Vital Sign     Days of Exercise per Week: 1 day     Minutes of Exercise per Session: 20 min   Stress: No Stress Concern Present (11/3/2024)    Citizen of [place] of Occupational Health - Occupational Stress Questionnaire     Feeling of Stress : Not at all   Social Connections: Moderately Integrated (11/3/2024)    Social Connection and Isolation Panel [NHANES]     Frequency of Communication with Friends and Family: More than three times a week     Frequency of Social Gatherings with Friends and Family: Once a week     Attends Pentec[employer]al Services: Never     Active Member of Clubs or Organizations: No     Attends Club or Organization Meetings: More than 4 times per year     Marital Status:    Intimate Partner Violence: Not At Risk (5/28/2025)    Humiliation, Afraid, Rape, and Kick questionnaire     Fear of Current or Ex-Partner: No     Emotionally Abused: No     Physically Abused: No     Sexually Abused: No   Housing Stability: Low Risk  (5/29/2025)    Housing Stability Vital Sign     Unable to Pay for Housing in the Last Year: No     Number of Times Moved in the Last Year: 0     Homeless in the Last Year: No     Family History   Problem Relation Age of Onset    Other Mother         accident    Colorectal Cancer Father     Cancer Father         Colan cancer    Diabetes Father     Cancer Sister         basal cell carcinoma    Cancer Brother         lymphoma       Chief Complaint    Patient presents with    Hypertension     NP per referral dx: Hypertension, unspecified type       ROS:   10 point review systems is otherwise negative except as per the HPI       [1]   Past Medical History:  Diagnosis Date    Acute cerebrovascular accident (CVA) (HCC) 5/28/2025    Anesthesia     Intraoperative awareness around 1990, biopsy of liver.    Awareness under anesthesia     Basal cell carcinoma (BCC) in situ of skin     Blood clotting disorder (HCC)     Right leg 2023    CAD (coronary artery disease)     Patient denies    Cancer (HCC)     skin    COPD (chronic obstructive pulmonary disease) (HCC)     COPD (chronic obstructive pulmonary disease) (HCC)     Emphysema of lung (HCC)     Patient denies    Heart burn     Hep C w/o coma, chronic (HCC)     Hepatitis C     treated.  Scarring of liver stage 1.    High cholesterol     Hospital discharge follow-up 06/05/2023    Hypertension     states controlled on meds    Indigestion     Renal disorder     Patient denies any kidney issues    Snoring     Soft tissue infection 05/25/2023    Stenosis of aorta     Stenosis of femoral artery (HCC)     Wound dehiscence 05/19/2023   [2]   Past Surgical History:  Procedure Laterality Date    LUMBAR LAMINECTOMY DISKECTOMY  08/12/2024    Procedure: POSTERIOR BILATERAL L3-4 LUMBAR INTERNAL DECOMPRESSION;  Surgeon: Franc Carlisle M.D.;  Location: SURGERY [facility];  Service: Neurosurgery    DEBRIDEMENT Right 05/19/2023    Procedure: RIGHT GROIN WOUND DEBRIDEMENT;  Surgeon: Negar Machado M.D.;  Location: SURGERY [facility];  Service: General    AR REPR ANEURYSM/GRFT INS,COMMON FEMORAL Right 05/03/2023    Procedure: OPEN REPAIR OF RIGHT GROIN PSEUDOANEURYSM;  Surgeon: Negar Machado M.D.;  Location: SURGERY [facility];  Service: General    AORTOGRAM Right 05/03/2023    Procedure: AORTOGRAM, RIGHT LEG;  Surgeon: Negar Machado M.D.;  Location: SURGERY [facility];  Service: General    THROMBECTOMY Right 05/03/2023     Procedure: THROMBECTOMY, RIGHT LEG;  Surgeon: Negar Machado M.D.;  Location: SURGERY [facility];  Service: General    INGUINAL HERNIA REPAIR BILATERAL      LAMINOTOMY      lumbar (rods)    MENISCUS REPAIR Left     OTHER  2014 2023    Bilateral aorta to femeral arteries grafts    OTHER NEUROLOGICAL SURG  2014    Cage 4 th lumbar    OTHER ORTHOPEDIC SURGERY  2014    TONSILLECTOMY      As a baby   [3] No Known Allergies  [4]   Outpatient Encounter Medications as of 6/2/2025   Medication Sig Dispense Refill    naproxen (NAPROSYN) 375 MG Tab Take 375 mg by mouth 1 time a day as needed.      atorvastatin (LIPITOR) 40 MG Tab Take 1 Tablet by mouth every evening. 100 Tablet 3    lisinopril (PRINIVIL) 20 MG Tab Take 1 Tablet by mouth at bedtime. Indications: High Blood Pressure 100 Tablet 3    tadalafil (CIALIS) 10 MG tablet Take 1 Tablet by mouth 1 time a day as needed for Erectile Dysfunction. 10 Tablet 11    buPROPion (WELLBUTRIN) 75 MG Tab Take 1 Tablet by mouth 2 times a day. 200 Tablet 3    albuterol 108 (90 Base) MCG/ACT Aero Soln inhalation aerosol Inhale 2 Puffs every four hours as needed for Shortness of Breath. 18 g 2    aspirin 81 MG EC tablet Take 81 mg by mouth every morning.       No facility-administered encounter medications on file as of 6/2/2025.

## 2025-06-03 NOTE — PROGRESS NOTES
06/02/25: First attempt to reach pt for TCM outreach. LVM with contact information for pt to call back.  06/03/25: Second attempt to reach pt for TCM outreach. LVM with contact information for pt to call back.

## 2025-06-05 ENCOUNTER — TELEPHONE (OUTPATIENT)
Dept: HEALTH INFORMATION MANAGEMENT | Facility: OTHER | Age: 70
End: 2025-06-05
Payer: MEDICARE

## 2025-06-09 ENCOUNTER — TELEPHONE (OUTPATIENT)
Dept: NEUROLOGY | Facility: MEDICAL CENTER | Age: 70
End: 2025-06-09
Payer: MEDICARE

## 2025-06-19 DIAGNOSIS — I63.9 CEREBROVASCULAR ACCIDENT (CVA), UNSPECIFIED MECHANISM (HCC): Primary | ICD-10-CM

## 2025-06-20 ENCOUNTER — TELEPHONE (OUTPATIENT)
Facility: MEDICAL CENTER | Age: 70
End: 2025-06-20
Payer: MEDICARE

## 2025-06-23 ENCOUNTER — TELEPHONE (OUTPATIENT)
Dept: CARDIOLOGY | Facility: MEDICAL CENTER | Age: 70
End: 2025-06-23
Payer: MEDICARE

## 2025-06-23 NOTE — TELEPHONE ENCOUNTER
Juan Antonio AT [facility] Final Report to [facility]'S nurse Tabatha on 6/23/2025  Established patient  Also routed to Neuroscience Zio Patch pool

## 2025-06-24 ENCOUNTER — OFFICE VISIT (OUTPATIENT)
Dept: MEDICAL GROUP | Facility: MEDICAL CENTER | Age: 70
End: 2025-06-24
Payer: MEDICARE

## 2025-06-24 VITALS
RESPIRATION RATE: 16 BRPM | OXYGEN SATURATION: 94 % | TEMPERATURE: 97.8 F | WEIGHT: 164.8 LBS | HEART RATE: 76 BPM | DIASTOLIC BLOOD PRESSURE: 70 MMHG | HEIGHT: 68 IN | SYSTOLIC BLOOD PRESSURE: 144 MMHG | BODY MASS INDEX: 24.98 KG/M2

## 2025-06-24 DIAGNOSIS — F41.1 GENERALIZED ANXIETY DISORDER: ICD-10-CM

## 2025-06-24 DIAGNOSIS — I10 ESSENTIAL HYPERTENSION: ICD-10-CM

## 2025-06-24 DIAGNOSIS — E78.5 DYSLIPIDEMIA: ICD-10-CM

## 2025-06-24 DIAGNOSIS — I63.9 ISCHEMIC STROKE (HCC): ICD-10-CM

## 2025-06-24 DIAGNOSIS — F17.210 CIGARETTE SMOKER: ICD-10-CM

## 2025-06-24 DIAGNOSIS — I47.10 PAROXYSMAL SVT (SUPRAVENTRICULAR TACHYCARDIA) (HCC): ICD-10-CM

## 2025-06-24 PROCEDURE — RXMED WILLOW AMBULATORY MEDICATION CHARGE: Performed by: FAMILY MEDICINE

## 2025-06-24 PROCEDURE — 93228 REMOTE 30 DAY ECG REV/REPORT: CPT | Performed by: INTERNAL MEDICINE

## 2025-06-24 RX ORDER — METOPROLOL SUCCINATE 25 MG/1
25 TABLET, EXTENDED RELEASE ORAL DAILY
Qty: 100 TABLET | Refills: 3 | Status: SHIPPED | OUTPATIENT
Start: 2025-06-24

## 2025-06-24 RX ORDER — CITALOPRAM HYDROBROMIDE 20 MG/1
20 TABLET ORAL NIGHTLY
Qty: 100 TABLET | Refills: 3 | Status: SHIPPED | OUTPATIENT
Start: 2025-06-24

## 2025-06-24 ASSESSMENT — ANXIETY QUESTIONNAIRES
4. TROUBLE RELAXING: NEARLY EVERY DAY
7. FEELING AFRAID AS IF SOMETHING AWFUL MIGHT HAPPEN: NEARLY EVERY DAY
5. BEING SO RESTLESS THAT IT IS HARD TO SIT STILL: NEARLY EVERY DAY
2. NOT BEING ABLE TO STOP OR CONTROL WORRYING: NEARLY EVERY DAY
3. WORRYING TOO MUCH ABOUT DIFFERENT THINGS: NEARLY EVERY DAY
1. FEELING NERVOUS, ANXIOUS, OR ON EDGE: MORE THAN HALF THE DAYS
IF YOU CHECKED OFF ANY PROBLEMS ON THIS QUESTIONNAIRE, HOW DIFFICULT HAVE THESE PROBLEMS MADE IT FOR YOU TO DO YOUR WORK, TAKE CARE OF THINGS AT HOME, OR GET ALONG WITH OTHER PEOPLE: EXTREMELY DIFFICULT
6. BECOMING EASILY ANNOYED OR IRRITABLE: NEARLY EVERY DAY
GAD7 TOTAL SCORE: 20

## 2025-06-24 ASSESSMENT — FIBROSIS 4 INDEX: FIB4 SCORE: 3.54

## 2025-06-24 ASSESSMENT — PATIENT HEALTH QUESTIONNAIRE - PHQ9: CLINICAL INTERPRETATION OF PHQ2 SCORE: 0

## 2025-06-24 NOTE — PROGRESS NOTES
"Verbal consent was acquired by the patient to use [employer] ambient listening note generation during this visit    Subjective:     CC: \"hospital follow up\"    History of Present Illness  The patient presents for evaluation of stroke, hypertension, and anxiety.    Stroke  He was hospitalized from 05/28/2025 to 05/30/2025 due to a stroke, during which a clot was successfully removed. His physician suspects that the stroke may have been a consequence of aortic stenosis, prompting an order for a duplex scan of his heart. The cardiologist reported normal findings, but a 2-week cardiac event monitor revealed some events, including tachycardia and extra heartbeats. He reports no abnormal heart rhythms or palpitations. He is currently on atorvastatin 40 mg and aspirin 81 mg. He is scheduled for a carotid Doppler test in 2 days. He has follow-up appointments with cardiology and vascular surgery, and a neurology appointment on 07/23/2025. He is also on Wellbutrin for smoking cessation, which he reports as ineffective. He is considering attending a smoking cessation class. He reports no changes in his condition while wearing the heart monitor. He has been experiencing frequent hiccups since his stroke.  - Onset: Hospitalized from 05/28/2025 to 05/30/2025 due to a stroke.  - Duration: Since the stroke.  - Character: Stroke with clot removal, frequent hiccups.  - Alleviating/Aggravating Factors: No changes while wearing the heart monitor.  - Severity: Frequent hiccups since the stroke.    Hypertension  His blood pressure medication was increased from 5 mg to 20 mg due to elevated readings. He monitors his blood pressure at home, which typically reads around 150 systolic. He has been experiencing frequent headaches. He is on lisinopril 20 mg.  - Onset: Elevated readings prompting medication increase.  - Duration: Ongoing.  - Character: Elevated blood pressure readings, frequent headaches.  - Severity: Blood pressure typically " "around 150 systolic.    Anxiety  He reports difficulty in connecting certain thoughts and experiences new episodes of violent rage. He is interested in trying an anxiety medication. His sleep pattern is normal, waking up at 7:00 AM daily.  - Onset: New episodes of violent rage.  - Character: Difficulty connecting thoughts, violent rage.  - Severity: Interested in trying anxiety medication.    PAST SURGICAL HISTORY:  Aortic grafting    SOCIAL HISTORY  He is taking Wellbutrin for smoking cessation.          Objective:     Exam:  BP (!) 144/70 (BP Location: Left arm, Patient Position: Sitting, BP Cuff Size: Adult)   Pulse 76   Temp 36.6 °C (97.8 °F) (Temporal)   Resp 16   Ht 1.727 m (5' 8\")   Wt 74.8 kg (164 lb 12.8 oz)   SpO2 94%   BMI 25.06 kg/m²  Body mass index is 25.06 kg/m².    Physical Exam  General Appearance: Normal.  Vital signs: Within normal limits.  HEENT: Within normal limits.  Respiratory: Clear to auscultation, no wheezing, rales or rhonchi.  Cardiovascular: Regular rate and rhythm, no murmurs, rubs, or gallops.  Skin: Warm and dry, no rash.  Neurological: Normal.  Physical Exam        Results  Imaging   - CTA of the neck: Focal moderate atherosclerotic stenosis in the proximal left vertebral artery.   - Echocardiogram: 05/30/2025, Ejection fraction 60%, heart was normal size and function, no aortic valve stenosis, ascending aortic diameter is 3.3 cm, pulmonic valve, no stenosis, tricuspid valve look good as well.    Diagnostic Testing   - Cardiac event monitor: Predominant underlying rhythm was sinus rhythm with some tachycardia, longest interval lasting four beats.      Assessment & Plan:       1. Ischemic stroke (HCC)    2. Essential hypertension  - metoprolol SR (TOPROL XL) 25 MG TABLET SR 24 HR; Take 1 Tablet by mouth every day.  Dispense: 100 Tablet; Refill: 3    3. Generalized anxiety disorder  - citalopram (CELEXA) 20 MG Tab; Take 1 Tablet by mouth every evening.  Dispense: 100 Tablet; " Refill: 3    4. Cigarette smoker    5. Dyslipidemia    6. Paroxysmal SVT (supraventricular tachycardia) (HCC)  - metoprolol SR (TOPROL XL) 25 MG TABLET SR 24 HR; Take 1 Tablet by mouth every day.  Dispense: 100 Tablet; Refill: 3      Assessment & Plan  1. Stroke: Acute.  - Experienced a stroke at the right M1 segment; etiology of blood clots remains uncertain, possibly due to cholesterol, blood pressure, and smoking. CTA of the neck revealed focal moderate atherosclerotic stenosis in the proximal left vertebral artery; echocardiogram from 05/30/2025 showed ejection fraction of 60%, normal heart size and function, no aortic valve stenosis, pulmonic valve stenosis, or tricuspid valve abnormalities.  - Currently on high-dose atorvastatin 40 mg and aspirin 81 mg for stroke prevention.  - Prescription for metoprolol provided to manage blood pressure and tachycardia; start with half a tablet daily for the first week to ensure tolerance.    2. Hypertension: Chronic.  - Blood pressure remains slightly elevated today; advised to monitor closely and stay hydrated.  - Currently on lisinopril 20 mg.  - Prescription for metoprolol provided to manage blood pressure and tachycardia; start with half a tablet daily for the first week to ensure tolerance.  - Continue current regimen of lisinopril 20 mg.    3. Anxiety: Acute.  - Reports increased irritability and violent rage since stroke.  - Prescription for citalopram 20 mg provided to manage anxiety; start with half a tablet daily for the first week to ensure tolerance.  - Potential side effects discussed: upset stomach, changes in stools, worsening erectile dysfunction; continue current regimen of Wellbutrin.    Follow-up  - Follow-up scheduled for August 2025.         Return in about 6 weeks (around 8/5/2025), or if symptoms worsen or fail to improve, for Medication F/U.      This note was created using voice recognition software (Dragon). The accuracy of the dictation is limited  by the abilities of the software. I have reviewed the note prior to signing, however some errors in grammar and context are still possible. If you have any questions related to this note please do not hesitate to contact our office.

## 2025-06-25 ENCOUNTER — PHARMACY VISIT (OUTPATIENT)
Dept: PHARMACY | Facility: MEDICAL CENTER | Age: 70
End: 2025-06-25
Payer: COMMERCIAL

## 2025-06-25 ENCOUNTER — RESULTS FOLLOW-UP (OUTPATIENT)
Dept: CARDIOLOGY | Facility: MEDICAL CENTER | Age: 70
End: 2025-06-25
Payer: MEDICARE

## 2025-06-26 ENCOUNTER — HOSPITAL ENCOUNTER (OUTPATIENT)
Dept: RADIOLOGY | Facility: MEDICAL CENTER | Age: 70
End: 2025-06-26
Attending: SURGERY
Payer: MEDICARE

## 2025-06-26 DIAGNOSIS — I63.9 CEREBROVASCULAR ACCIDENT (CVA), UNSPECIFIED MECHANISM (HCC): ICD-10-CM

## 2025-06-26 PROCEDURE — 93880 EXTRACRANIAL BILAT STUDY: CPT

## 2025-06-30 ENCOUNTER — TELEPHONE (OUTPATIENT)
Dept: CARDIOLOGY | Facility: MEDICAL CENTER | Age: 70
End: 2025-06-30
Payer: MEDICARE

## 2025-06-30 DIAGNOSIS — I63.9 CEREBROVASCULAR ACCIDENT (CVA), UNSPECIFIED MECHANISM (HCC): Primary | ICD-10-CM

## 2025-06-30 NOTE — TELEPHONE ENCOUNTER
----- Message from Physician Gary Gomez M.D. sent at 6/30/2025  9:02 AM PDT -----  Monitor without AF. Can we get Tamir referred for a loop recorder in accordance with out discussion from clinic.   Thx  BE   PAST MEDICAL HISTORY:  ADHD (attention deficit hyperactivity disorder)     Celiac disease in pediatric patient     Hallucinations Auditory & Visual    Juvenile rheumatoid arthritis with systemic onset     Mood disorder Anxiety & Depression    OCD (obsessive compulsive disorder)     Self mutilating behavior scratching herself    Suicidal ideation

## 2025-07-03 ENCOUNTER — HOSPITAL ENCOUNTER (OUTPATIENT)
Facility: MEDICAL CENTER | Age: 70
End: 2025-07-03
Attending: INTERNAL MEDICINE | Admitting: INTERNAL MEDICINE
Payer: MEDICARE

## 2025-07-07 ENCOUNTER — PRE-ADMISSION TESTING (OUTPATIENT)
Dept: ADMISSIONS | Facility: MEDICAL CENTER | Age: 70
End: 2025-07-07
Payer: MEDICARE

## 2025-07-07 ENCOUNTER — PATIENT MESSAGE (OUTPATIENT)
Dept: CARDIOLOGY | Facility: MEDICAL CENTER | Age: 70
End: 2025-07-07
Payer: MEDICARE

## 2025-07-08 ENCOUNTER — PATIENT MESSAGE (OUTPATIENT)
Dept: CARDIOLOGY | Facility: MEDICAL CENTER | Age: 70
End: 2025-07-08
Payer: MEDICARE

## 2025-07-15 ENCOUNTER — OFFICE VISIT (OUTPATIENT)
Facility: MEDICAL CENTER | Age: 70
End: 2025-07-15
Payer: MEDICARE

## 2025-07-15 ENCOUNTER — APPOINTMENT (OUTPATIENT)
Dept: ADMISSIONS | Facility: MEDICAL CENTER | Age: 70
DRG: 039 | End: 2025-07-15
Attending: SURGERY
Payer: MEDICARE

## 2025-07-15 VITALS
DIASTOLIC BLOOD PRESSURE: 72 MMHG | HEIGHT: 68 IN | TEMPERATURE: 98.3 F | OXYGEN SATURATION: 95 % | HEART RATE: 60 BPM | WEIGHT: 161.38 LBS | SYSTOLIC BLOOD PRESSURE: 130 MMHG | BODY MASS INDEX: 24.46 KG/M2

## 2025-07-15 DIAGNOSIS — Z72.0 TOBACCO USE: ICD-10-CM

## 2025-07-15 DIAGNOSIS — I65.21 STENOSIS OF RIGHT CAROTID ARTERY: Primary | ICD-10-CM

## 2025-07-15 DIAGNOSIS — Z86.73 HISTORY OF STROKE: ICD-10-CM

## 2025-07-15 DIAGNOSIS — I73.9 PERIPHERAL ARTERIAL DISEASE (HCC): ICD-10-CM

## 2025-07-15 DIAGNOSIS — E78.5 DYSLIPIDEMIA: ICD-10-CM

## 2025-07-15 DIAGNOSIS — I10 PRIMARY HYPERTENSION: ICD-10-CM

## 2025-07-15 PROCEDURE — 3075F SYST BP GE 130 - 139MM HG: CPT | Performed by: SURGERY

## 2025-07-15 PROCEDURE — 99214 OFFICE O/P EST MOD 30 MIN: CPT | Performed by: SURGERY

## 2025-07-15 PROCEDURE — 3078F DIAST BP <80 MM HG: CPT | Performed by: SURGERY

## 2025-07-15 ASSESSMENT — FIBROSIS 4 INDEX: FIB4 SCORE: 3.54

## 2025-07-15 NOTE — PROGRESS NOTES
"  VASCULAR SURGERY SERVICE  OFFICE FOLLOW UP      Date: 7/15/2025    Referring Provider: Lowell Rivas DO     Consulting Physician: Negar Machado MD - [facility]      -------------------------------------------------------------------------------------------------     Chief complaint:  \"Here for follow-up\".     HPI:  Mr. Dinero returns to the clinic today for follow-up, status post right leg angiogram and angioplasty of severe to critical right superficial femoral artery stenosis using drug-coated balloon on December 28, 2023, open repair of right groin pseudoaneurysm on May 3, 2023 and right groin wound debridement and wound VAC placement on May 19, 2023, and aortobifemoral bypass on September 22, 2014.      Since I last saw him, he developed right hemispheric embolic infarcts in May of this year.  Echocardiogram was negative for intracardiac thrombus.  His cardiac monitoring was negative for atrial fibrillation.  The CTA of the carotids was read as no significant stenosis on either side.     On follow-up today, he has no complaints.  He has been on aspirin.  Unfortunately, he continues to smoke but tells me that he is scheduled to undergo hypnotic therapy in the near future to have him to stop smoking..     Recent arterial studies showed normal bilateral ABIs and no evidence of recurrent stenosis of the right mid to distal superficial femoral artery.      Past Medical History[1]    Past Surgical History[2]    Current Medications[3]    Social History     Socioeconomic History    Marital status:      Spouse name: Not on file    Number of children: Not on file    Years of education: Not on file    Highest education level: Associate degree: occupational, technical, or vocational program   Occupational History    Not on file   Tobacco Use    Smoking status: Every Day     Current packs/day: 1.00     Average packs/day: 1 pack/day for 53.4 years (53.4 ttl pk-yrs)     Types: Cigarettes     Start date: 3/8/1972 "    Smokeless tobacco: Never    Tobacco comments:     Working on quiting   Vaping Use    Vaping status: Never Used   Substance and Sexual Activity    Alcohol use: Not Currently     Comment: Patient stopped drinking 5/28/2018 in sobriety    Drug use: Never    Sexual activity: Yes     Partners: Female     Birth control/protection: None     Comment: Had other surguries bi lateral aorta to femeral grafts   Other Topics Concern    Not on file   Social History Narrative    Not on file     Social Drivers of Health     Financial Resource Strain: Low Risk  (11/3/2024)    Overall Financial Resource Strain (CARDIA)     Difficulty of Paying Living Expenses: Not very hard   Food Insecurity: No Food Insecurity (5/29/2025)    Hunger Vital Sign     Worried About Running Out of Food in the Last Year: Never true     Ran Out of Food in the Last Year: Never true   Transportation Needs: No Transportation Needs (5/29/2025)    PRAPARE - Transportation     Lack of Transportation (Medical): No     Lack of Transportation (Non-Medical): No   Physical Activity: Insufficiently Active (11/3/2024)    Exercise Vital Sign     Days of Exercise per Week: 1 day     Minutes of Exercise per Session: 20 min   Stress: No Stress Concern Present (11/3/2024)    [employer] [employer] - Occupational Stress Questionnaire     Feeling of Stress : Not at all   Social Connections: Moderately Integrated (11/3/2024)    Social Connection and Isolation Panel [NHANES]     Frequency of Communication with Friends and Family: More than three times a week     Frequency of Social Gatherings with Friends and Family: Once a week     Attends Evangelical Services: Never     Active Member of Clubs or Organizations: No     Attends Club or Organization Meetings: More than 4 times per year     Marital Status:    Intimate Partner Violence: Not At Risk (5/28/2025)    Humiliation, Afraid, Rape, and Kick questionnaire     Fear of Current or Ex-Partner: No      "Emotionally Abused: No     Physically Abused: No     Sexually Abused: No   Housing Stability: Low Risk  (5/29/2025)    Housing Stability Vital Sign     Unable to Pay for Housing in the Last Year: No     Number of Times Moved in the Last Year: 0     Homeless in the Last Year: No       Family History   Problem Relation Age of Onset    Other Mother         accident    Colorectal Cancer Father     Cancer Father         Colan    Diabetes Father     Cancer Sister         Skin cancer    Cancer Brother         Limpnoma       Allergies:  Patient has no known allergies.    Review of Systems:    Constitutional: Negative for fever, chills, weight loss,   HENT:   Negative for hearing loss or tinnitus    Eyes:    Negative for blurred vision, double vision, or loss of vision  Respiratory:  Negative for cough, hemoptysis, or wheezing    Cardiac:  Negative for chest pain or palpitations or orthopnea  Vascular:  Negative for claudication or rest pain   Gastrointestinal: Negative for nausea, vomiting, or abdominal pain     Negative for hematochezia or melena   Genitourinary: Negative for dysuria, frequency, or hematuria   Musculoskeletal: Negative for myalgias, back pain, or joint pain  Skin:   Negative for itching or rash  Neurological:  Negative for dizziness, headaches, or tremors     Negative for speech disturbance     Negative for extremity weakness or paresthesias  Endo/Heme:  Negative for easy bruising or bleeding  Psychiatric:  Negative for depression, suicidal ideas, or hallucinations    Physical Exam:  /72 (BP Location: Right arm, Patient Position: Sitting, BP Cuff Size: Adult)   Pulse 60   Temp 36.8 °C (98.3 °F) (Temporal)   Ht 1.727 m (5' 8\")   Wt 73.2 kg (161 lb 6 oz)   SpO2 95%     Constitutional:          Alert, oriented, no acute distress  HEENT:                       Normocephalic and atraumatic, EOMI  Neck:                          Supple, no JVD, no bruits.  Cardiovascular:         Regular rate and " rhythm  Pulmonary:                Good air entry bilaterally  Abdominal:                Soft, non-tender, non-distended                                      Aortic impulse not widened.  Well-healed scars.  Musculoskeletal:       No edema, no tenderness  Neurological:             CN II-XII grossly intact, no focal deficits  Skin:                           Skin is warm and dry. No rash noted.  Psychiatric:                Normal mood and affect.  Upper extremities: Palpable bilateral brachial pulses with multiphasic flow.  Lower extremities:    Well-healed groin scars.  Multiphasic flow over bilateral pedal arteries.  Feet are warm.  No ulceration.     Radiology:  I reviewed the CTA images which actually shows severe right proximal internal carotid artery and carotid bifurcation stenosis and moderate stenosis on the left.       Assessment:  -Severe right carotid artery stenosis with recent stroke.  -History of open repair of right common femoral artery pseudoaneurysm.  -Peripheral arterial disease.  -History of aortobifemoral bypass.  -Coronary artery disease.  -Hypertension.  -Dyslipidemia.  -Tobacco use.    Plan:  I had a long discussion with patient and his wife.  CTA of the neck images were reviewed with him.  Patient has severe right carotid artery stenosis.  This is more than likely the source of his recent stroke.  I therefore discussed with patient and his wife the options of: Right carotid surgery, right carotid stenting, and no invasive intervention along with the pros and cons of all approaches.  After a long discussion, patient and his wife would like to proceed with right carotid surgery, fully understanding risks which include, but not limited to, bleeding, infection, nerve injury, hyperperfusion syndrome, stroke, heart attack, and perioperative anesthetic complications.  I told patient that he will have numbness around the incision and in the right earlobe after the surgery which is normal and that the  numbness may take up to 6 months to resolve and therefore he should shave using an electrical shaver instead of a razor when he shaves to avoid cutting himself accidentally.  Patient and his wife indicated understanding.  All questions were answered.  At their request, the procedure will be performed on July 21, 2025, pending operating room availability.  Patient and his wife know to call 911 and have patient taken to the emergency room if he developed any neurological symptom at any time.  They were instructed on the signs and symptoms to look out for.      Negar Machado MD  [facility]   [place] preferred or call my office [phone]  __________________________________________________________________  Patient:Dale Dinero   MRN:[id number]   CSN:[id number]         [1]   Past Medical History:  Diagnosis Date    Acute cerebrovascular accident (CVA) (HCC) 5/28/2025    Anesthesia     Intraoperative awareness around 1990, biopsy of liver.    Awareness under anesthesia     Basal cell carcinoma (BCC) in situ of skin     Blood clotting disorder (HCC)     Right leg 2023    CAD (coronary artery disease)     Patient denies    Cancer (HCC)     skin    COPD (chronic obstructive pulmonary disease) (HCC)     COPD (chronic obstructive pulmonary disease) (HCC)     Emphysema of lung (HCC)     Patient denies    Heart burn     Hep C w/o coma, chronic (HCC)     Hepatitis C     treated.  Scarring of liver stage 1.    High cholesterol     Hospital discharge follow-up 06/05/2023    Hypertension     states controlled on meds    Indigestion     Renal disorder     Patient denies any kidney issues    Snoring     Soft tissue infection 05/25/2023    Stenosis of aorta     Stenosis of femoral artery (HCC)     Wound dehiscence 05/19/2023   [2]   Past Surgical History:  Procedure Laterality Date    LUMBAR LAMINECTOMY DISKECTOMY  08/12/2024    Procedure: POSTERIOR BILATERAL L3-4 LUMBAR INTERNAL DECOMPRESSION;  Surgeon: Franc MERINO  DEVEN [place];  Location: SURGERY [facility];  Service: Neurosurgery    DEBRIDEMENT Right 05/19/2023    Procedure: RIGHT GROIN WOUND DEBRIDEMENT;  Surgeon: Negar Machado M.D.;  Location: SURGERY [facility];  Service: General    TX REPR ANEURYSM/GRFT INS,COMMON FEMORAL Right 05/03/2023    Procedure: OPEN REPAIR OF RIGHT GROIN PSEUDOANEURYSM;  Surgeon: Negar Machado M.D.;  Location: SURGERY [facility];  Service: General    AORTOGRAM Right 05/03/2023    Procedure: AORTOGRAM, RIGHT LEG;  Surgeon: Negar Machado M.D.;  Location: SURGERY [facility];  Service: General    THROMBECTOMY Right 05/03/2023    Procedure: THROMBECTOMY, RIGHT LEG;  Surgeon: Negar Machado M.D.;  Location: SURGERY [facility];  Service: General    INGUINAL HERNIA REPAIR BILATERAL      LAMINOTOMY      lumbar (rods)    MENISCUS REPAIR Left     NO PERTINENT PAST SURGICAL HISTORY  2925    Stroke 2 blood clots removed from brain    OTHER  2014 2023    Bilateral aorta to femeral arteries grafts    OTHER NEUROLOGICAL SURG  2014    Cage 4 th lumbar    OTHER ORTHOPEDIC SURGERY  2014    TONSILLECTOMY      As a baby   [3]   Current Outpatient Medications   Medication Sig Dispense Refill    citalopram (CELEXA) 20 MG Tab Take 1 Tablet by mouth every evening. 100 Tablet 3    metoprolol SR (TOPROL XL) 25 MG TABLET SR 24 HR Take 1 Tablet by mouth every day. 100 Tablet 3    naproxen (NAPROSYN) 375 MG Tab Take 375 mg by mouth 1 time a day as needed.      atorvastatin (LIPITOR) 40 MG Tab Take 1 Tablet by mouth every evening. 100 Tablet 3    lisinopril (PRINIVIL) 20 MG Tab Take 1 Tablet by mouth at bedtime. Indications: High Blood Pressure 100 Tablet 3    tadalafil (CIALIS) 10 MG tablet Take 1 Tablet by mouth 1 time a day as needed for Erectile Dysfunction. 10 Tablet 11    buPROPion (WELLBUTRIN) 75 MG Tab Take 1 Tablet by mouth 2 times a day. 200 Tablet 3    albuterol 108 (90 Base) MCG/ACT Aero Soln inhalation aerosol Inhale 2 Puffs every four hours as needed  for Shortness of Breath. 18 g 2    aspirin 81 MG EC tablet Take 81 mg by mouth every morning.       No current facility-administered medications for this visit.

## 2025-07-16 ENCOUNTER — PRE-ADMISSION TESTING (OUTPATIENT)
Dept: ADMISSIONS | Facility: MEDICAL CENTER | Age: 70
DRG: 039 | End: 2025-07-16
Attending: SURGERY
Payer: MEDICARE

## 2025-07-16 RX ORDER — CALCIUM CARBONATE 500 MG/1
500-1000 TABLET, CHEWABLE ORAL
COMMUNITY

## 2025-07-16 NOTE — PREADMIT AVS NOTE
Current Medications   Medication Instructions    calcium carbonate (TUMS) 500 MG Chew Tab Hold medication day of procedure    citalopram (CELEXA) 20 MG Tab Continue taking medication as prescribed, including morning of procedure     metoprolol SR (TOPROL XL) 25 MG TABLET SR 24 HR Continue taking medication as prescribed, including morning of procedure     naproxen (NAPROSYN) 375 MG Tab Stop 5 days before surgery    atorvastatin (LIPITOR) 40 MG Tab Continue until night before surgery    lisinopril (PRINIVIL) 20 MG Tab Stop 24 hours before surgery    tadalafil (CIALIS) 10 MG tablet Stop 48 hours before surgery    buPROPion (WELLBUTRIN) 75 MG Tab Continue taking medication as prescribed, including morning of procedure     albuterol 108 (90 Base) MCG/ACT Aero Soln inhalation aerosol As needed medication, may take if needed, including morning of procedure     aspirin 81 MG EC tablet Follow instructions from surgeon or specialist.

## 2025-07-18 ENCOUNTER — PRE-ADMISSION TESTING (OUTPATIENT)
Dept: ADMISSIONS | Facility: MEDICAL CENTER | Age: 70
DRG: 039 | End: 2025-07-18
Attending: SURGERY
Payer: MEDICARE

## 2025-07-18 ENCOUNTER — APPOINTMENT (OUTPATIENT)
Dept: CARDIOLOGY | Facility: MEDICAL CENTER | Age: 70
End: 2025-07-18
Attending: INTERNAL MEDICINE
Payer: MEDICARE

## 2025-07-18 DIAGNOSIS — Z01.810 PRE-OPERATIVE CARDIOVASCULAR EXAMINATION: ICD-10-CM

## 2025-07-18 DIAGNOSIS — Z01.812 PRE-OPERATIVE LABORATORY EXAMINATION: Primary | ICD-10-CM

## 2025-07-18 LAB
ANION GAP SERPL CALC-SCNC: 11 MMOL/L (ref 7–16)
BASOPHILS # BLD AUTO: 0.5 % (ref 0–1.8)
BASOPHILS # BLD: 0.04 K/UL (ref 0–0.12)
BUN SERPL-MCNC: 16 MG/DL (ref 8–22)
CALCIUM SERPL-MCNC: 9.2 MG/DL (ref 8.5–10.5)
CHLORIDE SERPL-SCNC: 105 MMOL/L (ref 96–112)
CO2 SERPL-SCNC: 24 MMOL/L (ref 20–33)
CREAT SERPL-MCNC: 0.87 MG/DL (ref 0.5–1.4)
EKG IMPRESSION: NORMAL
EOSINOPHIL # BLD AUTO: 0.11 K/UL (ref 0–0.51)
EOSINOPHIL NFR BLD: 1.4 % (ref 0–6.9)
ERYTHROCYTE [DISTWIDTH] IN BLOOD BY AUTOMATED COUNT: 42.9 FL (ref 35.9–50)
GFR SERPLBLD CREATININE-BSD FMLA CKD-EPI: 94 ML/MIN/1.73 M 2
GLUCOSE SERPL-MCNC: 78 MG/DL (ref 65–99)
HCT VFR BLD AUTO: 51 % (ref 42–52)
HGB BLD-MCNC: 17.1 G/DL (ref 14–18)
IMM GRANULOCYTES # BLD AUTO: 0.02 K/UL (ref 0–0.11)
IMM GRANULOCYTES NFR BLD AUTO: 0.3 % (ref 0–0.9)
LYMPHOCYTES # BLD AUTO: 2.87 K/UL (ref 1–4.8)
LYMPHOCYTES NFR BLD: 36.7 % (ref 22–41)
MCH RBC QN AUTO: 29.6 PG (ref 27–33)
MCHC RBC AUTO-ENTMCNC: 33.5 G/DL (ref 32.3–36.5)
MCV RBC AUTO: 88.4 FL (ref 81.4–97.8)
MONOCYTES # BLD AUTO: 0.72 K/UL (ref 0–0.85)
MONOCYTES NFR BLD AUTO: 9.2 % (ref 0–13.4)
NEUTROPHILS # BLD AUTO: 4.06 K/UL (ref 1.82–7.42)
NEUTROPHILS NFR BLD: 51.9 % (ref 44–72)
NRBC # BLD AUTO: 0 K/UL
NRBC BLD-RTO: 0 /100 WBC (ref 0–0.2)
PLATELET # BLD AUTO: 202 K/UL (ref 164–446)
PMV BLD AUTO: 10 FL (ref 9–12.9)
POTASSIUM SERPL-SCNC: 4.5 MMOL/L (ref 3.6–5.5)
RBC # BLD AUTO: 5.77 M/UL (ref 4.7–6.1)
SODIUM SERPL-SCNC: 140 MMOL/L (ref 135–145)
WBC # BLD AUTO: 7.8 K/UL (ref 4.8–10.8)

## 2025-07-18 PROCEDURE — 85025 COMPLETE CBC W/AUTO DIFF WBC: CPT

## 2025-07-18 PROCEDURE — 80048 BASIC METABOLIC PNL TOTAL CA: CPT

## 2025-07-18 PROCEDURE — 93005 ELECTROCARDIOGRAM TRACING: CPT | Mod: TC

## 2025-07-18 PROCEDURE — 36415 COLL VENOUS BLD VENIPUNCTURE: CPT

## 2025-07-18 PROCEDURE — 93010 ELECTROCARDIOGRAM REPORT: CPT | Performed by: STUDENT IN AN ORGANIZED HEALTH CARE EDUCATION/TRAINING PROGRAM

## 2025-07-22 ENCOUNTER — ANESTHESIA EVENT (OUTPATIENT)
Dept: SURGERY | Facility: MEDICAL CENTER | Age: 70
DRG: 039 | End: 2025-07-22
Payer: MEDICARE

## 2025-07-23 ENCOUNTER — ANESTHESIA (OUTPATIENT)
Dept: SURGERY | Facility: MEDICAL CENTER | Age: 70
DRG: 039 | End: 2025-07-23
Payer: MEDICARE

## 2025-07-23 ENCOUNTER — APPOINTMENT (OUTPATIENT)
Dept: NEUROLOGY | Facility: MEDICAL CENTER | Age: 70
End: 2025-07-23
Attending: PSYCHIATRY & NEUROLOGY
Payer: MEDICARE

## 2025-07-23 ENCOUNTER — HOSPITAL ENCOUNTER (INPATIENT)
Facility: MEDICAL CENTER | Age: 70
LOS: 1 days | DRG: 039 | End: 2025-07-24
Attending: SURGERY | Admitting: SURGERY
Payer: MEDICARE

## 2025-07-23 PROBLEM — Z98.890 S/P CAROTID ENDARTERECTOMY: Status: ACTIVE | Noted: 2025-07-23

## 2025-07-23 LAB
ABO GROUP BLD: NORMAL
BLD GP AB SCN SERPL QL: NORMAL
RH BLD: NORMAL

## 2025-07-23 PROCEDURE — 160048 HCHG OR STATISTICAL LEVEL 1-5: Performed by: SURGERY

## 2025-07-23 PROCEDURE — 700111 HCHG RX REV CODE 636 W/ 250 OVERRIDE (IP): Mod: JZ | Performed by: STUDENT IN AN ORGANIZED HEALTH CARE EDUCATION/TRAINING PROGRAM

## 2025-07-23 PROCEDURE — 160039 HCHG SURGERY MINUTES - EA ADDL 1 MIN LEVEL 3: Performed by: SURGERY

## 2025-07-23 PROCEDURE — 110372 HCHG SHELL REV 278: Performed by: SURGERY

## 2025-07-23 PROCEDURE — 160028 HCHG SURGERY MINUTES - 1ST 30 MINS LEVEL 3: Performed by: SURGERY

## 2025-07-23 PROCEDURE — 86900 BLOOD TYPING SEROLOGIC ABO: CPT

## 2025-07-23 PROCEDURE — A9270 NON-COVERED ITEM OR SERVICE: HCPCS | Performed by: SURGERY

## 2025-07-23 PROCEDURE — 03UK0KZ SUPPLEMENT RIGHT INTERNAL CAROTID ARTERY WITH NONAUTOLOGOUS TISSUE SUBSTITUTE, OPEN APPROACH: ICD-10-PCS | Performed by: SURGERY

## 2025-07-23 PROCEDURE — 03CK0ZZ EXTIRPATION OF MATTER FROM RIGHT INTERNAL CAROTID ARTERY, OPEN APPROACH: ICD-10-PCS | Performed by: SURGERY

## 2025-07-23 PROCEDURE — 03CH0ZZ EXTIRPATION OF MATTER FROM RIGHT COMMON CAROTID ARTERY, OPEN APPROACH: ICD-10-PCS | Performed by: SURGERY

## 2025-07-23 PROCEDURE — 03CM0ZZ EXTIRPATION OF MATTER FROM RIGHT EXTERNAL CAROTID ARTERY, OPEN APPROACH: ICD-10-PCS | Performed by: SURGERY

## 2025-07-23 PROCEDURE — 160192 HCHG ANESTHESIA COMPLEX: Performed by: SURGERY

## 2025-07-23 PROCEDURE — 700105 HCHG RX REV CODE 258: Performed by: SURGERY

## 2025-07-23 PROCEDURE — 160195 HCHG PACU COMPLEX - 1ST 60 MINS: Performed by: SURGERY

## 2025-07-23 PROCEDURE — 700101 HCHG RX REV CODE 250: Performed by: SURGERY

## 2025-07-23 PROCEDURE — A9270 NON-COVERED ITEM OR SERVICE: HCPCS | Performed by: STUDENT IN AN ORGANIZED HEALTH CARE EDUCATION/TRAINING PROGRAM

## 2025-07-23 PROCEDURE — 160196 HCHG PACU COMPLEX - EA ADDL 30 MINS: Performed by: SURGERY

## 2025-07-23 PROCEDURE — 160015 HCHG STAT PREOP MINUTES: Performed by: SURGERY

## 2025-07-23 PROCEDURE — C1713 ANCHOR/SCREW BN/BN,TIS/BN: HCPCS | Performed by: SURGERY

## 2025-07-23 PROCEDURE — 86901 BLOOD TYPING SEROLOGIC RH(D): CPT

## 2025-07-23 PROCEDURE — 700101 HCHG RX REV CODE 250: Performed by: STUDENT IN AN ORGANIZED HEALTH CARE EDUCATION/TRAINING PROGRAM

## 2025-07-23 PROCEDURE — 770001 HCHG ROOM/CARE - MED/SURG/GYN PRIV*

## 2025-07-23 PROCEDURE — 86850 RBC ANTIBODY SCREEN: CPT

## 2025-07-23 PROCEDURE — C1781 MESH (IMPLANTABLE): HCPCS | Performed by: SURGERY

## 2025-07-23 PROCEDURE — 700102 HCHG RX REV CODE 250 W/ 637 OVERRIDE(OP): Performed by: STUDENT IN AN ORGANIZED HEALTH CARE EDUCATION/TRAINING PROGRAM

## 2025-07-23 PROCEDURE — 700111 HCHG RX REV CODE 636 W/ 250 OVERRIDE (IP): Performed by: SURGERY

## 2025-07-23 PROCEDURE — 700102 HCHG RX REV CODE 250 W/ 637 OVERRIDE(OP): Performed by: SURGERY

## 2025-07-23 PROCEDURE — 160002 HCHG RECOVERY MINUTES (STAT): Performed by: SURGERY

## 2025-07-23 DEVICE — PATCH 2X9CM XENOSURE BIOLOGIC VASCULAR---ORDER IN MULTIPLES OF 5---: Type: IMPLANTABLE DEVICE | Site: NECK | Status: FUNCTIONAL

## 2025-07-23 RX ORDER — ATORVASTATIN CALCIUM 40 MG/1
40 TABLET, FILM COATED ORAL EVERY EVENING
Status: DISCONTINUED | OUTPATIENT
Start: 2025-07-23 | End: 2025-07-24 | Stop reason: HOSPADM

## 2025-07-23 RX ORDER — CALCIUM CARBONATE 500 MG/1
500-1000 TABLET, CHEWABLE ORAL
Status: DISCONTINUED | OUTPATIENT
Start: 2025-07-23 | End: 2025-07-24 | Stop reason: HOSPADM

## 2025-07-23 RX ORDER — HYDROMORPHONE HYDROCHLORIDE 1 MG/ML
0.1 INJECTION, SOLUTION INTRAMUSCULAR; INTRAVENOUS; SUBCUTANEOUS
Status: DISCONTINUED | OUTPATIENT
Start: 2025-07-23 | End: 2025-07-23 | Stop reason: HOSPADM

## 2025-07-23 RX ORDER — HALOPERIDOL 5 MG/ML
1 INJECTION INTRAMUSCULAR
Status: DISCONTINUED | OUTPATIENT
Start: 2025-07-23 | End: 2025-07-23 | Stop reason: HOSPADM

## 2025-07-23 RX ORDER — LABETALOL HYDROCHLORIDE 5 MG/ML
10 INJECTION, SOLUTION INTRAVENOUS EVERY 4 HOURS PRN
Status: DISCONTINUED | OUTPATIENT
Start: 2025-07-23 | End: 2025-07-24 | Stop reason: HOSPADM

## 2025-07-23 RX ORDER — EPHEDRINE SULFATE 50 MG/ML
5 INJECTION, SOLUTION INTRAVENOUS
Status: DISCONTINUED | OUTPATIENT
Start: 2025-07-23 | End: 2025-07-23 | Stop reason: HOSPADM

## 2025-07-23 RX ORDER — DIPHENHYDRAMINE HYDROCHLORIDE 50 MG/ML
12.5 INJECTION, SOLUTION INTRAMUSCULAR; INTRAVENOUS
Status: DISCONTINUED | OUTPATIENT
Start: 2025-07-23 | End: 2025-07-23 | Stop reason: HOSPADM

## 2025-07-23 RX ORDER — SODIUM CHLORIDE, SODIUM LACTATE, POTASSIUM CHLORIDE, CALCIUM CHLORIDE 600; 310; 30; 20 MG/100ML; MG/100ML; MG/100ML; MG/100ML
INJECTION, SOLUTION INTRAVENOUS CONTINUOUS
Status: ACTIVE | OUTPATIENT
Start: 2025-07-23 | End: 2025-07-23

## 2025-07-23 RX ORDER — OXYCODONE HCL 5 MG/5 ML
10 SOLUTION, ORAL ORAL
Status: COMPLETED | OUTPATIENT
Start: 2025-07-23 | End: 2025-07-23

## 2025-07-23 RX ORDER — PROTAMINE SULFATE 10 MG/ML
INJECTION, SOLUTION INTRAVENOUS PRN
Status: DISCONTINUED | OUTPATIENT
Start: 2025-07-23 | End: 2025-07-23 | Stop reason: SURG

## 2025-07-23 RX ORDER — HYDRALAZINE HYDROCHLORIDE 20 MG/ML
INJECTION INTRAMUSCULAR; INTRAVENOUS PRN
Status: DISCONTINUED | OUTPATIENT
Start: 2025-07-23 | End: 2025-07-23 | Stop reason: SURG

## 2025-07-23 RX ORDER — HYDROMORPHONE HYDROCHLORIDE 1 MG/ML
0.4 INJECTION, SOLUTION INTRAMUSCULAR; INTRAVENOUS; SUBCUTANEOUS
Status: DISCONTINUED | OUTPATIENT
Start: 2025-07-23 | End: 2025-07-23 | Stop reason: HOSPADM

## 2025-07-23 RX ORDER — ONDANSETRON 2 MG/ML
4 INJECTION INTRAMUSCULAR; INTRAVENOUS EVERY 4 HOURS PRN
Status: DISCONTINUED | OUTPATIENT
Start: 2025-07-23 | End: 2025-07-24 | Stop reason: HOSPADM

## 2025-07-23 RX ORDER — HYDROCODONE BITARTRATE AND ACETAMINOPHEN 5; 325 MG/1; MG/1
1-2 TABLET ORAL EVERY 6 HOURS PRN
Status: DISCONTINUED | OUTPATIENT
Start: 2025-07-23 | End: 2025-07-24 | Stop reason: HOSPADM

## 2025-07-23 RX ORDER — BUPROPION HYDROCHLORIDE 75 MG/1
75 TABLET ORAL 2 TIMES DAILY
Status: DISCONTINUED | OUTPATIENT
Start: 2025-07-23 | End: 2025-07-24 | Stop reason: HOSPADM

## 2025-07-23 RX ORDER — HYDROMORPHONE HYDROCHLORIDE 1 MG/ML
0.2 INJECTION, SOLUTION INTRAMUSCULAR; INTRAVENOUS; SUBCUTANEOUS
Status: DISCONTINUED | OUTPATIENT
Start: 2025-07-23 | End: 2025-07-23 | Stop reason: HOSPADM

## 2025-07-23 RX ORDER — SODIUM CHLORIDE, SODIUM LACTATE, POTASSIUM CHLORIDE, CALCIUM CHLORIDE 600; 310; 30; 20 MG/100ML; MG/100ML; MG/100ML; MG/100ML
INJECTION, SOLUTION INTRAVENOUS CONTINUOUS
Status: DISCONTINUED | OUTPATIENT
Start: 2025-07-23 | End: 2025-07-23 | Stop reason: HOSPADM

## 2025-07-23 RX ORDER — LIDOCAINE HYDROCHLORIDE 40 MG/ML
SOLUTION TOPICAL PRN
Status: DISCONTINUED | OUTPATIENT
Start: 2025-07-23 | End: 2025-07-23 | Stop reason: HOSPADM

## 2025-07-23 RX ORDER — HEPARIN SODIUM 1000 [USP'U]/ML
INJECTION, SOLUTION INTRAVENOUS; SUBCUTANEOUS PRN
Status: DISCONTINUED | OUTPATIENT
Start: 2025-07-23 | End: 2025-07-23 | Stop reason: SURG

## 2025-07-23 RX ORDER — ASPIRIN 81 MG/1
81 TABLET ORAL EVERY EVENING
Status: DISCONTINUED | OUTPATIENT
Start: 2025-07-23 | End: 2025-07-24 | Stop reason: HOSPADM

## 2025-07-23 RX ORDER — ACETAMINOPHEN 325 MG/1
650 TABLET ORAL EVERY 6 HOURS PRN
Status: DISCONTINUED | OUTPATIENT
Start: 2025-07-23 | End: 2025-07-24 | Stop reason: HOSPADM

## 2025-07-23 RX ORDER — EPHEDRINE SULFATE 50 MG/ML
INJECTION, SOLUTION INTRAVENOUS PRN
Status: DISCONTINUED | OUTPATIENT
Start: 2025-07-23 | End: 2025-07-23 | Stop reason: SURG

## 2025-07-23 RX ORDER — HYDRALAZINE HYDROCHLORIDE 20 MG/ML
10 INJECTION INTRAMUSCULAR; INTRAVENOUS EVERY 4 HOURS PRN
Status: DISCONTINUED | OUTPATIENT
Start: 2025-07-23 | End: 2025-07-24 | Stop reason: HOSPADM

## 2025-07-23 RX ORDER — HYDRALAZINE HYDROCHLORIDE 20 MG/ML
5 INJECTION INTRAMUSCULAR; INTRAVENOUS
Status: DISCONTINUED | OUTPATIENT
Start: 2025-07-23 | End: 2025-07-23 | Stop reason: HOSPADM

## 2025-07-23 RX ORDER — ROCURONIUM BROMIDE 10 MG/ML
INJECTION, SOLUTION INTRAVENOUS PRN
Status: DISCONTINUED | OUTPATIENT
Start: 2025-07-23 | End: 2025-07-23 | Stop reason: SURG

## 2025-07-23 RX ORDER — METOPROLOL SUCCINATE 25 MG/1
25 TABLET, EXTENDED RELEASE ORAL DAILY
Status: DISCONTINUED | OUTPATIENT
Start: 2025-07-23 | End: 2025-07-24 | Stop reason: HOSPADM

## 2025-07-23 RX ORDER — DEXAMETHASONE SODIUM PHOSPHATE 4 MG/ML
INJECTION, SOLUTION INTRA-ARTICULAR; INTRALESIONAL; INTRAMUSCULAR; INTRAVENOUS; SOFT TISSUE PRN
Status: DISCONTINUED | OUTPATIENT
Start: 2025-07-23 | End: 2025-07-23 | Stop reason: SURG

## 2025-07-23 RX ORDER — OXYCODONE HCL 5 MG/5 ML
5 SOLUTION, ORAL ORAL
Status: COMPLETED | OUTPATIENT
Start: 2025-07-23 | End: 2025-07-23

## 2025-07-23 RX ORDER — ACETAMINOPHEN 500 MG
1000 TABLET ORAL ONCE
Status: DISCONTINUED | OUTPATIENT
Start: 2025-07-23 | End: 2025-07-24 | Stop reason: HOSPADM

## 2025-07-23 RX ORDER — MORPHINE SULFATE 4 MG/ML
1-2 INJECTION INTRAVENOUS
Status: DISCONTINUED | OUTPATIENT
Start: 2025-07-23 | End: 2025-07-24 | Stop reason: HOSPADM

## 2025-07-23 RX ORDER — ALBUTEROL SULFATE 5 MG/ML
2.5 SOLUTION RESPIRATORY (INHALATION)
Status: DISCONTINUED | OUTPATIENT
Start: 2025-07-23 | End: 2025-07-23 | Stop reason: HOSPADM

## 2025-07-23 RX ORDER — ALBUTEROL SULFATE 90 UG/1
2 INHALANT RESPIRATORY (INHALATION) EVERY 4 HOURS PRN
Status: DISCONTINUED | OUTPATIENT
Start: 2025-07-23 | End: 2025-07-24 | Stop reason: HOSPADM

## 2025-07-23 RX ORDER — ONDANSETRON 2 MG/ML
INJECTION INTRAMUSCULAR; INTRAVENOUS PRN
Status: DISCONTINUED | OUTPATIENT
Start: 2025-07-23 | End: 2025-07-23 | Stop reason: HOSPADM

## 2025-07-23 RX ORDER — LISINOPRIL 20 MG/1
20 TABLET ORAL
Status: DISCONTINUED | OUTPATIENT
Start: 2025-07-23 | End: 2025-07-24 | Stop reason: HOSPADM

## 2025-07-23 RX ORDER — ONDANSETRON 2 MG/ML
4 INJECTION INTRAMUSCULAR; INTRAVENOUS
Status: DISCONTINUED | OUTPATIENT
Start: 2025-07-23 | End: 2025-07-23 | Stop reason: HOSPADM

## 2025-07-23 RX ORDER — BUPIVACAINE HYDROCHLORIDE AND EPINEPHRINE 2.5; 5 MG/ML; UG/ML
INJECTION, SOLUTION EPIDURAL; INFILTRATION; INTRACAUDAL; PERINEURAL
Status: DISCONTINUED | OUTPATIENT
Start: 2025-07-23 | End: 2025-07-23 | Stop reason: HOSPADM

## 2025-07-23 RX ORDER — DOCUSATE SODIUM 100 MG/1
100 CAPSULE, LIQUID FILLED ORAL 2 TIMES DAILY
Status: DISCONTINUED | OUTPATIENT
Start: 2025-07-23 | End: 2025-07-24 | Stop reason: HOSPADM

## 2025-07-23 RX ORDER — CEFAZOLIN SODIUM 1 G/3ML
INJECTION, POWDER, FOR SOLUTION INTRAMUSCULAR; INTRAVENOUS PRN
Status: DISCONTINUED | OUTPATIENT
Start: 2025-07-23 | End: 2025-07-23 | Stop reason: SURG

## 2025-07-23 RX ADMIN — ROCURONIUM BROMIDE 10 MG: 10 INJECTION INTRAVENOUS at 14:09

## 2025-07-23 RX ADMIN — OXYCODONE HYDROCHLORIDE 10 MG: 5 SOLUTION ORAL at 14:45

## 2025-07-23 RX ADMIN — SODIUM CHLORIDE, POTASSIUM CHLORIDE, SODIUM LACTATE AND CALCIUM CHLORIDE: 600; 310; 30; 20 INJECTION, SOLUTION INTRAVENOUS at 10:48

## 2025-07-23 RX ADMIN — FENTANYL CITRATE 50 MCG: 50 INJECTION, SOLUTION INTRAMUSCULAR; INTRAVENOUS at 15:12

## 2025-07-23 RX ADMIN — ROCURONIUM BROMIDE 50 MG: 10 INJECTION INTRAVENOUS at 12:58

## 2025-07-23 RX ADMIN — HEPARIN SODIUM 5000 UNITS: 1000 INJECTION, SOLUTION INTRAVENOUS; SUBCUTANEOUS at 13:23

## 2025-07-23 RX ADMIN — DEXAMETHASONE SODIUM PHOSPHATE 4 MG: 4 INJECTION INTRA-ARTICULAR; INTRALESIONAL; INTRAMUSCULAR; INTRAVENOUS; SOFT TISSUE at 13:08

## 2025-07-23 RX ADMIN — FENTANYL CITRATE 25 MCG: 50 INJECTION, SOLUTION INTRAMUSCULAR; INTRAVENOUS at 14:45

## 2025-07-23 RX ADMIN — HYDROMORPHONE HYDROCHLORIDE 0.4 MG: 1 INJECTION, SOLUTION INTRAMUSCULAR; INTRAVENOUS; SUBCUTANEOUS at 15:20

## 2025-07-23 RX ADMIN — FENTANYL CITRATE 25 MCG: 50 INJECTION, SOLUTION INTRAMUSCULAR; INTRAVENOUS at 14:00

## 2025-07-23 RX ADMIN — CEFAZOLIN 2 G: 1 INJECTION, POWDER, FOR SOLUTION INTRAMUSCULAR; INTRAVENOUS at 13:08

## 2025-07-23 RX ADMIN — SODIUM CHLORIDE, POTASSIUM CHLORIDE, SODIUM LACTATE AND CALCIUM CHLORIDE: 600; 310; 30; 20 INJECTION, SOLUTION INTRAVENOUS at 19:50

## 2025-07-23 RX ADMIN — HYDROMORPHONE HYDROCHLORIDE 0.4 MG: 1 INJECTION, SOLUTION INTRAMUSCULAR; INTRAVENOUS; SUBCUTANEOUS at 15:40

## 2025-07-23 RX ADMIN — HYDRALAZINE HYDROCHLORIDE 10 MG: 20 INJECTION, SOLUTION INTRAMUSCULAR; INTRAVENOUS at 14:21

## 2025-07-23 RX ADMIN — FENTANYL CITRATE 100 MCG: 50 INJECTION, SOLUTION INTRAMUSCULAR; INTRAVENOUS at 12:57

## 2025-07-23 RX ADMIN — PROPOFOL 150 MG: 10 INJECTION, EMULSION INTRAVENOUS at 12:57

## 2025-07-23 RX ADMIN — LIDOCAINE HYDROCHLORIDE 4 ML: 40 SOLUTION TOPICAL at 12:58

## 2025-07-23 RX ADMIN — EPHEDRINE SULFATE 10 MG: 50 INJECTION, SOLUTION INTRAVENOUS at 13:09

## 2025-07-23 RX ADMIN — SUGAMMADEX 200 MG: 100 INJECTION, SOLUTION INTRAVENOUS at 14:25

## 2025-07-23 RX ADMIN — ONDANSETRON 4 MG: 2 INJECTION INTRAMUSCULAR; INTRAVENOUS at 14:25

## 2025-07-23 RX ADMIN — EPHEDRINE SULFATE 5 MG: 50 INJECTION, SOLUTION INTRAVENOUS at 13:23

## 2025-07-23 RX ADMIN — PROTAMINE SULFATE 30 MG: 10 INJECTION, SOLUTION INTRAVENOUS at 14:14

## 2025-07-23 RX ADMIN — HYDROMORPHONE HYDROCHLORIDE 0.2 MG: 1 INJECTION, SOLUTION INTRAMUSCULAR; INTRAVENOUS; SUBCUTANEOUS at 15:00

## 2025-07-23 RX ADMIN — FENTANYL CITRATE 25 MCG: 50 INJECTION, SOLUTION INTRAMUSCULAR; INTRAVENOUS at 13:31

## 2025-07-23 RX ADMIN — EPHEDRINE SULFATE 5 MG: 50 INJECTION, SOLUTION INTRAVENOUS at 13:46

## 2025-07-23 RX ADMIN — EPHEDRINE SULFATE 5 MG: 50 INJECTION, SOLUTION INTRAVENOUS at 13:35

## 2025-07-23 RX ADMIN — HYDROCODONE BITARTRATE AND ACETAMINOPHEN 1 TABLET: 5; 325 TABLET ORAL at 19:39

## 2025-07-23 RX ADMIN — CEFAZOLIN 2 G: 2 INJECTION, POWDER, FOR SOLUTION INTRAVENOUS at 20:53

## 2025-07-23 RX ADMIN — BUPROPION HYDROCHLORIDE 75 MG: 75 TABLET, FILM COATED ORAL at 19:40

## 2025-07-23 RX ADMIN — ASPIRIN 81 MG: 81 TABLET, COATED ORAL at 15:11

## 2025-07-23 RX ADMIN — ATORVASTATIN CALCIUM 40 MG: 40 TABLET, FILM COATED ORAL at 19:40

## 2025-07-23 RX ADMIN — HYDRALAZINE HYDROCHLORIDE 10 MG: 20 INJECTION, SOLUTION INTRAMUSCULAR; INTRAVENOUS at 14:16

## 2025-07-23 RX ADMIN — MORPHINE SULFATE 2 MG: 4 INJECTION, SOLUTION INTRAMUSCULAR; INTRAVENOUS at 22:29

## 2025-07-23 RX ADMIN — FENTANYL CITRATE 25 MCG: 50 INJECTION, SOLUTION INTRAMUSCULAR; INTRAVENOUS at 14:14

## 2025-07-23 ASSESSMENT — FIBROSIS 4 INDEX
FIB4 SCORE: 3.17
FIB4 SCORE: 3.17

## 2025-07-23 ASSESSMENT — PAIN DESCRIPTION - PAIN TYPE
TYPE: ACUTE PAIN
TYPE: SURGICAL PAIN
TYPE: ACUTE PAIN
TYPE: SURGICAL PAIN
TYPE: ACUTE PAIN
TYPE: SURGICAL PAIN

## 2025-07-23 ASSESSMENT — LIFESTYLE VARIABLES
DOES PATIENT WANT TO STOP DRINKING: NO
HOW MANY TIMES IN THE PAST YEAR HAVE YOU HAD 5 OR MORE DRINKS IN A DAY: 0
HAVE YOU EVER FELT YOU SHOULD CUT DOWN ON YOUR DRINKING: NO
CONSUMPTION TOTAL: NEGATIVE
EVER HAD A DRINK FIRST THING IN THE MORNING TO STEADY YOUR NERVES TO GET RID OF A HANGOVER: NO
ON A TYPICAL DAY WHEN YOU DRINK ALCOHOL HOW MANY DRINKS DO YOU HAVE: 0
TOTAL SCORE: 0
TOTAL SCORE: 0
ALCOHOL_USE: NO
TOTAL SCORE: 0
AVERAGE NUMBER OF DAYS PER WEEK YOU HAVE A DRINK CONTAINING ALCOHOL: 0
EVER FELT BAD OR GUILTY ABOUT YOUR DRINKING: NO
HAVE PEOPLE ANNOYED YOU BY CRITICIZING YOUR DRINKING: NO

## 2025-07-23 ASSESSMENT — SOCIAL DETERMINANTS OF HEALTH (SDOH)
IN THE PAST 12 MONTHS, HAS THE ELECTRIC, GAS, OIL, OR WATER COMPANY THREATENED TO SHUT OFF SERVICE IN YOUR HOME?: NO
WITHIN THE PAST 12 MONTHS, YOU WORRIED THAT YOUR FOOD WOULD RUN OUT BEFORE YOU GOT THE MONEY TO BUY MORE: NEVER TRUE
WITHIN THE LAST YEAR, HAVE YOU BEEN KICKED, HIT, SLAPPED, OR OTHERWISE PHYSICALLY HURT BY YOUR PARTNER OR EX-PARTNER?: NO
WITHIN THE LAST YEAR, HAVE YOU BEEN AFRAID OF YOUR PARTNER OR EX-PARTNER?: NO
WITHIN THE LAST YEAR, HAVE TO BEEN RAPED OR FORCED TO HAVE ANY KIND OF SEXUAL ACTIVITY BY YOUR PARTNER OR EX-PARTNER?: NO
WITHIN THE LAST YEAR, HAVE YOU BEEN HUMILIATED OR EMOTIONALLY ABUSED IN OTHER WAYS BY YOUR PARTNER OR EX-PARTNER?: NO
WITHIN THE PAST 12 MONTHS, THE FOOD YOU BOUGHT JUST DIDN'T LAST AND YOU DIDN'T HAVE MONEY TO GET MORE: NEVER TRUE

## 2025-07-23 ASSESSMENT — PATIENT HEALTH QUESTIONNAIRE - PHQ9
2. FEELING DOWN, DEPRESSED, IRRITABLE, OR HOPELESS: NOT AT ALL
SUM OF ALL RESPONSES TO PHQ9 QUESTIONS 1 AND 2: 0
1. LITTLE INTEREST OR PLEASURE IN DOING THINGS: NOT AT ALL

## 2025-07-23 NOTE — ANESTHESIA POSTPROCEDURE EVALUATION
Patient: Dale Dinero    Procedure Summary       Date: 07/23/25 Room / Location: [facility] 07 / SURGERY [facility]    Anesthesia Start: 1250 Anesthesia Stop: 1438    Procedure: RIGHT CAROTID ENDARTERECTOMY (Right: Neck) Diagnosis: (RIGHT CAROTID STENOSIS)    Surgeons: Negar Machado M.D. Responsible Provider: Greer Allan M.D.    Anesthesia Type: general ASA Status: 4            Final Anesthesia Type: general  Last vitals  BP   Blood Pressure : (!) 150/72    Temp   36.8 °C (98.2 °F)    Pulse   87   Resp   17    SpO2   94 %      Anesthesia Post Evaluation    Patient location during evaluation: PACU  Patient participation: complete - patient participated  Level of consciousness: awake    Airway patency: patent  Anesthetic complications: no  Cardiovascular status: hemodynamically stable  Respiratory status: acceptable  Hydration status: euvolemic    PONV: none          No notable events documented.     Nurse Pain Score: 1 (NPRS)

## 2025-07-23 NOTE — PROGRESS NOTES
Medication history reviewed with pt. Med rec is complete.  Allergies reviewed, per pt  Interviewed pt with wife at bedside with permission from pt.    Any outpatient anti-MICROBIAL in the last 30 days? NO    Pt is not on any anticoagulants

## 2025-07-23 NOTE — ANESTHESIA PROCEDURE NOTES
Airway    Date/Time: 7/23/2025 12:58 PM    Performed by: Greer Allan M.D.  Authorized by: Greer Allan M.D.    Location:  OR  Urgency:  Elective  Difficult Airway: No    Indications for Airway Management:  Anesthesia      Spontaneous Ventilation: absent    Sedation Level:  Deep  Preoxygenated: Yes    Patient Position:  Sniffing  Mask Difficulty Assessment:  1 - vent by mask  Final Airway Type:  Endotracheal airway  Final Endotracheal Airway:  ETT  Cuffed: Yes    Technique Used for Successful ETT Placement:  Direct laryngoscopy  Devices/Methods Used in Placement:  Intubating stylet    Insertion Site:  Oral  Blade Type:  Indy  Laryngoscope Blade/Videolaryngoscope Blade Size:  4  ETT Size (mm):  8.0  Measured from:  Teeth  ETT to Teeth (cm):  24  Placement Verified by: auscultation and capnometry    Cormack-Lehane Classification:  Grade I - full view of glottis  Number of Attempts at Approach:  1

## 2025-07-23 NOTE — OR SURGEON
Immediate Post OP Note    PreOp Diagnosis: Severe left carotid artery stenosis with history of strokes.      PostOp Diagnosis: Same.      Procedure(s):  Right carotid endarterectomy with patch angioplasty - Wound Class: Clean with Drain    Surgeon(s):  Negar Machado M.D.    Anesthesiologist/Type of Anesthesia:  Anesthesiologist: Greer Allan M.D./General    Surgical Staff:  Assistant: Lyndsay Holt P.A.-C.  Circulator: Lukas D. Gansert, R.N.  Scrub Person: Savanna Nolen    Specimens removed if any:  * No specimens in log *    Estimated Blood Loss: 25 mL.    IV fluid: 600 mL.    Findings: Severe stenosis.    Complications: None.    Dictated, #[id number].        7/23/2025 2:38 PM Negar Machado M.D.

## 2025-07-23 NOTE — ANESTHESIA PREPROCEDURE EVALUATION
Case: [id number] Date/Time: 07/23/25 1145    Procedure: RIGHT CAROTID ENDARTERECTOMY    Pre-op diagnosis: RIGHT CAROTID STENOSIS    Location: [facility] OR  / SURGERY [facility]    Surgeons: Negar Machado M.D.          69yo man with CVA in May (no residual deficits) found to have severe right proximal ICA and carotic bifurcation stenosis, moderate stenosis on left.   H/o peripheral artery disease s/p aorto-bifem bypass in 2014 and angioplasty of right SFA in 2023.  COPD, no home O2.  Current smoker.    Relevant Problems   PULMONARY   (positive) COPD (chronic obstructive pulmonary disease) (Formerly [facility])      NEURO   (positive) Ischemic stroke diagnosed during current admission (Formerly [facility])      CARDIAC   (positive) Essential hypertension   (positive) Paroxysmal SVT (supraventricular tachycardia) (Formerly [facility])   (positive) Pseudoaneurysm of femoral artery (Formerly [facility])       Physical Exam    Airway   Mallampati: II  TM distance: >3 FB  Neck ROM: full       Cardiovascular - normal exam  Rhythm: regular  Rate: normal    (-) murmur     Dental - normal exam        Facial Hair   Pulmonary - normal examBreath sounds clear to auscultation     Abdominal    Neurological - normal exam                   Anesthesia Plan    ASA 4   ASA physical status 4 criteria: CVA or TIA - recent (< 3 months)    Plan - general       Airway plan will be ETT          Induction: intravenous    Postoperative Plan: Postoperative administration of opioids is intended.    Pertinent diagnostic labs and testing reviewed    Informed Consent:    Anesthetic plan and risks discussed with patient.    Use of blood products discussed with: patient whom consented to blood products.

## 2025-07-23 NOTE — ANESTHESIA PROCEDURE NOTES
Arterial Line    Performed by: Greer Allan M.D.  Authorized by: Greer Allan M.D.    Start Time:  7/23/2025 1:02 PM  End Time:  7/23/2025 1:05 PM  Localization: ultrasound guidance  Image captured, interpreted and electronically stored.  Patient Location:  OR  Indication: continuous blood pressure monitoring        Catheter Size:  20 G  Seldinger Technique?: Yes    Laterality:  Left  Site:  Radial artery  Line Secured:  Antimicrobial disc, tape and transparent dressing  Events: patient tolerated procedure well with no complications

## 2025-07-23 NOTE — ANESTHESIA TIME REPORT
Anesthesia Start and Stop Event Times       Date Time Event    7/23/2025 1230 Ready for Procedure     1250 Anesthesia Start     1438 Anesthesia Stop          Responsible Staff  07/23/25      Name Role Begin End    Greer Allan M.D. Anesth 1250 1438          Overtime Reason:  no overtime (within assigned shift)    Comments:

## 2025-07-23 NOTE — OP REPORT
DATE OF SERVICE:  07/23/2025     SURGEON:  Negar Machado MD     ASSISTANT:  Lyndsay Holt PA-C     ANESTHESIOLOGIST:  Greer Allan MD     TYPE OF ANESTHESIA:  General anesthesia and local anesthesia with 10 mL of   0.25% Marcaine solution.     PREOPERATIVE DIAGNOSIS:  Severe left carotid artery stenosis with history of   right hemispheric strokes.     POSTOPERATIVE DIAGNOSIS:  Severe left carotid artery stenosis with history of   right hemispheric strokes.     PROCEDURES:  Right carotid endarterectomy with bovine patch angioplasty and   intraoperative shunting.     INDICATIONS FOR PROCEDURE:  This is a pleasant 70-year-old male with multiple   medical problems, who recently developed problem with right hemispheric   strokes.  Further workup showed severe right carotid artery stenosis.    Discussion was made with the patient.  He would like to undergo right carotid   surgery, fully understanding all risks.     DESCRIPTION OF PROCEDURE:  Informed consent was obtained.  The patient was   taken to the operating room and was placed in the supine position.  Sequential   compression devices were applied.  The patient was given Ancef intravenously.    General anesthesia was induced.  A radial arterial catheter was placed by the   anesthesiologist for intraoperative blood pressure monitoring.     Next, the patient's right neck and chest were sterilely prepped and draped in   the normal fashion.  A timeout procedure was done.  The skin and subcutaneous   tissue in the neck were anesthetized with 10 mL of Marcaine solution.  An   incision was made along the skin line.  The incision was extended through   subcutaneous tissue and platysma muscle using the electrocautery.  The   sternocleidomastoid muscle was dissected on its medial aspect.  The internal   jugular vein was also dissected medially.  The facial vein and other venous   tributaries were ligated with 3-0 silk ties, clip with hemoclips, and sharply   divided.      Next, the common carotid artery was identified and carefully dissected free   from the surrounding tissues.  The carotid bifurcation as well as proximal to   mid internal carotid artery were not dissected to avoid the risk of   atheroembolization.  A carotid sinus nerve block was performed using 1 mL of   1% lidocaine solution.  A robot retractor was used.     The patient was given heparin 5000 units intravenously.  At the same time, his   mean arterial pressure was maintained in the  range.  After the heparin   was allowed to circulate systemically for 3 minutes, vascular clamp was   applied on the proximal common carotid artery.  The internal carotid artery was   circumferentially dissected free distally above the diseased area and a vascular   clamp was applied at this area.  The external carotid artery was also   circumferentially dissected free as its origin and a vessel loop was doubly   placed around the external carotid artery and used for vascular control.  An   arteriotomy was made on the anterolateral aspect of the common carotid artery   extended into the internal carotid artery.  Upon entering the arterial lumen,   there was severe (80%) stenosis seen.  At this point, the vascular clamp on the   normal distal internal carotid artery was temporarily released and poor   backbleeding was seen; therefore, a Luna-Inahara shunt was brought into the   operative field and used to maintain cerebral perfusion.  The shunt flow was  verified with the use of a handheld Doppler.     Thromboendarterectomy of the common carotid and internal carotid arteries was   performed.  Thromboendarterectomy of the external carotid artery was also   performed.  The intima of the distal internal carotid artery and proximal   common carotid artery were tacked with interrupted 7-0 Prolene sutures.  The   arterial lumen was thoroughly irrigated with heparinized saline solution and   was found to be free of any debris.     A  sterile bovine pericardial patch was brought into the operative field and a   patch angioplasty was performed from the common carotid artery extending into   the internal carotid artery using running 6-0 Prolene sutures.  Prior to   completing the patch angioplasty, the shunt was removed.  Backbleeding and flushing were obtained.  The   arterial lumen was also thoroughly irrigated with heparinized saline   solution.  The patch angioplasty was completed.  Flow was first restored into   the external carotid artery, followed by into internal carotid artery.  A   sterile Doppler probe was brought into the operative field.  Excellent Doppler   flow signals were obtained over the common carotid artery, carotid   thromboendarterectomy site, internal and external carotid arteries.     The patient was given protamine 30 mg intravenously.  The wound was  irrigated and was found to have excellent hemostasis.  Surgiflo was also   placed over the patch angioplasty site.     Through a separate stab incision inferiorly, a 7-mm Wilfredo-Bonner drain was   brought into the operative field, cut to appropriate length, and secured   to the skin with 3-0 nylon suture.  The platysma muscle was   reapproximated over the drain with running 3-0 Vicryl suture.  The skin edges   were reapproximated with 4-0 Monocryl suture in a subcuticular manner.  The   wounds were cleaned and sterile dressings were applied.     ESTIMATED BLOOD LOSS:  25 mL.     INTRAVENOUS FLUIDS:  600 mL.     COUNTS:  Sponge and instrument count was correct x2.     The patient was then awakened, extubated, and taken to recovery area in stable   condition with intact baseline neurological examination.        ______________________________  MD SLY Smith/AZM    DD:  07/23/2025 14:45  DT:  07/23/2025 15:09    Job#:  [id number]

## 2025-07-24 VITALS
HEART RATE: 63 BPM | HEIGHT: 70 IN | TEMPERATURE: 97.9 F | BODY MASS INDEX: 22.82 KG/M2 | OXYGEN SATURATION: 92 % | WEIGHT: 159.39 LBS | SYSTOLIC BLOOD PRESSURE: 114 MMHG | RESPIRATION RATE: 18 BRPM | DIASTOLIC BLOOD PRESSURE: 60 MMHG

## 2025-07-24 PROCEDURE — 700111 HCHG RX REV CODE 636 W/ 250 OVERRIDE (IP): Mod: JZ,TB | Performed by: SURGERY

## 2025-07-24 PROCEDURE — A9270 NON-COVERED ITEM OR SERVICE: HCPCS | Performed by: SURGERY

## 2025-07-24 PROCEDURE — 700105 HCHG RX REV CODE 258: Performed by: SURGERY

## 2025-07-24 PROCEDURE — 700102 HCHG RX REV CODE 250 W/ 637 OVERRIDE(OP): Performed by: SURGERY

## 2025-07-24 RX ADMIN — CEFAZOLIN 2 G: 2 INJECTION, POWDER, FOR SOLUTION INTRAVENOUS at 05:10

## 2025-07-24 RX ADMIN — HYDROCODONE BITARTRATE AND ACETAMINOPHEN 2 TABLET: 5; 325 TABLET ORAL at 01:04

## 2025-07-24 RX ADMIN — ANTACID TABLETS 1000 MG: 500 TABLET, CHEWABLE ORAL at 00:18

## 2025-07-24 RX ADMIN — ACETAMINOPHEN 650 MG: 325 TABLET ORAL at 00:19

## 2025-07-24 RX ADMIN — METOPROLOL SUCCINATE 25 MG: 25 TABLET, EXTENDED RELEASE ORAL at 05:07

## 2025-07-24 RX ADMIN — DOCUSATE SODIUM 100 MG: 100 CAPSULE, LIQUID FILLED ORAL at 05:06

## 2025-07-24 RX ADMIN — BUPROPION HYDROCHLORIDE 75 MG: 75 TABLET, FILM COATED ORAL at 06:00

## 2025-07-24 ASSESSMENT — COGNITIVE AND FUNCTIONAL STATUS - GENERAL
MOVING TO AND FROM BED TO CHAIR: A LITTLE
HELP NEEDED FOR BATHING: A LITTLE
CLIMB 3 TO 5 STEPS WITH RAILING: A LITTLE
DRESSING REGULAR LOWER BODY CLOTHING: A LITTLE
MOBILITY SCORE: 20
TOILETING: A LITTLE
SUGGESTED CMS G CODE MODIFIER DAILY ACTIVITY: CJ
SUGGESTED CMS G CODE MODIFIER MOBILITY: CJ
WALKING IN HOSPITAL ROOM: A LITTLE
STANDING UP FROM CHAIR USING ARMS: A LITTLE
DAILY ACTIVITIY SCORE: 21

## 2025-07-24 ASSESSMENT — PAIN DESCRIPTION - PAIN TYPE: TYPE: ACUTE PAIN

## 2025-07-24 NOTE — CARE PLAN
The patient is Stable - Low risk of patient condition declining or worsening    Shift Goals  Clinical Goals: pain control, monitor drain. neuro checks, ambulation  Patient Goals: pain control, rest  Family Goals: POC    Progress made toward(s) clinical / shift goals:  Pain managed with prescribed medications. Neuro checks performed. Patient ambulated in room. Drain output has turned from bloody to serosanguinous. Stroke education packet given and gone over with patient.         Problem: Pain - Standard  Goal: Alleviation of pain or a reduction in pain to the patient’s comfort goal  Description: Target End Date:  Prior to discharge or change in level of care    Document on Vitals flowsheet    1.  Document pain using the appropriate pain scale per order or unit policy  2.  Educate and implement non-pharmacologic comfort measures (i.e. relaxation, distraction, massage, cold/heat therapy, etc.)  3.  Pain management medications as ordered  4.  Reassess pain after pain med administration per policy  5.  If opiods administered assess patient's response to pain medication is appropriate per POSS sedation scale  6.  Follow pain management plan developed in collaboration with patient and interdisciplinary team (including palliative care or pain specialists if applicable)  Outcome: Progressing     Problem: Knowledge Deficit - Standard  Goal: Patient and family/care givers will demonstrate understanding of plan of care, disease process/condition, diagnostic tests and medications  Description: Target End Date:  1-3 days or as soon as patient condition allows    Document in Patient Education    1.  Patient and family/caregiver oriented to unit, equipment, visitation policy and means for communicating concern  2.  Complete/review Learning Assessment  3.  Assess knowledge level of disease process/condition, treatment plan, diagnostic tests and medications  4.  Explain disease process/condition, treatment plan, diagnostic tests and  medications  Outcome: Progressing

## 2025-07-24 NOTE — CARE PLAN
The patient is Stable - Low risk of patient condition declining or worsening    Shift Goals  Clinical Goals: pain control, monitor drain. neuro checks, ambulation  Patient Goals: pain control, rest  Family Goals: POC    Progress made toward(s) clinical / shift goals:  Neuro checks performed. Drain output and insertion site monitored. Neuro checks performed. Patient given stroke education packet and educated on information listed. Pain monitored with prescribed medications.      Problem: Knowledge Deficit:  Goal: Knowledge of disease process/condition, treatment plan, diagnostic tests, and medications will improve  7/24/2025 0330 by Catrachita Madrigal R.N.  Outcome: Progressing  7/24/2025 0329 by [email or web address].  Outcome: Progressing     Problem: Knowledge Deficit:  Goal: Knowledge of disease process/condition, treatment plan, diagnostic tests, and medications will improve  7/24/2025 0330 by Catrachita Madrigal R.N.  Outcome: Progressing  7/24/2025 0329 by Catrachita Madrigal R.N.  Outcome: Progressing

## 2025-07-24 NOTE — PROGRESS NOTES
Virtual Nurse admission and rounding complete.    Round Needs: Pain Rating 9/10 and Notified of Patient Needs: Primary RN.

## 2025-07-24 NOTE — PROGRESS NOTES
Discharge orders received.  Patient arrived to the discharge lounge.  PIV removed by floor RN. Meds to beds medications not ordered.  Instructions given, medications reviewed and general discharge education provided to patient.  Follow up appointments discussed.  Patient verbalized understanding of dc instructions and prescriptions. Patient educated on signs and symptoms of infection, including: fever, site redness/swelling/warmth/tenderness/foul smelling drainage. If signs and symptoms of infection are noted patient to call 911 or come back to emergency room. Patient signed discharge instructions.  Patient verbalized he had all belongings with him, Denied having any home medications locked in our inpatient pharmacy that  he needs back. Patient declined wheelchair assistance to car. Patient ambulated with steady gait from discharge lounge to private vehicle. Patient left via car with spouse to home in stable condition.

## 2025-07-24 NOTE — DISCHARGE INSTRUCTIONS
Stroke Prevention  Some medical conditions and lifestyle choices can lead to a higher risk for a stroke. You can help to prevent a stroke by eating healthy foods and exercising. It also helps to not smoke and to manage any health problems you may have.  How can this condition affect me?  A stroke is an emergency. It should be treated right away. A stroke can lead to brain damage or threaten your life. There is a better chance of surviving and getting better after a stroke if you get medical help right away.  What can increase my risk?  The following medical conditions may increase your risk of a stroke:  Diseases of the heart and blood vessels (cardiovascular disease).  High blood pressure (hypertension).  Diabetes.  High cholesterol.  Sickle cell disease.  Problems with blood clotting.  Being very overweight.  Sleeping problems (obstructivesleep apnea).  Other risk factors include:  Being older than age 60.  A history of blood clots, stroke, or mini-stroke (TIA).  Race, ethnic background, or a family history of stroke.  Smoking or using tobacco products.  Taking birth control pills, especially if you smoke.  Heavy alcohol and drug use.  Not being active.  What actions can I take to prevent this?  Manage your health conditions  High cholesterol.  Eat a healthy diet. If this is not enough to manage your cholesterol, you may need to take medicines.  Take medicines as told by your doctor.  High blood pressure.  Try to keep your blood pressure below 130/80.  If your blood pressure cannot be managed through a healthy diet and regular exercise, you may need to take medicines.  Take medicines as told by your doctor.  Ask your doctor if you should check your blood pressure at home.  Have your blood pressure checked every year.  Diabetes.  Eat a healthy diet and get regular exercise. If your blood sugar (glucose) cannot be managed through diet and exercise, you may need to take medicines.  Take medicines as told by your  doctor.  Talk to your doctor about getting checked for sleeping problems. Signs of a problem can include:  Snoring a lot.  Feeling very tired.  Make sure that you manage any other conditions you have.  Nutrition    Follow instructions from your doctor about what to eat or drink. You may be told to:  Eat and drink fewer calories each day.  Limit how much salt (sodium) you use to 1,500 milligrams (mg) each day.  Use only healthy fats for cooking, such as olive oil, canola oil, and sunflower oil.  Eat healthy foods. To do this:  Choose foods that are high in fiber. These include whole grains, and fresh fruits and vegetables.  Eat at least 5 servings of fruits and vegetables a day. Try to fill one-half of your plate with fruits and vegetables at each meal.  Choose low-fat (lean) proteins. These include low-fat cuts of meat, chicken without skin, fish, tofu, beans, and nuts.  Eat low-fat dairy products.  Avoid foods that:  Are high in salt.  Have saturated fat.  Have trans fat.  Have cholesterol.  Are processed or pre-made.  Count how many carbohydrates you eat and drink each day.  Lifestyle  If you drink alcohol:  Limit how much you have to:  0-1 drink a day for women who are not pregnant.  0-2 drinks a day for men.  Know how much alcohol is in your drink. In the [place] one drink equals one 12 oz bottle of beer (355mL), one 5 oz glass of wine (148mL), or one 1½ oz glass of hard liquor (44mL).  Do not smoke or use any products that have nicotine or tobacco. If you need help quitting, ask your doctor.  Avoid secondhand smoke.  Do not use drugs.  Activity    Try to stay at a healthy weight.  Get at least 30 minutes of exercise on most days, such as:  Fast walking.  Biking.  Swimming.  Medicines  Take over-the-counter and prescription medicines only as told by your doctor.  Avoid taking birth control pills. Talk to your doctor about the risks of taking birth control pills if:  You are over 35 years old.  You smoke.  You get  "very bad headaches.  You have had a blood clot.  Where to find more information  American Stroke Association: www.strokeassociation.org  Get help right away if:  You or a loved one has any signs of a stroke. \"BE FAST\" is an easy way to remember the warning signs:  B - Balance. Dizziness, sudden trouble walking, or loss of balance.  E - Eyes. Trouble seeing or a change in how you see.  F - Face. Sudden weakness or loss of feeling of the face. The face or eyelid may droop on one side.  A - Arms. Weakness or loss of feeling in an arm. This happens all of a sudden and most often on one side of the body.  S - Speech. Sudden trouble speaking, slurred speech, or trouble understanding what people say.  T - Time. Time to call emergency services. Write down what time symptoms started.  You or a loved one has other signs of a stroke, such as:  A sudden, very bad headache with no known cause.  Feeling like you may vomit (nausea).  Vomiting.  A seizure.  These symptoms may be an emergency. Get help right away. Call your local emergency services (911 in the [place]  Do not wait to see if the symptoms will go away.  Do not drive yourself to the hospital.  Summary  You can help to prevent a stroke by eating healthy, exercising, and not smoking. It also helps to manage any health problems you have.  Do not smoke or use any products that contain nicotine or tobacco.  Get help right away if you or a loved one has any signs of a stroke.  This information is not intended to replace advice given to you by your health care provider. Make sure you discuss any questions you have with your health care provider.    "

## 2025-07-24 NOTE — DISCHARGE SUMMARY
DATE OF ADMISSION:  07/23/2025   DATE OF DISCHARGE:  07/24/2025     ADMITTING DIAGNOSES:  1.  Severe right carotid artery stenosis with history of right hemispheric   strokes.  2.  Dyslipidemia.  3.  Hypertension.  4.  Tobacco use.     DISCHARGE DIAGNOSES:  1.  Severe right carotid artery stenosis with history of right hemispheric   strokes.  2.  Dyslipidemia.  3.  Hypertension.  4.  Tobacco use.     PROCEDURE:  Right carotid endarterectomy with intraoperative shunting and   patch angioplasty, performed on 07/23/2025.     HOSPITAL COURSE:  The patient is a pleasant 70-year-old male who underwent   right carotid endarterectomy for symptomatic right carotid artery stenosis.    He tolerated the procedure well and was admitted to the floor postoperatively.    He continued to do well.  By postop day #1, he was able to ambulate   independently and tolerating diet.  His incision remains clean, dry, intact   without erythema, drainage, fluctuation, or hematoma formation.  He had no   headache.  His neurological exam remained nonfocal except for a slight   droopiness at the corner of the right mouth secondary to retraction during the   procedure, which should resolve over time.  His blood pressure was under   control.  He would like to go home and therefore was discharged home.     DISCHARGE INSTRUCTIONS:  MEDICATIONS:  Resume home medication.  The patient did not have pain and   therefore did not want to be sent home on pain medication.  He was instructed   to take Tylenol as needed for discomfort.  He has some sore throat secondary   to the endotracheal tube and therefore advised to take Chloraseptic spray as   needed.     ACTIVITY:  As tolerated except no lifting more than 10 pounds.  The patient   was instructed that he may remove dressing after two days and shower, but no   bath or hot tub for two weeks.     FOLLOWUP APPOINTMENT:  Followup appointment with Lyndsay or Dr. Machado in   [facility] in 2-3 weeks.  The  patient was advised to call the office to   confirm the appointment and also to call the office for any problem.     CONDITION AT THE TIME OF DISCHARGE:  Stable.        ______________________________  MD SLY Smith/ARON    DD:  07/24/2025 08:26  DT:  07/24/2025 09:00    Job#:  [id number]

## 2025-07-24 NOTE — PROGRESS NOTES
4 Eyes Skin Assessment Completed by EVERT Domínguez and THERESE Mcgee    Skin assessment is primarily focused on high risk bony prominences. Pay special attention to skin beneath and around medical devices, high risk bony prominences, skin to skin areas and areas where the patient lacks sensation to feel pain and areas where the patient previously had breakdown.     Head (Occipital):  WDL   Ears (Under Medical Devices): WDL   Nose (Under Medical Devices): WDL   Mouth:  WDL   Neck: Right anterior neck incision with DIP and RODRIGO drain   Breast/Chest:  WDL   Shoulder Blades:  WDL   Spine:   Scar   (R) Arm/Elbow/Hand: Pink, Blanching, and Bruising   (L) Arm/Elbow/Hand: Pink, Blanching, and Bruising   Abdomen: Scar   Pannus/Groin:  WDL   Sacrum/Coccyx:   Pink and Blanching   (R) Ischial Tuberosity (Sit Bones):  WDL   (L) Ischial Tuberosity (Sit Bones):  WDL   (R) Leg:  Bruising   (L) Leg:  Bruising   (R) Heel:  Pink and Blanching   (R) Foot/Toe: Dry   (L) Heel: Pink and Blanching   (L) Foot/Toe:  Dry       DEVICES IN USE:   Respiratory Devices:  Nasal cannula and Pulse ox  Feeding Devices:  N/A   Lines & BP Monitoring Devices:  Peripheral IV, BP cuff, and Pulse ox    Orthopedic Devices:  N/A  Miscellaneous Devices:  SCDs, RODRIGO drain    PROTOCOL INTERVENTIONS:   Standard/Trauma Bed:  Already in place  Float Heels with Pillows:  Applied this assessment  Nasal Cannula with Gray Foams:  Already in place    WOUND PHOTOS:   N/A no wounds identified    WOUND CONSULT:   N/A, no advanced wound care needs identified

## 2025-07-24 NOTE — PROGRESS NOTES
Received report from previous shift RN  Assessment complete.  A&O x 4. Patient calls appropriately.  Patient ambulates with SBA assist and FWW  Patient has 6/10 pain. Pain managed with prescribed medications.  Denies N&V. Tolerating diet.  Right neck incision with brit drain patent to bulb suction  + void, Last BM 7/22 PTA  Patient denies SOB.  Patient sitting up in bed. Family at bedside.  Review plan with of care with patient. Call light and personal belongings with in reach. Hourly rounding in place. All needs met at this time.

## 2025-07-25 ENCOUNTER — TELEPHONE (OUTPATIENT)
Dept: HEALTH INFORMATION MANAGEMENT | Facility: OTHER | Age: 70
End: 2025-07-25
Payer: MEDICARE

## 2025-07-29 ENCOUNTER — TELEPHONE (OUTPATIENT)
Dept: HEALTH INFORMATION MANAGEMENT | Facility: OTHER | Age: 70
End: 2025-07-29
Payer: MEDICARE

## 2025-07-29 ENCOUNTER — OFFICE VISIT (OUTPATIENT)
Dept: NEUROLOGY | Facility: MEDICAL CENTER | Age: 70
End: 2025-07-29
Attending: PSYCHIATRY & NEUROLOGY
Payer: MEDICARE

## 2025-07-29 ENCOUNTER — TELEPHONE (OUTPATIENT)
Dept: NEUROLOGY | Facility: MEDICAL CENTER | Age: 70
End: 2025-07-29
Payer: MEDICARE

## 2025-07-29 ENCOUNTER — HOSPITAL ENCOUNTER (OUTPATIENT)
Dept: LAB | Facility: MEDICAL CENTER | Age: 70
End: 2025-07-29
Attending: FAMILY MEDICINE
Payer: MEDICARE

## 2025-07-29 VITALS
BODY MASS INDEX: 22.85 KG/M2 | OXYGEN SATURATION: 93 % | TEMPERATURE: 99.1 F | HEIGHT: 70 IN | SYSTOLIC BLOOD PRESSURE: 130 MMHG | WEIGHT: 159.61 LBS | HEART RATE: 62 BPM | DIASTOLIC BLOOD PRESSURE: 72 MMHG

## 2025-07-29 DIAGNOSIS — I10 ESSENTIAL HYPERTENSION: ICD-10-CM

## 2025-07-29 DIAGNOSIS — D35.02 ADRENAL ADENOMA, LEFT: ICD-10-CM

## 2025-07-29 DIAGNOSIS — F17.210 CIGARETTE SMOKER: ICD-10-CM

## 2025-07-29 DIAGNOSIS — J44.9 CHRONIC OBSTRUCTIVE PULMONARY DISEASE, UNSPECIFIED COPD TYPE (HCC): ICD-10-CM

## 2025-07-29 DIAGNOSIS — N52.9 ERECTILE DYSFUNCTION, UNSPECIFIED ERECTILE DYSFUNCTION TYPE: ICD-10-CM

## 2025-07-29 DIAGNOSIS — J41.0 SMOKERS' COUGH (HCC): ICD-10-CM

## 2025-07-29 DIAGNOSIS — R91.1 PULMONARY NODULE LESS THAN 6 MM IN DIAMETER WITH LOW RISK FOR MALIGNANT NEOPLASM: ICD-10-CM

## 2025-07-29 DIAGNOSIS — R80.9 MICROALBUMINURIA: ICD-10-CM

## 2025-07-29 DIAGNOSIS — Z98.890 S/P CAROTID ENDARTERECTOMY: Primary | ICD-10-CM

## 2025-07-29 DIAGNOSIS — I63.9 ISCHEMIC STROKE DIAGNOSED DURING CURRENT ADMISSION (HCC): ICD-10-CM

## 2025-07-29 DIAGNOSIS — E78.5 DYSLIPIDEMIA: ICD-10-CM

## 2025-07-29 DIAGNOSIS — Z91.89 PULMONARY NODULE LESS THAN 6 MM IN DIAMETER WITH LOW RISK FOR MALIGNANT NEOPLASM: ICD-10-CM

## 2025-07-29 LAB
ALBUMIN SERPL BCP-MCNC: 4.2 G/DL (ref 3.2–4.9)
ALBUMIN/GLOB SERPL: 1.5 G/DL
ALP SERPL-CCNC: 76 U/L (ref 30–99)
ALT SERPL-CCNC: 6 U/L (ref 2–50)
ANION GAP SERPL CALC-SCNC: 12 MMOL/L (ref 7–16)
AST SERPL-CCNC: 15 U/L (ref 12–45)
BASOPHILS # BLD AUTO: 0.6 % (ref 0–1.8)
BASOPHILS # BLD: 0.05 K/UL (ref 0–0.12)
BILIRUB SERPL-MCNC: 0.6 MG/DL (ref 0.1–1.5)
BUN SERPL-MCNC: 15 MG/DL (ref 8–22)
CALCIUM ALBUM COR SERPL-MCNC: 9 MG/DL (ref 8.5–10.5)
CALCIUM SERPL-MCNC: 9.2 MG/DL (ref 8.5–10.5)
CHLORIDE SERPL-SCNC: 105 MMOL/L (ref 96–112)
CHOLEST SERPL-MCNC: 87 MG/DL (ref 100–199)
CO2 SERPL-SCNC: 24 MMOL/L (ref 20–33)
CREAT SERPL-MCNC: 0.8 MG/DL (ref 0.5–1.4)
CREAT UR-MCNC: 188 MG/DL
EOSINOPHIL # BLD AUTO: 0.12 K/UL (ref 0–0.51)
EOSINOPHIL NFR BLD: 1.5 % (ref 0–6.9)
ERYTHROCYTE [DISTWIDTH] IN BLOOD BY AUTOMATED COUNT: 43.9 FL (ref 35.9–50)
GFR SERPLBLD CREATININE-BSD FMLA CKD-EPI: 96 ML/MIN/1.73 M 2
GLOBULIN SER CALC-MCNC: 2.8 G/DL (ref 1.9–3.5)
GLUCOSE SERPL-MCNC: 90 MG/DL (ref 65–99)
HCT VFR BLD AUTO: 47.8 % (ref 42–52)
HDLC SERPL-MCNC: 31 MG/DL
HGB BLD-MCNC: 16 G/DL (ref 14–18)
IMM GRANULOCYTES # BLD AUTO: 0.05 K/UL (ref 0–0.11)
IMM GRANULOCYTES NFR BLD AUTO: 0.6 % (ref 0–0.9)
LDLC SERPL CALC-MCNC: 46 MG/DL
LYMPHOCYTES # BLD AUTO: 2.8 K/UL (ref 1–4.8)
LYMPHOCYTES NFR BLD: 34.7 % (ref 22–41)
MCH RBC QN AUTO: 29.9 PG (ref 27–33)
MCHC RBC AUTO-ENTMCNC: 33.5 G/DL (ref 32.3–36.5)
MCV RBC AUTO: 89.2 FL (ref 81.4–97.8)
MICROALBUMIN UR-MCNC: 5.8 MG/DL
MICROALBUMIN/CREAT UR: 31 MG/G (ref 0–30)
MONOCYTES # BLD AUTO: 0.75 K/UL (ref 0–0.85)
MONOCYTES NFR BLD AUTO: 9.3 % (ref 0–13.4)
NEUTROPHILS # BLD AUTO: 4.29 K/UL (ref 1.82–7.42)
NEUTROPHILS NFR BLD: 53.3 % (ref 44–72)
NRBC # BLD AUTO: 0 K/UL
NRBC BLD-RTO: 0 /100 WBC (ref 0–0.2)
PLATELET # BLD AUTO: 226 K/UL (ref 164–446)
PMV BLD AUTO: 9.9 FL (ref 9–12.9)
POTASSIUM SERPL-SCNC: 4.6 MMOL/L (ref 3.6–5.5)
PROT SERPL-MCNC: 7 G/DL (ref 6–8.2)
RBC # BLD AUTO: 5.36 M/UL (ref 4.7–6.1)
SODIUM SERPL-SCNC: 141 MMOL/L (ref 135–145)
TRIGL SERPL-MCNC: 50 MG/DL (ref 0–149)
TSH SERPL DL<=0.005 MIU/L-ACNC: 1.47 UIU/ML (ref 0.38–5.33)
WBC # BLD AUTO: 8.1 K/UL (ref 4.8–10.8)

## 2025-07-29 PROCEDURE — 84443 ASSAY THYROID STIM HORMONE: CPT

## 2025-07-29 PROCEDURE — 82570 ASSAY OF URINE CREATININE: CPT

## 2025-07-29 PROCEDURE — 36415 COLL VENOUS BLD VENIPUNCTURE: CPT

## 2025-07-29 PROCEDURE — 82043 UR ALBUMIN QUANTITATIVE: CPT

## 2025-07-29 PROCEDURE — 80061 LIPID PANEL: CPT

## 2025-07-29 PROCEDURE — 85025 COMPLETE CBC W/AUTO DIFF WBC: CPT

## 2025-07-29 PROCEDURE — 99213 OFFICE O/P EST LOW 20 MIN: CPT | Performed by: PSYCHIATRY & NEUROLOGY

## 2025-07-29 PROCEDURE — 80053 COMPREHEN METABOLIC PANEL: CPT

## 2025-07-29 ASSESSMENT — PATIENT HEALTH QUESTIONNAIRE - PHQ9: CLINICAL INTERPRETATION OF PHQ2 SCORE: 0

## 2025-07-29 ASSESSMENT — FIBROSIS 4 INDEX: FIB4 SCORE: 3.17

## 2025-07-29 NOTE — PROGRESS NOTES
[facility] STROKE [facility]INIC    Date of Service: 07/29/25    Referred by: Dale Navarro M.D.  [address]  [address]  [place] [id number]      History of present illness (HPI)   This is a 70-year-old male with a history of CAD, HCV, COPD, hyperlipidemia, and hypertension who presented on 5/28/2025 with several days of progressive left hand weakness, followed by acute onset of left facial droop and altered mentation. CT head was negative; CTA showed a right M1 occlusion with a large ischemic penumbra on perfusion imaging. He received tenecteplase (TNK) and underwent successful thrombectomy with TICI 3 reperfusion. Follow-up CT on 5/29 revealed a small subarachnoid hemorrhage. At follow-up, he reports feeling back to baseline, with full return of left hand strength and normal speech. Exam shows him alert, oriented, ambulatory, and without focal deficits. He understands the importance of smoking cessation.    Stroke history details:  Symptom onset: Days prior to presentation on 5/28/25.  Clinical presentation: Left hand weakness for several days, facial droop, AMS.  MRI brain: Several right MCA territory strokes  Angiogram: Right M1 occlusion  Echocardiogram: Normal, no bubble  Outpatient heart monitor: PACs/PVCs  Treatment: Thrombectomy TICI 3, followed by right carotid endarterectomy.       Vascular risk factors:  Hypertension: [x]  Obesity: []  Diabetes/Pre-diabetes: []  Dyslipidemia: [x]  Smoking: [x]  Atrial fibrillation: []  Sleep apnea: []  Coronary artery disease: [x]  Peripheral vascular disease: [x] left leg DVT in 2023    He is down to half a pack of cigarettes per day but is working on quitting. He is compliant with his medications.       Review of Systems (ROS)  A 10-point review of systems was performed and is negative except as noted in the HPI.    Medical history  Past Medical History[1]      Surgical history  Past Surgical History[2]      Relevant family history  Family History   Problem Relation Age of  Onset    Other Mother         accident    Colorectal Cancer Father     Cancer Father         Colan    Diabetes Father     Cancer Sister         Skin cancer    Cancer Brother         Limpnoma         Social history  Social History     Tobacco Use    Smoking status: Every Day     Current packs/day: 1.00     Average packs/day: 1 pack/day for 53.4 years (53.4 ttl pk-yrs)     Types: Cigarettes     Start date: 3/8/1972    Smokeless tobacco: Never    Tobacco comments:     Working on quiting, going in to get hypnotized 7/18/25   Substance Use Topics    Alcohol use: Not Currently     Comment: Patient stopped drinking 5/28/2018 in sobriety         Medications    Active Medications[3]        Screening    Depression Screening    Little interest or pleasure in doing things?  0 - not at all  Feeling down, depressed , or hopeless? 0 - not at all  Trouble falling or staying asleep, or sleeping too much?     Feeling tired or having little energy?     Poor appetite or overeating?     Feeling bad about yourself - or that you are a failure or have let yourself or your family down?    Trouble concentrating on things, such as reading the newspaper or watching television?    Moving or speaking so slowly that other people could have noticed.  Or the opposite - being so fidgety or restless that you have been moving around a lot more than usual?     Thoughts that you would be better off dead, or of hurting yourself?     Patient Health Questionnaire Score:        If depressive symptoms identified deferred to follow up visit unless specifically addressed in assesment and plan.    Interpretation of PHQ-9 Total Score   Score Severity   1-4 No Depression   5-9 Mild Depression   10-14 Moderate Depression   15-19 Moderately Severe Depression   20-27 Severe Depression          [place] Suicide Severity Rating Scale     Wish to be Dead?: No  Suicidal Thoughts: No    Suicidal Thoughts with Method Without Specific Plan or Intent to Act:    Suicidal  "Intent Without Specific Plan:    Suicide Intent with Specific Plan:    Suicide Behavior Question: No  How long ago did you do any of these?:    C-SSRS Risk Level: No Risk    Additional Suicide Screening Questions    Suspected or Confirmed Suicide Attempted?:    Harming or killing others?:      [place] Suicide Reassessment     New or continued thoughts about killing self?:    Preparing to end life?:              Physical exam    Vitals:    07/29/25 0935   BP: 130/72   BP Location: Left arm   Patient Position: Sitting   BP Cuff Size: Adult   Pulse: 62   Temp: 37.3 °C (99.1 °F)   TempSrc: Temporal   SpO2: 93%   Weight: 72.4 kg (159 lb 9.8 oz)   Height: 1.778 m (5' 10\")           General Exam:    In no distress.  Unlabored breathing.  No rash on visible skin.      Brief Neurological Exam:    Alert and oriented.  Speech fluency and comprehension are intact.  No cranial neuropathies appreciated.   Moves all extremities equally.   Walks unassisted.         Lab Results  Lab Results   Component Value Date/Time    WBC 7.8 07/18/2025 08:50 AM    RBC 5.77 07/18/2025 08:50 AM    HEMOGLOBIN 17.1 07/18/2025 08:50 AM    HEMATOCRIT 51.0 07/18/2025 08:50 AM    MCV 88.4 07/18/2025 08:50 AM    MCH 29.6 07/18/2025 08:50 AM    MCHC 33.5 07/18/2025 08:50 AM    MPV 10.0 07/18/2025 08:50 AM    NEUTSPOLYS 51.90 07/18/2025 08:50 AM    LYMPHOCYTES 36.70 07/18/2025 08:50 AM    MONOCYTES 9.20 07/18/2025 08:50 AM    EOSINOPHILS 1.40 07/18/2025 08:50 AM    BASOPHILS 0.50 07/18/2025 08:50 AM          Lab Results   Component Value Date/Time    SODIUM 140 07/18/2025 08:50 AM    POTASSIUM 4.5 07/18/2025 08:50 AM    CHLORIDE 105 07/18/2025 08:50 AM    CO2 24 07/18/2025 08:50 AM    GLUCOSE 78 07/18/2025 08:50 AM    BUN 16 07/18/2025 08:50 AM    CREATININE 0.87 07/18/2025 08:50 AM        Lab Results   Component Value Date/Time    CHOLSTRLTOT 108 05/29/2025 01:50 AM    LDL 55 05/29/2025 01:50 AM    HDL 40 05/29/2025 01:50 AM    TRIGLYCERIDE 65 05/29/2025 " 01:50 AM               Imaging and additional work up  MRI brain: 5/28/25  1.  A few small areas of acute infarcts in the right cerebral hemisphere in the distribution of right middle cerebral artery. The right M1 and M2 segments are widely patent.  2.  Minimal subarachnoid hemorrhage.  3.  Mild chronic microvascular ischemic disease.        CTA head and neck: 5/28/25  1.  A focal moderate atherosclerotic stenosis in the proximal left vertebral artery.  2.  Occluded right distal M1 segment.  3.  Atherosclerotic calcification in the carotid bulb without significant stenosis at the origins.      IR-thrombectomy: 5/28/25  1.  Cerebral angiography showed a right M1 occlusion.  2.  Successful mechanical thrombectomy of the right M1 occlusion was performed with a single pass using suction and aspiration techniques.  3.  TICI 3 restoration of flow.  4.  Pseudoaneurysm noted at the interface between the graft and the native vessel in the left common femoral artery.    Carotid US: 6/26/25  Heterogeneous calcific  plaque of the bifurcation extending into the    internal carotid artery. <50% internal carotid artery stenosis.      Assessment and Plan  This is a 70 y.o. male who presented for follow up of a right hemispheric stroke due to R MCA occlusion and R ICA carotid stenosis.     TOAST Stroke Subtype:  Large-Artery Atherosclerosis: [x]  Cardio-embolism: []  Small-Vessel Occlusion/Lacunar Stroke: []  Stroke of Other Determined Etiology: []  Stroke of Undetermined Etiology: []      Continue with aspirin 81 mg QD, lipitor 40 mg and blood pressure medication.  Smoking cessation is the most important thing he can do to prevent another stroke.       Numerous questions were answered to the best of my knowledge. The patient is in agreement with the plan. Return to the clinic PRN    ADMINISTRATIVE BILLING  I spent a total of 25 minutes on this patient encounter.        Liborio Maher MD  Diplomate of the American Board of  Psychiatry and Neurology.  General Neurology & Neuro-Oncology.  [facility].   of Clinical Neurology at [facility].             [1]   Past Medical History:  Diagnosis Date    Acute cerebrovascular accident (CVA) (HCC) 05/28/2025    Anesthesia     Intraoperative awareness around 1990, biopsy of liver.    Awareness under anesthesia 2009    Liver biopsy    Basal cell carcinoma (BCC) in situ of skin     Blood clotting disorder (HCC)     Right leg 2023    CAD (coronary artery disease)     Patient denies    Cancer (HCC)     skin    COPD (chronic obstructive pulmonary disease) (HCC)     COPD (chronic obstructive pulmonary disease) (HCC)     Emphysema of lung (HCC) 2018    COPD, inhaler prn    Heart burn 07/16/2025    tums prn    Hep C w/o coma, chronic (HCC)     Hepatitis C 07/16/2025    treated.  Scarring of liver stage 1, in remission    High cholesterol 2018    medicated    Hospital discharge follow-up 06/05/2023    Hypertension 07/16/2025    medicated    Indigestion 07/16/2025    tums prn    Renal disorder     Patient denies any kidney issues    Snoring     Soft tissue infection 05/25/2023    Stenosis of aorta     Stenosis of femoral artery (HCC)     Stroke (HCC) 6-4 2025    memory loss r/t stroke June 2025    Wound dehiscence 05/19/2023   [2]   Past Surgical History:  Procedure Laterality Date    MO THROMBOENDARTECTMY NECK,NECK INCIS Right 7/23/2025    Procedure: RIGHT CAROTID ENDARTERECTOMY;  Surgeon: Negar Machado M.D.;  Location: SURGERY [facility];  Service: Vascular    LUMBAR LAMINECTOMY DISKECTOMY  08/12/2024    Procedure: POSTERIOR BILATERAL L3-4 LUMBAR INTERNAL DECOMPRESSION;  Surgeon: Franc Carlisle M.D.;  Location: SURGERY [facility];  Service: Neurosurgery    DEBRIDEMENT Right 05/19/2023    Procedure: RIGHT GROIN WOUND DEBRIDEMENT;  Surgeon: Negar Machado M.D.;  Location: SURGERY [facility];  Service: General    MO REPR  ANEURYSM/GRFT INS,COMMON FEMORAL Right 05/03/2023    Procedure: OPEN REPAIR OF RIGHT GROIN PSEUDOANEURYSM;  Surgeon: Negar Machado M.D.;  Location: SURGERY [facility];  Service: General    AORTOGRAM Right 05/03/2023    Procedure: AORTOGRAM, RIGHT LEG;  Surgeon: Negar Machado M.D.;  Location: SURGERY [facility];  Service: General    THROMBECTOMY Right 05/03/2023    Procedure: THROMBECTOMY, RIGHT LEG;  Surgeon: Negar Machado M.D.;  Location: SURGERY [facility];  Service: General    INGUINAL HERNIA REPAIR BILATERAL      LAMINOTOMY      lumbar (rods)    MENISCUS REPAIR Left     NO PERTINENT PAST SURGICAL HISTORY  2925    Stroke 2 blood clots removed from brain, 2025    OTHER  2014 2023    Bilateral aorta to femeral arteries grafts    OTHER NEUROLOGICAL SURG  2014    Cage 4 th lumbar    OTHER ORTHOPEDIC SURGERY  2014    TONSILLECTOMY      As a baby   [3]   Outpatient Medications Marked as Taking for the 7/29/25 encounter (Office Visit) with Liborio Maher M.D.   Medication Sig Dispense Refill    calcium carbonate (TUMS) 500 MG Chew Tab Chew 500-1,000 mg 1 time a day as needed. Indications: Heartburn      citalopram (CELEXA) 20 MG Tab Take 1 Tablet by mouth every evening. 100 Tablet 3    metoprolol SR (TOPROL XL) 25 MG TABLET SR 24 HR Take 1 Tablet by mouth every day. 100 Tablet 3    atorvastatin (LIPITOR) 40 MG Tab Take 1 Tablet by mouth every evening. 100 Tablet 3    lisinopril (PRINIVIL) 20 MG Tab Take 1 Tablet by mouth at bedtime. Indications: High Blood Pressure 100 Tablet 3    tadalafil (CIALIS) 10 MG tablet Take 1 Tablet by mouth 1 time a day as needed for Erectile Dysfunction. 10 Tablet 11    buPROPion (WELLBUTRIN) 75 MG Tab Take 1 Tablet by mouth 2 times a day. 200 Tablet 3    albuterol 108 (90 Base) MCG/ACT Aero Soln inhalation aerosol Inhale 2 Puffs every four hours as needed for Shortness of Breath. 18 g 2    aspirin 81 MG EC tablet Take 81 mg by mouth every evening.

## 2025-07-29 NOTE — TELEPHONE ENCOUNTER
Pt states whenever he has a headache he will take his BP, and it is always high. This AM had a headache, and BP was 168/86.   Pt states he has been taking his medication, but not taking his BP unless he feels unwell.   Pt encouraged to keep a log of BP every day. Pt present with spouse, both verbalized understanding.   Pt was driving to his appointment with , neurology, at the time of the phone call. Pt told to let  know about this high BP and accompanying symptoms. Pt verbalized understanding.   New f/u with PCP made for 7/31.   Pt denies dizziness, vision changes, confusion, brain fog, weakness. Pt states the celexa has helped with  not get so angry. Let pt know I was glad to hear, and will pass this along to PCP.   ED precautions reviewed with pt.   No further needs at this time.

## 2025-07-31 ENCOUNTER — OFFICE VISIT (OUTPATIENT)
Dept: MEDICAL GROUP | Facility: MEDICAL CENTER | Age: 70
End: 2025-07-31
Payer: MEDICARE

## 2025-07-31 VITALS
DIASTOLIC BLOOD PRESSURE: 66 MMHG | SYSTOLIC BLOOD PRESSURE: 124 MMHG | RESPIRATION RATE: 14 BRPM | OXYGEN SATURATION: 96 % | BODY MASS INDEX: 23.11 KG/M2 | TEMPERATURE: 98.4 F | WEIGHT: 161.4 LBS | HEART RATE: 61 BPM | HEIGHT: 70 IN

## 2025-07-31 DIAGNOSIS — R51.9 NONINTRACTABLE HEADACHE, UNSPECIFIED CHRONICITY PATTERN, UNSPECIFIED HEADACHE TYPE: ICD-10-CM

## 2025-07-31 DIAGNOSIS — I10 ESSENTIAL HYPERTENSION: ICD-10-CM

## 2025-07-31 DIAGNOSIS — Z98.890 S/P CAROTID ENDARTERECTOMY: Primary | ICD-10-CM

## 2025-07-31 DIAGNOSIS — F17.210 CIGARETTE SMOKER: ICD-10-CM

## 2025-07-31 ASSESSMENT — FIBROSIS 4 INDEX: FIB4 SCORE: 1.84

## 2025-07-31 NOTE — PROGRESS NOTES
"Verbal consent was acquired by the patient to use [employer] ambient listening note generation during this visit    Subjective:     CC: \"surgery follow up\"    History of Present Illness  The patient presents for evaluation of post-surgical swelling, hypertension, headaches, and smoking cessation.    Post-Surgical Swelling  He underwent a carotid endarterectomy on 07/23/2025. The surgical site is currently dry and itchy, with some swelling present. He attempted to shave the area yesterday but found it to be tender. He is seeking a topical treatment for the dryness and itchiness. He was informed that he should not have a scar.  - Onset: Post-surgery on 07/23/2025.  - Location: Surgical site.  - Character: Dry, itchy, and tender with some swelling.  - Alleviating/Aggravating Factors: Seeking topical treatment for dryness and itchiness.  - Timing: Attempted to shave the area yesterday.    Hypertension  His neurologist advised him to discontinue metoprolol and instead take another medication in the morning. However, he forgot to take it yesterday, resulting in a blood pressure reading of 160/111. He is currently on metoprolol 25 mg.  - Onset: Forgot to take medication yesterday.  - Character: Blood pressure reading of 160/111.  - Timing: Currently on metoprolol 25 mg.    Headaches  He has been experiencing frequent headaches. His neurologist recommended a daily intake of 400 mg of magnesium and to continue with his vitamin regimen. He was also advised against taking 350 mg of Tylenol, which he had been using twice daily for pain management, and to only take it as needed.  - Onset: Frequent.  - Character: Headaches.  - Alleviating/Aggravating Factors: Recommended daily intake of 400 mg of magnesium and continuation of vitamin regimen; advised against taking 350 mg of Tylenol twice daily.  - Timing: Only take Tylenol as needed.    Smoking Cessation  He is making efforts to quit smoking, currently consuming half a pack of " "cigarettes daily. He is using Trim PM and Trim AM as part of his smoking cessation plan. He reports no sleep disturbances.  - Onset: Currently consuming half a pack daily.  - Character: Smoking cessation efforts.  - Alleviating/Aggravating Factors: Using Trim PM and Trim AM.  - Severity: Reports no sleep disturbances.    Tobacco: He smokes cigarettes, currently consuming half a pack daily.  Sleep: He reports no issues with sleep.  Sexual Practices: He reports difficulty with climax during sexual activity.    PAST SURGICAL HISTORY:  - Carotid endarterectomy on 07/23/2025          Objective:     Exam:  /66 (BP Location: Right arm, Patient Position: Sitting, BP Cuff Size: Adult)   Pulse 61   Temp 36.9 °C (98.4 °F) (Temporal)   Resp 14   Ht 1.778 m (5' 10\")   Wt 73.2 kg (161 lb 6.4 oz)   SpO2 96%   BMI 23.16 kg/m²  Body mass index is 23.16 kg/m².    Physical Exam  General Appearance: Normal.  Vital signs: Within normal limits.  HEENT: Within normal limits.  Respiratory: Within normal limits.  Skin: Healing well, some swelling and tenderness noted on the right side of the neck.  Neurological: Normal.  Neck: Swelling noted, non-pulsatile.  Physical Exam        Results  Labs   - Lipid Panel: 07/29/2025, Total cholesterol 87 mg/dL, LDL 46 mg/dL   - Metabolic Panel: 07/29/2025, Glucose 90 mg/dL, Creatinine 0.8 mg/dL, GFR 96   - Microalbumin to Creatinine Ratio: 07/29/2025, Microalbumin to creatinine ratio 31   - TSH: 07/29/2025, TSH 1.4   - Complete Blood Count (CBC): 07/29/2025, White blood cells 8.1, Hemoglobin 16, Platelets 226      Assessment & Plan:       1. S/P carotid endarterectomy    2. Essential hypertension    3. Nonintractable headache, unspecified chronicity pattern, unspecified headache type    4. Cigarette smoker      Assessment & Plan  1. Post-surgical swelling: Stable.  - Surgical site appears to be healing well, with no signs of infection or complications.  - Swelling likely due to tissue " inflammation and should resolve over time.  - Apply Vaseline as a barrier cream if the area becomes uncomfortable.  - Monitor for signs of infection such as increasing redness, heat, or discharge, and seek immediate medical attention if these symptoms occur.    2. Hypertension: Stable.  - Blood pressure readings are within the normal range today at 124/66.  - Continue taking metoprolol 25 mg in the morning as recommended by the neurologist.  - Blood pressure elevated to 160/111 when metoprolol was missed.  - Magnesium 400 mg recommended to help with blood pressure and headaches.    3. Headaches: Chronic.  - Continue taking Tylenol 325 mg as needed for pain relief, ensuring not to exceed 3000 mg in a 24-hour period.  - Take an additional 200 mg of magnesium glycinate at bedtime to help with headaches and sleep.  - Riboflavin (B2), CoQ10, and magnesium recommended for headache and migraine prophylaxis.  - Headaches have been frequent, possibly related to recent surgery and medication adjustments.    4. Smoking cessation.  - Continue efforts towards smoking cessation.  - Attend the upcoming smoking cessation class next week.  - Currently smoking half a pack per day, with plans to reduce further.  - Utilize relaxation techniques and visual cues to decrease smoking.    Follow-up  - 08/08/2025.         Return in about 5 months (around 12/31/2025), or if symptoms worsen or fail to improve, for Lifestyle.      This note was created using voice recognition software (Dragon). The accuracy of the dictation is limited by the abilities of the software. I have reviewed the note prior to signing, however some errors in grammar and context are still possible. If you have any questions related to this note please do not hesitate to contact our office.

## 2025-08-07 ENCOUNTER — OFFICE VISIT (OUTPATIENT)
Facility: MEDICAL CENTER | Age: 70
End: 2025-08-07
Payer: MEDICARE

## 2025-08-07 ENCOUNTER — TELEPHONE (OUTPATIENT)
Facility: MEDICAL CENTER | Age: 70
End: 2025-08-07

## 2025-08-07 VITALS
SYSTOLIC BLOOD PRESSURE: 124 MMHG | BODY MASS INDEX: 22.95 KG/M2 | WEIGHT: 160.27 LBS | HEART RATE: 65 BPM | TEMPERATURE: 99.1 F | HEIGHT: 70 IN | OXYGEN SATURATION: 97 % | DIASTOLIC BLOOD PRESSURE: 62 MMHG

## 2025-08-07 DIAGNOSIS — I72.4 PSEUDOANEURYSM OF FEMORAL ARTERY (HCC): ICD-10-CM

## 2025-08-07 DIAGNOSIS — Z98.890 S/P CAROTID ENDARTERECTOMY: Primary | ICD-10-CM

## 2025-08-07 PROCEDURE — 3078F DIAST BP <80 MM HG: CPT | Performed by: SURGERY

## 2025-08-07 PROCEDURE — 99024 POSTOP FOLLOW-UP VISIT: CPT | Performed by: SURGERY

## 2025-08-07 PROCEDURE — 3074F SYST BP LT 130 MM HG: CPT | Performed by: SURGERY

## 2025-08-07 ASSESSMENT — FIBROSIS 4 INDEX: FIB4 SCORE: 1.84

## 2025-08-08 ENCOUNTER — TELEPHONE (OUTPATIENT)
Facility: MEDICAL CENTER | Age: 70
End: 2025-08-08
Payer: MEDICARE

## 2025-08-15 ENCOUNTER — TELEPHONE (OUTPATIENT)
Dept: MEDICAL GROUP | Facility: MEDICAL CENTER | Age: 70
End: 2025-08-15
Payer: MEDICARE

## 2025-08-18 ENCOUNTER — TELEPHONE (OUTPATIENT)
Dept: NEUROLOGY | Facility: MEDICAL CENTER | Age: 70
End: 2025-08-18
Payer: MEDICARE

## 2025-08-19 ENCOUNTER — PATIENT MESSAGE (OUTPATIENT)
Dept: MEDICAL GROUP | Facility: MEDICAL CENTER | Age: 70
End: 2025-08-19
Payer: MEDICARE

## 2025-08-21 ENCOUNTER — HOSPITAL ENCOUNTER (OUTPATIENT)
Dept: RADIOLOGY | Facility: MEDICAL CENTER | Age: 70
End: 2025-08-21
Attending: SURGERY
Payer: MEDICARE

## 2025-08-21 DIAGNOSIS — N52.9 ERECTILE DYSFUNCTION, UNSPECIFIED ERECTILE DYSFUNCTION TYPE: ICD-10-CM

## 2025-08-21 DIAGNOSIS — I72.4 PSEUDOANEURYSM OF FEMORAL ARTERY (HCC): ICD-10-CM

## 2025-08-21 PROCEDURE — 75635 CT ANGIO ABDOMINAL ARTERIES: CPT

## 2025-08-21 PROCEDURE — 700117 HCHG RX CONTRAST REV CODE 255: Performed by: SURGERY

## 2025-08-21 PROCEDURE — RXMED WILLOW AMBULATORY MEDICATION CHARGE: Performed by: FAMILY MEDICINE

## 2025-08-21 RX ORDER — TADALAFIL 10 MG/1
10 TABLET ORAL
Qty: 10 TABLET | Refills: 11 | Status: SHIPPED | OUTPATIENT
Start: 2025-08-21

## 2025-08-21 RX ADMIN — IOHEXOL 100 ML: 350 INJECTION, SOLUTION INTRAVENOUS at 08:34

## 2025-08-25 ENCOUNTER — PHARMACY VISIT (OUTPATIENT)
Dept: PHARMACY | Facility: MEDICAL CENTER | Age: 70
End: 2025-08-25
Payer: COMMERCIAL

## 2025-08-26 ENCOUNTER — OFFICE VISIT (OUTPATIENT)
Facility: MEDICAL CENTER | Age: 70
End: 2025-08-26
Payer: MEDICARE

## 2025-08-26 VITALS
HEART RATE: 66 BPM | TEMPERATURE: 98.7 F | WEIGHT: 160.38 LBS | DIASTOLIC BLOOD PRESSURE: 70 MMHG | HEIGHT: 70 IN | SYSTOLIC BLOOD PRESSURE: 124 MMHG | BODY MASS INDEX: 22.96 KG/M2 | OXYGEN SATURATION: 98 %

## 2025-08-26 DIAGNOSIS — I73.9 PERIPHERAL ARTERIAL DISEASE (HCC): ICD-10-CM

## 2025-08-26 DIAGNOSIS — Z72.0 TOBACCO USE: ICD-10-CM

## 2025-08-26 DIAGNOSIS — J44.9 CHRONIC OBSTRUCTIVE PULMONARY DISEASE, UNSPECIFIED COPD TYPE (HCC): ICD-10-CM

## 2025-08-26 DIAGNOSIS — I72.4 PSEUDOANEURYSM OF LEFT FEMORAL ARTERY (HCC): Primary | ICD-10-CM

## 2025-08-26 DIAGNOSIS — Z98.890 HISTORY OF RIGHT-SIDED CAROTID ENDARTERECTOMY: ICD-10-CM

## 2025-08-26 ASSESSMENT — FIBROSIS 4 INDEX: FIB4 SCORE: 1.84

## 2025-08-27 ENCOUNTER — TELEPHONE (OUTPATIENT)
Dept: NEUROLOGY | Facility: MEDICAL CENTER | Age: 70
End: 2025-08-27
Payer: MEDICARE

## 2025-08-28 ENCOUNTER — PATIENT OUTREACH (OUTPATIENT)
Dept: HEALTH INFORMATION MANAGEMENT | Facility: OTHER | Age: 70
End: 2025-08-28
Payer: MEDICARE

## (undated) DEVICE — TRANSDUCER ADULT DISP. SINGLE BONDED STERILE - (20EA/CA)

## (undated) DEVICE — SURGIFOAM (SIZE 100) - (6EA/CA)

## (undated) DEVICE — GLOVE BIOGEL INDICATOR SZ 7SURGICAL PF LTX - (50/BX 4BX/CA)

## (undated) DEVICE — SUTURE 4-0 SILK 12 X 18 INCH - (36/BX)

## (undated) DEVICE — GLOVE SZ 7 BIOGEL PI MICRO - PF LF (50PR/BX 4BX/CA)

## (undated) DEVICE — SUTURE GENERAL

## (undated) DEVICE — LACTATED RINGERS INJ 1000 ML - (14EA/CA 60CA/PF)

## (undated) DEVICE — SOD. CHL. INJ. 0.9% 250 ML - (36/CA 50CA/PF)

## (undated) DEVICE — TRAY SURESTEP FOLEY TEMP SENSING 16FR (10EA/CA) ORDER  #18764 FOR TEMP FOLEY ONLY

## (undated) DEVICE — CANNULA O2 COMFORT SOFT EAR ADULT 7 FT TUBING (50/CA)

## (undated) DEVICE — KIT EVACUATER 3 SPRING PVC LF 1/8 DRAIN SIZE (10EA/CA)"

## (undated) DEVICE — SLEEVE VASO DVT COMPRESSION CALF MED - (10PR/CA)

## (undated) DEVICE — SENSOR OXIMETER ADULT SPO2 RD SET (20EA/BX)

## (undated) DEVICE — CLIP MED LG INTNL HRZN TI ESCP - (20/BX)

## (undated) DEVICE — SUCTION INSTRUMENT YANKAUER BULBOUS TIP W/O VENT (50EA/CA)

## (undated) DEVICE — DRAPE SURGICAL U 77X120 - (10/CA)

## (undated) DEVICE — STAPLER SKIN DISP - (6/BX 10BX/CA) VISISTAT

## (undated) DEVICE — SUTURE 5-0 PROLENE C-1 D/A 24 (36PK/BX)"

## (undated) DEVICE — CLIP LG INTNL HRZN TI ESCP LGT - (20/BX)

## (undated) DEVICE — SUTURE 3-0 VICRYL PLUS RB-1 - 8 X 18 INCH (12/BX)

## (undated) DEVICE — SET EXTENSION WITH 2 PORTS (48EA/CA) ***PART #2C8610 IS A SUBSTITUTE*****

## (undated) DEVICE — MIDAS LUBRICATOR DIFFUSER PACK (4EA/CA)

## (undated) DEVICE — SODIUM CHL IRRIGATION 0.9% 1000ML (12EA/CA)

## (undated) DEVICE — SYRINGE SAFETY TB 1 ML 27 GA X 1/2 IN (100/BX 4BX/CA)

## (undated) DEVICE — GLOVE BIOGEL PI ORTHO SZ 7.5 PF LF (40PR/BX)

## (undated) DEVICE — DRAPE LARGE 3 QUARTER - (20/CA)

## (undated) DEVICE — HANDPIECE 10FT INTPLS SCT PLS IRRIGATION HAND CONTROL SET (6/PK)

## (undated) DEVICE — TUBE CONNECTING SUCTION - CLEAR PLASTIC STERILE 72 IN (50EA/CA)

## (undated) DEVICE — SYRINGE NON SAFETY 10 CC 20 GA X 1-1/2 IN (100/BX 4BX/CA)

## (undated) DEVICE — PACK NEURO - (2EA/CA)

## (undated) DEVICE — GELAQUASONIC 100 ULTRASOUND - 48/BX 20GM STERILE FOIL POUCH

## (undated) DEVICE — CHLORAPREP 26 ML APPLICATOR - ORANGE TINT(25/CA)

## (undated) DEVICE — TOWELS CLOTH SURGICAL - (4/PK 20PK/CA)

## (undated) DEVICE — SUTURE 2-0 VICRYL PLUS CTX - 8 X 18 INCH (12/BX)

## (undated) DEVICE — Device

## (undated) DEVICE — SUTURE 7-0 PROLENE BV-1 D/A 24 (36PK/BX)"

## (undated) DEVICE — DISH PETRI STERILE (50EA/CA)

## (undated) DEVICE — SPONGE GAUZESTER 4 X 4 4PLY - (128PK/CA)

## (undated) DEVICE — GOWN WARMING STANDARD FLEX - (30/CA)

## (undated) DEVICE — DERMABOND ADVANCED - (12EA/BX)

## (undated) DEVICE — INSTRUMENT JAWCOVER SUTURE - BOOTS (5PK/BX)

## (undated) DEVICE — SYRINGE 30 ML LL (56/BX)

## (undated) DEVICE — CANISTER SUCTION 3000ML MECHANICAL FILTER AUTO SHUTOFF MEDI-VAC NONSTERILE LF DISP (40EA/CA)

## (undated) DEVICE — CLEANER ELECTRO-SURGICAL TIP - (25/BX 4BX/CA)

## (undated) DEVICE — SLEEVE VASO CALF MED - (10PR/CA)

## (undated) DEVICE — SOD. CHL. INJ. 0.9% 1000 ML - (14EA/CA 60CA/PF)

## (undated) DEVICE — SUTURE CV

## (undated) DEVICE — SET FLUID WARMING STANDARD FLOW - (10/CA)

## (undated) DEVICE — SYSTEM PEEL & PLACE 13CM INCISIONS

## (undated) DEVICE — CANISTER SUCTION 3000ML MECHANICAL FILTER AUTO SHUTOFF MEDI-VAC NONSTERILE LF DISP  (40EA/CA)

## (undated) DEVICE — SYRINGE 10 ML CONTROL LL (25EA/BX 4BX/CA)

## (undated) DEVICE — SUTURE 0 VICRYL PLUS CT-1 - 8 X 18 INCH (12/BX)

## (undated) DEVICE — VESSELOOP MINI BLUE STERILE - SURG-I-LOOP (10EA/BX)

## (undated) DEVICE — SYRINGE NON SAFETY 5 CC 21 GA X 1-1/2 IN (100/BX 4BX/CA)

## (undated) DEVICE — RESERVOIR SUCTION 100 CC - SILICONE (20EA/CA)

## (undated) DEVICE — PACK MAJOR BASIN - (2EA/CA)

## (undated) DEVICE — ELECTRODE DUAL RETURN W/ CORD - (50/PK)

## (undated) DEVICE — DRAPE 36X28IN RAD CARM BND BG - (25/CA) O

## (undated) DEVICE — ADHESIVE MASTISOL - (48/BX)

## (undated) DEVICE — TUBING CLEARLINK DUO-VENT - C-FLO (48EA/CA)

## (undated) DEVICE — SET LEADWIRE 5 LEAD BEDSIDE DISPOSABLE ECG (1SET OF 5/EA)

## (undated) DEVICE — TRAY, CV CATH MULTI-LUM.AK-25703

## (undated) DEVICE — SUTURE 3-0 VICRYL PLUS SH - 27 INCH (36/BX)

## (undated) DEVICE — PACK MINOR BASIN - (2EA/CA)

## (undated) DEVICE — GLOVE BIOGEL INDICATOR SZ 7.5 SURGICAL PF LTX - (50PR/BX 4BX/CA)

## (undated) DEVICE — SHUNT PRUITT CAROTID T 9 FR---MUST ORDER MINIMUM OF 10---

## (undated) DEVICE — SODIUM CHL. IRRIGATION 0.9% 3000ML (4EA/CA 65CA/PF)

## (undated) DEVICE — SUTURE 6-0 PROLENE BV-1 D/A 24 (36PK/BX)"

## (undated) DEVICE — GLOVE BIOGEL SZ 8.5 SURGICAL PF LTX - (50PR/BX 4BX/CA)

## (undated) DEVICE — TUBE E-T HI-LO CUFF 7.5MM (10EA/PK)

## (undated) DEVICE — BLADE SURGICAL #11 - (50/BX)

## (undated) DEVICE — SUTURE 4-0 MONOCRYL PLUS PS-1 - 27 INCH (36/BX)

## (undated) DEVICE — PEN SKIN MARKER W/RULER - (50EA/BX)

## (undated) DEVICE — KIT  I.V. START (100EA/CA)

## (undated) DEVICE — SUTURE 3-0 ETHILON FS-1 - (36/BX) 30 INCH

## (undated) DEVICE — CANISTER SUCTION RIGID RED 1500CC (40EA/CA)

## (undated) DEVICE — HEMOSTAT ABSORBABLE POWDER SURGICEL 3G (5EA/BX)

## (undated) DEVICE — NEEDLE NON SAFETY 25 GA X 1 1/2 IN HYPO (100EA/BX)

## (undated) DEVICE — COVER LIGHT HANDLE ALC PLUS DISP (18EA/BX)

## (undated) DEVICE — KIT RADIAL ARTERY 20GA W/MAX BARRIER AND BIOPATCH  (5EA/CA) #10740 IS FOR THE SET RADIAL ARTERIAL

## (undated) DEVICE — DRAPE IOBAN II INCISE 23X17 - (10EA/BX 4BX/CA)

## (undated) DEVICE — SLEEVE, VASO, THIGH, MED

## (undated) DEVICE — CLIP SM INTNL HRZN TI ESCP LGT - (24EA/PK 25PK/BX)

## (undated) DEVICE — SET BIFURCATED BLOOD - (48EA/CS)

## (undated) DEVICE — TRANSDUCER ADULT DISP. SINGLE BONDED STERILE - (10EA/CA)

## (undated) DEVICE — DECANTER FLD BLS - (50/CA)

## (undated) DEVICE — GLOVE BIOGEL PI INDICATOR SZ 7.0 SURGICAL PF LF - (50/BX 4BX/CA)

## (undated) DEVICE — TUBE E-T HI-LO CUFF 7.0MM (10EA/PK)

## (undated) DEVICE — TOWEL STOP TIMEOUT SAFETY FLAG (40EA/CA)

## (undated) DEVICE — GLOVE BIOGEL SZ 7 SURGICAL PF LTX - (50PR/BX 4BX/CA)

## (undated) DEVICE — POLY UMBILICAL TAPE 1/8X30 - (36/BX)

## (undated) DEVICE — SYRINGE 20 ML LL (50EA/BX 4BX/CA)

## (undated) DEVICE — CLOSURE WOUND 1/4 X 4 (STERI - STRIP) (50/BX 4BX/CA)

## (undated) DEVICE — SUTURE 4-0 MONOCRYL PLUS PS-2 - 27 INCH (36/BX)

## (undated) DEVICE — CATHETER EMBOLECTOMY 4FR (5EA/CA)

## (undated) DEVICE — SUTURE 3-0 SILK 12 X 18 IN - (36/BX)

## (undated) DEVICE — MASK OXYGEN VNYL ADLT MED CONC WITH 7 FOOT TUBING  - (50EA/CA)

## (undated) DEVICE — LACTATED RINGERS INJ. 500 ML - (24EA/CA)

## (undated) DEVICE — SUTURE 3-0 VICRYL PLUS SH - 8X 18 INCH (12/BX)

## (undated) DEVICE — CLIP MED INTNL HRZN TI ESCP - (25/BX)

## (undated) DEVICE — WIPE SURGICAL INSTRUMENT VISIWIPE 2-7/8IN X 2-7/8IN (20EA/PK)

## (undated) DEVICE — DRAPE MAGNETIC (INSTRA-MAG) - (30/CA)

## (undated) DEVICE — PACK AV FISTULA (2EA/CA)

## (undated) DEVICE — SODIUM CHL. INJ. 0.9% 500ML (24EA/CA 50CA/PF)

## (undated) DEVICE — TIP INTPLS HFLO ML ORFC BTRY - (12/CS)  FOR SURGILAV

## (undated) DEVICE — VESSELOOP MAXI BLUE STERILE- SURG-I-LOOP (10EA/BX)

## (undated) DEVICE — GOWN SURGEONS X-LARGE - DISP. (30/CA)

## (undated) DEVICE — ARMREST CRADLE FOAM - (2PR/PK 12PR/CA)

## (undated) DEVICE — WATER IRRIGATION STERILE 1000ML (12EA/CA)

## (undated) DEVICE — HEADREST PRONEVIEW LARGE - (10/CA)

## (undated) DEVICE — BOVIE  BLADE 6 EXTENDED - (50/PK)

## (undated) DEVICE — SHEET THYROID - (10EA/CA)

## (undated) DEVICE — SYRINGE NON SAFETY 10 CC 21 GA X 1-1/2 IN (100/BX 4BX/CA)

## (undated) DEVICE — GLOVE BIOGEL SZ 6.5 SURGICAL PF LTX (50PR/BX 4BX/CA)

## (undated) DEVICE — BLADE SURGICAL #15 - (50/BX 3BX/CA)

## (undated) DEVICE — KIT SURGIFLO W/OUT THROMBIN - (6EA/CA)

## (undated) DEVICE — TOOL MR8 14CM MATCH HD SYM-TRI 3MM DIAMETER (1/EA)